# Patient Record
Sex: FEMALE | URBAN - METROPOLITAN AREA
[De-identification: names, ages, dates, MRNs, and addresses within clinical notes are randomized per-mention and may not be internally consistent; named-entity substitution may affect disease eponyms.]

---

## 2017-01-11 ENCOUNTER — INPATIENT (INPATIENT)
Facility: HOSPITAL | Age: 32
LOS: 1 days | Discharge: HOME | DRG: 314 | End: 2017-01-13
Attending: INTERNAL MEDICINE | Admitting: INTERNAL MEDICINE
Payer: MEDICAID

## 2017-01-11 VITALS
TEMPERATURE: 98 F | SYSTOLIC BLOOD PRESSURE: 152 MMHG | RESPIRATION RATE: 21 BRPM | HEART RATE: 95 BPM | OXYGEN SATURATION: 96 % | DIASTOLIC BLOOD PRESSURE: 106 MMHG

## 2017-01-11 DIAGNOSIS — R07.9 CHEST PAIN, UNSPECIFIED: ICD-10-CM

## 2017-01-11 DIAGNOSIS — Q24.9 CONGENITAL MALFORMATION OF HEART, UNSPECIFIED: Chronic | ICD-10-CM

## 2017-01-11 DIAGNOSIS — I63.9 CEREBRAL INFARCTION, UNSPECIFIED: ICD-10-CM

## 2017-01-11 DIAGNOSIS — E78.5 HYPERLIPIDEMIA, UNSPECIFIED: ICD-10-CM

## 2017-01-11 DIAGNOSIS — I10 ESSENTIAL (PRIMARY) HYPERTENSION: ICD-10-CM

## 2017-01-11 DIAGNOSIS — R11.10 VOMITING, UNSPECIFIED: ICD-10-CM

## 2017-01-11 DIAGNOSIS — K92.2 GASTROINTESTINAL HEMORRHAGE, UNSPECIFIED: ICD-10-CM

## 2017-01-11 DIAGNOSIS — I50.22 CHRONIC SYSTOLIC (CONGESTIVE) HEART FAILURE: ICD-10-CM

## 2017-01-11 LAB
ALBUMIN SERPL ELPH-MCNC: 3.3 G/DL — LOW (ref 3.4–5)
ALP SERPL-CCNC: 52 U/L — SIGNIFICANT CHANGE UP (ref 40–120)
ALT FLD-CCNC: 36 U/L — SIGNIFICANT CHANGE UP (ref 12–42)
ANION GAP SERPL CALC-SCNC: 2 MMOL/L — LOW (ref 9–16)
ANION GAP SERPL CALC-SCNC: 4 MMOL/L — LOW (ref 9–16)
ANION GAP SERPL CALC-SCNC: 5 MMOL/L — LOW (ref 9–16)
APPEARANCE UR: CLEAR — SIGNIFICANT CHANGE UP
APTT BLD: 27.9 SEC — SIGNIFICANT CHANGE UP (ref 27.5–37.4)
AST SERPL-CCNC: 78 U/L — HIGH (ref 15–37)
BACTERIA # UR AUTO: PRESENT /HPF
BASOPHILS NFR BLD AUTO: 0.9 % — SIGNIFICANT CHANGE UP (ref 0–2)
BILIRUB SERPL-MCNC: 1.3 MG/DL — HIGH (ref 0.2–1.2)
BILIRUB UR-MCNC: (no result)
BUN SERPL-MCNC: 15 MG/DL — SIGNIFICANT CHANGE UP (ref 7–23)
BUN SERPL-MCNC: 15 MG/DL — SIGNIFICANT CHANGE UP (ref 7–23)
BUN SERPL-MCNC: 16 MG/DL — SIGNIFICANT CHANGE UP (ref 7–23)
CALCIUM SERPL-MCNC: 8.2 MG/DL — LOW (ref 8.5–10.5)
CALCIUM SERPL-MCNC: 8.3 MG/DL — LOW (ref 8.5–10.5)
CALCIUM SERPL-MCNC: 8.3 MG/DL — LOW (ref 8.5–10.5)
CHLORIDE SERPL-SCNC: 96 MMOL/L — SIGNIFICANT CHANGE UP (ref 96–108)
CHLORIDE SERPL-SCNC: 97 MMOL/L — SIGNIFICANT CHANGE UP (ref 96–108)
CHLORIDE SERPL-SCNC: 98 MMOL/L — SIGNIFICANT CHANGE UP (ref 96–108)
CK MB CFR SERPL CALC: <1 NG/ML — SIGNIFICANT CHANGE UP (ref 0.5–3.6)
CK SERPL-CCNC: 118 U/L — SIGNIFICANT CHANGE UP (ref 26–192)
CK SERPL-CCNC: 36 U/L — SIGNIFICANT CHANGE UP (ref 26–192)
CK SERPL-CCNC: 47 U/L — SIGNIFICANT CHANGE UP (ref 26–192)
CO2 SERPL-SCNC: 38 MMOL/L — HIGH (ref 22–31)
CO2 SERPL-SCNC: 39 MMOL/L — HIGH (ref 22–31)
CO2 SERPL-SCNC: 39 MMOL/L — HIGH (ref 22–31)
COLOR SPEC: YELLOW — SIGNIFICANT CHANGE UP
CREAT SERPL-MCNC: 0.83 MG/DL — SIGNIFICANT CHANGE UP (ref 0.5–1.3)
CREAT SERPL-MCNC: 0.9 MG/DL — SIGNIFICANT CHANGE UP (ref 0.5–1.3)
CREAT SERPL-MCNC: 1 MG/DL — SIGNIFICANT CHANGE UP (ref 0.5–1.3)
D DIMER BLD IA.RAPID-MCNC: 491 NG/ML DDU — HIGH
DIFF PNL FLD: (no result)
EOSINOPHIL NFR BLD AUTO: 0.9 % — SIGNIFICANT CHANGE UP (ref 0–6)
EPI CELLS # UR: (no result) /HPF
GLUCOSE SERPL-MCNC: 123 MG/DL — HIGH (ref 70–99)
GLUCOSE SERPL-MCNC: 78 MG/DL — SIGNIFICANT CHANGE UP (ref 70–99)
GLUCOSE SERPL-MCNC: 83 MG/DL — SIGNIFICANT CHANGE UP (ref 70–99)
GLUCOSE UR QL: NEGATIVE — SIGNIFICANT CHANGE UP
HCG SERPL-ACNC: <1 MIU/ML — SIGNIFICANT CHANGE UP
HCT VFR BLD CALC: 52.5 % — HIGH (ref 34.5–45)
HCT VFR BLD CALC: 53 % — HIGH (ref 34.5–45)
HGB BLD-MCNC: 15.4 G/DL — SIGNIFICANT CHANGE UP (ref 11.5–15.5)
HGB BLD-MCNC: 15.5 G/DL — SIGNIFICANT CHANGE UP (ref 11.5–15.5)
HYALINE CASTS # UR AUTO: (no result) /LPF
INR BLD: 1.17 — HIGH (ref 0.88–1.16)
KETONES UR-MCNC: NEGATIVE — SIGNIFICANT CHANGE UP
LEUKOCYTE ESTERASE UR-ACNC: NEGATIVE — SIGNIFICANT CHANGE UP
LIDOCAIN IGE QN: 59 U/L — LOW (ref 73–393)
LYMPHOCYTES # BLD AUTO: 20.8 % — SIGNIFICANT CHANGE UP (ref 13–44)
MAGNESIUM SERPL-MCNC: 1.7 MG/DL — SIGNIFICANT CHANGE UP (ref 1.6–2.4)
MCHC RBC-ENTMCNC: 22.7 PG — LOW (ref 27–34)
MCHC RBC-ENTMCNC: 23.7 PG — LOW (ref 27–34)
MCHC RBC-ENTMCNC: 29.2 G/DL — LOW (ref 32–36)
MCHC RBC-ENTMCNC: 29.3 G/DL — LOW (ref 32–36)
MCV RBC AUTO: 77.4 FL — LOW (ref 80–100)
MCV RBC AUTO: 80.9 FL — SIGNIFICANT CHANGE UP (ref 80–100)
MONOCYTES NFR BLD AUTO: 9.7 % — SIGNIFICANT CHANGE UP (ref 2–14)
NEUTROPHILS NFR BLD AUTO: 67.7 % — SIGNIFICANT CHANGE UP (ref 43–77)
NITRITE UR-MCNC: NEGATIVE — SIGNIFICANT CHANGE UP
PH UR: 6 — SIGNIFICANT CHANGE UP (ref 4–8)
PLATELET # BLD AUTO: 118 K/UL — LOW (ref 150–400)
PLATELET # BLD AUTO: 146 K/UL — LOW (ref 150–400)
POTASSIUM SERPL-MCNC: 3.9 MMOL/L — SIGNIFICANT CHANGE UP (ref 3.5–5.3)
POTASSIUM SERPL-MCNC: 4.1 MMOL/L — SIGNIFICANT CHANGE UP (ref 3.5–5.3)
POTASSIUM SERPL-MCNC: 6.2 MMOL/L — CRITICAL HIGH (ref 3.5–5.3)
POTASSIUM SERPL-SCNC: 3.9 MMOL/L — SIGNIFICANT CHANGE UP (ref 3.5–5.3)
POTASSIUM SERPL-SCNC: 4.1 MMOL/L — SIGNIFICANT CHANGE UP (ref 3.5–5.3)
POTASSIUM SERPL-SCNC: 6.2 MMOL/L — CRITICAL HIGH (ref 3.5–5.3)
PROT SERPL-MCNC: 8.2 G/DL — SIGNIFICANT CHANGE UP (ref 6.4–8.2)
PROT UR-MCNC: 30 MG/DL
PROTHROM AB SERPL-ACNC: 13 SEC — SIGNIFICANT CHANGE UP (ref 10–13.1)
RBC # BLD: 6.55 M/UL — HIGH (ref 3.8–5.2)
RBC # BLD: 6.78 M/UL — HIGH (ref 3.8–5.2)
RBC # FLD: 22.9 % — HIGH (ref 10.3–16.9)
RBC # FLD: 23.9 % — HIGH (ref 10.3–16.9)
RBC CASTS # UR COMP ASSIST: < 5 /HPF — SIGNIFICANT CHANGE UP
SODIUM SERPL-SCNC: 137 MMOL/L — SIGNIFICANT CHANGE UP (ref 135–145)
SODIUM SERPL-SCNC: 140 MMOL/L — SIGNIFICANT CHANGE UP (ref 135–145)
SODIUM SERPL-SCNC: 141 MMOL/L — SIGNIFICANT CHANGE UP (ref 135–145)
SP GR SPEC: 1.02 — SIGNIFICANT CHANGE UP (ref 1–1.03)
TROPONIN I SERPL-MCNC: 0.14 NG/ML — HIGH (ref 0.01–0.04)
TROPONIN I SERPL-MCNC: 0.16 NG/ML — HIGH (ref 0.01–0.04)
TROPONIN I SERPL-MCNC: 0.18 NG/ML — HIGH (ref 0.01–0.04)
UROBILINOGEN FLD QL: 4 E.U./DL
WBC # BLD: 4.4 K/UL — SIGNIFICANT CHANGE UP (ref 3.8–10.5)
WBC # BLD: 6.1 K/UL — SIGNIFICANT CHANGE UP (ref 3.8–10.5)
WBC # FLD AUTO: 4.4 K/UL — SIGNIFICANT CHANGE UP (ref 3.8–10.5)
WBC # FLD AUTO: 6.1 K/UL — SIGNIFICANT CHANGE UP (ref 3.8–10.5)
WBC UR QL: < 5 /HPF — SIGNIFICANT CHANGE UP

## 2017-01-11 PROCEDURE — 71010: CPT | Mod: 26

## 2017-01-11 PROCEDURE — 93010 ELECTROCARDIOGRAM REPORT: CPT

## 2017-01-11 PROCEDURE — 99285 EMERGENCY DEPT VISIT HI MDM: CPT | Mod: 25

## 2017-01-11 RX ORDER — ASPIRIN/CALCIUM CARB/MAGNESIUM 324 MG
325 TABLET ORAL ONCE
Qty: 0 | Refills: 0 | Status: COMPLETED | OUTPATIENT
Start: 2017-01-11 | End: 2017-01-11

## 2017-01-11 RX ORDER — FUROSEMIDE 40 MG
60 TABLET ORAL ONCE
Qty: 0 | Refills: 0 | Status: COMPLETED | OUTPATIENT
Start: 2017-01-11 | End: 2017-01-11

## 2017-01-11 RX ORDER — ASPIRIN/CALCIUM CARB/MAGNESIUM 324 MG
81 TABLET ORAL DAILY
Qty: 0 | Refills: 0 | Status: DISCONTINUED | OUTPATIENT
Start: 2017-01-11 | End: 2017-01-12

## 2017-01-11 RX ORDER — FUROSEMIDE 40 MG
40 TABLET ORAL DAILY
Qty: 0 | Refills: 0 | Status: DISCONTINUED | OUTPATIENT
Start: 2017-01-12 | End: 2017-01-13

## 2017-01-11 RX ORDER — LISINOPRIL 2.5 MG/1
10 TABLET ORAL DAILY
Qty: 0 | Refills: 0 | Status: DISCONTINUED | OUTPATIENT
Start: 2017-01-11 | End: 2017-01-12

## 2017-01-11 RX ORDER — FAMOTIDINE 10 MG/ML
20 INJECTION INTRAVENOUS DAILY
Qty: 0 | Refills: 0 | Status: DISCONTINUED | OUTPATIENT
Start: 2017-01-11 | End: 2017-01-13

## 2017-01-11 RX ORDER — AMLODIPINE BESYLATE 2.5 MG/1
5 TABLET ORAL DAILY
Qty: 0 | Refills: 0 | Status: DISCONTINUED | OUTPATIENT
Start: 2017-01-11 | End: 2017-01-13

## 2017-01-11 RX ORDER — SODIUM CHLORIDE 9 MG/ML
1000 INJECTION INTRAMUSCULAR; INTRAVENOUS; SUBCUTANEOUS ONCE
Qty: 0 | Refills: 0 | Status: COMPLETED | OUTPATIENT
Start: 2017-01-11 | End: 2017-01-11

## 2017-01-11 RX ORDER — PANTOPRAZOLE SODIUM 20 MG/1
40 TABLET, DELAYED RELEASE ORAL
Qty: 0 | Refills: 0 | Status: DISCONTINUED | OUTPATIENT
Start: 2017-01-11 | End: 2017-01-13

## 2017-01-11 RX ORDER — ONDANSETRON 8 MG/1
4 TABLET, FILM COATED ORAL ONCE
Qty: 0 | Refills: 0 | Status: DISCONTINUED | OUTPATIENT
Start: 2017-01-11 | End: 2017-01-13

## 2017-01-11 RX ORDER — FAMOTIDINE 10 MG/ML
20 INJECTION INTRAVENOUS ONCE
Qty: 0 | Refills: 0 | Status: COMPLETED | OUTPATIENT
Start: 2017-01-11 | End: 2017-01-11

## 2017-01-11 RX ORDER — ATORVASTATIN CALCIUM 80 MG/1
10 TABLET, FILM COATED ORAL AT BEDTIME
Qty: 0 | Refills: 0 | Status: DISCONTINUED | OUTPATIENT
Start: 2017-01-11 | End: 2017-01-13

## 2017-01-11 RX ORDER — ONDANSETRON 8 MG/1
4 TABLET, FILM COATED ORAL ONCE
Qty: 0 | Refills: 0 | Status: COMPLETED | OUTPATIENT
Start: 2017-01-11 | End: 2017-01-11

## 2017-01-11 RX ADMIN — Medication 325 MILLIGRAM(S): at 15:10

## 2017-01-11 RX ADMIN — ONDANSETRON 4 MILLIGRAM(S): 8 TABLET, FILM COATED ORAL at 13:00

## 2017-01-11 RX ADMIN — Medication 60 MILLIGRAM(S): at 15:10

## 2017-01-11 RX ADMIN — Medication 30 MILLILITER(S): at 13:04

## 2017-01-11 RX ADMIN — FAMOTIDINE 20 MILLIGRAM(S): 10 INJECTION INTRAVENOUS at 13:04

## 2017-01-11 RX ADMIN — ATORVASTATIN CALCIUM 10 MILLIGRAM(S): 80 TABLET, FILM COATED ORAL at 22:22

## 2017-01-11 RX ADMIN — SODIUM CHLORIDE 1000 MILLILITER(S): 9 INJECTION INTRAMUSCULAR; INTRAVENOUS; SUBCUTANEOUS at 13:00

## 2017-01-11 NOTE — H&P ADULT. - ATTENDING COMMENTS
-pt seen and examined. Agree with PA note  -pt has multiple self-reported medical problems, and provides an unreliable history of details, currently states she is visiting here from [place]. She presented with complaints of a poorly described chest pain syndrome as well as some vague nausea/vomiting.  EKG revealed SR with nonspecific changes, There is a small noted elevation in troponin as well as pro-BNP. Echocardiogram notable for probable hypertrophic cardiomyopaty with LATASHA and no outflow gradient.  She was diuresed gently for mild volume overload (cautiously not to drop preload). CE minimally elevated, with no significant increase. Will r/o DVT given prior DVT. She appears quite comfortable clinically.  Will need improved psychosocial care in the outpt setting when she returns to [place]. Will stop lisinopril given of child-bearing age. Start toprol xl 25mg daily,

## 2017-01-11 NOTE — H&P ADULT. - PROBLEM SELECTOR PLAN 1
-currently CP free, S/P  in ED, continue ASA 81  -EKG nonischemic, serial EKGs  -troponin 0.14 to 0.183 continue to trend CE

## 2017-01-11 NOTE — H&P ADULT. - GASTROINTESTINAL DETAILS
no distention/no masses palpable/no guarding/normal/no rebound tenderness/soft/no rigidity/nontender

## 2017-01-11 NOTE — H&P ADULT. - PMH
Afib    Chronic systolic congestive heart failure    DVT (deep venous thrombosis)    GIB (gastrointestinal bleeding)    HLD (hyperlipidemia)    HTN (hypertension)    Myocardial infarction

## 2017-01-11 NOTE — H&P ADULT. - NEGATIVE NEUROLOGICAL SYMPTOMS
no loss of sensation/no headache/no vertigo/no syncope/no difficulty walking/no loss of consciousness/no weakness

## 2017-01-11 NOTE — H&P ADULT. - PROBLEM SELECTOR PLAN 2
-diuresing well s/p Lasix 60 mg IV, continue Lasix 40 mg PO QD starting tomorrow  -strict I&Os, daily weights  -CXR revealing congestion  -echo pending  -Continue Lisinopril 10 mg PO QD, consider increasing  -Metolazone 2.5 mg PO QD

## 2017-01-11 NOTE — ED PROVIDER NOTE - OBJECTIVE STATEMENT
30 yo female, very poor historian, presents with CP and SOB with GAYTAN X 2 days. Also c/o multiple episodes of nbnb emesis. Pt states that she has had a "heart attack and stroke" in the past and has hx of HTN[place] any [place] her medications. Also admits to having fluid on her lungs. Denies fevers. (+) dry cough. (+) epigastric abd pain.

## 2017-01-11 NOTE — H&P ADULT. - PROBLEM SELECTOR PLAN 7
-Continue Norvasc 5 mg PO QD, Lisinopril 10  -Continue to monitor closely  -F/U echo to indicate chronicity of HTN

## 2017-01-11 NOTE — H&P ADULT. - ASSESSMENT
Pt is a 32 yo F [employer], extremely poor historian, noncompliant, visiting from [place] PMHx HTN, HLD, CHF (UK EF), ?paroxysmal afib (reports during pregnancy), ?h/o CVA (reports "loss of speech" 9/16, unsure treatment), DVT in 2008 (s/p Coumadin last dose 2009), h/o UGIB s/p endoscopy, MI in 2008 (s/p med mgmt), ?h/o congential heart disease ([place] heart surgery as child) presented to [facility] ED 1/11/17 c/o 2-3 day history of vomiting, SOB, and chest pressure.  Pt will be admitted to [facility] for telemetry, R/O ACS, and likely acute on chronic CHF management in the setting of possible viral illness.

## 2017-01-11 NOTE — ED PROVIDER NOTE - MEDICAL DECISION MAKING DETAILS
Pt with hx of ?CHF/ CAD/ stroke (poor historian), presents with CP/SOB and N/V. Cardiac enzymes and BNP elevated. Pt admitted to cardiac tele for further evaluation and management.

## 2017-01-11 NOTE — H&P ADULT. - HISTORY OF PRESENT ILLNESS
Pt is a 30 yo F [employer], extremely poor historian, noncompliant, visiting from [place] PMHx HTN, HLD, CHF (UK EF), ?paroxysmal afib (reports during pregnancy), ?h/o CVA (reports "loss of speech" 9/16, unsure treatment), DVT in 2008 (s/p Coumadin last dose 2009), h/o UGIB s/p endoscopy, MI in 2008 (s/p med mgmt), ?h/o congential heart disease ([place] heart surgery as child) presented to [facility] ED 1/11/17 c/o 2-3 day history of vomiting, SOB, and chest pressure. Patient states that a few days ago she started feeling fatigued, SOB after 1 flight of stairs, and nausea with clear vomitus. She reports substernal chest pressure at rest not relieved by anything. She also notes intermittent LE edema. Pt reports being hospitalized in [place] multiple times for similar complaints where she was told that she had congestive heart failure. She also admits to not taking her medications recently as she is visiting [place] since 12/26 and did not bring them. Pt denies any syncope, LOC, lightheadedness, dizziness, weakness, visual changes, numbness/tingling, diarrhea, constipation, hematemesis, hematochezia, melena, LE claudication, fever/chills, urinary frequency, dysuria, hematuria. Denies flu vaccine or sick contacts. Upon arrival to [facility] ED /106, RR 21, O2 96% on RA, afebrile, no leukocytosis. Troponin 0.14, BNP 3886, CXR revealing congestion, EKG with nonsp ST changes, atrial enlargement. Pt given Maalox, Zofran, , Pepcid, Lasix 60mg IV once. Pt will be admitted to [facility] for telemetry, R/O ACS, and likely acute on chronic CHF management in the setting of possible viral illness.     *Recent Hospitalizations at [place], [phone], Pharmacy [facility] [phone] Pt is a 32 yo F [employer], extremely poor historian, noncompliant, visiting from [place] PMHx HTN, HLD, CHF (UK EF), ?paroxysmal afib (reports during pregnancy), ?h/o CVA (reports "loss of speech" 9/16, unsure treatment), DVT in 2008 (s/p Coumadin last dose 2009), h/o UGIB s/p endoscopy, ? MI in 2008 (s/p med mgmt), ?h/o congential heart disease ([place] heart surgery as child) presented to [facility] ED 1/11/17 c/o 2-3 day history of vomiting, SOB, and chest pressure. Patient states that a few days ago she started feeling fatigued, SOB after 1 flight of stairs, and nausea with clear vomitus. She reports substernal chest pressure at rest not relieved by anything. She also notes intermittent LE edema. Pt reports being hospitalized in [place] multiple times for similar complaints where she was told that she had congestive heart failure. She also admits to not taking her medications recently as she is visiting [place] since 12/26 and did not bring them. Pt denies any syncope, LOC, lightheadedness, dizziness, weakness, visual changes, numbness/tingling, diarrhea, constipation, hematemesis, hematochezia, melena, LE claudication, fever/chills, urinary frequency, dysuria, hematuria. Denies flu vaccine or sick contacts. Upon arrival to [facility] ED /106, RR 21, O2 96% on RA, afebrile, no leukocytosis. Troponin 0.14, BNP 3886, CXR revealing congestion, EKG with nonsp ST changes, atrial enlargement. Pt given Maalox, Zofran, , Pepcid, Lasix 60mg IV once. Pt will be admitted to [facility] for telemetry, R/O ACS, and likely acute on chronic CHF management in the setting of possible viral illness.     *Recent Hospitalizations at [facility], [place], [place], [phone], Pharmacy [facility] [phone]

## 2017-01-11 NOTE — ED PROVIDER NOTE - PROGRESS NOTE DETAILS
Pt with mildly elevated cardiac enzymeGIven IV lasix and admitted to cardiology for further evaluation.

## 2017-01-11 NOTE — H&P ADULT. - PROBLEM SELECTOR PLAN 8
-? history of CVA, reports "loss of speech" in September 2016 and cannot remember tx  -obtaining records from [facility]  -no focal deficits, CN II-XII grossly intact, no weakness/dysarthria   -monitor closely, consider CT head

## 2017-01-11 NOTE — ED ADULT NURSE NOTE - OBJECTIVE STATEMENT
pt is a 30 y/o female c/o intermittent midsternal chest pain that "has been going on for a while" but became worse today. pt also c/o one episode of vomiting today that was clear and watery. PMH CHF and HTN, pt took daily medications this morning as prescribed. no other prior tx. pt denies any SOB, jaw pain, arm pain, headache, palpitations, fever, chills, diarrhea, urinary symptoms. EKG done and MD reviewed. pt reports recent travel from [place] by bus. pt also has an IUD. pt is a 30 y/o female c/o intermittent midsternal chest pain that "has been going on for a while" but became worse today. pt also c/o one episode of vomiting today that was clear and watery. PMH CHF and HTN, pt took daily medications this morning as prescribed. no other prior tx. pt denies any SOB, jaw pain, arm pain, headache, palpitations, fever, chills, diarrhea, urinary symptoms. bilateral pedal edema +1.  EKG done and MD reviewed. pt reports recent travel from [place] by bus. pt also has an IUD.

## 2017-01-11 NOTE — H&P ADULT. - NEUROLOGICAL DETAILS
sensation intact/alert and oriented x 3/responds to verbal commands/cranial nerves intact/deep reflexes hyperactive/normal strength

## 2017-01-12 DIAGNOSIS — I50.32 CHRONIC DIASTOLIC (CONGESTIVE) HEART FAILURE: ICD-10-CM

## 2017-01-12 LAB
ALBUMIN SERPL ELPH-MCNC: 3 G/DL — LOW (ref 3.4–5)
ALBUMIN SERPL ELPH-MCNC: 3.3 G/DL — LOW (ref 3.4–5)
ALP SERPL-CCNC: 53 U/L — SIGNIFICANT CHANGE UP (ref 40–120)
ALP SERPL-CCNC: 61 U/L — SIGNIFICANT CHANGE UP (ref 40–120)
ALT FLD-CCNC: 28 U/L — SIGNIFICANT CHANGE UP (ref 12–42)
ALT FLD-CCNC: 31 U/L — SIGNIFICANT CHANGE UP (ref 12–42)
ANION GAP SERPL CALC-SCNC: 5 MMOL/L — LOW (ref 9–16)
ANION GAP SERPL CALC-SCNC: 7 MMOL/L — LOW (ref 9–16)
APTT BLD: 28.3 SEC — SIGNIFICANT CHANGE UP (ref 27.5–37.4)
AST SERPL-CCNC: 19 U/L — SIGNIFICANT CHANGE UP (ref 15–37)
AST SERPL-CCNC: 28 U/L — SIGNIFICANT CHANGE UP (ref 15–37)
BASE EXCESS BLDV CALC-SCNC: 11.6 MMOL/L — SIGNIFICANT CHANGE UP
BILIRUB SERPL-MCNC: 1.2 MG/DL — SIGNIFICANT CHANGE UP (ref 0.2–1.2)
BILIRUB SERPL-MCNC: 1.3 MG/DL — HIGH (ref 0.2–1.2)
BUN SERPL-MCNC: 16 MG/DL — SIGNIFICANT CHANGE UP (ref 7–23)
BUN SERPL-MCNC: 18 MG/DL — SIGNIFICANT CHANGE UP (ref 7–23)
CALCIUM SERPL-MCNC: 8.6 MG/DL — SIGNIFICANT CHANGE UP (ref 8.5–10.5)
CALCIUM SERPL-MCNC: 8.8 MG/DL — SIGNIFICANT CHANGE UP (ref 8.5–10.5)
CHLORIDE SERPL-SCNC: 95 MMOL/L — LOW (ref 96–108)
CHLORIDE SERPL-SCNC: 97 MMOL/L — SIGNIFICANT CHANGE UP (ref 96–108)
CHOLEST SERPL-MCNC: 122 MG/DL — SIGNIFICANT CHANGE UP
CK MB CFR SERPL CALC: <1 NG/ML — SIGNIFICANT CHANGE UP (ref 0.5–3.6)
CK SERPL-CCNC: 25 U/L — LOW (ref 26–192)
CO2 SERPL-SCNC: 36 MMOL/L — HIGH (ref 22–31)
CO2 SERPL-SCNC: 37 MMOL/L — HIGH (ref 22–31)
CREAT SERPL-MCNC: 0.94 MG/DL — SIGNIFICANT CHANGE UP (ref 0.5–1.3)
CREAT SERPL-MCNC: 0.96 MG/DL — SIGNIFICANT CHANGE UP (ref 0.5–1.3)
GAS PNL BLDV: SIGNIFICANT CHANGE UP
GLUCOSE SERPL-MCNC: 130 MG/DL — HIGH (ref 70–99)
GLUCOSE SERPL-MCNC: 141 MG/DL — HIGH (ref 70–99)
HBA1C BLD-MCNC: 6.4 % — HIGH (ref 4.8–5.6)
HCO3 BLDV-SCNC: 42 MMOL/L — HIGH (ref 20–27)
HCT VFR BLD CALC: 51.8 % — HIGH (ref 34.5–45)
HDLC SERPL-MCNC: 34 MG/DL — LOW
HGB BLD-MCNC: 15.5 G/DL — SIGNIFICANT CHANGE UP (ref 11.5–15.5)
INR BLD: 1.35 — HIGH (ref 0.88–1.16)
LACTATE SERPL-SCNC: 1.8 MMOL/L — SIGNIFICANT CHANGE UP (ref 0.5–2)
LIPID PNL WITH DIRECT LDL SERPL: 66 MG/DL — SIGNIFICANT CHANGE UP
MAGNESIUM SERPL-MCNC: 1.7 MG/DL — SIGNIFICANT CHANGE UP (ref 1.6–2.4)
MCHC RBC-ENTMCNC: 23.8 PG — LOW (ref 27–34)
MCHC RBC-ENTMCNC: 29.9 G/DL — LOW (ref 32–36)
MCV RBC AUTO: 79.6 FL — LOW (ref 80–100)
NT-PROBNP SERPL-SCNC: 4075 PG/ML — HIGH
PCO2 BLDV: 80 MMHG — HIGH (ref 41–51)
PCP SPEC-MCNC: SIGNIFICANT CHANGE UP
PH BLDV: 7.34 — SIGNIFICANT CHANGE UP (ref 7.32–7.43)
PLATELET # BLD AUTO: 120 K/UL — LOW (ref 150–400)
PO2 BLDV: 30 MMHG — SIGNIFICANT CHANGE UP
POTASSIUM SERPL-MCNC: 3.9 MMOL/L — SIGNIFICANT CHANGE UP (ref 3.5–5.3)
POTASSIUM SERPL-MCNC: 4.1 MMOL/L — SIGNIFICANT CHANGE UP (ref 3.5–5.3)
POTASSIUM SERPL-SCNC: 3.9 MMOL/L — SIGNIFICANT CHANGE UP (ref 3.5–5.3)
POTASSIUM SERPL-SCNC: 4.1 MMOL/L — SIGNIFICANT CHANGE UP (ref 3.5–5.3)
PROT SERPL-MCNC: 7.3 G/DL — SIGNIFICANT CHANGE UP (ref 6.4–8.2)
PROT SERPL-MCNC: 7.9 G/DL — SIGNIFICANT CHANGE UP (ref 6.4–8.2)
PROTHROM AB SERPL-ACNC: 15 SEC — HIGH (ref 10–13.1)
RBC # BLD: 6.51 M/UL — HIGH (ref 3.8–5.2)
RBC # FLD: 23 % — HIGH (ref 10.3–16.9)
SAO2 % BLDV: 50 % — SIGNIFICANT CHANGE UP
SODIUM SERPL-SCNC: 137 MMOL/L — SIGNIFICANT CHANGE UP (ref 135–145)
SODIUM SERPL-SCNC: 140 MMOL/L — SIGNIFICANT CHANGE UP (ref 135–145)
TOTAL CHOLESTEROL/HDL RATIO MEASUREMENT: 3.6 RATIO — SIGNIFICANT CHANGE UP
TRIGL SERPL-MCNC: 111 MG/DL — SIGNIFICANT CHANGE UP
TROPONIN I SERPL-MCNC: 0.06 NG/ML — HIGH (ref 0.01–0.04)
TROPONIN I SERPL-MCNC: 0.07 NG/ML — HIGH (ref 0.01–0.04)
TROPONIN I SERPL-MCNC: 0.12 NG/ML — HIGH (ref 0.01–0.04)
TSH SERPL-MCNC: 1.62 UIU/ML — SIGNIFICANT CHANGE UP (ref 0.35–4.94)
WBC # BLD: 4.4 K/UL — SIGNIFICANT CHANGE UP (ref 3.8–10.5)
WBC # FLD AUTO: 4.4 K/UL — SIGNIFICANT CHANGE UP (ref 3.8–10.5)

## 2017-01-12 PROCEDURE — 93970 EXTREMITY STUDY: CPT | Mod: 26

## 2017-01-12 PROCEDURE — 93306 TTE W/DOPPLER COMPLETE: CPT | Mod: 26

## 2017-01-12 PROCEDURE — 70450 CT HEAD/BRAIN W/O DYE: CPT | Mod: 26

## 2017-01-12 RX ORDER — ASPIRIN/CALCIUM CARB/MAGNESIUM 324 MG
81 TABLET ORAL DAILY
Qty: 0 | Refills: 0 | Status: DISCONTINUED | OUTPATIENT
Start: 2017-01-12 | End: 2017-01-13

## 2017-01-12 RX ORDER — METOPROLOL TARTRATE 50 MG
25 TABLET ORAL
Qty: 0 | Refills: 0 | Status: DISCONTINUED | OUTPATIENT
Start: 2017-01-12 | End: 2017-01-13

## 2017-01-12 RX ORDER — SODIUM CHLORIDE 9 MG/ML
1000 INJECTION INTRAMUSCULAR; INTRAVENOUS; SUBCUTANEOUS
Qty: 0 | Refills: 0 | Status: DISCONTINUED | OUTPATIENT
Start: 2017-01-12 | End: 2017-01-13

## 2017-01-12 RX ORDER — FUROSEMIDE 40 MG
40 TABLET ORAL ONCE
Qty: 0 | Refills: 0 | Status: COMPLETED | OUTPATIENT
Start: 2017-01-12 | End: 2017-01-12

## 2017-01-12 RX ORDER — MAGNESIUM SULFATE 500 MG/ML
2 VIAL (ML) INJECTION ONCE
Qty: 0 | Refills: 0 | Status: COMPLETED | OUTPATIENT
Start: 2017-01-12 | End: 2017-01-12

## 2017-01-12 RX ORDER — METOPROLOL TARTRATE 50 MG
25 TABLET ORAL DAILY
Qty: 0 | Refills: 0 | Status: DISCONTINUED | OUTPATIENT
Start: 2017-01-12 | End: 2017-01-12

## 2017-01-12 RX ADMIN — LISINOPRIL 10 MILLIGRAM(S): 2.5 TABLET ORAL at 06:53

## 2017-01-12 RX ADMIN — Medication 25 MILLIGRAM(S): at 17:03

## 2017-01-12 RX ADMIN — Medication 40 MILLIGRAM(S): at 06:52

## 2017-01-12 RX ADMIN — PANTOPRAZOLE SODIUM 40 MILLIGRAM(S): 20 TABLET, DELAYED RELEASE ORAL at 06:53

## 2017-01-12 RX ADMIN — SODIUM CHLORIDE 75 MILLILITER(S): 9 INJECTION INTRAMUSCULAR; INTRAVENOUS; SUBCUTANEOUS at 17:03

## 2017-01-12 RX ADMIN — AMLODIPINE BESYLATE 5 MILLIGRAM(S): 2.5 TABLET ORAL at 06:53

## 2017-01-12 RX ADMIN — Medication 50 GRAM(S): at 17:21

## 2017-01-12 RX ADMIN — Medication 40 MILLIGRAM(S): at 11:24

## 2017-01-12 RX ADMIN — Medication 81 MILLIGRAM(S): at 17:16

## 2017-01-12 RX ADMIN — ATORVASTATIN CALCIUM 10 MILLIGRAM(S): 80 TABLET, FILM COATED ORAL at 21:03

## 2017-01-12 NOTE — PROGRESS NOTE ADULT - PROBLEM SELECTOR PLAN 8
-? history of CVA, reports "loss of speech" in September 2016 and cannot remember tx  -obtaining records from [facility]  -no focal deficits, CN II-XII grossly intact, no weakness/dysarthria   -monitor closely, consider CT head -? history of CVA, reports "loss of speech" in September 2016 and cannot remember tx   - Rapid response was called this AM 2/2 unreponsiveness. Pt back to baseline A+ O x3. CT head STAT showed no acute intracranial abnormality. Specifically, no acute intracranial hemorrhage, mass effect or recent transcortical or territorial infarction.     -no focal deficits, CN II-XII grossly intact, no weakness/dysarthria   - Will continue to monitor.

## 2017-01-12 NOTE — PROGRESS NOTE ADULT - PROBLEM SELECTOR PLAN 6
-?h/o GIB, obtaining records, monitor H&H on ASA 81 -?h/o GIB  - H/H stable. Will con't to trend. No signs of active bleeding.

## 2017-01-12 NOTE — PROGRESS NOTE ADULT - PROBLEM SELECTOR PLAN 3
-h/o DVT, add on D-dimer f/u  -LE dopplers  -Consider CTA PE protocol -h/o DVT  ddimer elevated at 491. LE duplex US - neg for DVT.   No CTA PE protocol per Dr. Marc. Unlikely to have PE.

## 2017-01-12 NOTE — PROGRESS NOTE ADULT - PROBLEM SELECTOR PLAN 1
-currently CP free, S/P  in ED, continue ASA 81  -EKG nonischemic, serial EKGs  -troponin 0.14 to 0.183 continue to trend CE -currently CP free, S/P  in ED, continue ASA 81  -troponin 0.14 to 0.183   - ECHO today revealed EF 60%, severe asymmetric septal hypertrophy. Basal anteroseptum measures 2.0 cm, no valvular disease.  - Started Lopressor 25mg Q12hrs.   Of note, pt says she can't swallow pills. Dysphagia screen - normal.

## 2017-01-12 NOTE — PROGRESS NOTE ADULT - PROBLEM SELECTOR PLAN 2
-diuresing well s/p Lasix 60 mg IV, continue Lasix 40 mg PO QD starting tomorrow  -strict I&Os, daily weights  -CXR revealing congestion  -echo pending  -Continue Lisinopril 10 mg PO QD, consider increasing  -Metolazone 2.5 mg PO QD - Received another lasix 40mg IV x1. Cont' Lasix 40 mg PO QD. Con't Metolazone 2.5 mg PO QDD/c'd lisinopril.   -strict I&Os, daily weights  -CXR revealing congestion

## 2017-01-12 NOTE — PROGRESS NOTE ADULT - ASSESSMENT
32 yo F [employer], extremely poor historian, noncompliant, visiting from [place] PMHx HTN, HLD, ?paroxysmal afib (reports during pregnancy), ?h/o CVA (reports "loss of speech" 9/16, unsure treatment), DVT in 2008 (s/p Coumadin last dose 2009), h/o UGIB s/p endoscopy, MI in 2008 (s/p med mgmt), who is admitted to [facility] for telemetry, R/O ACS, and likely acute on chronic CHF management in the setting of possible viral illness. 32 yo F [employer], extremely poor historian, noncompliant, visiting from [place] PMHx HTN, HLD, ?paroxysmal afib (reports during pregnancy), ?h/o CVA (reports "loss of speech" 9/16, unsure treatment), DVT in 2008 (s/p Coumadin last dose 2009), h/o UGIB s/p endoscopy, MI in 2008 (s/p med mgmt), who is admitted to [facility] for telemetry, R/O ACS, and acute on chronic CHF management.

## 2017-01-12 NOTE — PROGRESS NOTE ADULT - PROBLEM SELECTOR PLAN 4
-Continue PPI and Zofran PRN nausea  -AST/ALT 52/36, lipase 59  -continue to trend, consider abd US -Continue PPI and Zofran PRN nausea  -AST/ALT 52/36, lipase 59  -continue to trend

## 2017-01-12 NOTE — PROGRESS NOTE ADULT - PROBLEM SELECTOR PLAN 7
-Continue Norvasc 5 mg PO QD, Lisinopril 10  -Continue to monitor closely  -F/U echo to indicate chronicity of HTN -Continue Norvasc 5 mg PO QD and lopressor   -Continue to monitor closely

## 2017-01-12 NOTE — PROGRESS NOTE ADULT - PROBLEM SELECTOR PLAN 9
? MICHAEL, pt is obese and seems more tired than usual. Will start a trial of CPAP tonight.   Will monitor O2 at night.

## 2017-01-13 VITALS — TEMPERATURE: 98 F

## 2017-01-13 DIAGNOSIS — F32.9 MAJOR DEPRESSIVE DISORDER, SINGLE EPISODE, UNSPECIFIED: ICD-10-CM

## 2017-01-13 DIAGNOSIS — F43.21 ADJUSTMENT DISORDER WITH DEPRESSED MOOD: ICD-10-CM

## 2017-01-13 LAB
ANION GAP SERPL CALC-SCNC: 4 MMOL/L — LOW (ref 9–16)
BUN SERPL-MCNC: 17 MG/DL — SIGNIFICANT CHANGE UP (ref 7–23)
CALCIUM SERPL-MCNC: 8.9 MG/DL — SIGNIFICANT CHANGE UP (ref 8.5–10.5)
CHLORIDE SERPL-SCNC: 92 MMOL/L — LOW (ref 96–108)
CO2 SERPL-SCNC: 43 MMOL/L — HIGH (ref 22–31)
CREAT SERPL-MCNC: 0.97 MG/DL — SIGNIFICANT CHANGE UP (ref 0.5–1.3)
GLUCOSE SERPL-MCNC: 142 MG/DL — HIGH (ref 70–99)
HBA1C BLD-MCNC: 6.5 % — HIGH (ref 4.8–5.6)
HCT VFR BLD CALC: 55.6 % — HIGH (ref 34.5–45)
HGB BLD-MCNC: 16.5 G/DL — HIGH (ref 11.5–15.5)
MAGNESIUM SERPL-MCNC: 2.1 MG/DL — SIGNIFICANT CHANGE UP (ref 1.6–2.4)
MCHC RBC-ENTMCNC: 24.3 PG — LOW (ref 27–34)
MCHC RBC-ENTMCNC: 29.7 G/DL — LOW (ref 32–36)
MCV RBC AUTO: 82 FL — SIGNIFICANT CHANGE UP (ref 80–100)
PLATELET # BLD AUTO: 138 K/UL — LOW (ref 150–400)
POTASSIUM SERPL-MCNC: 3.6 MMOL/L — SIGNIFICANT CHANGE UP (ref 3.5–5.3)
POTASSIUM SERPL-SCNC: 3.6 MMOL/L — SIGNIFICANT CHANGE UP (ref 3.5–5.3)
RBC # BLD: 6.78 M/UL — HIGH (ref 3.8–5.2)
RBC # FLD: 23.3 % — HIGH (ref 10.3–16.9)
SODIUM SERPL-SCNC: 139 MMOL/L — SIGNIFICANT CHANGE UP (ref 135–145)
T3FREE SERPL-MCNC: 2.54 PG/ML — SIGNIFICANT CHANGE UP (ref 1.71–3.71)
T4 FREE SERPL-MCNC: 1.05 NG/DL — SIGNIFICANT CHANGE UP (ref 0.7–1.48)
TSH SERPL-MCNC: 1.65 UIU/ML — SIGNIFICANT CHANGE UP (ref 0.35–4.94)
WBC # BLD: 7.1 K/UL — SIGNIFICANT CHANGE UP (ref 3.8–10.5)
WBC # FLD AUTO: 7.1 K/UL — SIGNIFICANT CHANGE UP (ref 3.8–10.5)

## 2017-01-13 PROCEDURE — 83880 ASSAY OF NATRIURETIC PEPTIDE: CPT

## 2017-01-13 PROCEDURE — 85027 COMPLETE CBC AUTOMATED: CPT

## 2017-01-13 PROCEDURE — 80048 BASIC METABOLIC PNL TOTAL CA: CPT

## 2017-01-13 PROCEDURE — 84439 ASSAY OF FREE THYROXINE: CPT

## 2017-01-13 PROCEDURE — 99223 1ST HOSP IP/OBS HIGH 75: CPT

## 2017-01-13 PROCEDURE — 81001 URINALYSIS AUTO W/SCOPE: CPT

## 2017-01-13 PROCEDURE — 83735 ASSAY OF MAGNESIUM: CPT

## 2017-01-13 PROCEDURE — 96375 TX/PRO/DX INJ NEW DRUG ADDON: CPT

## 2017-01-13 PROCEDURE — 82550 ASSAY OF CK (CPK): CPT

## 2017-01-13 PROCEDURE — 70450 CT HEAD/BRAIN W/O DYE: CPT

## 2017-01-13 PROCEDURE — 93306 TTE W/DOPPLER COMPLETE: CPT

## 2017-01-13 PROCEDURE — 80061 LIPID PANEL: CPT

## 2017-01-13 PROCEDURE — 82803 BLOOD GASES ANY COMBINATION: CPT

## 2017-01-13 PROCEDURE — 99285 EMERGENCY DEPT VISIT HI MDM: CPT | Mod: 25

## 2017-01-13 PROCEDURE — 84484 ASSAY OF TROPONIN QUANT: CPT

## 2017-01-13 PROCEDURE — 93970 EXTREMITY STUDY: CPT

## 2017-01-13 PROCEDURE — 94660 CPAP INITIATION&MGMT: CPT

## 2017-01-13 PROCEDURE — 82553 CREATINE MB FRACTION: CPT

## 2017-01-13 PROCEDURE — 85730 THROMBOPLASTIN TIME PARTIAL: CPT

## 2017-01-13 PROCEDURE — 85610 PROTHROMBIN TIME: CPT

## 2017-01-13 PROCEDURE — 96374 THER/PROPH/DIAG INJ IV PUSH: CPT

## 2017-01-13 PROCEDURE — 83036 HEMOGLOBIN GLYCOSYLATED A1C: CPT

## 2017-01-13 PROCEDURE — 83690 ASSAY OF LIPASE: CPT

## 2017-01-13 PROCEDURE — 80307 DRUG TEST PRSMV CHEM ANLYZR: CPT

## 2017-01-13 PROCEDURE — 84443 ASSAY THYROID STIM HORMONE: CPT

## 2017-01-13 PROCEDURE — 80053 COMPREHEN METABOLIC PANEL: CPT

## 2017-01-13 PROCEDURE — 81003 URINALYSIS AUTO W/O SCOPE: CPT

## 2017-01-13 PROCEDURE — 84702 CHORIONIC GONADOTROPIN TEST: CPT

## 2017-01-13 PROCEDURE — 85379 FIBRIN DEGRADATION QUANT: CPT

## 2017-01-13 PROCEDURE — 71045 X-RAY EXAM CHEST 1 VIEW: CPT

## 2017-01-13 PROCEDURE — 36415 COLL VENOUS BLD VENIPUNCTURE: CPT

## 2017-01-13 PROCEDURE — 93005 ELECTROCARDIOGRAM TRACING: CPT

## 2017-01-13 PROCEDURE — 85025 COMPLETE CBC W/AUTO DIFF WBC: CPT

## 2017-01-13 PROCEDURE — 84481 FREE ASSAY (FT-3): CPT

## 2017-01-13 PROCEDURE — 83605 ASSAY OF LACTIC ACID: CPT

## 2017-01-13 RX ORDER — LISINOPRIL 2.5 MG/1
1 TABLET ORAL
Qty: 0 | Refills: 0 | COMMUNITY

## 2017-01-13 RX ORDER — FUROSEMIDE 40 MG
1 TABLET ORAL
Qty: 30 | Refills: 0 | OUTPATIENT
Start: 2017-01-13 | End: 2017-02-12

## 2017-01-13 RX ORDER — AMLODIPINE BESYLATE 2.5 MG/1
1 TABLET ORAL
Qty: 0 | Refills: 0 | COMMUNITY

## 2017-01-13 RX ORDER — FUROSEMIDE 40 MG
1 TABLET ORAL
Qty: 0 | Refills: 0 | COMMUNITY

## 2017-01-13 RX ORDER — METOPROLOL TARTRATE 50 MG
1 TABLET ORAL
Qty: 60 | Refills: 3 | OUTPATIENT
Start: 2017-01-13 | End: 2017-05-12

## 2017-01-13 RX ORDER — AMLODIPINE BESYLATE 2.5 MG/1
1 TABLET ORAL
Qty: 30 | Refills: 3 | OUTPATIENT
Start: 2017-01-13 | End: 2017-05-12

## 2017-01-13 RX ORDER — ASPIRIN/CALCIUM CARB/MAGNESIUM 324 MG
1 TABLET ORAL
Qty: 0 | Refills: 0 | COMMUNITY
Start: 2017-01-13

## 2017-01-13 RX ORDER — ATORVASTATIN CALCIUM 80 MG/1
1 TABLET, FILM COATED ORAL
Qty: 30 | Refills: 3 | OUTPATIENT
Start: 2017-01-13 | End: 2017-05-12

## 2017-01-13 RX ORDER — RANITIDINE HYDROCHLORIDE 150 MG/1
1 TABLET, FILM COATED ORAL
Qty: 30 | Refills: 3 | OUTPATIENT
Start: 2017-01-13 | End: 2017-05-12

## 2017-01-13 RX ORDER — RANITIDINE HYDROCHLORIDE 150 MG/1
1 TABLET, FILM COATED ORAL
Qty: 0 | Refills: 0 | COMMUNITY

## 2017-01-13 RX ADMIN — Medication 81 MILLIGRAM(S): at 12:29

## 2017-01-13 RX ADMIN — AMLODIPINE BESYLATE 5 MILLIGRAM(S): 2.5 TABLET ORAL at 07:27

## 2017-01-13 RX ADMIN — Medication 40 MILLIGRAM(S): at 07:27

## 2017-01-13 RX ADMIN — PANTOPRAZOLE SODIUM 40 MILLIGRAM(S): 20 TABLET, DELAYED RELEASE ORAL at 07:27

## 2017-01-13 RX ADMIN — Medication 25 MILLIGRAM(S): at 07:27

## 2017-01-13 RX ADMIN — FAMOTIDINE 20 MILLIGRAM(S): 10 INJECTION INTRAVENOUS at 12:29

## 2017-01-13 NOTE — DISCHARGE NOTE ADULT - CARE PLAN
Principal Discharge DX:	Hypertrophic cardiomyopathy  Goal:	It is disease in which the heart muscle (myocardium) becomes abnormally thick. You were started on metoprolol tartrate 25mg twice a day to help your heart pump better.  Instructions for follow-up, activity and diet:	Please follow up with a medicine doctor at [facility] on [address] [place] [address], between [address] Tel   Secondary Diagnosis:	HTN (hypertension)  Goal:	Monitor your blood pressure. Maintain a blood pressure of less than 130/80. STOP lisinopril. Continue norvasc 5mg once a day and metoprolol.  Secondary Diagnosis:	Chronic diastolic (congestive) heart failure  Goal:	Heart failure occurs when the heart is unable to pump sufficiently to maintain blood flow to meet the body's needs. Continue lasix.  Instructions for follow-up, activity and diet:	Follow a low salt diet.  Secondary Diagnosis:	MICHAEL (obstructive sleep apnea)  Goal:	You need a sleep test to be done in order to get diagnosed sleep apnea.  Instructions for follow-up, activity and diet:	You need to see a lung doctor (pulmonologist). Principal Discharge DX:	Hypertrophic cardiomyopathy  Goal:	It is disease in which the heart muscle (myocardium) becomes abnormally thick. You were started on metoprolol tartrate 25mg twice a day to help your heart pump better.  Instructions for follow-up, activity and diet:	Please follow up with a medicine doctor at [facility] on [address] [place] [address], between [address] Tel   Secondary Diagnosis:	HTN (hypertension)  Goal:	Monitor your blood pressure. Maintain a blood pressure of less than 130/80. STOP lisinopril. Continue norvasc 5mg once a day and metoprolol.  Secondary Diagnosis:	Chronic diastolic (congestive) heart failure  Goal:	Heart failure occurs when the heart is unable to pump sufficiently to maintain blood flow to meet the body's needs. Continue lasix.  Instructions for follow-up, activity and diet:	Follow a low salt diet.  Secondary Diagnosis:	MICHAEL (obstructive sleep apnea)  Goal:	You need a sleep test to be done in order to get diagnosed sleep apnea.  Instructions for follow-up, activity and diet:	You need to see a lung doctor (pulmonologist). Principal Discharge DX:	Hypertrophic cardiomyopathy  Goal:	It is disease in which the heart muscle (myocardium) becomes abnormally thick. You were started on metoprolol tartrate 25mg twice a day to help your heart pump better.  Instructions for follow-up, activity and diet:	Please follow up with a medicine doctor at [facility] on [address] [place] [address], between [address] Tel   Secondary Diagnosis:	HTN (hypertension)  Goal:	Monitor your blood pressure. Maintain a blood pressure of less than 130/80. STOP lisinopril. Continue norvasc 5mg once a day and metoprolol.  Secondary Diagnosis:	Chronic diastolic (congestive) heart failure  Goal:	Heart failure occurs when the heart is unable to pump sufficiently to maintain blood flow to meet the body's needs. Continue lasix.  Instructions for follow-up, activity and diet:	Follow a low salt diet.  Secondary Diagnosis:	MICHAEL (obstructive sleep apnea)  Goal:	You need a sleep test to be done in order to get diagnosed sleep apnea.  Instructions for follow-up, activity and diet:	You need to see a lung doctor (pulmonologist).

## 2017-01-13 NOTE — DISCHARGE NOTE ADULT - PATIENT PORTAL LINK FT
“You can access the Follow[facility] Patient Portal, offered by [facility], by registering with the following website: [email or web address]”

## 2017-01-13 NOTE — CONSULT NOTE ADULT - SUBJECTIVE AND OBJECTIVE BOX
HPI: Per admitting clinician: "Pt is a 30 yo F [employer], extremely poor historian, noncompliant, visiting from [place] PMHx HTN, HLD, CHF (UK EF), ?paroxysmal afib (reports during pregnancy), ?h/o CVA (reports "loss of speech" 9/16, unsure treatment), DVT in 2008 (s/p Coumadin last dose 2009), h/o UGIB s/p endoscopy, ? MI in 2008 (s/p med mgmt), ?h/o congential heart disease ([place] heart surgery as child) presented to [facility] ED 1/11/17 c/o 2-3 day history of vomiting, SOB, and chest pressure. Patient states that a few days ago she started feeling fatigued, SOB after 1 flight of stairs, and nausea with clear vomitus. She reports substernal chest pressure at rest not relieved by anything. She also notes intermittent LE edema. Pt reports being hospitalized in [place] multiple times for similar complaints where she was told that she had congestive heart failure. She also admits to not taking her medications recently as she is visiting [place] since 12/26 and did not bring them. Pt denies any syncope, LOC, lightheadedness, dizziness, weakness, visual changes, numbness/tingling, diarrhea, constipation, hematemesis, hematochezia, melena, LE claudication, fever/chills, urinary frequency, dysuria, hematuria. Denies flu vaccine or sick contacts. Upon arrival to [facility] ED /106, RR 21, O2 96% on RA, afebrile, no leukocytosis. Troponin 0.14, BNP 3886, CXR revealing congestion, EKG with nonsp ST changes, atrial enlargement. Pt given Maalox, Zofran, , Pepcid, Lasix 60mg IV once. Pt will be admitted to [facility] for telemetry, R/O ACS, and likely acute on chronic CHF management in the setting of possible viral illness.   *Recent Hospitalizations at [facility], [place], [phone], Pharmacy [facility] [phone] (11 Jan 2017 17:58)"    CC: "I'm tired."  In speaking with the consultor, it was apparent that the patient was having trouble putting together a consistent timeline. Her level of alertness was fluctuating and it was thought that it could be volitional. In addition, the patient would say she was going to vomit and then dry heave.    Upon evaluation today, patient was in gowns in bed with a CPAP mask at the top of the bed and her father sitting in a chair next to the bed. Patient was vague and easily irritable. She gave writer permission to speak with father. Father stated that the patient has 3 children who are living down in [place], [place] with his ex-wife (patient's mother)  Patient endorses depressed/irritable mood most of the day, nearly every day for greater than 2 weeks. Patient endorses loss of interest, change in weight, trouble sleeping, feeling worthless, poor concentration, indecisiveness, recurrent thoughts of death/suicidal ideation.    HAILY - Patient endorses  excessive anxiety, muscle tension, restlessness, being easily fatigued, being irritable, issues with sleep and difficulty concentrating for the past  months.    Patient denies suicidal and homicidal ideation. Patient denies symptoms of marlo and psychosis.    Collateral: [facility] Manager: [employer] [phone]  Psychiatrist Dr. Ware [phone] - Dwtr message with  requesting call back.    Past Psychiatric History: Patient reports being seen at [facility] at some point. Patient states she was on Effexor. Patient states she has never been given a psychiatric diagnosis.    Past Medical History:    ALL:    MEDS:    Social History:    Substance Abuse History:    Family History:    ROS: Psych: See HPI.   All other systems negative except:  Constitutional -Fever, night sweats, weight loss, lymphadenopathy, ecchymoses, fatigue    Dermatologic - Rash, New/growing/changing skin lesions    HEENT - Vision change, eye pain rhinorrhea, sinus pain, epistaxis  dysphagia, odynophagia, globus sensation  Change in hearing, tinnitus, vertigo, otalgia  Dental problems, oral ulcers or lesions    Endocrine - Weight change, heat or cold intolerance, tremor, insomnia, neck pain or swelling  Polyuria, polydypsia, polyphagia  Abnormal hair growth, change in nails  Cardiovascular - Chest pain, palpitations, syncope  Edema, cyanosis, claudication  Orthopnea, paroxysmal nocturnal dyspnea  Pulmonary - Shortness of breath, dyspnea with exertion  Cough, hemoptysis, wheezing, chest pain  GI - Nausea, vomiting, diarrhea, melena, hematochezia  Change in appetite, abdominal pain, change in bowel habits or stools   - Dysuria, frequency, urgency  Urinary incontinence, hematuria, foamy urine, nocturia  Change in libido, erectile dysfunction  Change in menses, dysmenorrhea, dyspaerunia, pelvic pain  Musculoskeletal Joint swelling or pain, muscle pain, back pain  Neurologic Headache, scotoma  Change in smell or taste, change in facial muscles  Muscle weakness, paresthesias, anesthesia  Ataxia, change in speech    LABS:    EXAM:  VS:     Gen Appearance: Well/poorly groomed, dress, posture, perceived age, poor/good eye contact  Gait/Station/Muscle Tone:  Abnormal Movements: Psychomotor increased/normal/decreased. Note presence of: tics, tremors, or akathisia  Speech: Rate: rapid/pressured/normal/reduced/. Volume:  loud/normal/soft. Quantity: talkative/minimal  Thought Process: Logical, flight of ideas, racing thoughts, circumstantial, tangential, thought blocking  Associations:  Thought Content: Poverty of thought, delusions, phobias, Perseverative  Mood:  Affect:  Consciousness/orientation:  Memory: Recent:		Remote:  Attention/Concentration:  Fund of Knowledge: Age appropriate, above/at/below average intelligence  Insight:   Judgment:     Suicide Risk Assessment  Risk Factors:  Protective Factors:    Impression    Recommendations:    IN HOSPITAL  Observation Status:  Additional Work-up:  Medications:    AFTER DISCHARGE HPI: Per admitting clinician: "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 MI in  (s/p med mgmt), ?h/o congential heart disease ([place] heart surgery as child) presented to [facility] ED 17 c/o 2-3 day history of vomiting, SOB, and chest pressure. Patient states that a few days ago she started feeling fatigued, SOB after 1 flight of stairs, and nausea with clear vomitus. She reports substernal chest pressure at rest not relieved by anything. She also notes intermittent LE edema. Pt reports being hospitalized in [place] multiple times for similar complaints where she was told that she had congestive heart failure. She also admits to not taking her medications recently as she is visiting [place] since  and did not bring them. Pt denies any syncope, LOC, lightheadedness, dizziness, weakness, visual changes, numbness/tingling, diarrhea, constipation, hematemesis, hematochezia, melena, LE claudication, fever/chills, urinary frequency, dysuria, hematuria. Denies flu vaccine or sick contacts. Upon arrival to [facility] ED /106, RR 21, O2 96% on RA, afebrile, no leukocytosis. Troponin 0.14, BNP 3886, CXR revealing congestion, EKG with nonsp ST changes, atrial enlargement. Pt given Maalox, Zofran, , Pepcid, Lasix 60mg IV once. Pt will be admitted to [facility] for telemetry, R/O ACS, and likely acute on chronic CHF management in the setting of possible viral illness.   *Recent Hospitalizations at [facility], [place], [phone], Pharmacy [facility] [phone] (2017 17:58)"    CC: "I'm tired."  Please note: In speaking with the consultor, it was apparent that the patient was having trouble putting together a consistent timeline. Her level of alertness was fluctuating and it was thought that it could be volitional. In addition, the patient would say she was going to vomit and then dry heave.    Upon evaluation today, patient was in gowns in bed with a CPAP mask at the top of the bed and her father sitting in a chair next to the bed. Patient was vague and easily irritable. She gave writer permission to speak with father. Father stated that the patient has 3 children who are living down in [place], [place] with his ex-wife (patient's mother). Patient had been living with her partner in [place], [place] until september when she and her partner went through a difficult time after which the patient moved down to [place] to live with her mother and children. Patient's partner passed away a few days before [place] so patient traveled up to [facility] a few days after [place] to attend the . Patient was staying with her grandmother sometimes and her father sometimes. Patient was supposed to leave Wednesday, 2 days ago, to go back to [place]. Patient started feeling "bad" and came to the hospital. The patient reported that she hadn't taken her medication for the past 2-3 weeks because she forgot to bring it with her to [place]. Patient has a Psychiatrist who she saw and he prescriber her Effexor. Patient endorses depressed/irritable mood most of the day for the past 2-3 weeks. Patient endorses loss of interest, change in weight, trouble sleeping, feeling worthless, poor concentration, indecisiveness, recurrent thoughts of death/suicidal ideation.  HAILY - Patient endorses  excessive anxiety, muscle tension, restlessness, being easily fatigued, being irritable, issues with sleep and difficulty concentrating for the past  months.    Patient denies suicidal and homicidal ideation. Patient denies symptoms of marlo and psychosis.    Collateral: [facility] Manager: [employer] [phone]  Psychiatrist Dr. Ware [phone] - Left message with  requesting call back.    Past Psychiatric History: Patient reports being seen at [facility] at some point. Patient states she was on Effexor. Patient states she has never been given a psychiatric diagnosis.    Past Medical History:    ALL:    MEDS:    Social History:    Substance Abuse History:    Family History:    ROS: Psych: See HPI.   All other systems negative except:  Constitutional -Fever, night sweats, weight loss, lymphadenopathy, ecchymoses, fatigue    Dermatologic - Rash, New/growing/changing skin lesions    HEENT - Vision change, eye pain rhinorrhea, sinus pain, epistaxis  dysphagia, odynophagia, globus sensation  Change in hearing, tinnitus, vertigo, otalgia  Dental problems, oral ulcers or lesions    Endocrine - Weight change, heat or cold intolerance, tremor, insomnia, neck pain or swelling  Polyuria, polydypsia, polyphagia  Abnormal hair growth, change in nails  Cardiovascular - Chest pain, palpitations, syncope  Edema, cyanosis, claudication  Orthopnea, paroxysmal nocturnal dyspnea  Pulmonary - Shortness of breath, dyspnea with exertion  Cough, hemoptysis, wheezing, chest pain  GI - Nausea, vomiting, diarrhea, melena, hematochezia  Change in appetite, abdominal pain, change in bowel habits or stools   - Dysuria, frequency, urgency  Urinary incontinence, hematuria, foamy urine, nocturia  Change in libido, erectile dysfunction  Change in menses, dysmenorrhea, dyspaerunia, pelvic pain  Musculoskeletal Joint swelling or pain, muscle pain, back pain  Neurologic Headache, scotoma  Change in smell or taste, change in facial muscles  Muscle weakness, paresthesias, anesthesia  Ataxia, change in speech    LABS:    EXAM:  VS:     Gen Appearance: Well/poorly groomed, dress, posture, perceived age, poor/good eye contact  Gait/Station/Muscle Tone:  Abnormal Movements: Psychomotor increased/normal/decreased. Note presence of: tics, tremors, or akathisia  Speech: Rate: rapid/pressured/normal/reduced/. Volume:  loud/normal/soft. Quantity: talkative/minimal  Thought Process: Logical, flight of ideas, racing thoughts, circumstantial, tangential, thought blocking  Associations:  Thought Content: Poverty of thought, delusions, phobias, Perseverative  Mood:  Affect:  Consciousness/orientation:  Memory: Recent:		Remote:  Attention/Concentration:  Fund of Knowledge: Age appropriate, above/at/below average intelligence  Insight:   Judgment:     Suicide Risk Assessment  Risk Factors:  Protective Factors:    Impression    Recommendations:    IN HOSPITAL  Observation Status:  Additional Work-up:  Medications:    AFTER DISCHARGE HPI: Per admitting clinician: "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 MI in  (s/p med mgmt), ?h/o congential heart disease ([place] heart surgery as child) presented to [facility] ED 17 c/o 2-3 day history of vomiting, SOB, and chest pressure. Patient states that a few days ago she started feeling fatigued, SOB after 1 flight of stairs, and nausea with clear vomitus. She reports substernal chest pressure at rest not relieved by anything. She also notes intermittent LE edema. Pt reports being hospitalized in [place] multiple times for similar complaints where she was told that she had congestive heart failure. She also admits to not taking her medications recently as she is visiting [place] since  and did not bring them. Pt denies any syncope, LOC, lightheadedness, dizziness, weakness, visual changes, numbness/tingling, diarrhea, constipation, hematemesis, hematochezia, melena, LE claudication, fever/chills, urinary frequency, dysuria, hematuria. Denies flu vaccine or sick contacts. Upon arrival to [facility] ED /106, RR 21, O2 96% on RA, afebrile, no leukocytosis. Troponin 0.14, BNP 3886, CXR revealing congestion, EKG with nonsp ST changes, atrial enlargement. Pt given Maalox, Zofran, , Pepcid, Lasix 60mg IV once. Pt will be admitted to [facility] for telemetry, R/O ACS, and likely acute on chronic CHF management in the setting of possible viral illness.   *Recent Hospitalizations at [facility], [place], -[phone], Pharmacy [facility] [phone] (2017 17:58)"    CC: "I'm tired."  Please note: In speaking with the consultor, it was apparent that the patient was having trouble putting together a consistent timeline. Her level of alertness was fluctuating and it was thought that it could be volitional. In addition, the patient would say she was going to vomit and then dry heave.    Upon evaluation today, patient was in gowns in bed with a CPAP mask at the top of the bed and her father sitting in a chair next to the bed. Patient was vague and easily irritable. She gave writer permission to speak with father. Father stated that the patient has 3 children who are living down in [place], [place] with his ex-wife (patient's mother). Patient had been living with her partner in [place], [place] until september when she and her partner went through a difficult time after which the patient moved down to [place] to live with her mother and children. Patient's partner passed away a few days before [place] so [facility] [place] [facility] the . Patient was staying with her grandmother sometimes and her father sometimes. Patient was supposed to leave Wednesday, 2 days ago, to go back to [place]. Patient started feeling "bad" and came to the hospital. The patient reported that she hadn't taken her medication for the past 2-3 weeks because she forgot to bring it with her to [place]. Patient has a Psychiatrist who she saw and he prescriber her Effexor. Patient endorses depressed/irritable mood most of the day for the past 2-3 weeks. Patient endorses loss of appetite, trouble sleeping, and  poor concentration. Patient denies suicidal and homicidal ideation. Patient denies symptoms of marlo and psychosis.    Collateral:   [facility] Manager: Destinee CANTRELL [phone]  Psychiatrist - Dr. Ware [phone] - Lzbi message with  requesting call back.    Past Psychiatric History: Patient reports being seen at [facility] at some point. Patient states she was on Effexor. Patient states she has never been given a psychiatric diagnosis.    PAST MEDICAL & SURGICAL HISTORY:  DVT (deep venous thrombosis)  GIB (gastrointestinal bleeding)  Afib  HTN (hypertension)  Chronic systolic congestive heart failure  HLD (hyperlipidemia)  Myocardial infarction  Congenital heart disease  r/o Diabetes (HbA1C - 6.5)  r/o Obstructive Sleep Apnea    Allergies: Seafood (Rash)    MEDICATIONS  (STANDING):  amLODIPine   Tablet 5milliGRAM(s) Oral daily  furosemide    Tablet 40milliGRAM(s) Oral daily  pantoprazole    Tablet 40milliGRAM(s) Oral before breakfast  famotidine    Tablet 20milliGRAM(s) Oral daily  atorvastatin 10milliGRAM(s) Oral at bedtime  sodium chloride 0.9%. 1000milliLiter(s) IV Continuous <Continuous>  aspirin  chewable 81milliGRAM(s) Oral daily  metoprolol 25milliGRAM(s) Oral two times a day    MEDICATIONS  (PRN):  ondansetron Injectable 4milliGRAM(s) IV Push once PRN Nausea and/or Vomiting    Social History: See HPI. Father reports that patient is unemployed and that she did not finish 10th grade.     Substance Abuse History: Patient denies. Father deferred to patient.    Family History: Some depression?    ROS: Psych: See HPI.   All other systems negative over the past 2 weeks except:  Constitutional - fatigue  Cardiovascular - Chest pain, Edema, nocturnal dyspnea  Pulmonary - Shortness of breath, dyspnea with exertion, chest pain  GI - Nausea, vomiting,Change in appetite    CBC Full  -  ( 2017 07:14 )  WBC Count : 7.1 K/uL  Hemoglobin : 16.5 g/dL  Hematocrit : 55.6 %  Platelet Count - Automated : 138 K/uL  Mean Cell Volume : 82.0 fL  Mean Cell Hemoglobin : 24.3 pg  Mean Cell Hemoglobin Concentration : 29.7 g/dL    139    |  92     |  17     ----------------------------<  142    3.6     |  43     |  0.97     Ca    8.9        2017 07:14  Mg     2.1       2017 07:14    TPro  7.3    /  Alb  3.0    /  TBili  1.3    /  DBili  x      /  AST  19     /  ALT  28     /  AlkPhos  53       EKG:  Q-T Interval 386 ms  QTC Calculation(Bezet) 453 ms  P Axis 36 degrees  R Axis 195 degrees  T Axis 56 degrees  Diagnosis Line Normal sinus rhythm, Biatrial enlargement, Right superior axis deviation, Pulmonary disease pattern    EXAM:  ECHOCARDIOGRAM (CARDIOL)    PROCEDURE DATE:  2017    Abnormal findings: Severe asymmetric septal hypertrophy. The right atrium is borderline dilated.   All other findings were WNL  Study Quality: Good.    EXAM:  Vital Signs Last 24 Hrs  T(C): 36.4, Max: 37.1 ( @ 01:25)  T(F): 97.5, Max: 98.8 ( @ 01:25)  HR: 90 (84 - 105)  BP: 141/94 (122/76 - 151/70)  RR: 16 (16 - 18)  SpO2: 99% (9% - 100%)     Gen Appearance: Obese, poorly groomed, looks older than stated age, poor eye contact, scleral injection of medial right eye, chipped front tooth  Gait/Station/Muscle Tone: Not tested/Not tested/Normal  Abnormal Movements: Psychomotor normal.   Speech: Rate: normal. Volume:  normal. Quantity: minimal  Thought Process: Vague, superficial  Thought Content: Perseverative regarding father's perceived judgements about her  Mood: "Tired"  Affect: Irritable, regressed  Consciousness/orientation: AOx4  Memory: Grossly intact  Attention/Concentration: Adequate  Fund of Knowledge: Unclear if at (due to lack of education) or below average intelligence  Insight: Fair  Judgment: Fair    Suicide Risk Assessment  Risk Factors: Recent loss, many chronic medical conditions  Protective Factors: Female, age, AA, children, supportive family

## 2017-01-13 NOTE — CONSULT NOTE ADULT - PROBLEM SELECTOR RECOMMENDATION 9
IN HOSPITAL  Observation Status: Per unit protocol.  Additional Work-up: None at this time  Medications: Consider adding Effexor as patient reports she is prescribed it.    AFTER DISCHARGE  Patient should follow up with Dr. Ware, her outpatient psychiatrist.    Case discussed with attending. IN HOSPITAL  Observation Status: Per unit protocol.  Additional Work-up: None at this time  Medications: None at this time as medications need to be confirmed by outpatient psychiatrist.    AFTER DISCHARGE  Patient should follow up with Dr. Ware, her outpatient psychiatrist.    Case discussed with attending. IN HOSPITAL  Observation Status: Per unit protocol.  Additional Work-up: None at this time  Medications: None at this time as medications need to be confirmed by outpatient psychiatrist.    AFTER DISCHARGE  Patient should follow up with Dr. Ware, her outpatient psychiatrist. Medication changes and therapy referral can be made by Dr. Ware.    Case discussed with attending, Dr. Mcdaniels. IN HOSPITAL  Observation Status: Per unit protocol.  Additional Work-up: None at this time  Medications: None at this time as medications need to be confirmed by outpatient psychiatrist.  Patient does not meet criteria for inpatient psychiatric admission as she is not a danger to self/others, she can take care of her ADLs, has adequate social support and is established with an outpatient provider.    AFTER DISCHARGE  Follow up with Dr. Ware, her outpatient psychiatrist. Medication changes and therapy referral can be made by Dr. Ware.    Case discussed with attending, Dr. Mcdaniels. IN HOSPITAL  Observation Status: Per unit protocol. No need for C.O.  Additional Work-up: Consider checking TSH, B12/Folate, Vit D level for depression workup.  Medications: None at this time as medications need to be confirmed by outpatient psychiatrist.  Patient does not meet criteria for inpatient psychiatric admission as she is not a danger to self/others, she can take care of her ADLs, has adequate social support and is established with an outpatient provider.    AFTER DISCHARGE  Follow up with Dr. Ware, her outpatient psychiatrist. Medication changes and therapy referral can be made by Dr. Ware.    Case discussed with attending, Dr. Mcdaniels.

## 2017-01-13 NOTE — DISCHARGE NOTE ADULT - PLAN OF CARE
It is disease in which the heart muscle (myocardium) becomes abnormally thick. You were started on metoprolol tartrate 25mg twice a day to help your heart pump better. Please follow up with a medicine doctor at [facility] on [address], [address] Tel  Monitor your blood pressure. Maintain a blood pressure of less than 130/80. STOP lisinopril. Continue norvasc 5mg once a day and metoprolol. Heart failure occurs when the heart is unable to pump sufficiently to maintain blood flow to meet the body's needs. Continue lasix. Follow a low salt diet. You need a sleep test to be done in order to get diagnosed sleep apnea. You need to see a lung doctor (pulmonologist).

## 2017-01-13 NOTE — DISCHARGE NOTE ADULT - HOSPITAL COURSE
30 yo F SEAFOOD ALLERGY, extremely poor historian, noncompliant, visiting from [place] PMHx HTN, HLD, CHF ([place] EF), ?paroxysmal afib (reports during pregnancy), ?h/o CVA (reports "loss of speech" 9/16, unsure treatment), DVT in 2008 (s/p Coumadin last dose 2009), h/o UGIB s/p endoscopy, ? MI in 2008 (s/p med mgmt), ?h/o congential heart disease ([place] heart surgery as child) presented to [facility] ED 1/11/17 c/o 2-3 day history of vomiting, SOB, and chest pressure. Upon arrival to [facility] ED /106, RR 21, O2 96% on RA, afebrile, no leukocytosis. Troponin 0.14, BNP 3886, CXR revealing congestion, EKG with nonsp ST changes, atrial enlargement. Pt given Maalox, Zofran, , Pepcid, Lasix 60mg IV once. Pt will be admitted to [facility] for telemetry, R/O ACS, and likely acute on chronic CHF management. Pt s/p IV lasix. Rapid response was called around 8:45ish on 1/12/17. Pt was found to be unresponsive. Upon arrival to the ER, pt was more responsive. Neuro exam intact, /100, HR 90s, Temp 97.9F. Lab work sent STAT. UTox STAT. Dr. Marc arrived at bedside and agreed w/ plan. CT head ordered STAT- neg. . Pt become fully responsive, A+Ox3 after 10 mins. Pt stated she was really tired and didn't get enough sleep. CT head w/o contrast revealed No acute intracranial abnormality. Specifically, no acute intracranial hemorrhage, mass effect or recent transcortical or territorial infarction. Utox neg. All lab work within normal. ECHO 1/12 revealed EF 60%, severe asymmetric septal hypertrophy. Basal anteroseptum measures 2.0 cm, no valvular disease. Pt was started on lopressor 25mg BID. Of note, pt states she can't swallow pills. Dysphagia screening done by nurse was normal and pt eating regular food with no events. No focal deficits, CN II-XII grossly intact, no weakness/dysarthria. Started CPAP for possible MICHAEL. Pt slept well with CPAP machine. Psych was consulted to evaluate pt further as pt behaves in a weird manner. Upon further questioning with psych, pt's father states she lives in [place] with mom and her three children but her mother has full custody. It seems like pt doesn't want to go back to [place]. Pt states her insurance is not covered there. Per father, pt used to live with him in the [place] but now patient is living at her grandmother's house. Father agrees to accompany patient to grandmother's home. Daughter agrees to go to her grandmother's prior to going back to [place] if circumstances permit it. SW, psych and Dr. Marc made aware of safe discharge plan. Labs reviewed and case discussed w/ Dr. Marc who deemed pt stable for discharge. Pt and father were given all discharge instructions and signed all paperwork. Pt will  her prescriptions at [facility].

## 2017-01-13 NOTE — CONSULT NOTE ADULT - ASSESSMENT
30 yo F noncompliant with medications, visiting from [place] with significant past medical history including CHF presented to [facility] ED reporting vomiting, SOB, and chest pressure. In speaking with the consultor, it was apparent that the patient was having trouble putting together a consistent timeline. Patient had moved down from [place] to [place] about 4 months ago. Then patient's partner, in [place], passed away 3 - 4 weeks ago and patient came to [place] without her medications to attend the . Patient endorses some symptoms of depression and appears regressed likely due to the loss of her partner. Patient has an outpatient psychiatrist. Patient denies SI, HI, and symptoms of marlo/psychosis. 32 yo F noncompliant with medications, visiting from [place] with significant past medical history including CHF presented to [facility] ED reporting vomiting, SOB, and chest pressure. In speaking with the consultor, it was apparent that the patient was having trouble putting together a consistent timeline. Patient had moved down from [place] to [place] about 4 months ago. Then patient's partner, in [place], passed away 3 - 4 weeks ago and patient came to [place] without her medications to attend the . Patient endorses some symptoms of depression and appears regressed. Patient likely has chronic depression as evidence by her report of being on Effexor and acute distress due to the loss of her partner. Patient has an outpatient psychiatrist. Patient denies SI, HI, and symptoms of marlo/psychosis.

## 2017-01-13 NOTE — DISCHARGE NOTE ADULT - MEDICATION SUMMARY - MEDICATIONS TO STOP TAKING
I will STOP taking the medications listed below when I get home from the hospital:    lisinopril 10 mg oral tablet  -- 1 tab(s) by mouth once a day

## 2017-01-13 NOTE — CONSULT NOTE ADULT - ATTENDING COMMENTS
Pt seen; chart reviewed and discussed with team.    Agree with above.    Pt with likely acute-on-chronic depression (now also dealing with bereavement after loss of partner), also noncompliant with medication (unclear for how long and exact rx regimen).    Pt denies any thoughts of self-harm or suicidality, no HI, no psychosis.    Given above, no need for C.O., defer starting/re-starting medications pending collateral info.    Pt to follow up with her outpt psychiatrist upon medical d/c.

## 2017-01-13 NOTE — DISCHARGE NOTE ADULT - MEDICATION SUMMARY - MEDICATIONS TO TAKE
I will START or STAY ON the medications listed below when I get home from the hospital:    aspirin 81 mg oral tablet, chewable  -- 1 tab(s) by mouth once a day  -- Indication: For Heart    atorvastatin 10 mg oral tablet  -- 1 tab(s) by mouth once a day (at bedtime)  -- Indication: For High cholesterol    metoprolol tartrate 25 mg oral tablet  -- 1 tab(s) by mouth 2 times a day  -- Indication: For Hypertrophic cardiomyopathy     Norvasc 5 mg oral tablet  -- 1 tab(s) by mouth once a day  -- Indication: For High blood pressure    Lasix 20 mg oral tablet  -- 1 tab(s) by mouth once a day  -- Indication: For Heart    metOLazone 2.5 mg oral tablet  -- 1 tab(s) by mouth once a day  -- Indication: For Heart     Zantac 300 oral tablet  -- 1 tab(s) by mouth once a day (at bedtime)  -- Indication: For Acid reflux    Klor-Con 10 mEq oral tablet, extended release  -- 1 tab(s) by mouth once a day  -- Indication: For supplements

## 2017-01-18 DIAGNOSIS — R11.2 NAUSEA WITH VOMITING, UNSPECIFIED: ICD-10-CM

## 2017-01-18 DIAGNOSIS — I11.0 HYPERTENSIVE HEART DISEASE WITH HEART FAILURE: ICD-10-CM

## 2017-01-18 DIAGNOSIS — G47.33 OBSTRUCTIVE SLEEP APNEA (ADULT) (PEDIATRIC): ICD-10-CM

## 2017-01-18 DIAGNOSIS — R07.9 CHEST PAIN, UNSPECIFIED: ICD-10-CM

## 2017-01-18 DIAGNOSIS — I48.0 PAROXYSMAL ATRIAL FIBRILLATION: ICD-10-CM

## 2017-01-18 DIAGNOSIS — Z91.013 ALLERGY TO SEAFOOD: ICD-10-CM

## 2017-01-18 DIAGNOSIS — Z86.73 PERSONAL HISTORY OF TRANSIENT ISCHEMIC ATTACK (TIA), AND CEREBRAL INFARCTION WITHOUT RESIDUAL DEFICITS: ICD-10-CM

## 2017-01-18 DIAGNOSIS — Z91.14 PATIENT'S OTHER NONCOMPLIANCE WITH MEDICATION REGIMEN: ICD-10-CM

## 2017-01-18 DIAGNOSIS — E78.5 HYPERLIPIDEMIA, UNSPECIFIED: ICD-10-CM

## 2017-01-18 DIAGNOSIS — I50.33 ACUTE ON CHRONIC DIASTOLIC (CONGESTIVE) HEART FAILURE: ICD-10-CM

## 2017-01-18 DIAGNOSIS — I25.2 OLD MYOCARDIAL INFARCTION: ICD-10-CM

## 2017-01-18 DIAGNOSIS — Z79.01 LONG TERM (CURRENT) USE OF ANTICOAGULANTS: ICD-10-CM

## 2017-01-18 DIAGNOSIS — K21.9 GASTRO-ESOPHAGEAL REFLUX DISEASE WITHOUT ESOPHAGITIS: ICD-10-CM

## 2017-01-18 DIAGNOSIS — I42.2 OTHER HYPERTROPHIC CARDIOMYOPATHY: ICD-10-CM

## 2017-01-18 DIAGNOSIS — Q24.9 CONGENITAL MALFORMATION OF HEART, UNSPECIFIED: ICD-10-CM

## 2017-01-18 DIAGNOSIS — Z86.718 PERSONAL HISTORY OF OTHER VENOUS THROMBOSIS AND EMBOLISM: ICD-10-CM

## 2017-01-27 ENCOUNTER — LABORATORY RESULT (OUTPATIENT)
Age: 32
End: 2017-01-27

## 2017-01-27 ENCOUNTER — APPOINTMENT (OUTPATIENT)
Dept: INTERNAL MEDICINE | Facility: CLINIC | Age: 32
End: 2017-01-27

## 2017-01-27 ENCOUNTER — INPATIENT (INPATIENT)
Facility: HOSPITAL | Age: 32
LOS: 26 days | Discharge: NURSING, REHAB OR LONG-TERM CARE | DRG: 4 | End: 2017-02-23
Attending: INTERNAL MEDICINE | Admitting: INTERNAL MEDICINE
Payer: MEDICAID

## 2017-01-27 VITALS
SYSTOLIC BLOOD PRESSURE: 139 MMHG | DIASTOLIC BLOOD PRESSURE: 98 MMHG | HEART RATE: 116 BPM | OXYGEN SATURATION: 92 % | HEIGHT: 69 IN | RESPIRATION RATE: 22 BRPM | WEIGHT: 240.08 LBS | TEMPERATURE: 98 F

## 2017-01-27 VITALS
BODY MASS INDEX: 36.37 KG/M2 | TEMPERATURE: 98.3 F | SYSTOLIC BLOOD PRESSURE: 120 MMHG | OXYGEN SATURATION: 83 % | HEART RATE: 100 BPM | WEIGHT: 240 LBS | DIASTOLIC BLOOD PRESSURE: 90 MMHG | HEIGHT: 68 IN

## 2017-01-27 DIAGNOSIS — F32.9 MAJOR DEPRESSIVE DISORDER, SINGLE EPISODE, UNSPECIFIED: ICD-10-CM

## 2017-01-27 DIAGNOSIS — Q24.9 CONGENITAL MALFORMATION OF HEART, UNSPECIFIED: Chronic | ICD-10-CM

## 2017-01-27 DIAGNOSIS — I48.91 UNSPECIFIED ATRIAL FIBRILLATION: ICD-10-CM

## 2017-01-27 LAB
ALBUMIN SERPL ELPH-MCNC: 3.1 G/DL — LOW (ref 3.4–5)
ALP SERPL-CCNC: 51 U/L — SIGNIFICANT CHANGE UP (ref 40–120)
ALT FLD-CCNC: 21 U/L — SIGNIFICANT CHANGE UP (ref 12–42)
AMYLASE P1 CFR SERPL: 50 U/L — SIGNIFICANT CHANGE UP (ref 25–115)
ANION GAP SERPL CALC-SCNC: 4 MMOL/L — LOW (ref 9–16)
APPEARANCE UR: CLEAR — SIGNIFICANT CHANGE UP
AST SERPL-CCNC: 40 U/L — HIGH (ref 15–37)
BASOPHILS NFR BLD AUTO: 0.6 % — SIGNIFICANT CHANGE UP (ref 0–2)
BILIRUB SERPL-MCNC: 2.1 MG/DL — HIGH (ref 0.2–1.2)
BILIRUB UR-MCNC: NEGATIVE — SIGNIFICANT CHANGE UP
BUN SERPL-MCNC: 15 MG/DL — SIGNIFICANT CHANGE UP (ref 7–23)
CALCIUM SERPL-MCNC: 8.9 MG/DL — SIGNIFICANT CHANGE UP (ref 8.5–10.5)
CHLORIDE SERPL-SCNC: 97 MMOL/L — SIGNIFICANT CHANGE UP (ref 96–108)
CO2 SERPL-SCNC: 40 MMOL/L — HIGH (ref 22–31)
COLOR SPEC: YELLOW — SIGNIFICANT CHANGE UP
CREAT SERPL-MCNC: 1.09 MG/DL — SIGNIFICANT CHANGE UP (ref 0.5–1.3)
DIFF PNL FLD: (no result)
EOSINOPHIL NFR BLD AUTO: 0.8 % — SIGNIFICANT CHANGE UP (ref 0–6)
GLUCOSE SERPL-MCNC: 99 MG/DL — SIGNIFICANT CHANGE UP (ref 70–99)
GLUCOSE UR QL: NEGATIVE — SIGNIFICANT CHANGE UP
HCT VFR BLD CALC: 53.8 % — HIGH (ref 34.5–45)
HGB BLD-MCNC: 15.5 G/DL — SIGNIFICANT CHANGE UP (ref 11.5–15.5)
KETONES UR-MCNC: NEGATIVE — SIGNIFICANT CHANGE UP
LEUKOCYTE ESTERASE UR-ACNC: NEGATIVE — SIGNIFICANT CHANGE UP
LIDOCAIN IGE QN: 69 U/L — LOW (ref 73–393)
LYMPHOCYTES # BLD AUTO: 20.5 % — SIGNIFICANT CHANGE UP (ref 13–44)
MCHC RBC-ENTMCNC: 22 PG — LOW (ref 27–34)
MCHC RBC-ENTMCNC: 28.8 G/DL — LOW (ref 32–36)
MCV RBC AUTO: 76.4 FL — LOW (ref 80–100)
MONOCYTES NFR BLD AUTO: 10.9 % — SIGNIFICANT CHANGE UP (ref 2–14)
NEUTROPHILS NFR BLD AUTO: 67.2 % — SIGNIFICANT CHANGE UP (ref 43–77)
NITRITE UR-MCNC: NEGATIVE — SIGNIFICANT CHANGE UP
NT-PROBNP SERPL-SCNC: 1867 PG/ML — HIGH
PH UR: 6.5 — SIGNIFICANT CHANGE UP (ref 4–8)
PLATELET # BLD AUTO: 118 K/UL — LOW (ref 150–400)
POTASSIUM SERPL-MCNC: 4.7 MMOL/L — SIGNIFICANT CHANGE UP (ref 3.5–5.3)
POTASSIUM SERPL-SCNC: 4.7 MMOL/L — SIGNIFICANT CHANGE UP (ref 3.5–5.3)
PROT SERPL-MCNC: 8.1 G/DL — SIGNIFICANT CHANGE UP (ref 6.4–8.2)
PROT UR-MCNC: (no result) MG/DL
RAPID RVP RESULT: SIGNIFICANT CHANGE UP
RBC # BLD: 7.04 M/UL — HIGH (ref 3.8–5.2)
RBC # FLD: 25.1 % — HIGH (ref 10.3–16.9)
SODIUM SERPL-SCNC: 141 MMOL/L — SIGNIFICANT CHANGE UP (ref 135–145)
SP GR SPEC: 1.01 — SIGNIFICANT CHANGE UP (ref 1–1.03)
UROBILINOGEN FLD QL: >=8 E.U./DL
WBC # BLD: 5.2 K/UL — SIGNIFICANT CHANGE UP (ref 3.8–10.5)
WBC # FLD AUTO: 5.2 K/UL — SIGNIFICANT CHANGE UP (ref 3.8–10.5)

## 2017-01-27 PROCEDURE — 93010 ELECTROCARDIOGRAM REPORT: CPT | Mod: 59

## 2017-01-27 PROCEDURE — 31500 INSERT EMERGENCY AIRWAY: CPT

## 2017-01-27 PROCEDURE — 99292 CRITICAL CARE ADDL 30 MIN: CPT | Mod: 25

## 2017-01-27 PROCEDURE — 74176 CT ABD & PELVIS W/O CONTRAST: CPT | Mod: 26

## 2017-01-27 PROCEDURE — 99291 CRITICAL CARE FIRST HOUR: CPT | Mod: 25

## 2017-01-27 RX ORDER — SODIUM CHLORIDE 9 MG/ML
1000 INJECTION INTRAMUSCULAR; INTRAVENOUS; SUBCUTANEOUS
Qty: 0 | Refills: 0 | Status: DISCONTINUED | OUTPATIENT
Start: 2017-01-27 | End: 2017-01-28

## 2017-01-27 RX ORDER — IOHEXOL 300 MG/ML
50 INJECTION, SOLUTION INTRAVENOUS ONCE
Qty: 0 | Refills: 0 | Status: COMPLETED | OUTPATIENT
Start: 2017-01-27 | End: 2017-01-27

## 2017-01-27 RX ORDER — FAMOTIDINE 10 MG/ML
20 INJECTION INTRAVENOUS ONCE
Qty: 0 | Refills: 0 | Status: COMPLETED | OUTPATIENT
Start: 2017-01-27 | End: 2017-01-27

## 2017-01-27 RX ORDER — ONDANSETRON 8 MG/1
4 TABLET, FILM COATED ORAL ONCE
Qty: 0 | Refills: 0 | Status: COMPLETED | OUTPATIENT
Start: 2017-01-27 | End: 2017-01-27

## 2017-01-27 RX ADMIN — SODIUM CHLORIDE 100 MILLILITER(S): 9 INJECTION INTRAMUSCULAR; INTRAVENOUS; SUBCUTANEOUS at 21:55

## 2017-01-27 RX ADMIN — ONDANSETRON 4 MILLIGRAM(S): 8 TABLET, FILM COATED ORAL at 20:00

## 2017-01-27 RX ADMIN — IOHEXOL 50 MILLILITER(S): 300 INJECTION, SOLUTION INTRAVENOUS at 21:55

## 2017-01-27 RX ADMIN — FAMOTIDINE 20 MILLIGRAM(S): 10 INJECTION INTRAVENOUS at 20:00

## 2017-01-27 NOTE — ED ADULT NURSE NOTE - CHIEF COMPLAINT QUOTE
Patient c/o chest pain , sob  and cough for 2 weeks . Denies any fever , chills nor palpitations . Was sent by pmd for evaluation . History of afib and CHF.

## 2017-01-27 NOTE — ED PROVIDER NOTE - CRITICAL CARE INDICATION, MLM
patient was critically ill... Patient was critically ill with a high probability of imminent or life threatening deterioration. requires advanced airway and aggressive MGMT

## 2017-01-27 NOTE — ED PROVIDER NOTE - MUSCULOSKELETAL, MLM
Spine appears normal, range of motion is not limited, no muscle or joint tenderness Spine appears normal, range of motion is not limited, no muscle or joint tenderness, b/l LE edema 1+, symmetrical, good distal pulses, no discolorations,

## 2017-01-27 NOTE — ED PROVIDER NOTE - CARE PLAN
Principal Discharge DX:	Nausea and vomiting  Secondary Diagnosis:	Cardiomyopathy, hypertrophic Principal Discharge DX:	CO2 narcosis  Secondary Diagnosis:	Cardiomyopathy, hypertrophic

## 2017-01-27 NOTE — ED PROVIDER NOTE - RESPIRATORY, MLM
scattered rales, no wheezing, good air movement scattered rales, no wheezing, good air movement, no respiratory distress or labored breathing

## 2017-01-27 NOTE — ED PROVIDER NOTE - CRITICAL CARE PROVIDED
direct patient care (not related to procedure)/interpretation of diagnostic studies/additional history taking/consultation with other physicians/documentation

## 2017-01-27 NOTE — ED PROVIDER NOTE - MEDICAL DECISION MAKING DETAILS
32 yo female with h/o hypertrophic cardiomyopathy, in the Er with nausea, vomiting, mid epigastric pain. PMH gallstones and cholecystectomy, GERD. pt appears well, non-toxic, afebrile, in NAD. on exam- soft abdomen, mid epigastric tenderness, labs- better compare to her previous admission. ECG and CXR- no changes from previous one. abd/pelvis CT scan done- no hernia or obstruction, suspicious for gastritis and pelvic congestion syndrome. Pt improved after IV antiemetics and PPI, stable for discharge . F/u with GI strongly recommended, importance to take all her medications as prescribed explained. 32 yo female with h/o hypertrophic cardiomyopathy, in the Er with nausea, vomiting, mid epigastric pain. PMH gallstones and cholecystectomy, GERD. pt appears well, non-toxic, afebrile, in NAD. on exam- soft abdomen, mid epigastric tenderness++, labs- better compare to her previous admission. ECG and CXR- no changes from previous one. abd/pelvis CT scan done- no hernia or obstruction, suspicious for gastritis and pelvic congestion syndrome. Pt improved after IV antiemetics and PPI, will re-asses.

## 2017-01-27 NOTE — ED PROVIDER NOTE - OBJECTIVE STATEMENT
32 yo female, with h/o Afib, CHF, MI?, CVA?( pt is poor historian) c/o persistent nausea and vomiting, mid epigastric pain, c/o cough at night mostly, and GAYTAN. Pt present with the same symptoms to ER on 01/11/17, was admitted to [facility] then d/c home in 2 days. Pt reports that all her symptoms area the same despite her taking all medications as prescribed. Pt went to see her PMD today, vomit in the doctor's office and was sent to ER again. Denies fever, chills, denies CP, denies diarrhea or constipations. Pt reports h/o cholecystectomy few years ago, states that she had same vomiting at that time. 32 yo female, with h/o Afib, CHF, MI?, CVA? DVT? ( pt is poor historian) c/o persistent nausea and vomiting, mid epigastric pain, c/o cough at night mostly, and GAYTAN. Symptoms started about 1 month ago.  Pt states that she was with the same symptoms in the ER on 01/11/17, was admitted to [facility] then d/c home in 2 days. Pt reports that all her symptoms persist since she was discharge home, despite her taking all medications as prescribed. Pt went to see her PMD today, vomit in the doctor's office and was sent to ER again. Denies fever, chills, denies CP, denies diarrhea or constipations. Pt reports h/o cholecystectomy few years ago, states that she had same vomiting at that time.

## 2017-01-27 NOTE — ED PROVIDER NOTE - ATTENDING CONTRIBUTION TO CARE
31 yof pw nv and abd pain.  hx of CHF, CAD.  pt states sx ongoing for few weeks.  no f/c, no chest pain.  seen recently for similar complaints, admitted to the hospital, and discharged subsequently w/o significant improvement of sx.  prior abd surg of cholecystectomy.    agree w/ PA, pt seen vomiting in ED, abd soft, nontender, clear lungs, rrr, no rash noted, patent airway.  agree w/ CT, labs, screening EKG, reassess    tolerated PO contrast, CT reviewed w/o acute surgical pathology, no sbo.      pt reevaluated and seen w/ emesis bucket in hand.  denies cp, denies abd pain, no fever.    will trial of d5 NS for persistent vomiting, will need to reassess.    s/o to next team for repeat evaluation 31 yof pw nv and abd pain.  hx of CHF, CAD.  pt states sx ongoing for few weeks.  no f/c, no chest pain.  seen recently for similar complaints, admitted to the hospital, and discharged subsequently w/o significant improvement of sx.  prior abd surg of cholecystectomy.    agree w/ PA, pt seen vomiting in ED, abd soft, nontender, clear lungs, rrr, no rash noted, patent airway.  agree w/ CT, labs, screening EKG, reassess    tolerated PO contrast, CT reviewed w/o acute surgical pathology, no sbo.      pt reevaluated and seen w/ emesis bucket in hand.  denies cp, denies abd pain, no fever.    will trial of d5 NS for persistent vomiting, will need to reassess.    s/o to next team for repeat evaluation and PO toleration

## 2017-01-27 NOTE — ED ADULT NURSE NOTE - OBJECTIVE STATEMENT
Patient comes into from PCD office. Patient reports since being discharged on January 14th patient has had constant dull 4/10 epigastric pain, nausea, intermittent vomiting, no productive cough and exertional shortness of breath. No prior tx. Hx of CHF and HTN.

## 2017-01-27 NOTE — ED PROVIDER NOTE - PROGRESS NOTE DETAILS
pt vomit in the ER ,  became more and more tachycardiac, and at the same time her O2 sat dropped  so she was placed on non-rebreather face mask. O2 improved shortly. Pt appears to be sleepy/lethargic, blood gas has been ordered. Pt hypercapnic, retaining CO2. called for Bi-pap and ICU evaluation. PLan : Bi-pap, repeat VBG, ICU will re-evaluate. Received s/o from Dr Marc, pt with MICHAEL, had been seen for vomiting and abdominal pain w/reassuring exam but then became SOB, hypercapneic, did not improve after bipap, intubated in consultation with (and by) ICU team, plan for admission for hypercapneic respiratory failure pt found unresponsive and not triggering bipap descision to intubate by ICU team pt moved rapidly to resus and tube suction and back up measures prepped choice to attempt tube without paralytics by ICU team and first attempt at intubation by icu fellow unsuccessful with pt clenching and biting tube and possible damage to dentition pt then bagged up and reattempt by er team with glide and intubated without complication ng tube placed and suctions and pt sedated for comfort will admit to ICU

## 2017-01-27 NOTE — ED ADULT TRIAGE NOTE - CHIEF COMPLAINT QUOTE
Patient c/o chest pain , sob  and cough for 2 weeks . Denies any fever , chills nor palpitations . Was sent by pmd for evaluation . Patient c/o chest pain , sob  and cough for 2 weeks . Denies any fever , chills nor palpitations . Was sent by pmd for evaluation . History of afib and CHF.

## 2017-01-28 PROBLEM — E78.5 HYPERLIPIDEMIA, UNSPECIFIED: Chronic | Status: ACTIVE | Noted: 2017-01-11

## 2017-01-28 PROBLEM — I50.22 CHRONIC SYSTOLIC (CONGESTIVE) HEART FAILURE: Chronic | Status: ACTIVE | Noted: 2017-01-11

## 2017-01-28 PROBLEM — I48.91 UNSPECIFIED ATRIAL FIBRILLATION: Chronic | Status: ACTIVE | Noted: 2017-01-11

## 2017-01-28 PROBLEM — I82.409 ACUTE EMBOLISM AND THROMBOSIS OF UNSPECIFIED DEEP VEINS OF UNSPECIFIED LOWER EXTREMITY: Chronic | Status: ACTIVE | Noted: 2017-01-11

## 2017-01-28 PROBLEM — K92.2 GASTROINTESTINAL HEMORRHAGE, UNSPECIFIED: Chronic | Status: ACTIVE | Noted: 2017-01-11

## 2017-01-28 PROBLEM — I21.3 ST ELEVATION (STEMI) MYOCARDIAL INFARCTION OF UNSPECIFIED SITE: Chronic | Status: ACTIVE | Noted: 2017-01-11

## 2017-01-28 PROBLEM — I10 ESSENTIAL (PRIMARY) HYPERTENSION: Chronic | Status: ACTIVE | Noted: 2017-01-11

## 2017-01-28 LAB
BASE EXCESS BLDA CALC-SCNC: 10.6 MMOL/L — HIGH (ref -2–3)
BASE EXCESS BLDA CALC-SCNC: 8.5 MMOL/L — HIGH (ref -2–3)
BASE EXCESS BLDA CALC-SCNC: SIGNIFICANT CHANGE UP MMOL/L (ref -2–3)
GAS PNL BLDA: SIGNIFICANT CHANGE UP
GAS PNL BLDV: SIGNIFICANT CHANGE UP
HCO3 BLDA-SCNC: 31 MMOL/L — HIGH (ref 21–28)
HCO3 BLDA-SCNC: 36 MMOL/L — HIGH (ref 21–28)
HCO3 BLDA-SCNC: SIGNIFICANT CHANGE UP MMOL/L (ref 21–28)
HCT VFR BLD CALC: 50.2 % — HIGH (ref 34.5–45)
HGB BLD-MCNC: 15 G/DL — SIGNIFICANT CHANGE UP (ref 11.5–15.5)
MCHC RBC-ENTMCNC: 22.9 PG — LOW (ref 27–34)
MCHC RBC-ENTMCNC: 29.9 G/DL — LOW (ref 32–36)
MCV RBC AUTO: 76.6 FL — LOW (ref 80–100)
PCO2 BLDA: 37 MMHG — SIGNIFICANT CHANGE UP (ref 32–45)
PCO2 BLDA: 49 MMHG — HIGH (ref 32–45)
PCO2 BLDA: SIGNIFICANT CHANGE UP MMHG (ref 32–45)
PH BLDA: 7.48 — HIGH (ref 7.35–7.45)
PH BLDA: 7.55 — HIGH (ref 7.35–7.45)
PH BLDA: SIGNIFICANT CHANGE UP (ref 7.35–7.45)
PLATELET # BLD AUTO: 99 K/UL — LOW (ref 150–400)
PO2 BLDA: 48 MMHG — CRITICAL LOW (ref 83–108)
PO2 BLDA: 62 MMHG — LOW (ref 83–108)
PO2 BLDA: SIGNIFICANT CHANGE UP MMHG (ref 83–108)
RBC # BLD: 6.55 M/UL — HIGH (ref 3.8–5.2)
RBC # FLD: 24.3 % — HIGH (ref 10.3–16.9)
SAO2 % BLDA: 89 % — LOW (ref 95–100)
SAO2 % BLDA: 93 % — LOW (ref 95–100)
SAO2 % BLDA: SIGNIFICANT CHANGE UP % (ref 95–100)
TROPONIN I SERPL-MCNC: 0.03 NG/ML — SIGNIFICANT CHANGE UP (ref 0.01–0.04)
WBC # BLD: 5.9 K/UL — SIGNIFICANT CHANGE UP (ref 3.8–10.5)
WBC # FLD AUTO: 5.9 K/UL — SIGNIFICANT CHANGE UP (ref 3.8–10.5)

## 2017-01-28 PROCEDURE — 99223 1ST HOSP IP/OBS HIGH 75: CPT | Mod: 25,GC

## 2017-01-28 PROCEDURE — 71010: CPT | Mod: 26

## 2017-01-28 PROCEDURE — 31622 DX BRONCHOSCOPE/WASH: CPT | Mod: GC

## 2017-01-28 RX ORDER — AMPICILLIN SODIUM AND SULBACTAM SODIUM 250; 125 MG/ML; MG/ML
3 INJECTION, POWDER, FOR SUSPENSION INTRAMUSCULAR; INTRAVENOUS ONCE
Qty: 0 | Refills: 0 | Status: COMPLETED | OUTPATIENT
Start: 2017-01-28 | End: 2017-01-28

## 2017-01-28 RX ORDER — MIDAZOLAM HYDROCHLORIDE 1 MG/ML
5 INJECTION, SOLUTION INTRAMUSCULAR; INTRAVENOUS
Qty: 100 | Refills: 0 | Status: DISCONTINUED | OUTPATIENT
Start: 2017-01-28 | End: 2017-01-30

## 2017-01-28 RX ORDER — ETOMIDATE 2 MG/ML
20 INJECTION INTRAVENOUS ONCE
Qty: 0 | Refills: 0 | Status: COMPLETED | OUTPATIENT
Start: 2017-01-28 | End: 2017-01-28

## 2017-01-28 RX ORDER — FENTANYL CITRATE 50 UG/ML
1 INJECTION INTRAVENOUS
Qty: 2500 | Refills: 0 | Status: DISCONTINUED | OUTPATIENT
Start: 2017-01-28 | End: 2017-01-28

## 2017-01-28 RX ORDER — SODIUM CHLORIDE 9 MG/ML
500 INJECTION, SOLUTION INTRAVENOUS
Qty: 0 | Refills: 0 | Status: DISCONTINUED | OUTPATIENT
Start: 2017-01-28 | End: 2017-01-28

## 2017-01-28 RX ORDER — CHLORHEXIDINE GLUCONATE 213 G/1000ML
15 SOLUTION TOPICAL
Qty: 0 | Refills: 0 | Status: DISCONTINUED | OUTPATIENT
Start: 2017-01-28 | End: 2017-02-01

## 2017-01-28 RX ORDER — FENTANYL CITRATE 50 UG/ML
1 INJECTION INTRAVENOUS
Qty: 2500 | Refills: 0 | Status: DISCONTINUED | OUTPATIENT
Start: 2017-01-28 | End: 2017-01-30

## 2017-01-28 RX ORDER — PROPOFOL 10 MG/ML
34 INJECTION, EMULSION INTRAVENOUS
Qty: 1000 | Refills: 0 | Status: DISCONTINUED | OUTPATIENT
Start: 2017-01-28 | End: 2017-01-28

## 2017-01-28 RX ORDER — ACETAMINOPHEN 500 MG
975 TABLET ORAL ONCE
Qty: 0 | Refills: 0 | Status: COMPLETED | OUTPATIENT
Start: 2017-01-28 | End: 2017-01-28

## 2017-01-28 RX ORDER — KETOROLAC TROMETHAMINE 30 MG/ML
30 SYRINGE (ML) INJECTION ONCE
Qty: 0 | Refills: 0 | Status: DISCONTINUED | OUTPATIENT
Start: 2017-01-28 | End: 2017-01-28

## 2017-01-28 RX ORDER — HEPARIN SODIUM 5000 [USP'U]/ML
7500 INJECTION INTRAVENOUS; SUBCUTANEOUS EVERY 8 HOURS
Qty: 0 | Refills: 0 | Status: DISCONTINUED | OUTPATIENT
Start: 2017-01-28 | End: 2017-02-05

## 2017-01-28 RX ORDER — SODIUM CHLORIDE 9 MG/ML
1000 INJECTION INTRAMUSCULAR; INTRAVENOUS; SUBCUTANEOUS ONCE
Qty: 0 | Refills: 0 | Status: COMPLETED | OUTPATIENT
Start: 2017-01-28 | End: 2017-01-28

## 2017-01-28 RX ORDER — SODIUM CHLORIDE 9 MG/ML
500 INJECTION INTRAMUSCULAR; INTRAVENOUS; SUBCUTANEOUS ONCE
Qty: 0 | Refills: 0 | Status: COMPLETED | OUTPATIENT
Start: 2017-01-28 | End: 2017-01-28

## 2017-01-28 RX ORDER — METOCLOPRAMIDE HCL 10 MG
10 TABLET ORAL ONCE
Qty: 0 | Refills: 0 | Status: COMPLETED | OUTPATIENT
Start: 2017-01-28 | End: 2017-01-28

## 2017-01-28 RX ORDER — PANTOPRAZOLE SODIUM 20 MG/1
40 TABLET, DELAYED RELEASE ORAL DAILY
Qty: 0 | Refills: 0 | Status: DISCONTINUED | OUTPATIENT
Start: 2017-01-28 | End: 2017-02-09

## 2017-01-28 RX ORDER — AMPICILLIN SODIUM AND SULBACTAM SODIUM 250; 125 MG/ML; MG/ML
3 INJECTION, POWDER, FOR SUSPENSION INTRAMUSCULAR; INTRAVENOUS EVERY 6 HOURS
Qty: 0 | Refills: 0 | Status: DISCONTINUED | OUTPATIENT
Start: 2017-01-29 | End: 2017-01-30

## 2017-01-28 RX ORDER — AMPICILLIN SODIUM AND SULBACTAM SODIUM 250; 125 MG/ML; MG/ML
INJECTION, POWDER, FOR SUSPENSION INTRAMUSCULAR; INTRAVENOUS
Qty: 0 | Refills: 0 | Status: DISCONTINUED | OUTPATIENT
Start: 2017-01-28 | End: 2017-01-30

## 2017-01-28 RX ORDER — FENTANYL CITRATE 50 UG/ML
100 INJECTION INTRAVENOUS ONCE
Qty: 0 | Refills: 0 | Status: DISCONTINUED | OUTPATIENT
Start: 2017-01-28 | End: 2017-01-28

## 2017-01-28 RX ORDER — PROPOFOL 10 MG/ML
5 INJECTION, EMULSION INTRAVENOUS
Qty: 1000 | Refills: 0 | Status: DISCONTINUED | OUTPATIENT
Start: 2017-01-28 | End: 2017-01-30

## 2017-01-28 RX ORDER — SUCCINYLCHOLINE CHLORIDE 100 MG/5ML
100 SYRINGE (ML) INTRAVENOUS ONCE
Qty: 0 | Refills: 0 | Status: COMPLETED | OUTPATIENT
Start: 2017-01-28 | End: 2017-01-28

## 2017-01-28 RX ADMIN — MIDAZOLAM HYDROCHLORIDE 5 MG/HR: 1 INJECTION, SOLUTION INTRAMUSCULAR; INTRAVENOUS at 17:02

## 2017-01-28 RX ADMIN — Medication 975 MILLIGRAM(S): at 02:37

## 2017-01-28 RX ADMIN — Medication 100 MILLIGRAM(S): at 08:18

## 2017-01-28 RX ADMIN — AMPICILLIN SODIUM AND SULBACTAM SODIUM 200 GRAM(S): 250; 125 INJECTION, POWDER, FOR SUSPENSION INTRAMUSCULAR; INTRAVENOUS at 20:40

## 2017-01-28 RX ADMIN — Medication 30 MILLIGRAM(S): at 02:50

## 2017-01-28 RX ADMIN — FENTANYL CITRATE 10.89 MICROGRAM(S)/KG/HR: 50 INJECTION INTRAVENOUS at 20:37

## 2017-01-28 RX ADMIN — MIDAZOLAM HYDROCHLORIDE 5 MG/HR: 1 INJECTION, SOLUTION INTRAMUSCULAR; INTRAVENOUS at 08:48

## 2017-01-28 RX ADMIN — SODIUM CHLORIDE 1000 MILLILITER(S): 9 INJECTION INTRAMUSCULAR; INTRAVENOUS; SUBCUTANEOUS at 02:37

## 2017-01-28 RX ADMIN — SODIUM CHLORIDE 1000 MILLILITER(S): 9 INJECTION INTRAMUSCULAR; INTRAVENOUS; SUBCUTANEOUS at 23:33

## 2017-01-28 RX ADMIN — HEPARIN SODIUM 7500 UNIT(S): 5000 INJECTION INTRAVENOUS; SUBCUTANEOUS at 23:33

## 2017-01-28 RX ADMIN — PROPOFOL 22.22 MICROGRAM(S)/KG/MIN: 10 INJECTION, EMULSION INTRAVENOUS at 08:31

## 2017-01-28 RX ADMIN — Medication 975 MILLIGRAM(S): at 03:07

## 2017-01-28 RX ADMIN — Medication 104 MILLIGRAM(S): at 02:50

## 2017-01-28 RX ADMIN — FENTANYL CITRATE 100 MICROGRAM(S): 50 INJECTION INTRAVENOUS at 08:18

## 2017-01-28 RX ADMIN — SODIUM CHLORIDE 250 MILLILITER(S): 9 INJECTION, SOLUTION INTRAVENOUS at 03:05

## 2017-01-28 RX ADMIN — ETOMIDATE 20 MILLIGRAM(S): 2 INJECTION INTRAVENOUS at 08:18

## 2017-01-28 RX ADMIN — FENTANYL CITRATE 10.89 MICROGRAM(S)/KG/HR: 50 INJECTION INTRAVENOUS at 10:44

## 2017-01-28 RX ADMIN — Medication 30 MILLIGRAM(S): at 03:20

## 2017-01-28 NOTE — H&P ADULT. - HISTORY OF PRESENT ILLNESS
32 yo F extremely poor historian, PMHx HTN, HLD, ?paroxysmal afib (reports during pregnancy), congenital cardiac malformation s/p repair, ?MICHAEL, DVT in 2008 (s/p Coumadin last dose 2009), h/o UGIB s/p endoscopy, recent 2 day admission (Jan 2017) to [facility] for SOB/CP/n/v r/o ACS, ECHO (EF 60%, normal sized LA, severely asymmetric septal hypertrophy) admission c/b 10 minute unresponsiveness, resolving on its own with non contributory full w/u, presented to  ED (1/27 at night) again per the patient with similar symptoms that brought her to the hospital earlier this month, SOB/CP/n/v.     In the ED VSS T 98.5, , /98, RR 22, POx 92 RA. Labs and CT were not impressive other than cardiomegaly with ASD repair.  The patient was in the ED for a number of hours. In the early morning the pt vomited and became more tachypneic. She was placed on a non-rebreather. ABG showed CO2 retention. The patient was put on Bipap and MICU was consulted. By the time MICU arrived the patient had further decompensated and was urgently intubated.

## 2017-01-28 NOTE — ED ADULT NURSE REASSESSMENT NOTE - NS ED NURSE REASSESS COMMENT FT1
Patient is A&O x3 and +PERRLA. Patient is NAD, speaking full sentences, chest rise is equal and symmetrical bilaterally, trachea midline and lung sounds are clear in +1 non pitting edema noted in lower extremities bilaterally, cap refill less than 3 seconds bilaterally, dorsal pedal pulses are good bilaterally. Patient resting comfortably and aware with plan of care, will continue to monitor.
Arrived to patient in the resus room and intubated. Pt on diprovan and requiring to much to be sedated. pt starting of versed drip at 5ml/hr. Pt condition unchanged, pending transport to ICU.
Pt became increasingly lethargic, responsive to verbal and tactile stimuli. LAMONT Mohamud informed. Vbg ordered and sent to lab. Elevated CO2 levels noted, ph 7.12 reported from lab. Respiratory notified. Bipap applied. ICU arrived to bedside for evaluation. Pt not vented probably on bi-pap. Pt moved to resus room for intubation. EKG monitoring in progress. oxygen therapy provided. Pt intubated to lip level 23. Care ongoing. secondary IV line inserted.
Pt vomited Tylenol shortly after being administered. PA notified. Care in progress. Awaiting disposition. Care ongoing
remains lethargic, pt unable to hold up her cup of juice for PO trial. remains tachycardic to 110's. LAMONT Jain aware

## 2017-01-28 NOTE — H&P ADULT. - ASSESSMENT
30 yo F extremely poor historian, PMHx HTN, HLD, ?paroxysmal afib (reports during pregnancy), congenital cardiac malformation s/p repair, ?MICHAEL, DVT in 2008 (s/p Coumadin last dose 2009), recent 2 day tele admission (Jan 2017) to [facility] for SOB/CP/n/v r/o ACS, ECHO (EF 60%, normal sized LA, severely asymmetric septal hypertrophy) admission c/b 10 minute unresponsiveness, resolving on its own with non contributory full w/u, presented to  ED (1/27 at night) again per the patient with similar symptoms that brought her to the hospital earlier this month, SOB/CP/n/v. 32 yo F extremely poor historian, PMHx HTN, HLD, congenital cardiac malformation s/p repair, ?MICHAEL, DVT in 2008 (s/p Coumadin last dose 2009), recent 2 day tele admission (Jan 2017) to [facility] for SOB/CP/n/v r/o ACS, ECHO (EF 60%, normal sized LA, severely asymmetric septal hypertrophy)  presented to  ED (1/27 at night) again per the patient with similar symptoms that brought her to the hospital earlier this month, SOB/CP/n/v and now admitted to MICU for resp failure    Pulm  #Resp failure 2/2 CO2 retention. Unclear what triggered this. May have been aspiration pneumonitis 2/2 n/v. No h/o lung disease other than MICHAEL. Will repeat CXR. Will f/u RVP.     CV  #cardiac hx (congential anomaly s/p repair, severe asymmetric septal hypertrophy). presently HD stable. will consider ordering repeat ECHO.     ID.   #Low suspicion for infection now. But if CXR shows consolidation or white count elevates or fever then will start abx for pna.     FEN: NPO. Replete lytes.     PPX: SQH, PPI    Full CODE

## 2017-01-28 NOTE — H&P ADULT. - ATTENDING COMMENTS
the patient was seen and examined.  I discussed the case in details with the resident and fellow.  I reviewed labs, meds, CXR, and clinical status.  I rounded on the patient.  Plan is outlined.  the patient had a bronchoscopy. Continue antibiotics for her pneumonia. The ventilator soon adjusted. Start to feed

## 2017-01-28 NOTE — ED PROCEDURE NOTE - CPROC ED TRACHE INTUB DETAIL1
Patient connected to ventilator with settings as ordered./Patient was pre-oxygenated. An endotracheal tube (ETT) was placed through the vocal cords into the trachea. During intubation, staff applied gentle pressure to the cricoid cartilage. ETT position was confirmed by auscultation of bilateral breath sounds to all lung fields. ETCO2 level was appropriate./Difficult/crash intubation (see additional details section).

## 2017-01-28 NOTE — ED PROCEDURE NOTE - PROCEDURE ADDITIONAL DETAILS
first attempt by ICU team (unsuccessful) second attempt by ED team no complications dental trauma present before ED team attempted

## 2017-01-29 LAB
ANION GAP SERPL CALC-SCNC: 4 MMOL/L — LOW (ref 9–16)
ANISOCYTOSIS BLD QL: SLIGHT — SIGNIFICANT CHANGE UP
BASE EXCESS BLDA CALC-SCNC: 6.7 MMOL/L — HIGH (ref -2–3)
BUN SERPL-MCNC: 14 MG/DL — SIGNIFICANT CHANGE UP (ref 7–23)
CALCIUM SERPL-MCNC: 7.5 MG/DL — LOW (ref 8.5–10.5)
CHLORIDE SERPL-SCNC: 106 MMOL/L — SIGNIFICANT CHANGE UP (ref 96–108)
CO2 SERPL-SCNC: 34 MMOL/L — HIGH (ref 22–31)
CREAT SERPL-MCNC: 0.83 MG/DL — SIGNIFICANT CHANGE UP (ref 0.5–1.3)
GAS PNL BLDA: SIGNIFICANT CHANGE UP
GIANT PLATELETS BLD QL SMEAR: PRESENT — SIGNIFICANT CHANGE UP
GLUCOSE SERPL-MCNC: 65 MG/DL — LOW (ref 70–99)
GRAM STN FLD: SIGNIFICANT CHANGE UP
HCG SERPL-ACNC: <1 MIU/ML — SIGNIFICANT CHANGE UP
HCO3 BLDA-SCNC: 34 MMOL/L — HIGH (ref 21–28)
HCT VFR BLD CALC: 46.7 % — HIGH (ref 34.5–45)
HGB BLD-MCNC: 14 G/DL — SIGNIFICANT CHANGE UP (ref 11.5–15.5)
HYPOCHROMIA BLD QL: SLIGHT — SIGNIFICANT CHANGE UP
LACTATE SERPL-SCNC: 1 MMOL/L — SIGNIFICANT CHANGE UP (ref 0.5–2)
LACTATE SERPL-SCNC: 1.2 MMOL/L — SIGNIFICANT CHANGE UP (ref 0.5–2)
LG PLATELETS BLD QL AUTO: PRESENT — SIGNIFICANT CHANGE UP
LYMPHOCYTES # BLD AUTO: 2 % — LOW (ref 13–44)
MACROCYTES BLD QL: SLIGHT — SIGNIFICANT CHANGE UP
MAGNESIUM SERPL-MCNC: 1.5 MG/DL — LOW (ref 1.6–2.4)
MANUAL SMEAR VERIFICATION: SIGNIFICANT CHANGE UP
MCHC RBC-ENTMCNC: 23.4 PG — LOW (ref 27–34)
MCHC RBC-ENTMCNC: 30 G/DL — LOW (ref 32–36)
MCV RBC AUTO: 78.1 FL — LOW (ref 80–100)
MONOCYTES NFR BLD AUTO: 4 % — SIGNIFICANT CHANGE UP (ref 2–14)
NEUTROPHILS NFR BLD AUTO: 52 % — SIGNIFICANT CHANGE UP (ref 43–77)
NEUTS BAND # BLD: 40 % — HIGH
PCO2 BLDA: 57 MMHG — HIGH (ref 32–45)
PH BLDA: 7.39 — SIGNIFICANT CHANGE UP (ref 7.35–7.45)
PLAT MORPH BLD: (no result)
PLATELET # BLD AUTO: 84 K/UL — LOW (ref 150–400)
PO2 BLDA: 94 MMHG — SIGNIFICANT CHANGE UP (ref 83–108)
POLYCHROMASIA BLD QL SMEAR: SLIGHT — SIGNIFICANT CHANGE UP
POTASSIUM SERPL-MCNC: 4.3 MMOL/L — SIGNIFICANT CHANGE UP (ref 3.5–5.3)
POTASSIUM SERPL-SCNC: 4.3 MMOL/L — SIGNIFICANT CHANGE UP (ref 3.5–5.3)
RBC # BLD: 5.98 M/UL — HIGH (ref 3.8–5.2)
RBC # FLD: 24.3 % — HIGH (ref 10.3–16.9)
RBC BLD AUTO: (no result)
SAO2 % BLDA: 97 % — SIGNIFICANT CHANGE UP (ref 95–100)
SODIUM SERPL-SCNC: 144 MMOL/L — SIGNIFICANT CHANGE UP (ref 135–145)
SPECIMEN SOURCE: SIGNIFICANT CHANGE UP
VARIANT LYMPHS # BLD: 2 % — SIGNIFICANT CHANGE UP
WBC # BLD: 9.1 K/UL — SIGNIFICANT CHANGE UP (ref 3.8–10.5)
WBC # FLD AUTO: 9.1 K/UL — SIGNIFICANT CHANGE UP (ref 3.8–10.5)

## 2017-01-29 PROCEDURE — 71010: CPT | Mod: 26

## 2017-01-29 PROCEDURE — 99233 SBSQ HOSP IP/OBS HIGH 50: CPT | Mod: GC

## 2017-01-29 RX ORDER — SODIUM CHLORIDE 9 MG/ML
1000 INJECTION INTRAMUSCULAR; INTRAVENOUS; SUBCUTANEOUS ONCE
Qty: 0 | Refills: 0 | Status: COMPLETED | OUTPATIENT
Start: 2017-01-29 | End: 2017-01-29

## 2017-01-29 RX ORDER — MAGNESIUM SULFATE 500 MG/ML
2 VIAL (ML) INJECTION
Qty: 0 | Refills: 0 | Status: COMPLETED | OUTPATIENT
Start: 2017-01-29 | End: 2017-01-29

## 2017-01-29 RX ORDER — VANCOMYCIN HCL 1 G
1750 VIAL (EA) INTRAVENOUS EVERY 12 HOURS
Qty: 0 | Refills: 0 | Status: DISCONTINUED | OUTPATIENT
Start: 2017-01-29 | End: 2017-01-29

## 2017-01-29 RX ORDER — VANCOMYCIN HCL 1 G
1750 VIAL (EA) INTRAVENOUS ONCE
Qty: 0 | Refills: 0 | Status: DISCONTINUED | OUTPATIENT
Start: 2017-01-29 | End: 2017-01-29

## 2017-01-29 RX ORDER — VANCOMYCIN HCL 1 G
VIAL (EA) INTRAVENOUS
Qty: 0 | Refills: 0 | Status: DISCONTINUED | OUTPATIENT
Start: 2017-01-29 | End: 2017-01-29

## 2017-01-29 RX ORDER — VANCOMYCIN HCL 1 G
1750 VIAL (EA) INTRAVENOUS EVERY 12 HOURS
Qty: 0 | Refills: 0 | Status: DISCONTINUED | OUTPATIENT
Start: 2017-01-29 | End: 2017-01-30

## 2017-01-29 RX ADMIN — HEPARIN SODIUM 7500 UNIT(S): 5000 INJECTION INTRAVENOUS; SUBCUTANEOUS at 22:51

## 2017-01-29 RX ADMIN — AMPICILLIN SODIUM AND SULBACTAM SODIUM 200 GRAM(S): 250; 125 INJECTION, POWDER, FOR SUSPENSION INTRAMUSCULAR; INTRAVENOUS at 03:08

## 2017-01-29 RX ADMIN — PANTOPRAZOLE SODIUM 40 MILLIGRAM(S): 20 TABLET, DELAYED RELEASE ORAL at 12:56

## 2017-01-29 RX ADMIN — HEPARIN SODIUM 5000 UNIT(S): 5000 INJECTION INTRAVENOUS; SUBCUTANEOUS at 06:51

## 2017-01-29 RX ADMIN — MIDAZOLAM HYDROCHLORIDE 5 MG/HR: 1 INJECTION, SOLUTION INTRAMUSCULAR; INTRAVENOUS at 10:35

## 2017-01-29 RX ADMIN — AMPICILLIN SODIUM AND SULBACTAM SODIUM 200 GRAM(S): 250; 125 INJECTION, POWDER, FOR SUSPENSION INTRAMUSCULAR; INTRAVENOUS at 20:16

## 2017-01-29 RX ADMIN — AMPICILLIN SODIUM AND SULBACTAM SODIUM 200 GRAM(S): 250; 125 INJECTION, POWDER, FOR SUSPENSION INTRAMUSCULAR; INTRAVENOUS at 08:18

## 2017-01-29 RX ADMIN — Medication 250 MILLIGRAM(S): at 23:13

## 2017-01-29 RX ADMIN — SODIUM CHLORIDE 6000 MILLILITER(S): 9 INJECTION INTRAMUSCULAR; INTRAVENOUS; SUBCUTANEOUS at 02:09

## 2017-01-29 RX ADMIN — SODIUM CHLORIDE 2000 MILLILITER(S): 9 INJECTION INTRAMUSCULAR; INTRAVENOUS; SUBCUTANEOUS at 03:07

## 2017-01-29 RX ADMIN — PROPOFOL 3.27 MICROGRAM(S)/KG/MIN: 10 INJECTION, EMULSION INTRAVENOUS at 12:56

## 2017-01-29 RX ADMIN — Medication 100 GRAM(S): at 16:53

## 2017-01-29 RX ADMIN — CHLORHEXIDINE GLUCONATE 15 MILLILITER(S): 213 SOLUTION TOPICAL at 06:51

## 2017-01-29 RX ADMIN — HEPARIN SODIUM 7500 UNIT(S): 5000 INJECTION INTRAVENOUS; SUBCUTANEOUS at 14:22

## 2017-01-29 RX ADMIN — Medication 100 GRAM(S): at 14:22

## 2017-01-29 RX ADMIN — AMPICILLIN SODIUM AND SULBACTAM SODIUM 200 GRAM(S): 250; 125 INJECTION, POWDER, FOR SUSPENSION INTRAMUSCULAR; INTRAVENOUS at 14:22

## 2017-01-29 RX ADMIN — CHLORHEXIDINE GLUCONATE 15 MILLILITER(S): 213 SOLUTION TOPICAL at 18:50

## 2017-01-29 RX ADMIN — Medication 250 MILLIGRAM(S): at 12:15

## 2017-01-29 NOTE — CHART NOTE - NSCHARTNOTEFT_GEN_A_CORE
PGY-3 Event Note    patient SBP 87 with MAP of 59. IVC on ultrasound approximately 1.5 to 2.0 cm signifying >50% collapse a PGY-3 Event Note    patient SBP 87 with MAP of 59. IVC on ultrasound approximately 1.5 to 2.0 cm signifying >50% collapse. Gave 2 liters IVF bolus. Will monitor UOP

## 2017-01-29 NOTE — PROGRESS NOTE ADULT - SUBJECTIVE AND OBJECTIVE BOX
INTERVAL HPI/OVERNIGHT EVENTS: Hypotensive to 80s o/n, given 3L NS with good response - SBP up to 110s. Patient seen and examined at bedside, intubated and sedated, unable to assess ROS     OBJECTIVE:    VITAL SIGNS:  ICU Vital Signs Last 24 Hrs  T(C): 37.2, Max: 37.2 ( @ 18:00)  T(F): 99, Max: 99 ( @ 22:19)  HR: 72 (70 - 101)  BP: 156/104 (79/43 - 171/126)  BP(mean): 127 (57 - 143)  ABP: --  ABP(mean): --  RR: 17 (9 - 18)  SpO2: 95% (90% - 100%)    Mode: AC/ CMV (Assist Control/ Continuous Mandatory Ventilation), RR (machine): 14, TV (machine): 390, FiO2: 40, PEEP: 10, ITime: 0.85, MAP: 14, PIP: 29  I & Os for 24h ending  @ 07:00  =============================================  IN: 2928.5 ml / OUT: 1354 ml / NET: 1574.5 ml    I & Os for current day (as of  @ 16:46)  =============================================  IN: 644.6 ml / OUT: 445 ml / NET: 199.6 ml    CAPILLARY BLOOD GLUCOSE      PHYSICAL EXAM:    General: NAD, comfortable, unresponsive   HEENT: NCAT, PERRL, clear conjunctiva, no scleral icterus  Neck: supple, no JVD  Respiratory: CTA b/l, no wheezing, rhonchi, rales  Cardiovascular: RRR, normal S1S2, no M/R/G  Abdomen: soft, NT/ND, bowel sounds in all four quadrants, no palpable masses  Extremities: WWP, no clubbing, cyanosis, +1 edema  Neuro: sedated     MEDICATIONS:  MEDICATIONS  (STANDING):  midazolam Infusion 5mG/Hr IV Continuous <Continuous>  fentaNYL   Infusion 1MICROgram(s)/kG/Hr IV Continuous <Continuous>  propofol Infusion 5MICROgram(s)/kG/Min IV Continuous <Continuous>  ampicillin/sulbactam  IVPB  IV Intermittent   ampicillin/sulbactam  IVPB 3Gram(s) IV Intermittent every 6 hours  chlorhexidine 0.12% Liquid 15milliLiter(s) Swish and Spit two times a day  heparin  Injectable 7500Unit(s) SubCutaneous every 8 hours  pantoprazole  Injectable 40milliGRAM(s) IV Push daily  vancomycin  IVPB 1750milliGRAM(s) IV Intermittent every 12 hours  magnesium sulfate  IVPB 2Gram(s) IV Intermittent every 2 hours    MEDICATIONS  (PRN):      ALLERGIES:  Allergies    No Known Drug Allergies  Seafood (Rash)    Intolerances        LABS:                        14.0   9.1   )-----------( 84       ( 2017 04:38 )             46.7     2017 04:38    144    |  106    |  14     ----------------------------<  65     4.3     |  34     |  0.83     Ca    7.5        2017 04:38  Mg     1.5       2017 04:38    TPro  8.1    /  Alb  3.1    /  TBili  2.1    /  DBili  x      /  AST  40     /  ALT  21     /  AlkPhos  51     2017 19:42      Urinalysis Basic - ( 2017 22:00 )    Color: Yellow / Appearance: Clear / S.010 / pH: x  Gluc: x / Ketone: NEGATIVE  / Bili: NEGATIVE / Urobili: >=8.0 E.U./dL   Blood: x / Protein: Trace mg/dL / Nitrite: NEGATIVE   Leuk Esterase: NEGATIVE / RBC: < 5 /HPF / WBC < 5 /HPF   Sq Epi: x / Non Sq Epi: Moderate /HPF / Bacteria: Present /HPF        RADIOLOGY & ADDITIONAL TESTS: Reviewed.    A/P    32 yo F extremely poor historian, PMHx HTN, HLD, congenital cardiac malformation s/p repair, ?MICHAEL,  DVT in  (s/p Coumadin last dose ), recent 2 day tele admission (2017) to [facility] for SOB/CP/n/v r/o ACS, ECHO (EF 60%, normal sized LA, severely asymmetric septal hypertrophy)  presented with SOB/CP/n/v and now admitted to MICU intubated for resp failure    Pulm  #Resp failure 2/2 CO2 retention: CO2 retention likely 2/2 undiagnosed obesity-hypoventilation syndrome. Neg RVP  -intubated, SBT and possible extubation tomorrow     #Aspiration PNA: gram + and negative growth on bronch cultures.   -c/w Vanc and unasyn (day 2)      FEN: NPO with tube feeds     PPX: SQH, PPI    Full CODE    Dispo: MICU

## 2017-01-30 LAB
-  CEFAZOLIN: SIGNIFICANT CHANGE UP
-  CLINDAMYCIN: SIGNIFICANT CHANGE UP
-  ERYTHROMYCIN: SIGNIFICANT CHANGE UP
-  LINEZOLID: SIGNIFICANT CHANGE UP
-  OXACILLIN: SIGNIFICANT CHANGE UP
-  PENICILLIN: SIGNIFICANT CHANGE UP
-  RIFAMPIN: SIGNIFICANT CHANGE UP
-  TRIMETHOPRIM/SULFAMETHOXAZOLE: SIGNIFICANT CHANGE UP
-  VANCOMYCIN: SIGNIFICANT CHANGE UP
ANION GAP SERPL CALC-SCNC: 8 MMOL/L — LOW (ref 9–16)
BASOPHILS NFR BLD AUTO: 0.3 % — SIGNIFICANT CHANGE UP (ref 0–2)
BUN SERPL-MCNC: 12 MG/DL — SIGNIFICANT CHANGE UP (ref 7–23)
CALCIUM SERPL-MCNC: 8.2 MG/DL — LOW (ref 8.5–10.5)
CHLORIDE SERPL-SCNC: 103 MMOL/L — SIGNIFICANT CHANGE UP (ref 96–108)
CO2 SERPL-SCNC: 30 MMOL/L — SIGNIFICANT CHANGE UP (ref 22–31)
CREAT SERPL-MCNC: 0.79 MG/DL — SIGNIFICANT CHANGE UP (ref 0.5–1.3)
CULTURE RESULTS: SIGNIFICANT CHANGE UP
EOSINOPHIL NFR BLD AUTO: 2.3 % — SIGNIFICANT CHANGE UP (ref 0–6)
GLUCOSE SERPL-MCNC: 68 MG/DL — LOW (ref 70–99)
HCT VFR BLD CALC: 48.7 % — HIGH (ref 34.5–45)
HGB BLD-MCNC: 15.1 G/DL — SIGNIFICANT CHANGE UP (ref 11.5–15.5)
LYMPHOCYTES # BLD AUTO: 8.6 % — LOW (ref 13–44)
MAGNESIUM SERPL-MCNC: 2.4 MG/DL — SIGNIFICANT CHANGE UP (ref 1.6–2.4)
MCHC RBC-ENTMCNC: 23.3 PG — LOW (ref 27–34)
MCHC RBC-ENTMCNC: 31 G/DL — LOW (ref 32–36)
MCV RBC AUTO: 75 FL — LOW (ref 80–100)
METHOD TYPE: SIGNIFICANT CHANGE UP
MONOCYTES NFR BLD AUTO: 7.7 % — SIGNIFICANT CHANGE UP (ref 2–14)
NEUTROPHILS NFR BLD AUTO: 81.1 % — HIGH (ref 43–77)
NIGHT BLUE STAIN TISS: SIGNIFICANT CHANGE UP
ORGANISM # SPEC MICROSCOPIC CNT: SIGNIFICANT CHANGE UP
ORGANISM # SPEC MICROSCOPIC CNT: SIGNIFICANT CHANGE UP
PHOSPHATE SERPL-MCNC: 3.3 MG/DL — SIGNIFICANT CHANGE UP (ref 2.5–4.5)
PLATELET # BLD AUTO: 109 K/UL — LOW (ref 150–400)
POTASSIUM SERPL-MCNC: 4.3 MMOL/L — SIGNIFICANT CHANGE UP (ref 3.5–5.3)
POTASSIUM SERPL-SCNC: 4.3 MMOL/L — SIGNIFICANT CHANGE UP (ref 3.5–5.3)
RBC # BLD: 6.49 M/UL — HIGH (ref 3.8–5.2)
RBC # FLD: 24.5 % — HIGH (ref 10.3–16.9)
SODIUM SERPL-SCNC: 141 MMOL/L — SIGNIFICANT CHANGE UP (ref 135–145)
SPECIMEN SOURCE: SIGNIFICANT CHANGE UP
SPECIMEN SOURCE: SIGNIFICANT CHANGE UP
WBC # BLD: 7.9 K/UL — SIGNIFICANT CHANGE UP (ref 3.8–10.5)
WBC # FLD AUTO: 7.9 K/UL — SIGNIFICANT CHANGE UP (ref 3.8–10.5)

## 2017-01-30 PROCEDURE — 71010: CPT | Mod: 26,77

## 2017-01-30 PROCEDURE — 71010: CPT | Mod: 26

## 2017-01-30 PROCEDURE — 99233 SBSQ HOSP IP/OBS HIGH 50: CPT | Mod: GC

## 2017-01-30 RX ORDER — FENTANYL CITRATE 50 UG/ML
1 INJECTION INTRAVENOUS
Qty: 2500 | Refills: 0 | Status: DISCONTINUED | OUTPATIENT
Start: 2017-01-30 | End: 2017-01-30

## 2017-01-30 RX ORDER — MIDAZOLAM HYDROCHLORIDE 1 MG/ML
5 INJECTION, SOLUTION INTRAMUSCULAR; INTRAVENOUS
Qty: 100 | Refills: 0 | Status: DISCONTINUED | OUTPATIENT
Start: 2017-01-30 | End: 2017-01-30

## 2017-01-30 RX ORDER — OXACILLIN SODIUM 2 G
INTRAVENOUS SOLUTION, PIGGYBACK (EA) INTRAVENOUS
Qty: 0 | Refills: 0 | Status: DISCONTINUED | OUTPATIENT
Start: 2017-01-30 | End: 2017-02-06

## 2017-01-30 RX ORDER — OXACILLIN SODIUM 2 G
2 INTRAVENOUS SOLUTION, PIGGYBACK (EA) INTRAVENOUS ONCE
Qty: 0 | Refills: 0 | Status: COMPLETED | OUTPATIENT
Start: 2017-01-30 | End: 2017-01-30

## 2017-01-30 RX ORDER — FENTANYL CITRATE 50 UG/ML
1 INJECTION INTRAVENOUS
Qty: 2500 | Refills: 0 | Status: DISCONTINUED | OUTPATIENT
Start: 2017-01-30 | End: 2017-01-31

## 2017-01-30 RX ORDER — OXACILLIN SODIUM 2 G
2 INTRAVENOUS SOLUTION, PIGGYBACK (EA) INTRAVENOUS EVERY 4 HOURS
Qty: 0 | Refills: 0 | Status: DISCONTINUED | OUTPATIENT
Start: 2017-01-30 | End: 2017-02-06

## 2017-01-30 RX ORDER — PROPOFOL 10 MG/ML
5 INJECTION, EMULSION INTRAVENOUS
Qty: 1000 | Refills: 0 | Status: DISCONTINUED | OUTPATIENT
Start: 2017-01-30 | End: 2017-01-31

## 2017-01-30 RX ORDER — PROPOFOL 10 MG/ML
5 INJECTION, EMULSION INTRAVENOUS
Qty: 1000 | Refills: 0 | Status: DISCONTINUED | OUTPATIENT
Start: 2017-01-30 | End: 2017-01-30

## 2017-01-30 RX ORDER — MIDAZOLAM HYDROCHLORIDE 1 MG/ML
5 INJECTION, SOLUTION INTRAMUSCULAR; INTRAVENOUS
Qty: 100 | Refills: 0 | Status: DISCONTINUED | OUTPATIENT
Start: 2017-01-30 | End: 2017-01-31

## 2017-01-30 RX ADMIN — MIDAZOLAM HYDROCHLORIDE 5 MG/HR: 1 INJECTION, SOLUTION INTRAMUSCULAR; INTRAVENOUS at 07:41

## 2017-01-30 RX ADMIN — Medication 100 GRAM(S): at 14:50

## 2017-01-30 RX ADMIN — Medication 100 GRAM(S): at 22:40

## 2017-01-30 RX ADMIN — HEPARIN SODIUM 7500 UNIT(S): 5000 INJECTION INTRAVENOUS; SUBCUTANEOUS at 22:23

## 2017-01-30 RX ADMIN — CHLORHEXIDINE GLUCONATE 15 MILLILITER(S): 213 SOLUTION TOPICAL at 18:39

## 2017-01-30 RX ADMIN — HEPARIN SODIUM 7500 UNIT(S): 5000 INJECTION INTRAVENOUS; SUBCUTANEOUS at 07:05

## 2017-01-30 RX ADMIN — CHLORHEXIDINE GLUCONATE 15 MILLILITER(S): 213 SOLUTION TOPICAL at 07:05

## 2017-01-30 RX ADMIN — AMPICILLIN SODIUM AND SULBACTAM SODIUM 200 GRAM(S): 250; 125 INJECTION, POWDER, FOR SUSPENSION INTRAMUSCULAR; INTRAVENOUS at 03:34

## 2017-01-30 RX ADMIN — Medication 100 GRAM(S): at 10:59

## 2017-01-30 RX ADMIN — PANTOPRAZOLE SODIUM 40 MILLIGRAM(S): 20 TABLET, DELAYED RELEASE ORAL at 12:13

## 2017-01-30 RX ADMIN — PROPOFOL 3.27 MICROGRAM(S)/KG/MIN: 10 INJECTION, EMULSION INTRAVENOUS at 08:40

## 2017-01-30 RX ADMIN — Medication 100 GRAM(S): at 18:39

## 2017-01-30 RX ADMIN — AMPICILLIN SODIUM AND SULBACTAM SODIUM 200 GRAM(S): 250; 125 INJECTION, POWDER, FOR SUSPENSION INTRAMUSCULAR; INTRAVENOUS at 07:06

## 2017-01-30 RX ADMIN — HEPARIN SODIUM 7500 UNIT(S): 5000 INJECTION INTRAVENOUS; SUBCUTANEOUS at 14:50

## 2017-01-30 RX ADMIN — FENTANYL CITRATE 10.89 MICROGRAM(S)/KG/HR: 50 INJECTION INTRAVENOUS at 07:05

## 2017-01-30 NOTE — PROGRESS NOTE ADULT - SUBJECTIVE AND OBJECTIVE BOX
INTERVAL HPI/OVERNIGHT EVENTS:    VITAL SIGNS:  Vital Signs Last 24 Hrs  T(C): 37.4, Max: 37.4 (01-30 @ 01:00)  T(F): 99.3, Max: 99.3 (01-30 @ 01:00)  HR: 74 (70 - 106)  BP: 114/72 (113/70 - 171/126)  BP(mean): 87 (85 - 143)  RR: 14 (13 - 18)  SpO2: 97% (90% - 100%)    PHYSICAL EXAM:    Constitutional:  Eyes:  ENMT:  Neck:  Respiratory:  Cardiovascular:  Gastrointestinal:  Extremities:  Vascular:  Neurological:  Musculoskeletal:    MEDICATIONS  (STANDING):  ampicillin/sulbactam  IVPB  IV Intermittent   ampicillin/sulbactam  IVPB 3Gram(s) IV Intermittent every 6 hours  chlorhexidine 0.12% Liquid 15milliLiter(s) Swish and Spit two times a day  heparin  Injectable 7500Unit(s) SubCutaneous every 8 hours  pantoprazole  Injectable 40milliGRAM(s) IV Push daily  vancomycin  IVPB 1750milliGRAM(s) IV Intermittent every 12 hours  fentaNYL   Infusion 1MICROgram(s)/kG/Hr IV Continuous <Continuous>  midazolam Infusion 5mG/Hr IV Continuous <Continuous>  propofol Infusion 5MICROgram(s)/kG/Min IV Continuous <Continuous>    MEDICATIONS  (PRN):      Allergies    No Known Drug Allergies  Seafood (Rash)    Intolerances        LABS:                        14.0   9.1   )-----------( 84       ( 29 Jan 2017 04:38 )             46.7     29 Jan 2017 04:38    144    |  106    |  14     ----------------------------<  65     4.3     |  34     |  0.83     Ca    7.5        29 Jan 2017 04:38  Mg     1.5       29 Jan 2017 04:38            RADIOLOGY & ADDITIONAL TESTS: INTERVAL HPI/OVERNIGHT EVENTS: added vanc because of staph aureus on bronch wash   o/n: MARY      VITAL SIGNS:  Vital Signs Last 24 Hrs  T(C): 37.4, Max: 37.4 (01-30 @ 01:00)  T(F): 99.3, Max: 99.3 (01-30 @ 01:00)  HR: 74 (70 - 106)  BP: 114/72 (113/70 - 171/126) no pressor  BP(mean): 87 (85 - 143)  RR: 14 (13 - 18) MV  SpO2: 97% (90% - 100%)  I&O: net 24 hr net 293cc    PHYSICAL EXAM:    Constitutional:  Eyes:  ENMT:  Neck:  Respiratory:  Cardiovascular:  Gastrointestinal:  Extremities:  Vascular:  Neurological:  Musculoskeletal:  Tubes/Lines:    MEDICATIONS  (STANDING):  ampicillin/sulbactam  IVPB  IV Intermittent   ampicillin/sulbactam  IVPB 3Gram(s) IV Intermittent every 6 hours  chlorhexidine 0.12% Liquid 15milliLiter(s) Swish and Spit two times a day  heparin  Injectable 7500Unit(s) SubCutaneous every 8 hours  pantoprazole  Injectable 40milliGRAM(s) IV Push daily  vancomycin  IVPB 1750milliGRAM(s) IV Intermittent every 12 hours  fentaNYL   Infusion 1MICROgram(s)/kG/Hr IV Continuous <Continuous>  midazolam Infusion 5mG/Hr IV Continuous <Continuous>  propofol Infusion 5MICROgram(s)/kG/Min IV Continuous <Continuous>    MEDICATIONS  (PRN):      Allergies    No Known Drug Allergies  Seafood (Rash)    Intolerances        LABS:                        14.0   9.1   )-----------( 84       ( 29 Jan 2017 04:38 )             46.7     29 Jan 2017 04:38    144    |  106    |  14     ----------------------------<  65     4.3     |  34     |  0.83     Ca    7.5        29 Jan 2017 04:38  Mg     1.5       29 Jan 2017 04:38            RADIOLOGY & ADDITIONAL TESTS: Reviewed   A/P    30 yo F extremely poor historian, PMHx HTN, HLD, congenital cardiac malformation s/p repair, ?MICHAEL,  DVT in 2008 (s/p Coumadin last dose 2009), recent 2 day tele admission (Jan 2017) to [facility] for SOB/CP/n/v r/o ACS, ECHO (EF 60%, normal sized LA, severely asymmetric septal hypertrophy)  presented with SOB/CP/n/v and now admitted to MICU intubated for resp failure    Pulm  #Resp failure 2/2 CO2 retention: CO2 retention likely 2/2 undiagnosed obesity-hypoventilation syndrome. Neg RVP  -intubated, SBT and possible extubation tomorrow     #Aspiration PNA: gram + and negative growth on bronch cultures.   -c/w Vanc and unasyn (day 2)      FEN: NPO with tube feeds     PPX: SQH, PPI    Full CODE    Dispo: MICU INTERVAL HPI/OVERNIGHT EVENTS: added vanc because of staph aureus on bronch wash   o/n: MARY      VITAL SIGNS:  Vital Signs Last 24 Hrs  T(C): 37.4, Max: 37.4 (01-30 @ 01:00)  T(F): 99.3, Max: 99.3 (01-30 @ 01:00)  HR: 74 (70 - 106)  BP: 114/72 (113/70 - 171/126) no pressor  BP(mean): 87 (85 - 143)  RR: 14 (13 - 18) MV  SpO2: 97% (90% - 100%)  I&O: net 24 hr net 293cc    PHYSICAL EXAM:    Constitutional:   Eyes:  ENMT:  Neck:  Respiratory:  Cardiovascular:  Gastrointestinal:  Extremities:  Vascular:  Neurological:  Musculoskeletal:  Tubes/Lines:    MEDICATIONS  (STANDING):  ampicillin/sulbactam  IVPB  IV Intermittent   ampicillin/sulbactam  IVPB 3Gram(s) IV Intermittent every 6 hours  chlorhexidine 0.12% Liquid 15milliLiter(s) Swish and Spit two times a day  heparin  Injectable 7500Unit(s) SubCutaneous every 8 hours  pantoprazole  Injectable 40milliGRAM(s) IV Push daily  vancomycin  IVPB 1750milliGRAM(s) IV Intermittent every 12 hours  fentaNYL   Infusion 1MICROgram(s)/kG/Hr IV Continuous <Continuous>  midazolam Infusion 5mG/Hr IV Continuous <Continuous>  propofol Infusion 5MICROgram(s)/kG/Min IV Continuous <Continuous>    MEDICATIONS  (PRN):      Allergies    No Known Drug Allergies  Seafood (Rash)    Intolerances        LABS:                        14.0   9.1   )-----------( 84       ( 29 Jan 2017 04:38 )             46.7     29 Jan 2017 04:38    144    |  106    |  14     ----------------------------<  65     4.3     |  34     |  0.83     Ca    7.5        29 Jan 2017 04:38  Mg     1.5       29 Jan 2017 04:38            RADIOLOGY & ADDITIONAL TESTS: Nasogastric tube in region of stomach and appearing since prior exam   earlier same day. Left lung base infiltrate again noted. Stylette present   within NG tube.    A/P    32 yo F extremely poor historian, PMHx HTN, HLD, congenital cardiac malformation s/p repair, ?MICHAEL,  DVT in 2008 (s/p Coumadin last dose 2009), recent 2 day tele admission (Jan 2017) to [facility] for SOB/CP/n/v r/o ACS, ECHO (EF 60%, normal sized LA, severely asymmetric septal hypertrophy)  presented with SOB/CP/n/v and now admitted to MICU intubated for resp failure    Pulm  #Resp failure 2/2 CO2 retention: CO2 retention likely 2/2 undiagnosed obesity-hypoventilation syndrome. Neg RVP  -intubated, SBT and possible extubation tomorrow     #Aspiration PNA: gram + and negative growth on bronch cultures.   -c/w Vanc and unasyn (day 2)      FEN: NPO with tube feeds     PPX: SQH, PPI    Full CODE    Dispo: MICU INTERVAL HPI/OVERNIGHT EVENTS: added vanc because of staph aureus on bronch wash   o/n: MARY      VITAL SIGNS:  Vital Signs Last 24 Hrs  T(C): 37.4, Max: 37.4 (01-30 @ 01:00)  T(F): 99.3, Max: 99.3 (01-30 @ 01:00)  HR: 74 (70 - 106)  BP: 114/72 (113/70 - 171/126) no pressor  BP(mean): 87 (85 - 143)  RR: 14 (13 - 18) MV  SpO2: 97% (90% - 100%)  I&O: net 24 hr net 293cc    PHYSICAL EXAM:    Constitutional: obese young woman, intubated, NAD, alert to voice, not following commands  Eyes: PERRL  ENMT: MMM  Neck: No visible distended neck veins  Respiratory: good air movement, scattered crackles  Cardiovascular: RRR. +S1, S2. No murmurs.   Gastrointestinal: obese, NTND  Extremities: trace pedal edema. WWP  Tubes/Lines: miner. no central line.     MEDICATIONS  (STANDING):  ampicillin/sulbactam  IVPB  IV Intermittent   ampicillin/sulbactam  IVPB 3Gram(s) IV Intermittent every 6 hours  chlorhexidine 0.12% Liquid 15milliLiter(s) Swish and Spit two times a day  heparin  Injectable 7500Unit(s) SubCutaneous every 8 hours  pantoprazole  Injectable 40milliGRAM(s) IV Push daily  vancomycin  IVPB 1750milliGRAM(s) IV Intermittent every 12 hours  fentaNYL   Infusion 1MICROgram(s)/kG/Hr IV Continuous <Continuous>  midazolam Infusion 5mG/Hr IV Continuous <Continuous>  propofol Infusion 5MICROgram(s)/kG/Min IV Continuous <Continuous>    MEDICATIONS  (PRN):      Allergies    No Known Drug Allergies  Seafood (Rash)    Intolerances        LABS:                        14.0   9.1   )-----------( 84       ( 29 Jan 2017 04:38 )             46.7     29 Jan 2017 04:38    144    |  106    |  14     ----------------------------<  65     4.3     |  34     |  0.83     Ca    7.5        29 Jan 2017 04:38  Mg     1.5       29 Jan 2017 04:38            RADIOLOGY & ADDITIONAL TESTS: Nasogastric tube in region of stomach and appearing since prior exam   earlier same day. Left lung base infiltrate again noted. Stylette present   within NG tube.    A/P    30 yo F extremely poor historian, PMHx HTN, HLD, congenital cardiac malformation s/p repair, ?MICHAEL, ?HCOM,  DVT in 2008 (s/p Coumadin last dose 2009), recent 2 day tele admission (Jan 2017) to [facility] for SOB/CP/n/v r/o ACS, ECHO (EF 60%, normal sized LA, severely asymmetric septal hypertrophy)  presented with SOB/CP/n/v and now admitted to MICU intubated for resp failure.    Pulm  #Resp failure 2/2 CO2 retention: CO2 retention likely 2/2 obesity hypoventilation syndrome or from worsening aspiration pna from vomiting.   -intubated, will try to extubate today or tomorrow.     Cardiology    ID  #Aspiration PNA from vomiting: gram + and negative growth on bronch cultures. Cx sensitivies back. CXR improved today.   -switch to oxacillin 2g q4. Consider 7-10 day treatment.                 FEN: NPO with tube feeds     PPX: SQH, PPI    Full CODE    Dispo: MICU INTERVAL HPI/OVERNIGHT EVENTS: added vanc because of staph aureus on bronch wash   o/n: MARY      VITAL SIGNS:  Vital Signs Last 24 Hrs  T(C): 37.4, Max: 37.4 (01-30 @ 01:00)  T(F): 99.3, Max: 99.3 (01-30 @ 01:00)  HR: 74 (70 - 106)  BP: 114/72 (113/70 - 171/126) no pressor  BP(mean): 87 (85 - 143)  RR: 14 (13 - 18) MV  SpO2: 97% (90% - 100%)  I&O: net 24 hr net 293cc    PHYSICAL EXAM:    Constitutional: obese young woman, intubated, NAD, alert to voice, not following commands  Eyes: PERRL  ENMT: MMM  Neck: No visible distended neck veins  Respiratory: good air movement, scattered crackles  Cardiovascular: RRR. +S1, S2. No murmurs.   Gastrointestinal: obese, NTND  Extremities: trace pedal edema. WWP  Tubes/Lines: miner. no central line.     MEDICATIONS  (STANDING):  ampicillin/sulbactam  IVPB  IV Intermittent   ampicillin/sulbactam  IVPB 3Gram(s) IV Intermittent every 6 hours  chlorhexidine 0.12% Liquid 15milliLiter(s) Swish and Spit two times a day  heparin  Injectable 7500Unit(s) SubCutaneous every 8 hours  pantoprazole  Injectable 40milliGRAM(s) IV Push daily  vancomycin  IVPB 1750milliGRAM(s) IV Intermittent every 12 hours  fentaNYL   Infusion 1MICROgram(s)/kG/Hr IV Continuous <Continuous>  midazolam Infusion 5mG/Hr IV Continuous <Continuous>  propofol Infusion 5MICROgram(s)/kG/Min IV Continuous <Continuous>    MEDICATIONS  (PRN):      Allergies    No Known Drug Allergies  Seafood (Rash)    Intolerances        LABS:                        14.0   9.1   )-----------( 84       ( 29 Jan 2017 04:38 )             46.7     29 Jan 2017 04:38    144    |  106    |  14     ----------------------------<  65     4.3     |  34     |  0.83     Ca    7.5        29 Jan 2017 04:38  Mg     1.5       29 Jan 2017 04:38            RADIOLOGY & ADDITIONAL TESTS: Nasogastric tube in region of stomach and appearing since prior exam   earlier same day. Left lung base infiltrate again noted. Stylette present   within NG tube.    A/P    30 yo F extremely poor historian, PMHx HTN, HLD, congenital cardiac malformation s/p repair, ?MICHAEL, ?HCOM,  DVT in 2008 (s/p Coumadin last dose 2009), recent 2 day tele admission (Jan 2017) to [facility] for SOB/CP/n/v r/o ACS, ECHO (EF 60%, normal sized LA, severely asymmetric septal hypertrophy)  presented with SOB/CP/n/v and now admitted to MICU intubated for resp failure.    Pulm  #Resp failure 2/2 CO2 retention: CO2 retention likely 2/2 obesity hypoventilation syndrome or from worsening aspiration pna from vomiting. Initial BG while intubated showed elevated A-a likely 2/2 pna.   -intubated, will try to extubate today or tomorrow.     Cardiology  #HD. H/o HTN on home BP meds. Euvolemic. BP stable off pressors.     #Pump. EF 60%. No diastolic HF. Echo finding of asymmetric septal wall hypertrophy. HCOM?  Will consider cardiology consult.      #Electric. NSR    ID  #Aspiration PNA from vomiting: gram + and negative growth on bronch cultures. Cx sensitivies back. CXR improved today.   -switch to oxacillin 2g q4. Consider 7-10 day treatment.       FEN: NPO with tube feeds     PPX: SQH, PPI    Full CODE    Dispo: MICU

## 2017-01-31 DIAGNOSIS — J15.211 PNEUMONIA DUE TO METHICILLIN SUSCEPTIBLE STAPHYLOCOCCUS AUREUS: ICD-10-CM

## 2017-01-31 DIAGNOSIS — G47.33 OBSTRUCTIVE SLEEP APNEA (ADULT) (PEDIATRIC): ICD-10-CM

## 2017-01-31 DIAGNOSIS — I82.409 ACUTE EMBOLISM AND THROMBOSIS OF UNSPECIFIED DEEP VEINS OF UNSPECIFIED LOWER EXTREMITY: ICD-10-CM

## 2017-01-31 DIAGNOSIS — D69.6 THROMBOCYTOPENIA, UNSPECIFIED: ICD-10-CM

## 2017-01-31 LAB
ANION GAP SERPL CALC-SCNC: 8 MMOL/L — LOW (ref 9–16)
BASE EXCESS BLDA CALC-SCNC: 5.1 MMOL/L — HIGH (ref -2–3)
BASE EXCESS BLDA CALC-SCNC: SIGNIFICANT CHANGE UP MMOL/L (ref -2–3)
BUN SERPL-MCNC: 12 MG/DL — SIGNIFICANT CHANGE UP (ref 7–23)
CALCIUM SERPL-MCNC: 8.3 MG/DL — LOW (ref 8.5–10.5)
CHLORIDE SERPL-SCNC: 106 MMOL/L — SIGNIFICANT CHANGE UP (ref 96–108)
CO2 SERPL-SCNC: 28 MMOL/L — SIGNIFICANT CHANGE UP (ref 22–31)
CREAT SERPL-MCNC: 0.77 MG/DL — SIGNIFICANT CHANGE UP (ref 0.5–1.3)
GAS PNL BLDA: SIGNIFICANT CHANGE UP
GAS PNL BLDA: SIGNIFICANT CHANGE UP
GLUCOSE SERPL-MCNC: 83 MG/DL — SIGNIFICANT CHANGE UP (ref 70–99)
HCO3 BLDA-SCNC: 30 MMOL/L — HIGH (ref 21–28)
HCO3 BLDA-SCNC: SIGNIFICANT CHANGE UP MMOL/L (ref 21–28)
HCT VFR BLD CALC: 44.8 % — SIGNIFICANT CHANGE UP (ref 34.5–45)
HGB BLD-MCNC: 13.5 G/DL — SIGNIFICANT CHANGE UP (ref 11.5–15.5)
MAGNESIUM SERPL-MCNC: 2.2 MG/DL — SIGNIFICANT CHANGE UP (ref 1.6–2.4)
MCHC RBC-ENTMCNC: 22.5 PG — LOW (ref 27–34)
MCHC RBC-ENTMCNC: 30.1 G/DL — LOW (ref 32–36)
MCV RBC AUTO: 74.5 FL — LOW (ref 80–100)
PCO2 BLDA: 46 MMHG — HIGH (ref 32–45)
PCO2 BLDA: SIGNIFICANT CHANGE UP MMHG (ref 32–45)
PH BLDA: 7.43 — SIGNIFICANT CHANGE UP (ref 7.35–7.45)
PH BLDA: SIGNIFICANT CHANGE UP (ref 7.35–7.45)
PLATELET # BLD AUTO: 106 K/UL — LOW (ref 150–400)
PO2 BLDA: 65 MMHG — LOW (ref 83–108)
PO2 BLDA: SIGNIFICANT CHANGE UP MMHG (ref 83–108)
POTASSIUM SERPL-MCNC: 3.7 MMOL/L — SIGNIFICANT CHANGE UP (ref 3.5–5.3)
POTASSIUM SERPL-SCNC: 3.7 MMOL/L — SIGNIFICANT CHANGE UP (ref 3.5–5.3)
RBC # BLD: 6.01 M/UL — HIGH (ref 3.8–5.2)
RBC # FLD: 24.2 % — HIGH (ref 10.3–16.9)
SAO2 % BLDA: 92 % — LOW (ref 95–100)
SAO2 % BLDA: SIGNIFICANT CHANGE UP % (ref 95–100)
SODIUM SERPL-SCNC: 142 MMOL/L — SIGNIFICANT CHANGE UP (ref 135–145)
WBC # BLD: 6.9 K/UL — SIGNIFICANT CHANGE UP (ref 3.8–10.5)
WBC # FLD AUTO: 6.9 K/UL — SIGNIFICANT CHANGE UP (ref 3.8–10.5)

## 2017-01-31 PROCEDURE — 99233 SBSQ HOSP IP/OBS HIGH 50: CPT | Mod: GC

## 2017-01-31 PROCEDURE — 71010: CPT | Mod: 26

## 2017-01-31 PROCEDURE — 93970 EXTREMITY STUDY: CPT | Mod: 26

## 2017-01-31 RX ORDER — ACETAMINOPHEN 500 MG
650 TABLET ORAL EVERY 6 HOURS
Qty: 0 | Refills: 0 | Status: DISCONTINUED | OUTPATIENT
Start: 2017-01-31 | End: 2017-02-07

## 2017-01-31 RX ORDER — FENTANYL CITRATE 50 UG/ML
1 INJECTION INTRAVENOUS
Qty: 2500 | Refills: 0 | Status: DISCONTINUED | OUTPATIENT
Start: 2017-01-31 | End: 2017-02-01

## 2017-01-31 RX ORDER — POTASSIUM CHLORIDE 20 MEQ
40 PACKET (EA) ORAL
Qty: 0 | Refills: 0 | Status: COMPLETED | OUTPATIENT
Start: 2017-01-31 | End: 2017-01-31

## 2017-01-31 RX ORDER — PROPOFOL 10 MG/ML
5 INJECTION, EMULSION INTRAVENOUS
Qty: 1000 | Refills: 0 | Status: DISCONTINUED | OUTPATIENT
Start: 2017-01-31 | End: 2017-02-01

## 2017-01-31 RX ORDER — MIDAZOLAM HYDROCHLORIDE 1 MG/ML
5 INJECTION, SOLUTION INTRAMUSCULAR; INTRAVENOUS
Qty: 100 | Refills: 0 | Status: DISCONTINUED | OUTPATIENT
Start: 2017-01-31 | End: 2017-02-01

## 2017-01-31 RX ORDER — FUROSEMIDE 40 MG
40 TABLET ORAL ONCE
Qty: 0 | Refills: 0 | Status: COMPLETED | OUTPATIENT
Start: 2017-01-31 | End: 2017-01-31

## 2017-01-31 RX ADMIN — Medication 100 GRAM(S): at 16:57

## 2017-01-31 RX ADMIN — HEPARIN SODIUM 7500 UNIT(S): 5000 INJECTION INTRAVENOUS; SUBCUTANEOUS at 14:23

## 2017-01-31 RX ADMIN — Medication 40 MILLIGRAM(S): at 08:23

## 2017-01-31 RX ADMIN — Medication 100 GRAM(S): at 06:22

## 2017-01-31 RX ADMIN — Medication 40 MILLIEQUIVALENT(S): at 11:38

## 2017-01-31 RX ADMIN — PROPOFOL 3.27 MICROGRAM(S)/KG/MIN: 10 INJECTION, EMULSION INTRAVENOUS at 06:21

## 2017-01-31 RX ADMIN — Medication 100 GRAM(S): at 11:39

## 2017-01-31 RX ADMIN — Medication 100 GRAM(S): at 19:50

## 2017-01-31 RX ADMIN — PROPOFOL 3.27 MICROGRAM(S)/KG/MIN: 10 INJECTION, EMULSION INTRAVENOUS at 19:53

## 2017-01-31 RX ADMIN — FENTANYL CITRATE 100 MICROGRAM(S): 50 INJECTION INTRAVENOUS at 20:42

## 2017-01-31 RX ADMIN — PANTOPRAZOLE SODIUM 40 MILLIGRAM(S): 20 TABLET, DELAYED RELEASE ORAL at 11:38

## 2017-01-31 RX ADMIN — Medication 100 GRAM(S): at 23:22

## 2017-01-31 RX ADMIN — Medication 100 GRAM(S): at 01:54

## 2017-01-31 RX ADMIN — Medication 40 MILLIEQUIVALENT(S): at 10:11

## 2017-01-31 RX ADMIN — CHLORHEXIDINE GLUCONATE 15 MILLILITER(S): 213 SOLUTION TOPICAL at 17:00

## 2017-01-31 RX ADMIN — HEPARIN SODIUM 7500 UNIT(S): 5000 INJECTION INTRAVENOUS; SUBCUTANEOUS at 22:13

## 2017-01-31 RX ADMIN — HEPARIN SODIUM 7500 UNIT(S): 5000 INJECTION INTRAVENOUS; SUBCUTANEOUS at 06:22

## 2017-01-31 RX ADMIN — CHLORHEXIDINE GLUCONATE 15 MILLILITER(S): 213 SOLUTION TOPICAL at 06:21

## 2017-01-31 NOTE — CONSULT NOTE ADULT - SUBJECTIVE AND OBJECTIVE BOX
HPI:  30 yo F extremely poor historian, PMHx HTN, HLD, ?paroxysmal afib (reports during pregnancy), congenital cardiac malformation s/p repair, ?MICHAEL, DVT in 2008 (s/p Coumadin last dose 2009), h/o UGIB s/p endoscopy, recent 2 day admission (Jan 2017) to [facility] for SOB/CP/n/v r/o ACS, ECHO (EF 60%, normal sized LA, severely asymmetric septal hypertrophy) admission c/b 10 minute unresponsiveness, resolving on its own with non contributory full w/u, presented to  ED (1/27 at night) again per the patient with similar symptoms that brought her to the hospital earlier this month, SOB/CP/n/v.     In the ED VSS T 98.5, , /98, RR 22, POx 92 RA. Labs and CT were not impressive other than cardiomegaly with ASD repair.  The patient was in the ED for a number of hours. In the early morning the pt vomited and became more tachypneic. She was placed on a non-rebreather. ABG showed CO2 retention. The patient was put on Bipap and MICU was consulted. By the time MICU arrived the patient had further decompensated and was urgently intubated. (28 Jan 2017 19:13)      PAST MEDICAL & SURGICAL HISTORY:  DVT (deep venous thrombosis)  GIB (gastrointestinal bleeding)  Afib  HTN (hypertension)  Chronic systolic congestive heart failure  HLD (hyperlipidemia)  Myocardial infarction  Congenital heart disease    MEDICATIONS  (STANDING):  chlorhexidine 0.12% Liquid 15milliLiter(s) Swish and Spit two times a day  heparin  Injectable 7500Unit(s) SubCutaneous every 8 hours  pantoprazole  Injectable 40milliGRAM(s) IV Push daily  oxacillin IVPB 2Gram(s) IV Intermittent every 4 hours  oxacillin IVPB  IV Intermittent   fentaNYL   Infusion 1MICROgram(s)/kG/Hr IV Continuous <Continuous>  midazolam Infusion 5mG/Hr IV Continuous <Continuous>  propofol Infusion 5MICROgram(s)/kG/Min IV Continuous <Continuous>    MEDICATIONS  (PRN):  acetaminophen   Tablet 650milliGRAM(s) Oral every 6 hours PRN For Temp greater than 38 C (100.4 F)      Allergies: No Known Drug Allergies, Seafood (Rash)    SOCIAL HISTORY:    FAMILY HISTORY:  No pertinent family history in first degree relatives      Vital Signs Last 24 Hrs  T(C): 38.4, Max: 38.4 (01-31 @ 10:00)  T(F): 101.1, Max: 101.1 (01-31 @ 10:00)  HR: 88 (68 - 112)  BP: 104/66 (93/57 - 172/100)  BP(mean): 89 (73 - 129)  RR: 14 (0 - 18)  SpO2: 100% (88% - 100%)    PHYSICAL EXAM:    T(F): 101.1, Max: 101.1 (01-31 @ 10:00)  HR: 88 (68 - 112)  BP: 104/66 (93/57 - 172/100)  RR: 14 (0 - 18)  SpO2: 100% (88% - 100%)      Gen: well developed, well nourished, comfortable  HEENT: normocephalic/atraumatic, no conjunctival pallor, no scleral icterus, no oral thrush/mucosal bleeding/mucositis  Neck: supple, no masses, no JVD  Cardiovascular: RR, nl S1S2, no murmurs/rubs/gallops  Respiratory: clear air entry b/l  Gastrointestinal: BS+, soft, NT/ND, no masses, no splenomegaly, no hepatomegaly, no evidence for ascites  Extremities: no clubbing/cyanosis, no edema, no calf tenderness  Neurological: CN 2-12 grossly intact, no focal deficits  Lymph Nodes:  no cervical/supraclavicular LAD, no axillary/groin LAD      LABS:                        13.5   6.9   )-----------( 106      ( 31 Jan 2017 07:07 )             44.8     31 Jan 2017 07:07    142    |  106    |  12     ----------------------------<  83     3.7     |  28     |  0.77     Ca    8.3        31 Jan 2017 07:07  Phos  3.3       30 Jan 2017 07:27  Mg     2.2       31 Jan 2017 07:07            RADIOLOGY & ADDITIONAL STUDIES:

## 2017-01-31 NOTE — CONSULT NOTE ADULT - SUBJECTIVE AND OBJECTIVE BOX
HPI:  32 yo F extremely poor historian, PMHx HTN, HLD, ?paroxysmal afib (reports during pregnancy), congenital cardiac malformation s/p repair, ?MICHAEL, DVT in 2008 (s/p Coumadin last dose 2009), h/o UGIB s/p endoscopy, recent 2 day admission (Jan 2017) to [facility] for SOB/CP/n/v r/o ACS, ECHO (EF 60%, normal sized LA, severely asymmetric septal hypertrophy) admission c/b 10 minute unresponsiveness, resolving on its own with non contributory full w/u, presented to  ED (1/27 at night) again per the patient with similar symptoms that brought her to the hospital earlier this month, SOB/CP/n/v.     In the ED VSS T 98.5, , /98, RR 22, POx 92 RA. Labs and CT were not impressive other than cardiomegaly with ASD repair.  The patient was in the ED for a number of hours. In the early morning the pt vomited and became more tachypneic. She was placed on a non-rebreather. ABG showed CO2 retention. The patient was put on Bipap and MICU was consulted. By the time MICU arrived the patient had further decompensated and was urgently intubated.    Hospital Course:  -still intubated, bronched x2  -BP/HR stable  -team wanted cardiac evaluation given hx of HCM    PAST MEDICAL & SURGICAL HISTORY:  DVT (deep venous thrombosis)  GIB (gastrointestinal bleeding)  ?Afib  HTN (hypertension)  Chronic systolic congestive heart failure  HLD (hyperlipidemia)  Myocardial infarction  Congenital heart disease  HOCM      REVIEW OF SYSTEMS  intubated	    Allergic/Immunologic:	    No Known Drug Allergies  Seafood (Rash)      FAMILY HISTORY:  No pertinent family history in first degree relatives        Vital Signs Last 24 Hrs  T(C): 37.5, Max: 38.4 (01-31 @ 10:00)  T(F): 99.5, Max: 101.1 (01-31 @ 10:00)  HR: 90 (68 - 110)  BP: 129/70 (93/57 - 172/100)  BP(mean): 98 (73 - 129)  RR: 13 (0 - 18)  SpO2: 100% (88% - 100%)   I&O's Detail  I & Os for 24h ending 31 Jan 2017 07:00  =============================================  IN:    Jevity: 420 ml    fentaNYL  Infusion: 157.5 ml    propofol Infusion: 131.9 ml    fentaNYL  Infusion: 90 ml    propofol Infusion: 74.4 ml    IV PiggyBack: 50 ml    fentaNYL  Infusion: 44.9 ml    midazolam Infusion: 14 ml    propofol Infusion: 8.8 ml    midazolam Infusion: 8 ml    midazolam Infusion: 4 ml    Total IN: 1003.5 ml  ---------------------------------------------  OUT:    Indwelling Catheter - Urethral: 1245 ml    Total OUT: 1245 ml  ---------------------------------------------  Total NET: -241.5 ml    I & Os for current day (as of 31 Jan 2017 15:26)  =============================================  IN:    Jevity: 495 ml    IV PiggyBack: 100 ml    fentaNYL  Infusion: 67.6 ml    propofol Infusion: 53.9 ml    midazolam Infusion: 5 ml    Total IN: 721.5 ml  ---------------------------------------------  OUT:    Indwelling Catheter - Urethral: 2180 ml    Total OUT: 2180 ml  ---------------------------------------------  Total NET: -1458.5 ml    Daily     Daily     Physical Exam:   GEN: NAD, AAOx3, morbidly obese  HEENT: MMM, no icterus  CV: S1 S2 RRR, soft systolic murmur  Lung: CTA anteriorly  Abd: soft NT ND +BS  Ext: no c/c/e  Neuro: no focal neuro deficit    MEDICATIONS  (STANDING):  chlorhexidine 0.12% Liquid 15milliLiter(s) Swish and Spit two times a day  heparin  Injectable 7500Unit(s) SubCutaneous every 8 hours  pantoprazole  Injectable 40milliGRAM(s) IV Push daily  oxacillin IVPB 2Gram(s) IV Intermittent every 4 hours  oxacillin IVPB  IV Intermittent   fentaNYL   Infusion 1MICROgram(s)/kG/Hr IV Continuous <Continuous>  midazolam Infusion 5mG/Hr IV Continuous <Continuous>  propofol Infusion 5MICROgram(s)/kG/Min IV Continuous <Continuous>      TELEMETRY:    ECG: reviewed    Echo: EF 60%, normal sized LA, severely asymmetric septal hypertrophy)    LABS:                        13.5   6.9   )-----------( 106      ( 31 Jan 2017 07:07 )             44.8     31 Jan 2017 07:07    142    |  106    |  12     ----------------------------<  83     3.7     |  28     |  0.77     Ca    8.3        31 Jan 2017 07:07  Phos  3.3       30 Jan 2017 07:27  Mg     2.2       31 Jan 2017 07:07                RADIOLOGY & ADDITIONAL STUDIES:

## 2017-01-31 NOTE — PROGRESS NOTE ADULT - PROBLEM SELECTOR PLAN 2
the recent venous doppler is negative but she is a high risk for thromboembolic disease.  She is on heparin

## 2017-01-31 NOTE — CONSULT NOTE ADULT - ASSESSMENT
HOCM  -unlikely contributing to respiratory failure. She likely has some diastolic dysfunction, but she does not appear fluid overloaded on exam (no pitting edema, JVD difficult to distinguish)  -please restart metoprolol (home dose is fine) if BP/HR can handle. This will decrease LVOT obstruction.  -HCM patients highly dependent on preload- would not diurese (unless absolutely needed) as this can make LVOT obstruction worse  -Low risk from arrythmia standpoint (Septal hypertrophy <30mm, no NSVT on monitor, no known hx of SCD in family or unexplained syncope). Patient may benefit from exercise stress test in the future to look for BP drop (this should be done as an outpatient).  Case discussed with Dr. Marc

## 2017-01-31 NOTE — CONSULT NOTE ADULT - PROBLEM SELECTOR RECOMMENDATION 9
Uncertain etiology at this time. Isolated thrombocytopenia with no other cytopenias points away from a bone marrow process. I suspect an infectious or inflammatory process at this time. Will evaluate peripheral smear. Please check CBC with manual diff, retic count, PT, PTT, fibrinogen,

## 2017-01-31 NOTE — PROGRESS NOTE ADULT - SUBJECTIVE AND OBJECTIVE BOX
INTERVAL HPI/OVERNIGHT EVENTS: AM CXR improved. Cx sensitivies returned and changed abx. Morning ABG showed hypoxemia.     VITAL SIGNS:  Vital Signs Last 24 Hrs  T(C): 36.8, Max: 37.2 (01-30 @ 07:07)  T(F): 98.3, Max: 98.9 (01-30 @ 07:07)  HR: 86 (68 - 112)  BP: 127/62 (104/59 - 180/101)  BP(mean): 89 (75 - 139)  RR: 14 (0 - 26) MV  SpO2: 92% (88% - 100%)  I&O: 24 hr net neg 320, urine output last hr 35  Drips: versed, fentanyl, propofol    PHYSICAL EXAM:    Constitutional:  Eyes:  ENMT:  Neck:  Respiratory:  Cardiovascular:  Gastrointestinal:  Extremities:  Vascular:  Neurological:  Musculoskeletal:  Tubes/Lines:    MEDICATIONS  (STANDING):  chlorhexidine 0.12% Liquid 15milliLiter(s) Swish and Spit two times a day  heparin  Injectable 7500Unit(s) SubCutaneous every 8 hours  pantoprazole  Injectable 40milliGRAM(s) IV Push daily  oxacillin IVPB 2Gram(s) IV Intermittent every 4 hours  oxacillin IVPB  IV Intermittent   fentaNYL   Infusion 1MICROgram(s)/kG/Hr IV Continuous <Continuous>  midazolam Infusion 5mG/Hr IV Continuous <Continuous>  propofol Infusion 5MICROgram(s)/kG/Min IV Continuous <Continuous>    MEDICATIONS  (PRN):      Allergies    No Known Drug Allergies  Seafood (Rash)    Intolerances        LABS:                        15.1   7.9   )-----------( 109      ( 30 Jan 2017 07:27 )             48.7     30 Jan 2017 07:27    141    |  103    |  12     ----------------------------<  68     4.3     |  30     |  0.79     Ca    8.2        30 Jan 2017 07:27  Phos  3.3       30 Jan 2017 07:27  Mg     2.4       30 Jan 2017 07:27            RADIOLOGY & ADDITIONAL TESTS:    A/P    32 yo F  PMHx HTN, HLD, congenital cardiac malformation s/p repair, ?MICHAEL, ?HCOM,  DVT in 2008 (s/p Coumadin last dose 2009), recent 2 day tele admission (Jan 2017) to [facility] for SOB/CP/n/v r/o ACS, ECHO (EF 60%, normal sized LA, severely asymmetric septal hypertrophy)  presented with SOB/CP/n/v and now admitted to MICU intubated for resp failure.    Pulm  #Resp failure 2/2 CO2 retention: CO2 retention likely 2/2 obesity hypoventilation syndrome or from worsening aspiration pna from vomiting. Initial BG while intubated showed elevated A-a likely 2/2 pna.   -intubated, will try to extubate today or tomorrow.     Cardiology  #HD. H/o HTN on home BP meds. Euvolemic. BP stable off pressors.     #Pump. EF 60%. No diastolic HF. Echo finding of asymmetric septal wall hypertrophy. HCOM?  Will consider cardiology consult.      #Electric. NSR    ID  #Aspiration PNA from vomiting: gram + and negative growth on bronch cultures. Cx sensitivies back. CXR improved today.   -switch to oxacillin 2g q4. Consider 7-10 day treatment.       FEN: NPO with tube feeds     PPX: SQH, PPI    Full CODE    Dispo: MICU INTERVAL HPI/OVERNIGHT EVENTS: AM CXR yesterday was slightly improved. Cx sensitivies returned and changed abx.     VITAL SIGNS:  Vital Signs Last 24 Hrs  T(C): 36.8, Max: 37.2 (01-30 @ 07:07)  T(F): 98.3, Max: 98.9 (01-30 @ 07:07)  HR: 86 (68 - 112)  BP: 127/62 (104/59 - 180/101)  BP(mean): 89 (75 - 139)  RR: 14 (0 - 26) MV  SpO2: 92% (88% - 100%)  I&O: 24 hr net neg 320, urine output last hr 35  Drips: versed, fentanyl, propofol    PHYSICAL EXAM:    Constitutional: sedated, not responsive to sternal rub  Eyes: pin point pupils, reactive to light  ENMT: MMM  Neck: No JVP  Respiratory: good air movement, diffuse rales, R sided bronchial breath sounds  Cardiovascular: RRR. +S1, S2. No murmurs or gallops  Gastrointestinal: obese, soft, NTND  Extremities: WWP. No pitting edema of hands or legs.   Tubes/Lines: NGT, miner, no central line    MEDICATIONS  (STANDING):  chlorhexidine 0.12% Liquid 15milliLiter(s) Swish and Spit two times a day  heparin  Injectable 7500Unit(s) SubCutaneous every 8 hours  pantoprazole  Injectable 40milliGRAM(s) IV Push daily  oxacillin IVPB 2Gram(s) IV Intermittent every 4 hours  oxacillin IVPB  IV Intermittent   fentaNYL   Infusion 1MICROgram(s)/kG/Hr IV Continuous <Continuous>  midazolam Infusion 5mG/Hr IV Continuous <Continuous>  propofol Infusion 5MICROgram(s)/kG/Min IV Continuous <Continuous>    MEDICATIONS  (PRN):      Allergies    No Known Drug Allergies  Seafood (Rash)    Intolerances      LABS:                        13.5   6.9   )-----------( 106      ( 31 Jan 2017 07:07 )             44.8     31 Jan 2017 07:07    142    |  106    |  12     ----------------------------<  83     3.7     |  28     |  0.77     Ca    8.3        31 Jan 2017 07:07  Phos  3.3       30 Jan 2017 07:27  Mg     2.2       31 Jan 2017 07:07        RADIOLOGY & ADDITIONAL TESTS: CXR (wet read). AP. Poor quality film, pt turned to right side. ET 2.5 cm from elysia. NGT in stomach. Bilateral pulmonary infiltrates R > L. Bilateral pleural effusions.     A/P    30 yo F  PMHx HTN, HLD, congenital ASD s/p repair, MICHAEL, HOCM, family h/o of neuromuscular hypoventilation syndrome,  DVT in 2008 (s/p Coumadin last dose 2009) presented with SOB/CP/n/v and was admitted to MICU after hypercapnic resp failure.     #Pulm  #Hypercapnic resp failure with unclear etiology.    -intubated, will try to extubate today or tomorrow.     Cardiology  #HD. H/o HTN on home BP meds. Euvolemic. BP stable off pressors.     #Pump. EF 60%. No diastolic HF. Echo finding of asymmetric septal wall hypertrophy. HCOM?  Will consider cardiology consult.      #Electric. NSR    ID  #Aspiration PNA from vomiting: gram + and negative growth on bronch cultures. Cx sensitivies back. CXR improved today.   -switch to oxacillin 2g q4. Consider 7-10 day treatment.       FEN: NPO with tube feeds     PPX: SQH, PPI    Full CODE    Dispo: MICU INTERVAL HPI/OVERNIGHT EVENTS: AM CXR yesterday was slightly improved. Cx sensitivies returned and changed abx.     VITAL SIGNS:  Vital Signs Last 24 Hrs  T(C): 36.8, Max: 37.2 (01-30 @ 07:07)  T(F): 98.3, Max: 98.9 (01-30 @ 07:07)  HR: 86 (68 - 112)  BP: 127/62 (104/59 - 180/101)  BP(mean): 89 (75 - 139)  RR: 14 (0 - 26) MV  SpO2: 92% (88% - 100%)  I&O: 24 hr net neg 320, urine output last hr 35  Drips: versed, fentanyl, propofol    PHYSICAL EXAM:    Constitutional: sedated, not responsive to sternal rub  Eyes: pin point pupils, reactive to light  ENMT: MMM  Neck: No JVP  Respiratory: good air movement, diffuse rales, R sided bronchial breath sounds  Cardiovascular: RRR. +S1, S2. No murmurs or gallops  Gastrointestinal: obese, soft, NTND  Extremities: WWP. No pitting edema of hands or legs.   Tubes/Lines: NGT, miner, no central line    MEDICATIONS  (STANDING):  chlorhexidine 0.12% Liquid 15milliLiter(s) Swish and Spit two times a day  heparin  Injectable 7500Unit(s) SubCutaneous every 8 hours  pantoprazole  Injectable 40milliGRAM(s) IV Push daily  oxacillin IVPB 2Gram(s) IV Intermittent every 4 hours  oxacillin IVPB  IV Intermittent   fentaNYL   Infusion 1MICROgram(s)/kG/Hr IV Continuous <Continuous>  midazolam Infusion 5mG/Hr IV Continuous <Continuous>  propofol Infusion 5MICROgram(s)/kG/Min IV Continuous <Continuous>    MEDICATIONS  (PRN):      Allergies    No Known Drug Allergies  Seafood (Rash)    Intolerances      LABS:                        13.5   6.9   )-----------( 106      ( 31 Jan 2017 07:07 )             44.8     31 Jan 2017 07:07    142    |  106    |  12     ----------------------------<  83     3.7     |  28     |  0.77     Ca    8.3        31 Jan 2017 07:07  Phos  3.3       30 Jan 2017 07:27  Mg     2.2       31 Jan 2017 07:07        RADIOLOGY & ADDITIONAL TESTS: CXR (wet read). AP. Poor quality film, pt turned to right side. ET 2.5 cm from elysia. NGT in stomach. Bilateral pulmonary infiltrates R > L. Bilateral pleural effusions.     A/P    30 yo F  PMHx HTN, HLD, congenital ASD s/p repair, MICHAEL, HOCM, family h/o of neuromuscular hypoventilation syndrome,  DVT in 2008 (s/p Coumadin last dose 2009) presented with SOB/CP/n/v and was admitted to MICU after hypercapnic resp failure.     #Pulm  #Hypercapnic resp failure with unclear etiology.  May be from severe pna. But pt with MICHAEL and witnessed Cheyne Mcmillan breathing on exam today. Will check TSH for myxedema role in hypoventilation. May have similar neuromuscular disorder as her father who required diaphragmatic pacemaker. Additionally HOCM may be playing a role. Will consult cardiology. Ordering duplex to r/o DVT for possible PE role in resp failure. Continue with MV and daily CPAP trials.     #MSSA pna. C/w oxacillin. Will diurese to dry lungs out but will f/u with cardiology recs regarding HCOM.       Cardiology  #HD. H/o HTN on home BP meds. Hold BP while septic. May consider restarting when clinically improved. Cheryle for strict I&Os.     #Pump. EF 60%. No diastolic HF. Echo finding of asymmetric septal wall hypertrophy. HCOM? Ordered cardiology consult    #Electric. NSR    ID  #MSSA PNA. oxacillin 2g q4.     #Hem  #Thrombocytopenia with h/o DVT. Hem consult for possible APLAS. Ordered duplex to r/o DVT.     FEN: NPO with tube feeds Jevity 1.2 1560 ml a day.     PPX: SQH, PPI    Access: peripheral.     Full CODE    Dispo: MICU     FULL CODE INTERVAL HPI/OVERNIGHT EVENTS: AM CXR yesterday was slightly improved. Cx sensitivies returned and changed abx.   ROS not obtainable because of intubation with sedation    VITAL SIGNS:  Vital Signs Last 24 Hrs  T(C): 36.8, Max: 37.2 (01-30 @ 07:07)  T(F): 98.3, Max: 98.9 (01-30 @ 07:07)  HR: 86 (68 - 112)  BP: 127/62 (104/59 - 180/101)  BP(mean): 89 (75 - 139)  RR: 14 (0 - 26) MV  SpO2: 92% (88% - 100%)  I&O: 24 hr net neg 320, urine output last hr 35  Drips: versed, fentanyl, propofol    PHYSICAL EXAM:    Constitutional: sedated, not responsive to sternal rub  Eyes: pin point pupils, reactive to light  ENMT: MMM  Neck: No JVP  Respiratory: good air movement, diffuse rales, R sided bronchial breath sounds  Cardiovascular: RRR. +S1, S2. No murmurs or gallops  Gastrointestinal: obese, soft, NTND  Extremities: WWP. No pitting edema of hands or legs.   Vasc 2+ pulses throughout  MSK no joint swelling   miner in place  Tubes/Lines: NGT, miner, no central line    MEDICATIONS  (STANDING):  chlorhexidine 0.12% Liquid 15milliLiter(s) Swish and Spit two times a day  heparin  Injectable 7500Unit(s) SubCutaneous every 8 hours  pantoprazole  Injectable 40milliGRAM(s) IV Push daily  oxacillin IVPB 2Gram(s) IV Intermittent every 4 hours  oxacillin IVPB  IV Intermittent   fentaNYL   Infusion 1MICROgram(s)/kG/Hr IV Continuous <Continuous>  midazolam Infusion 5mG/Hr IV Continuous <Continuous>  propofol Infusion 5MICROgram(s)/kG/Min IV Continuous <Continuous>    MEDICATIONS  (PRN):      Allergies    No Known Drug Allergies  Seafood (Rash)    Intolerances      LABS:                        13.5   6.9   )-----------( 106      ( 31 Jan 2017 07:07 )             44.8     31 Jan 2017 07:07    142    |  106    |  12     ----------------------------<  83     3.7     |  28     |  0.77     Ca    8.3        31 Jan 2017 07:07  Phos  3.3       30 Jan 2017 07:27  Mg     2.2       31 Jan 2017 07:07        RADIOLOGY & ADDITIONAL TESTS: CXR (wet read). AP. Poor quality film, pt turned to right side. ET 2.5 cm from elysia. NGT in stomach. Bilateral pulmonary infiltrates R > L. Bilateral pleural effusions.     A/P    32 yo F  PMHx HTN, HLD, congenital ASD s/p repair, MICHAEL, HOCM, family h/o of neuromuscular hypoventilation syndrome,  DVT in 2008 (s/p Coumadin last dose 2009) presented with SOB/CP/n/v and was admitted to MICU after hypercapnic resp failure and MSSA pna.     #Pulm  #Hypercapnic resp failure with unclear etiology.  May be from severe pna. But pt with MICHAEL and witnessed Cheyne Mcmillan breathing on exam today. Will check TSH for myxedema role in hypoventilation. May have similar neuromuscular disorder as her father who required diaphragmatic pacemaker. Additionally HOCM may be playing a role. Will consult cardiology. Ordering duplex to r/o DVT for possible PE role in resp failure. Continue with MV and daily CPAP trials.     #MSSA pna. C/w oxacillin. Will diurese to dry lungs out but will f/u with cardiology recs regarding HCOM.       Cardiology  #HD. H/o HTN on home BP meds. Hold BP while septic. May consider restarting when clinically improved. Miner for strict I&Os.     #Pump. EF 60%. No diastolic HF. Echo finding of asymmetric septal wall hypertrophy. HCOM? Ordered cardiology consult    #Electric. NSR    ID  #MSSA PNA. oxacillin 2g q4.     #Hem  #Thrombocytopenia with h/o DVT. Hem consult for possible APLAS. Ordered duplex to r/o DVT.     FEN: NPO with tube feeds Jevity 1.2 1560 ml a day.     PPX: SQH, PPI    Access: peripheral.     Full CODE    Dispo: MICU     FULL CODE

## 2017-01-31 NOTE — PROGRESS NOTE ADULT - SUBJECTIVE AND OBJECTIVE BOX
Interval Events: reviewed  Patient seen and examined at bedside.    Patient is a 31y old  Female who presents with a chief complaint of   she is sedated and not in distress  PAST MEDICAL & SURGICAL HISTORY:  DVT (deep venous thrombosis)  GIB (gastrointestinal bleeding)  Afib  HTN (hypertension)  Chronic systolic congestive heart failure  HLD (hyperlipidemia)  Myocardial infarction  Congenital heart disease      MEDICATIONS:  Pulmonary:    Antimicrobials:  oxacillin IVPB 2Gram(s) IV Intermittent every 4 hours  oxacillin IVPB  IV Intermittent     Anticoagulants:  heparin  Injectable 7500Unit(s) SubCutaneous every 8 hours    Cardiac:      Allergies    No Known Drug Allergies  Seafood (Rash)    Intolerances        Vital Signs Last 24 Hrs  T(C): 37.8, Max: 37.8 (01-31 @ 05:00)  T(F): 100, Max: 100 (01-31 @ 05:00)  HR: 70 (68 - 112)  BP: 123/72 (104/59 - 180/101)  BP(mean): 86 (75 - 139)  RR: 16 (0 - 23)  SpO2: 100% (88% - 100%)  I & Os for 24h ending 01-31 @ 07:00  =============================================  IN: 1003.5 ml / OUT: 1245 ml / NET: -241.5 ml    I & Os for current day (as of 01-31 @ 09:16)  =============================================  IN: 94.8 ml / OUT: 30 ml / NET: 64.8 ml    Mode: AC/ CMV (Assist Control/ Continuous Mandatory Ventilation)  RR (machine): 14  TV (machine): 390  FiO2: 60  PEEP: 5  ITime: 0.85  MAP: 10  PIP: 23      LABS:  ABG - ( 31 Jan 2017 04:02 )  pH: 7.43  /  pCO2: 46    /  pO2: 65    / HCO3: 30    / Base Excess: 5.1   /  SaO2: 92                  CBC Full  -  ( 31 Jan 2017 07:07 )  WBC Count : 6.9 K/uL  Hemoglobin : 13.5 g/dL  Hematocrit : 44.8 %  Platelet Count - Automated : 106 K/uL  Mean Cell Volume : 74.5 fL  Mean Cell Hemoglobin : 22.5 pg  Mean Cell Hemoglobin Concentration : 30.1 g/dL  Auto Neutrophil # : x  Auto Lymphocyte # : x  Auto Monocyte # : x  Auto Eosinophil # : x  Auto Basophil # : x  Auto Neutrophil % : x  Auto Lymphocyte % : x  Auto Monocyte % : x  Auto Eosinophil % : x  Auto Basophil % : x    31 Jan 2017 07:07    142    |  106    |  12     ----------------------------<  83     3.7     |  28     |  0.77     Ca    8.3        31 Jan 2017 07:07  Phos  3.3       30 Jan 2017 07:27  Mg     2.2       31 Jan 2017 07:07        EXAM:  XR CHEST-FRONTAL                           PROCEDURE DATE:  01/30/2017                 INTERPRETATION:  Resident preliminary report  AP Portable CXR dated 1/30/2017 5:58 PM    CLINICAL INFORMATION: 31 years, Female, ngt placement    PRIOR STUDIES: Portable CXR on 1/30/2017    FINDINGS:   Lines, Catheters & Support Devices: Unchanged and in good position, as   following. There is an enteric tube with the tip terminating out of the   field-of-view, likely in appropriate position. Again there is a   nasogastric tube in appropriate position.    Heart Size, Mediastinum & Hilar Contours: Heart size is suboptimally   evaluated in this projection. Normal mediastinal and hilar contours.      Lungs: Again there is increased opacity in the right upper lobe likely   representing pneumonia. There is a retrocardiac opacity. Likely small   right pleural effusion. Left effusion is present.  No pleural effusions.    No pneumothorax.     Bones/Soft Tissues: No acute abnormalities of the soft tissues and   osseous structures.    IMPRESSION:  Nasogastric tube is likely in appropriate position, although terminating   out of the field-of-view. Right upper lobe pneumonia. Retrocardiac   opacity may be secondary to effusion, atelectasis or pneumonia.    CXR from 1/31 ET high in trachea.                  RADIOLOGY & ADDITIONAL STUDIES (The following images were personally reviewed):  Lobato:                            Yes          Urine output:               Yes         DVT prophylaxis:         Yes         Flattus:                          Yes         Bowel movement:          No

## 2017-01-31 NOTE — PROGRESS NOTE ADULT - PROBLEM SELECTOR PLAN 3
please send for gene analysis for congential hypoventilation syndrome.  She has a family history congenital hypoventilation syndrome.

## 2017-01-31 NOTE — PROGRESS NOTE ADULT - PROBLEM SELECTOR PLAN 1
the patient is stable and on Oxacillin.  The CXR revealed slight improvement in the infiltrate,.  Pulmonary toilet.  Need to advance the ET to an adequate level.   Continue on inhalers.

## 2017-02-01 DIAGNOSIS — J96.22 ACUTE AND CHRONIC RESPIRATORY FAILURE WITH HYPERCAPNIA: ICD-10-CM

## 2017-02-01 LAB
ANION GAP SERPL CALC-SCNC: 7 MMOL/L — LOW (ref 9–16)
ANISOCYTOSIS BLD QL: SLIGHT — SIGNIFICANT CHANGE UP
APPEARANCE UR: CLEAR — SIGNIFICANT CHANGE UP
APTT BLD: 36.7 SEC — SIGNIFICANT CHANGE UP (ref 27.5–37.4)
BASE EXCESS BLDA CALC-SCNC: -2.2 MMOL/L — LOW (ref -2–3)
BASE EXCESS BLDA CALC-SCNC: 2.7 MMOL/L — SIGNIFICANT CHANGE UP (ref -2–3)
BILIRUB UR-MCNC: NEGATIVE — SIGNIFICANT CHANGE UP
BUN SERPL-MCNC: 12 MG/DL — SIGNIFICANT CHANGE UP (ref 7–23)
CALCIUM SERPL-MCNC: 8.3 MG/DL — LOW (ref 8.5–10.5)
CHLORIDE SERPL-SCNC: 105 MMOL/L — SIGNIFICANT CHANGE UP (ref 96–108)
CO2 SERPL-SCNC: 29 MMOL/L — SIGNIFICANT CHANGE UP (ref 22–31)
COLOR SPEC: YELLOW — SIGNIFICANT CHANGE UP
CREAT SERPL-MCNC: 0.78 MG/DL — SIGNIFICANT CHANGE UP (ref 0.5–1.3)
DIFF PNL FLD: (no result)
EOSINOPHIL NFR BLD AUTO: 4 % — SIGNIFICANT CHANGE UP (ref 0–6)
FIBRINOGEN PPP-MCNC: 691 MG/DL — HIGH (ref 258–438)
GAS PNL BLDA: SIGNIFICANT CHANGE UP
GAS PNL BLDA: SIGNIFICANT CHANGE UP
GLUCOSE SERPL-MCNC: 76 MG/DL — SIGNIFICANT CHANGE UP (ref 70–99)
GLUCOSE UR QL: NEGATIVE — SIGNIFICANT CHANGE UP
HCO3 BLDA-SCNC: 29 MMOL/L — HIGH (ref 21–28)
HCO3 BLDA-SCNC: 31 MMOL/L — HIGH (ref 21–28)
HCT VFR BLD CALC: 47.3 % — HIGH (ref 34.5–45)
HGB BLD-MCNC: 14.6 G/DL — SIGNIFICANT CHANGE UP (ref 11.5–15.5)
HYPOCHROMIA BLD QL: SLIGHT — SIGNIFICANT CHANGE UP
INR BLD: 1.19 — HIGH (ref 0.88–1.16)
KETONES UR-MCNC: NEGATIVE — SIGNIFICANT CHANGE UP
LEUKOCYTE ESTERASE UR-ACNC: NEGATIVE — SIGNIFICANT CHANGE UP
LYMPHOCYTES # BLD AUTO: 19 % — SIGNIFICANT CHANGE UP (ref 13–44)
MAGNESIUM SERPL-MCNC: 2.2 MG/DL — SIGNIFICANT CHANGE UP (ref 1.6–2.4)
MANUAL DIF COMMENT BLD-IMP: SIGNIFICANT CHANGE UP
MANUAL SMEAR VERIFICATION: SIGNIFICANT CHANGE UP
MCHC RBC-ENTMCNC: 22.8 PG — LOW (ref 27–34)
MCHC RBC-ENTMCNC: 30.9 G/DL — LOW (ref 32–36)
MCV RBC AUTO: 73.9 FL — LOW (ref 80–100)
MICROCYTES BLD QL: SLIGHT — SIGNIFICANT CHANGE UP
MONOCYTES NFR BLD AUTO: 9 % — SIGNIFICANT CHANGE UP (ref 2–14)
NEUTROPHILS NFR BLD AUTO: 63 % — SIGNIFICANT CHANGE UP (ref 43–77)
NEUTS BAND # BLD: 5 % — SIGNIFICANT CHANGE UP
NITRITE UR-MCNC: NEGATIVE — SIGNIFICANT CHANGE UP
PCO2 BLDA: 100 MMHG — CRITICAL HIGH (ref 32–45)
PCO2 BLDA: 52 MMHG — HIGH (ref 32–45)
PH BLDA: 7.11 — CRITICAL LOW (ref 7.35–7.45)
PH BLDA: 7.37 — SIGNIFICANT CHANGE UP (ref 7.35–7.45)
PH UR: 8.5 — HIGH (ref 4–8)
PHOSPHATE SERPL-MCNC: 4.4 MG/DL — SIGNIFICANT CHANGE UP (ref 2.5–4.5)
PLAT MORPH BLD: (no result)
PLATELET # BLD AUTO: 124 K/UL — LOW (ref 150–400)
PO2 BLDA: 169 MMHG — HIGH (ref 83–108)
PO2 BLDA: 72 MMHG — LOW (ref 83–108)
POLYCHROMASIA BLD QL SMEAR: SLIGHT — SIGNIFICANT CHANGE UP
POTASSIUM SERPL-MCNC: 4 MMOL/L — SIGNIFICANT CHANGE UP (ref 3.5–5.3)
POTASSIUM SERPL-SCNC: 4 MMOL/L — SIGNIFICANT CHANGE UP (ref 3.5–5.3)
PROT UR-MCNC: 30 MG/DL
PROTHROM AB SERPL-ACNC: 13.2 SEC — HIGH (ref 10–13.1)
RBC # BLD: 6.4 M/UL — HIGH (ref 3.8–5.2)
RBC # FLD: 24.4 % — HIGH (ref 10.3–16.9)
RBC BLD AUTO: (no result)
SAO2 % BLDA: 87 % — LOW (ref 95–100)
SAO2 % BLDA: 99 % — SIGNIFICANT CHANGE UP (ref 95–100)
SODIUM SERPL-SCNC: 141 MMOL/L — SIGNIFICANT CHANGE UP (ref 135–145)
SP GR SPEC: 1.01 — SIGNIFICANT CHANGE UP (ref 1–1.03)
UROBILINOGEN FLD QL: >=8 E.U./DL
WBC # BLD: 6.2 K/UL — SIGNIFICANT CHANGE UP (ref 3.8–10.5)
WBC # FLD AUTO: 6.2 K/UL — SIGNIFICANT CHANGE UP (ref 3.8–10.5)

## 2017-02-01 PROCEDURE — 71010: CPT | Mod: 26,77

## 2017-02-01 PROCEDURE — 99232 SBSQ HOSP IP/OBS MODERATE 35: CPT | Mod: GC

## 2017-02-01 PROCEDURE — 71010: CPT | Mod: 26

## 2017-02-01 PROCEDURE — 99233 SBSQ HOSP IP/OBS HIGH 50: CPT | Mod: GC

## 2017-02-01 RX ORDER — MIDAZOLAM HYDROCHLORIDE 1 MG/ML
5 INJECTION, SOLUTION INTRAMUSCULAR; INTRAVENOUS
Qty: 100 | Refills: 0 | Status: DISCONTINUED | OUTPATIENT
Start: 2017-02-01 | End: 2017-02-01

## 2017-02-01 RX ORDER — ONDANSETRON 8 MG/1
4 TABLET, FILM COATED ORAL ONCE
Qty: 0 | Refills: 0 | Status: COMPLETED | OUTPATIENT
Start: 2017-02-01 | End: 2017-02-01

## 2017-02-01 RX ORDER — FENTANYL CITRATE 50 UG/ML
1 INJECTION INTRAVENOUS
Qty: 2500 | Refills: 0 | Status: DISCONTINUED | OUTPATIENT
Start: 2017-02-01 | End: 2017-02-01

## 2017-02-01 RX ORDER — MIDAZOLAM HYDROCHLORIDE 1 MG/ML
5 INJECTION, SOLUTION INTRAMUSCULAR; INTRAVENOUS
Qty: 100 | Refills: 0 | Status: DISCONTINUED | OUTPATIENT
Start: 2017-02-01 | End: 2017-02-02

## 2017-02-01 RX ORDER — PROPOFOL 10 MG/ML
7.65 INJECTION, EMULSION INTRAVENOUS
Qty: 1000 | Refills: 0 | Status: DISCONTINUED | OUTPATIENT
Start: 2017-02-01 | End: 2017-02-13

## 2017-02-01 RX ORDER — PROPOFOL 10 MG/ML
5 INJECTION, EMULSION INTRAVENOUS
Qty: 1000 | Refills: 0 | Status: DISCONTINUED | OUTPATIENT
Start: 2017-02-01 | End: 2017-02-01

## 2017-02-01 RX ORDER — CHLORHEXIDINE GLUCONATE 213 G/1000ML
15 SOLUTION TOPICAL
Qty: 0 | Refills: 0 | Status: DISCONTINUED | OUTPATIENT
Start: 2017-02-01 | End: 2017-02-15

## 2017-02-01 RX ORDER — PROPOFOL 10 MG/ML
7.65 INJECTION, EMULSION INTRAVENOUS
Qty: 1000 | Refills: 0 | Status: DISCONTINUED | OUTPATIENT
Start: 2017-02-01 | End: 2017-02-01

## 2017-02-01 RX ORDER — MIDAZOLAM HYDROCHLORIDE 1 MG/ML
5 INJECTION, SOLUTION INTRAMUSCULAR; INTRAVENOUS ONCE
Qty: 0 | Refills: 0 | Status: DISCONTINUED | OUTPATIENT
Start: 2017-02-01 | End: 2017-02-01

## 2017-02-01 RX ADMIN — Medication 100 GRAM(S): at 03:21

## 2017-02-01 RX ADMIN — Medication 100 GRAM(S): at 13:47

## 2017-02-01 RX ADMIN — PROPOFOL 3.27 MICROGRAM(S)/KG/MIN: 10 INJECTION, EMULSION INTRAVENOUS at 03:20

## 2017-02-01 RX ADMIN — Medication 100 GRAM(S): at 07:34

## 2017-02-01 RX ADMIN — CHLORHEXIDINE GLUCONATE 15 MILLILITER(S): 213 SOLUTION TOPICAL at 05:59

## 2017-02-01 RX ADMIN — HEPARIN SODIUM 7500 UNIT(S): 5000 INJECTION INTRAVENOUS; SUBCUTANEOUS at 13:41

## 2017-02-01 RX ADMIN — HEPARIN SODIUM 7500 UNIT(S): 5000 INJECTION INTRAVENOUS; SUBCUTANEOUS at 21:40

## 2017-02-01 RX ADMIN — ONDANSETRON 4 MILLIGRAM(S): 8 TABLET, FILM COATED ORAL at 13:41

## 2017-02-01 RX ADMIN — PROPOFOL 5 MICROGRAM(S)/KG/MIN: 10 INJECTION, EMULSION INTRAVENOUS at 17:20

## 2017-02-01 RX ADMIN — MIDAZOLAM HYDROCHLORIDE 5 MILLIGRAM(S): 1 INJECTION, SOLUTION INTRAMUSCULAR; INTRAVENOUS at 15:35

## 2017-02-01 RX ADMIN — Medication 100 GRAM(S): at 16:35

## 2017-02-01 RX ADMIN — HEPARIN SODIUM 7500 UNIT(S): 5000 INJECTION INTRAVENOUS; SUBCUTANEOUS at 06:00

## 2017-02-01 RX ADMIN — Medication 100 GRAM(S): at 19:54

## 2017-02-01 RX ADMIN — MIDAZOLAM HYDROCHLORIDE 5 MG/HR: 1 INJECTION, SOLUTION INTRAMUSCULAR; INTRAVENOUS at 17:15

## 2017-02-01 RX ADMIN — PANTOPRAZOLE SODIUM 40 MILLIGRAM(S): 20 TABLET, DELAYED RELEASE ORAL at 13:41

## 2017-02-01 RX ADMIN — PROPOFOL 5 MICROGRAM(S)/KG/MIN: 10 INJECTION, EMULSION INTRAVENOUS at 17:14

## 2017-02-01 RX ADMIN — PROPOFOL 5 MICROGRAM(S)/KG/MIN: 10 INJECTION, EMULSION INTRAVENOUS at 21:38

## 2017-02-01 NOTE — CHART NOTE - NSCHARTNOTEFT_GEN_A_CORE
Brief Hospital Course:  30 yo F  PMHx HTN, HLD, congenital ASD s/p repair, MICHAEL, HOCM, family h/o of neuromuscular hypoventilation syndrome,  DVT in 2008 (s/p Coumadin last dose 2009) presented with SOB/CP/n/v and was admitted to MICU after hypercapnic resp failure and MSSA pna. The pt presented in the ED with CC of n/v. After several hours in the ED she developed resp distress and was found altered. Bipap was put on and pt vomited and aspirated. MICU was consulted and pt was urgently intubated. In the MICU the pt developed a multilobar pna. She was started on empiric abx. She was bronched and Cx + MSSA so she was switched to oxacillin. Pulmonology was consulted given her family h/o of neuromuscular resp dysfunction. Cardiology was consulted given her echo showing HCOM. They did not think the HCOM was related to the resp failure. Hematology was consulted for the pt's thrombocytopenia on admission along with her h/o DVTs. LE duplex was negative for DVTs. The pt clinically improved over 2 days and was extubated on 2/1. She was put on bipap, became nauseas 1-2 hours later and vomited, the nurse removed the bipap before gross aspiration. She was switched to nasal bipap but she was becoming altered. An ABG showed acute CO2 retention and she was reintubated. A CTH was ordered to look for acute abnormality that could explain pt's presentation.

## 2017-02-01 NOTE — PROVIDER CONTACT NOTE (CHANGE IN STATUS NOTIFICATION) - SITUATION
Patient extubated approx 10:15am, has been increasingly lethargic since that time, was having decrease in pulse ox, was able to improve with encouraging deep breaths, now no longer helping

## 2017-02-01 NOTE — AIRWAY PLACEMENT NOTE ADULT - POST AIRWAY PLACEMENT ASSESSMENT:
CXR pending/chest excursion noted/breath sounds bilateral/positive end tidal CO2 noted/breath sounds equal

## 2017-02-01 NOTE — PROGRESS NOTE ADULT - SUBJECTIVE AND OBJECTIVE BOX
INTERVAL HPI/OVERNIGHT EVENTS: 1/31: given lasix. ordered cardiology consult for HCOM. ordered hem consult for thrombocytopenia. c/w oxacillin for MSSA pna.   o/n: febrile to 100.6     VITAL SIGNS:  Vital Signs Last 24 Hrs  T(C): 37.1, Max: 38.4 (01-31 @ 10:00)  T(F): 98.7, Max: 101.1 (01-31 @ 10:00)  HR: 81 (70 - 108)  BP: 118/73 (93/57 - 151/114)  BP(mean): 92 (69 - 128)  RR: 15 (11 - 30) MV  SpO2: 100% (91% - 100%)  I&O: urine output 125 last hour, 24 hour net neg 1.7L  Drips: propofol (now off)    PHYSICAL EXAM:    Constitutional:  Eyes:  ENMT:  Neck:  Respiratory:  Cardiovascular:  Gastrointestinal:  Extremities:  Vascular:  Neurological:  Musculoskeletal:  Tubes/Lines:    MEDICATIONS  (STANDING):  chlorhexidine 0.12% Liquid 15milliLiter(s) Swish and Spit two times a day  heparin  Injectable 7500Unit(s) SubCutaneous every 8 hours  pantoprazole  Injectable 40milliGRAM(s) IV Push daily  oxacillin IVPB 2Gram(s) IV Intermittent every 4 hours  oxacillin IVPB  IV Intermittent   fentaNYL   Infusion 1MICROgram(s)/kG/Hr IV Continuous <Continuous>  midazolam Infusion 5mG/Hr IV Continuous <Continuous>  propofol Infusion 5MICROgram(s)/kG/Min IV Continuous <Continuous>    MEDICATIONS  (PRN):  acetaminophen   Tablet 650milliGRAM(s) Oral every 6 hours PRN For Temp greater than 38 C (100.4 F)      Allergies    No Known Drug Allergies  Seafood (Rash)    Intolerances        LABS:                        13.5   6.9   )-----------( 106      ( 31 Jan 2017 07:07 )             44.8     31 Jan 2017 07:07    142    |  106    |  12     ----------------------------<  83     3.7     |  28     |  0.77     Ca    8.3        31 Jan 2017 07:07  Phos  3.3       30 Jan 2017 07:27  Mg     2.2       31 Jan 2017 07:07            RADIOLOGY & ADDITIONAL TESTS:    A/P    30 yo F  PMHx HTN, HLD, congenital ASD s/p repair, MICHAEL, HOCM, family h/o of neuromuscular hypoventilation syndrome,  DVT in 2008 (s/p Coumadin last dose 2009) presented with SOB/CP/n/v and was admitted to MICU after hypercapnic resp failure and MSSA pna.     #Pulm  #Hypercapnic resp failure with unclear etiology.  May be from severe pna. But pt with MICHAEL and witnessed Cheyne Mcmillan breathing on exam today. Will check TSH for myxedema role in hypoventilation. May have similar neuromuscular disorder as her father who required diaphragmatic pacemaker. Additionally HOCM may be playing a role. Will consult cardiology. Ordering duplex to r/o DVT for possible PE role in resp failure. Continue with MV and daily CPAP trials.     #MSSA pna. C/w oxacillin. Will diurese to dry lungs out but will f/u with cardiology recs regarding HCOM.       Cardiology  #HD. H/o HTN on home BP meds. Hold BP while septic. May consider restarting when clinically improved. [place] for strict I&Os.     #Pump. EF 60%. No diastolic HF. Echo finding of asymmetric septal wall hypertrophy. HCOM? Ordered cardiology consult    #Electric. NSR    ID  #MSSA PNA. oxacillin 2g q4.     #Hem  #Thrombocytopenia with h/o DVT. Hem consult for possible APLAS. Ordered duplex to r/o DVT.     FEN: NPO with tube feeds Jevity 1.2 1560 ml a day.     PPX: SQH, PPI    Access: peripheral.     Full CODE    Dispo: MICU     FULL CODE INTERVAL HPI/OVERNIGHT EVENTS: : given lasix. ordered cardiology consult for HCOM. ordered hem consult for thrombocytopenia. c/w oxacillin for MSSA pna.   o/n: febrile to 100.6     VITAL SIGNS:  Vital Signs Last 24 Hrs  T(C): 37.1, Max: 38.4 ( @ 10:00)  T(F): 98.7, Max: 101.1 ( @ 10:00)  HR: 81 (70 - 108)  BP: 118/73 (93/57 - 151/114)  BP(mean): 92 (69 - 128)  RR: 15 (11 - 30) MV  SpO2: 100% (91% - 100%)  I&O: urine output 125 last hour, 24 hour net neg 1.7L  Drips: propofol (now off)    PHYSICAL EXAM:    Constitutional:   Eyes:  ENMT:  Neck:  Respiratory:  Cardiovascular:  Gastrointestinal:  Extremities:  Vascular:  Neurological:  Musculoskeletal:  Tubes/Lines:    MEDICATIONS  (STANDING):  chlorhexidine 0.12% Liquid 15milliLiter(s) Swish and Spit two times a day  heparin  Injectable 7500Unit(s) SubCutaneous every 8 hours  pantoprazole  Injectable 40milliGRAM(s) IV Push daily  oxacillin IVPB 2Gram(s) IV Intermittent every 4 hours  oxacillin IVPB  IV Intermittent   fentaNYL   Infusion 1MICROgram(s)/kG/Hr IV Continuous <Continuous>  midazolam Infusion 5mG/Hr IV Continuous <Continuous>  propofol Infusion 5MICROgram(s)/kG/Min IV Continuous <Continuous>    MEDICATIONS  (PRN):  acetaminophen   Tablet 650milliGRAM(s) Oral every 6 hours PRN For Temp greater than 38 C (100.4 F)      Allergies    No Known Drug Allergies  Seafood (Rash)    Intolerances      LABS:                        14.6   6.2   )-----------( 124      ( 2017 07:34 )             47.3     2017 07:34    141    |  105    |  12     ----------------------------<  76     4.0     |  29     |  0.78     Ca    8.3        2017 07:34  Phos  4.4       2017 07:34  Mg     2.2       2017 07:34      PT/INR - ( 2017 13:14 )   PT: 13.2 sec;   INR: 1.19          PTT - ( 2017 13:14 )  PTT:36.7 sec  Urinalysis Basic - ( 2017 08:10 )    Color: Yellow / Appearance: Clear / S.010 / pH: x  Gluc: x / Ketone: NEGATIVE  / Bili: NEGATIVE / Urobili: >=8.0 E.U./dL   Blood: x / Protein: 30 mg/dL / Nitrite: NEGATIVE   Leuk Esterase: NEGATIVE / RBC: 5-10 /HPF / WBC 5-10 /HPF   Sq Epi: x / Non Sq Epi: Few /HPF / Bacteria: Present /HPF      RADIOLOGY & ADDITIONAL TESTS: CXR wet read. AP, likely taken on expiratory phase. ETT 3cm from elysia, NGT in stomach. RUL and LLL infiltrates.     LE duplex negative for DVT    A/P    32 yo F  PMHx HTN, HLD, congenital ASD s/p repair, MICHAEL, HOCM, family h/o of neuromuscular hypoventilation syndrome,  DVT in  (s/p Coumadin last dose ) presented with SOB/CP/n/v and was admitted to MICU after hypercapnic resp failure and MSSA pna.     #Pulm  #Hypercapnic resp failure with unclear etiology.  Pt with MICHAEL and witnessed Cheyne Mcmillan breathing on exam today. TSH WNL. May have similar neuromuscular disorder as her father who required diaphragmatic pacemaker. Ordered for genetic test.     #MSSA pna. C/w oxacillin. Diuresed yesterday to dry lungs out with improvement in resp status.       Cardiology  #HD. H/o HTN on home BP meds. Will restart when clinically improved. [place] for strict I&Os.     #Pump. EF 60%. No diastolic HF. Echo finding of asymmetric septal wall hypertrophy. Cardiology consulted and dx HCOM but low suspicion for causing resp failure and also low likelihood of arrhythmogenic. Cardiology recommends care in not reducing preload and also starting beta blocker when clinically appropriate.     ID  #MSSA PNA. oxacillin 2g q4, day 5. Pt spiked fever yesterday and today. BCx drawn. UA neg for UTI. PNA appears to be improving on current abx. Will broaden spectrum if clinically worsens.     #Hem  #Thrombocytopenia with h/o DVT. Hem consult for possible APLAS.     FEN: NPO with tube feeds Jevity 1.2 1560 ml a day.     PPX: SQH, PPI    Access: peripheral.     Full CODE    Dispo: MICU     FULL CODE INTERVAL HPI/OVERNIGHT EVENTS: : given lasix. ordered cardiology consult for HCOM. ordered hem consult for thrombocytopenia. c/w oxacillin for MSSA pna.   o/n: febrile to 100.6     VITAL SIGNS:  Vital Signs Last 24 Hrs  T(C): 37.1, Max: 38.4 ( @ 10:00)  T(F): 98.7, Max: 101.1 ( @ 10:00)  HR: 81 (70 - 108)  BP: 118/73 (93/57 - 151/114)  BP(mean): 92 (69 - 128)  RR: 15 (11 - 30) MV  SpO2: 100% (91% - 100%)  I&O: urine output 125 last hour, 24 hour net neg 1.7L  Drips: propofol (now off)    PHYSICAL EXAM:    Constitutional: intubated, follows commands, able to communicate with writing, uncomfortable on vent  Eyes: PERRL  ENMT: MMM  Neck: No distended neck veins  Respiratory: good air movement, rales in right lung fields, left lung fields sound clear.    Cardiovascular: RRR. +S1, S2. No murmurs or gallops  Gastrointestinal: Obese, soft, NT  Extremities: WWP. No pitting edema. +DPs  Tubes/Lines: miner    MEDICATIONS  (STANDING):  chlorhexidine 0.12% Liquid 15milliLiter(s) Swish and Spit two times a day  heparin  Injectable 7500Unit(s) SubCutaneous every 8 hours  pantoprazole  Injectable 40milliGRAM(s) IV Push daily  oxacillin IVPB 2Gram(s) IV Intermittent every 4 hours  oxacillin IVPB  IV Intermittent   fentaNYL   Infusion 1MICROgram(s)/kG/Hr IV Continuous <Continuous>  midazolam Infusion 5mG/Hr IV Continuous <Continuous>  propofol Infusion 5MICROgram(s)/kG/Min IV Continuous <Continuous>    MEDICATIONS  (PRN):  acetaminophen   Tablet 650milliGRAM(s) Oral every 6 hours PRN For Temp greater than 38 C (100.4 F)      Allergies    No Known Drug Allergies  Seafood (Rash)    Intolerances      LABS:                        14.6   6.2   )-----------( 124      ( 2017 07:34 )             47.3     2017 07:34    141    |  105    |  12     ----------------------------<  76     4.0     |  29     |  0.78     Ca    8.3        2017 07:34  Phos  4.4       2017 07:34  Mg     2.2       2017 07:34      PT/INR - ( 2017 13:14 )   PT: 13.2 sec;   INR: 1.19          PTT - ( 2017 13:14 )  PTT:36.7 sec  Urinalysis Basic - ( 2017 08:10 )    Color: Yellow / Appearance: Clear / S.010 / pH: x  Gluc: x / Ketone: NEGATIVE  / Bili: NEGATIVE / Urobili: >=8.0 E.U./dL   Blood: x / Protein: 30 mg/dL / Nitrite: NEGATIVE   Leuk Esterase: NEGATIVE / RBC: 5-10 /HPF / WBC 5-10 /HPF   Sq Epi: x / Non Sq Epi: Few /HPF / Bacteria: Present /HPF      RADIOLOGY & ADDITIONAL TESTS: CXR wet read. AP, likely taken on expiratory phase. ETT 3cm from elysia, NGT in stomach. RUL and LLL infiltrates.     LE duplex negative for DVT    A/P    30 yo F  PMHx HTN, HLD, congenital ASD s/p repair, MICHAEL, HOCM, family h/o of neuromuscular hypoventilation syndrome,  DVT in  (s/p Coumadin last dose ) presented with SOB/CP/n/v and was admitted to MICU after hypercapnic resp failure and MSSA pna.     #Pulm  #Hypercapnic resp failure with unclear etiology.  Pt with MICHAEL and witnessed Cheyne Mcmillan breathing on exam today. TSH WNL. May have similar neuromuscular disorder as her father who required diaphragmatic pacemaker. Ordered for genetic test. Pulm consulted and will follow their recs.     #MSSA pna. C/w oxacillin. Consider Diuresed yesterday to dry lungs out with improvement in resp status.       Cardiology  #HD. H/o HTN on home BP meds. Will restart when clinically improved. [place] for strict I&Os.     #Pump. EF 60%. No diastolic HF. Echo finding of asymmetric septal wall hypertrophy. Cardiology consulted and dx HCOM but low suspicion for causing resp failure and also low likelihood of arrhythmogenic. Cardiology recommends care in not reducing preload and also starting beta blocker when clinically appropriate.     ID  #MSSA PNA. oxacillin 2g q4, day 5. Pt spiked fever yesterday and today. BCx drawn. UA neg for UTI. PNA appears to be improving on current abx. Will broaden spectrum if clinically worsens.     #Hem  #Thrombocytopenia with h/o DVT. US neg for DVT. Platelets uptrended today. Hem consult for possible APLAS.     FEN: NPO with tube feeds Jevity 1.2 1560 ml a day.     PPX: SQH, SCDs, PPI    Access: peripheral.     Full CODE    Dispo: MICU     FULL CODE INTERVAL HPI/OVERNIGHT EVENTS: : given lasix. ordered cardiology consult for HCOM. ordered hem consult for thrombocytopenia. c/w oxacillin for MSSA pna. Extubated this AM but reintubated for hypercapneic respiratory failure  o/n: febrile to 100.6   No HA, CP, +vomitting once, no diarrhea, dysuria    VITAL SIGNS:  Vital Signs Last 24 Hrs  T(C): 37.1, Max: 38.4 ( @ 10:00)  T(F): 98.7, Max: 101.1 ( @ 10:00)  HR: 81 (70 - 108)  BP: 118/73 (93/57 - 151/114)  BP(mean): 92 (69 - 128)  RR: 15 (11 - 30) MV  SpO2: 100% (91% - 100%)  I&O: urine output 125 last hour, 24 hour net neg 1.7L  Drips: propofol (now off)    PHYSICAL EXAM:    Constitutional: intubated, follows commands, able to communicate with writing, uncomfortable on vent  Eyes: PERRL  ENMT: MMM  Neck: No distended neck veins  Respiratory: good air movement, rales in right lung fields, left lung fields sound clear.    Cardiovascular: RRR. +S1, S2. No murmurs or gallops  Gastrointestinal: Obese, soft, NT  Extremities: WWP. No pitting edema. +DPs  Vasc: 2+ pulses throughout  MSK no joint swelling  Tubes/Lines: miner    MEDICATIONS  (STANDING):  chlorhexidine 0.12% Liquid 15milliLiter(s) Swish and Spit two times a day  heparin  Injectable 7500Unit(s) SubCutaneous every 8 hours  pantoprazole  Injectable 40milliGRAM(s) IV Push daily  oxacillin IVPB 2Gram(s) IV Intermittent every 4 hours  oxacillin IVPB  IV Intermittent   fentaNYL   Infusion 1MICROgram(s)/kG/Hr IV Continuous <Continuous>  midazolam Infusion 5mG/Hr IV Continuous <Continuous>  propofol Infusion 5MICROgram(s)/kG/Min IV Continuous <Continuous>    MEDICATIONS  (PRN):  acetaminophen   Tablet 650milliGRAM(s) Oral every 6 hours PRN For Temp greater than 38 C (100.4 F)      Allergies    No Known Drug Allergies  Seafood (Rash)    Intolerances      LABS:                        14.6   6.2   )-----------( 124      ( 2017 07:34 )             47.3     2017 07:34    141    |  105    |  12     ----------------------------<  76     4.0     |  29     |  0.78     Ca    8.3        2017 07:34  Phos  4.4       2017 07:34  Mg     2.2       2017 07:34      PT/INR - ( 2017 13:14 )   PT: 13.2 sec;   INR: 1.19          PTT - ( 2017 13:14 )  PTT:36.7 sec  Urinalysis Basic - ( 2017 08:10 )    Color: Yellow / Appearance: Clear / S.010 / pH: x  Gluc: x / Ketone: NEGATIVE  / Bili: NEGATIVE / Urobili: >=8.0 E.U./dL   Blood: x / Protein: 30 mg/dL / Nitrite: NEGATIVE   Leuk Esterase: NEGATIVE / RBC: 5-10 /HPF / WBC 5-10 /HPF   Sq Epi: x / Non Sq Epi: Few /HPF / Bacteria: Present /HPF      RADIOLOGY & ADDITIONAL TESTS: CXR wet read. AP, likely taken on expiratory phase. ETT 3cm from elysia, NGT in stomach. RUL and LLL infiltrates.     LE duplex negative for DVT    A/P    32 yo F  PMHx HTN, HLD, congenital ASD s/p repair, MICHAEL, HOCM, family h/o of neuromuscular hypoventilation syndrome,  DVT in  (s/p Coumadin last dose ) presented with SOB/CP/n/v and was admitted to MICU after hypercapnic resp failure and MSSA pna. Thrombocytopenia.     #Pulm  #Hypercapnic resp failure with unclear etiology.  Pt with MICHAEL and witnessed Cheyne Mcmillan breathing on exam today. TSH WNL. May have similar neuromuscular disorder as her father who required diaphragmatic pacemaker. Ordered for genetic test. Pulm consulted and will follow their recs.     #MSSA pna. C/w oxacillin. Consider Diuresed yesterday to dry lungs out with improvement in resp status.       Cardiology  #HD. H/o HTN on home BP meds. Will restart when clinically improved. [place] for strict I&Os.     #Pump. EF 60%. No diastolic HF. Echo finding of asymmetric septal wall hypertrophy. Cardiology consulted and dx HCOM but low suspicion for causing resp failure and also low likelihood of arrhythmogenic. Cardiology recommends care in not reducing preload and also starting beta blocker when clinically appropriate.     ID  #MSSA PNA. oxacillin 2g q4, day 5. Pt spiked fever yesterday and today. BCx drawn. UA neg for UTI. PNA appears to be improving on current abx. Will broaden spectrum if clinically worsens.     #Hem  #Thrombocytopenia with h/o DVT. US neg for DVT. Platelets uptrended today. Hem consult for possible APLAS.     FEN: NPO with tube feeds Jevity 1.2 1560 ml a day.     PPX: SQH, SCDs, PPI    Access: peripheral.     Full CODE    Dispo: MICU     FULL CODE

## 2017-02-01 NOTE — PROGRESS NOTE ADULT - SUBJECTIVE AND OBJECTIVE BOX
Interval Events:  Patient seen and examined at bedside.  Patient awake and alert on the ventilator following commands with no acute events overnight.  The patient was extubated during the day then became hypercapneic and more somnolent with intermittent hypoxia.  The patient was reintubated.        MEDICATIONS:  Pulmonary:    Antimicrobials:  oxacillin IVPB 2Gram(s) IV Intermittent every 4 hours  oxacillin IVPB  IV Intermittent     Anticoagulants:  heparin  Injectable 7500Unit(s) SubCutaneous every 8 hours    Cardiac:      Allergies    No Known Drug Allergies  Seafood (Rash)    Intolerances        Vital Signs Last 24 Hrs  T(C): 37, Max: 38.1 ( @ 23:11)  T(F): 98.6, Max: 100.6 ( @ 23:11)  HR: 94 (72 - 128)  BP: 125/77 (97/51 - 183/90)  BP(mean): 96 (69 - 139)  RR: 14 (10 - 31)  SpO2: 96% (82% - 100%)  I & Os for 24h ending  @ 07:00  =============================================  IN: 2115.5 ml / OUT: 3655 ml / NET: -1539.5 ml    I & Os for current day (as of  @ 21:30)  =============================================  IN: 566 ml / OUT: 865 ml / NET: -299 ml    Mode: AC/ CMV (Assist Control/ Continuous Mandatory Ventilation)  RR (machine): 14  TV (machine): 390  FiO2: 40  PEEP: 5  ITime: 0.8  MAP: 8.1  PIP: 21      PHYSICAL EXAM:    General: Well developed; well nourished; in no acute distress  Neck: Supple; non tender; no masses  Respiratory: Clear to auscultation bilaterally without wheezing, rhonchi or rales  Cardiovascular: tachycardic but regular  Gastrointestinal: Soft non-tender non-distended; Normal bowel sounds  Extremities: Normal range of motion, No clubbing, cyanosis or edema  Neurological: Alert and oriented x3  Skin: Warm and dry. No obvious rash    LABS:  ABG - ( 2017 17:05 )  pH: 7.37  /  pCO2: 52    /  pO2: 169   / HCO3: 29    / Base Excess: 2.7   /  SaO2: 99                  CBC Full  -  ( 2017 07:34 )  WBC Count : 6.2 K/uL  Hemoglobin : 14.6 g/dL  Hematocrit : 47.3 %  Platelet Count - Automated : 124 K/uL  Mean Cell Volume : 73.9 fL  Mean Cell Hemoglobin : 22.8 pg  Mean Cell Hemoglobin Concentration : 30.9 g/dL  Auto Neutrophil # : x  Auto Lymphocyte # : x  Auto Monocyte # : x  Auto Eosinophil # : x  Auto Basophil # : x  Auto Neutrophil % : 63.0 %  Auto Lymphocyte % : 19.0 %  Auto Monocyte % : 9.0 %  Auto Eosinophil % : 4.0 %  Auto Basophil % : x    2017 07:34    141    |  105    |  12     ----------------------------<  76     4.0     |  29     |  0.78     Ca    8.3        2017 07:34  Phos  4.4       2017 07:34  Mg     2.2       2017 07:34      PT/INR - ( 2017 13:14 )   PT: 13.2 sec;   INR: 1.19          PTT - ( 2017 13:14 )  PTT:36.7 sec      Urinalysis Basic - ( 2017 08:10 )    Color: Yellow / Appearance: Clear / S.010 / pH: x  Gluc: x / Ketone: NEGATIVE  / Bili: NEGATIVE / Urobili: >=8.0 E.U./dL   Blood: x / Protein: 30 mg/dL / Nitrite: NEGATIVE   Leuk Esterase: NEGATIVE / RBC: 5-10 /HPF / WBC 5-10 /HPF   Sq Epi: x / Non Sq Epi: Few /HPF / Bacteria: Present /HPF                RADIOLOGY & ADDITIONAL STUDIES (The following images were personally reviewed):

## 2017-02-01 NOTE — PROGRESS NOTE ADULT - PROBLEM SELECTOR PLAN 1
Patient with hypercapneic respiratory failure possibly due to central hypoventilation syndrome.  The patient was diagnosed with "sleep apnea" when she was 5 years old and has had to use CPAP at night ever since.  Her father has central hypoventilation syndrome with a +PHOX2B gene and has diagraphmatic pacemaker.  -The patient was reintubated today for hypercapneic respiratory failure.  Check PHOX2B gene and will discuss tracheostomy with father and patient.

## 2017-02-01 NOTE — PROVIDER CONTACT NOTE (CHANGE IN STATUS NOTIFICATION) - BACKGROUND
Patient s/p extubation approx 1.5hours ago, extubated directly to bipap and vomitted as soon as we got her out of bed to chair.

## 2017-02-02 LAB
ANION GAP SERPL CALC-SCNC: 7 MMOL/L — LOW (ref 9–16)
B2 GLYCOPROT1 AB SER QL: NEGATIVE — SIGNIFICANT CHANGE UP
BUN SERPL-MCNC: 12 MG/DL — SIGNIFICANT CHANGE UP (ref 7–23)
CALCIUM SERPL-MCNC: 8.6 MG/DL — SIGNIFICANT CHANGE UP (ref 8.5–10.5)
CARDIOLIPIN AB SER-ACNC: NEGATIVE — SIGNIFICANT CHANGE UP
CHLORIDE SERPL-SCNC: 104 MMOL/L — SIGNIFICANT CHANGE UP (ref 96–108)
CO2 SERPL-SCNC: 28 MMOL/L — SIGNIFICANT CHANGE UP (ref 22–31)
CREAT SERPL-MCNC: 0.82 MG/DL — SIGNIFICANT CHANGE UP (ref 0.5–1.3)
DRVVT SCREEN TO CONFIRM RATIO: SIGNIFICANT CHANGE UP
GLUCOSE SERPL-MCNC: 84 MG/DL — SIGNIFICANT CHANGE UP (ref 70–99)
HCT VFR BLD CALC: 43.5 % — SIGNIFICANT CHANGE UP (ref 34.5–45)
HGB BLD-MCNC: 14.8 G/DL — SIGNIFICANT CHANGE UP (ref 11.5–15.5)
LA NT DPL PPP QL: 24.1 SEC — SIGNIFICANT CHANGE UP
MAGNESIUM SERPL-MCNC: 1.9 MG/DL — SIGNIFICANT CHANGE UP (ref 1.6–2.4)
MCHC RBC-ENTMCNC: 25.6 PG — LOW (ref 27–34)
MCHC RBC-ENTMCNC: 34 G/DL — SIGNIFICANT CHANGE UP (ref 32–36)
MCV RBC AUTO: 75.3 FL — LOW (ref 80–100)
PLATELET # BLD AUTO: 122 K/UL — LOW (ref 150–400)
POTASSIUM SERPL-MCNC: 4.2 MMOL/L — SIGNIFICANT CHANGE UP (ref 3.5–5.3)
POTASSIUM SERPL-SCNC: 4.2 MMOL/L — SIGNIFICANT CHANGE UP (ref 3.5–5.3)
RBC # BLD: 5.78 M/UL — HIGH (ref 3.8–5.2)
RBC # FLD: 25.9 % — HIGH (ref 10.3–16.9)
SODIUM SERPL-SCNC: 139 MMOL/L — SIGNIFICANT CHANGE UP (ref 135–145)
WBC # BLD: 8.6 K/UL — SIGNIFICANT CHANGE UP (ref 3.8–10.5)
WBC # FLD AUTO: 8.6 K/UL — SIGNIFICANT CHANGE UP (ref 3.8–10.5)

## 2017-02-02 PROCEDURE — 70450 CT HEAD/BRAIN W/O DYE: CPT | Mod: 26

## 2017-02-02 PROCEDURE — 99233 SBSQ HOSP IP/OBS HIGH 50: CPT | Mod: GC

## 2017-02-02 PROCEDURE — 71010: CPT | Mod: 26

## 2017-02-02 PROCEDURE — 99232 SBSQ HOSP IP/OBS MODERATE 35: CPT | Mod: GC

## 2017-02-02 RX ORDER — MODAFINIL 200 MG/1
200 TABLET ORAL DAILY
Qty: 0 | Refills: 0 | Status: DISCONTINUED | OUTPATIENT
Start: 2017-02-02 | End: 2017-02-08

## 2017-02-02 RX ORDER — MIDAZOLAM HYDROCHLORIDE 1 MG/ML
5 INJECTION, SOLUTION INTRAMUSCULAR; INTRAVENOUS
Qty: 100 | Refills: 0 | Status: DISCONTINUED | OUTPATIENT
Start: 2017-02-02 | End: 2017-02-07

## 2017-02-02 RX ADMIN — HEPARIN SODIUM 7500 UNIT(S): 5000 INJECTION INTRAVENOUS; SUBCUTANEOUS at 13:50

## 2017-02-02 RX ADMIN — MODAFINIL 200 MILLIGRAM(S): 200 TABLET ORAL at 12:47

## 2017-02-02 RX ADMIN — Medication 100 GRAM(S): at 08:35

## 2017-02-02 RX ADMIN — MIDAZOLAM HYDROCHLORIDE 5 MG/HR: 1 INJECTION, SOLUTION INTRAMUSCULAR; INTRAVENOUS at 08:35

## 2017-02-02 RX ADMIN — HEPARIN SODIUM 7500 UNIT(S): 5000 INJECTION INTRAVENOUS; SUBCUTANEOUS at 22:30

## 2017-02-02 RX ADMIN — MIDAZOLAM HYDROCHLORIDE 5 MG/HR: 1 INJECTION, SOLUTION INTRAMUSCULAR; INTRAVENOUS at 08:24

## 2017-02-02 RX ADMIN — PROPOFOL 5 MICROGRAM(S)/KG/MIN: 10 INJECTION, EMULSION INTRAVENOUS at 08:24

## 2017-02-02 RX ADMIN — CHLORHEXIDINE GLUCONATE 15 MILLILITER(S): 213 SOLUTION TOPICAL at 18:06

## 2017-02-02 RX ADMIN — Medication 100 GRAM(S): at 00:19

## 2017-02-02 RX ADMIN — PROPOFOL 5 MICROGRAM(S)/KG/MIN: 10 INJECTION, EMULSION INTRAVENOUS at 19:54

## 2017-02-02 RX ADMIN — PANTOPRAZOLE SODIUM 40 MILLIGRAM(S): 20 TABLET, DELAYED RELEASE ORAL at 12:09

## 2017-02-02 RX ADMIN — PROPOFOL 5 MICROGRAM(S)/KG/MIN: 10 INJECTION, EMULSION INTRAVENOUS at 10:09

## 2017-02-02 RX ADMIN — PROPOFOL 5 MICROGRAM(S)/KG/MIN: 10 INJECTION, EMULSION INTRAVENOUS at 13:50

## 2017-02-02 RX ADMIN — Medication 100 GRAM(S): at 12:09

## 2017-02-02 RX ADMIN — Medication 100 GRAM(S): at 16:43

## 2017-02-02 RX ADMIN — HEPARIN SODIUM 7500 UNIT(S): 5000 INJECTION INTRAVENOUS; SUBCUTANEOUS at 06:26

## 2017-02-02 RX ADMIN — Medication 100 GRAM(S): at 03:23

## 2017-02-02 RX ADMIN — CHLORHEXIDINE GLUCONATE 15 MILLILITER(S): 213 SOLUTION TOPICAL at 07:26

## 2017-02-02 RX ADMIN — Medication 100 GRAM(S): at 19:52

## 2017-02-02 NOTE — PROGRESS NOTE ADULT - PROBLEM SELECTOR PLAN 1
Likely congenital hypoventilation syndrome.  CT head performed to r/o anatomical causes centrally.  Start provigil 200mg daily for now.  May need diagphramatic pacemaker.  Potential Tracheostomy be discussed

## 2017-02-02 NOTE — PROGRESS NOTE ADULT - SUBJECTIVE AND OBJECTIVE BOX
Interval Events:  Patient seen and examined at bedside.  Patient completely awake and following commands off of sedation.  Resedated for comfort.        MEDICATIONS:  Pulmonary:    Antimicrobials:  oxacillin IVPB 2Gram(s) IV Intermittent every 4 hours  oxacillin IVPB  IV Intermittent     Anticoagulants:  heparin  Injectable 7500Unit(s) SubCutaneous every 8 hours    Cardiac:      Allergies    No Known Drug Allergies  Seafood (Rash)    Intolerances        Vital Signs Last 24 Hrs  T(C): 37.8, Max: 37.8 ( @ 16:30)  T(F): 100, Max: 100 ( @ 16:30)  HR: 92 (82 - 102)  BP: 124/72 (115/73 - 171/125)  BP(mean): 88 (88 - 139)  RR: 13 (0 - 24)  SpO2: 96% (88% - 97%)  I & Os for 24h ending  @ 07:00  =============================================  IN: 1919 ml / OUT: 1610 ml / NET: 309 ml    I & Os for current day (as of  @ 19:08)  =============================================  IN: 770 ml / OUT: 1580 ml / NET: -810 ml    Mode: AC/ CMV (Assist Control/ Continuous Mandatory Ventilation)  RR (machine): 14  TV (machine): 500  FiO2: 50  PEEP: 5  ITime: 0.85  MAP: 9  PIP: 26      PHYSICAL EXAM:    General: Well developed; well nourished; in no acute distress  Neck: Supple; non tender; no masses  Respiratory: Clear to auscultation bilaterally without wheezing, rhonchi or rales  Cardiovascular: Regular rate and rhythm. S1 and S2 Normal; No murmurs, gallops or rubs  Gastrointestinal: Soft non-tender non-distended; Normal bowel sounds  Extremities: Normal range of motion, No clubbing, cyanosis or edema  Neurological: awake and alert  Skin: Warm and dry. No obvious rash    LABS:  ABG - ( 2017 17:05 )  pH: 7.37  /  pCO2: 52    /  pO2: 169   / HCO3: 29    / Base Excess: 2.7   /  SaO2: 99                  CBC Full  -  ( 2017 07:17 )  WBC Count : 8.6 K/uL  Hemoglobin : 14.8 g/dL  Hematocrit : 43.5 %  Platelet Count - Automated : 122 K/uL  Mean Cell Volume : 75.3 fL  Mean Cell Hemoglobin : 25.6 pg  Mean Cell Hemoglobin Concentration : 34.0 g/dL  Auto Neutrophil # : x  Auto Lymphocyte # : x  Auto Monocyte # : x  Auto Eosinophil # : x  Auto Basophil # : x  Auto Neutrophil % : x  Auto Lymphocyte % : x  Auto Monocyte % : x  Auto Eosinophil % : x  Auto Basophil % : x    2017 10:25    139    |  104    |  12     ----------------------------<  84     4.2     |  28     |  0.82     Ca    8.6        2017 10:25  Phos  4.4       2017 07:34  Mg     1.9       2017 10:25      PT/INR - ( 2017 13:14 )   PT: 13.2 sec;   INR: 1.19          PTT - ( 2017 13:14 )  PTT:36.7 sec      Urinalysis Basic - ( 2017 08:10 )    Color: Yellow / Appearance: Clear / S.010 / pH: x  Gluc: x / Ketone: NEGATIVE  / Bili: NEGATIVE / Urobili: >=8.0 E.U./dL   Blood: x / Protein: 30 mg/dL / Nitrite: NEGATIVE   Leuk Esterase: NEGATIVE / RBC: 5-10 /HPF / WBC 5-10 /HPF   Sq Epi: x / Non Sq Epi: Few /HPF / Bacteria: Present /HPF                RADIOLOGY & ADDITIONAL STUDIES (The following images were personally reviewed):

## 2017-02-02 NOTE — PROGRESS NOTE ADULT - SUBJECTIVE AND OBJECTIVE BOX
INTERVAL HPI/OVERNIGHT EVENTS:    OVERNIGHT: No overnight events.  SUBJECTIVE: Patient seen and examined at bedside.     ROS:  CV: Denies chest pain  Resp: Denies SOB  GI: Denies abdominal pain, constipation, diarrhea, nausea, vomiting  : Denies dysuria  ID: Denies fevers, chills  MSK: Denies joint pain     OBJECTIVE:    VITAL SIGNS:  ICU Vital Signs Last 24 Hrs  T(C): 36.6, Max: 37.5 ( @ 01:26)  T(F): 97.8, Max: 99.5 ( @ 01:26)  HR: 82 (82 - 128)  BP: 120/74 (114/64 - 183/90)  BP(mean): 96 (85 - 137)  ABP: --  ABP(mean): --  RR: 14 (0 - 31)  SpO2: 96% (82% - 100%)    Mode: AC/ CMV (Assist Control/ Continuous Mandatory Ventilation), RR (machine): 14, TV (machine): 390, FiO2: 40, PEEP: 5, ITime: 0.85, MAP: 11, PIP: 24  I & Os for 24h ending  @ 07:00  =============================================  IN: 2115.5 ml / OUT: 3655 ml / NET: -1539.5 ml    I & Os for current day (as of  @ 06:18)  =============================================  IN: 758 ml / OUT: 1440 ml / NET: -682 ml    CAPILLARY BLOOD GLUCOSE  205 (2017 07:00)      PHYSICAL EXAM:    Constitutional: intubated, follows commands, able to communicate with writing, uncomfortable on vent  Eyes: PERRL  ENMT: MMM  Neck: No distended neck veins  Respiratory: good air movement, rales in right lung fields, left lung fields sound clear.    Cardiovascular: RRR. +S1, S2. No murmurs or gallops  Gastrointestinal: Obese, soft, NT  Extremities: WWP. No pitting edema. +DPs  Vasc: 2+ pulses throughout  MSK no joint swelling  Tubes/Lines: miner      MEDICATIONS:  MEDICATIONS  (STANDING):  heparin  Injectable 7500Unit(s) SubCutaneous every 8 hours  pantoprazole  Injectable 40milliGRAM(s) IV Push daily  oxacillin IVPB 2Gram(s) IV Intermittent every 4 hours  oxacillin IVPB  IV Intermittent   propofol Infusion 7.652MICROgram(s)/kG/Min IV Continuous <Continuous>  midazolam Infusion 5mG/Hr IV Continuous <Continuous>  chlorhexidine 0.12% Liquid 15milliLiter(s) Swish and Spit two times a day    MEDICATIONS  (PRN):  acetaminophen   Tablet 650milliGRAM(s) Oral every 6 hours PRN For Temp greater than 38 C (100.4 F)      ALLERGIES:  Allergies    No Known Drug Allergies  Seafood (Rash)    Intolerances        LABS:                        14.6   6.2   )-----------( 124      ( 2017 07:34 )             47.3     2017 07:34    141    |  105    |  12     ----------------------------<  76     4.0     |  29     |  0.78     Ca    8.3        2017 07:34  Phos  4.4       2017 07:34  Mg     2.2       2017 07:34      PT/INR - ( 2017 13:14 )   PT: 13.2 sec;   INR: 1.19          PTT - ( 2017 13:14 )  PTT:36.7 sec  Urinalysis Basic - ( 2017 08:10 )    Color: Yellow / Appearance: Clear / S.010 / pH: x  Gluc: x / Ketone: NEGATIVE  / Bili: NEGATIVE / Urobili: >=8.0 E.U./dL   Blood: x / Protein: 30 mg/dL / Nitrite: NEGATIVE   Leuk Esterase: NEGATIVE / RBC: 5-10 /HPF / WBC 5-10 /HPF   Sq Epi: x / Non Sq Epi: Few /HPF / Bacteria: Present /HPF        RADIOLOGY & ADDITIONAL TESTS: Reviewed.      A/P    30 yo F  PMHx HTN, HLD, congenital ASD s/p repair, MICHAEL, HOCM, family h/o of neuromuscular hypoventilation syndrome,  DVT in  (s/p Coumadin last dose ) presented with SOB/CP/n/v and was admitted to MICU after hypercapnic resp failure and MSSA pna. Thrombocytopenia.     #Pulm  #Hypercapnic resp failure with unclear etiology.  Pt with MICHAEL and witnessed Cheyne Mcmillan breathing on exam today. TSH WNL. May have similar neuromuscular disorder as her father who required diaphragmatic pacemaker. Ordered for genetic test. Pulm consulted and will follow their recs.     #MSSA pna. C/w oxacillin. Consider Diuresed yesterday to dry lungs out with improvement in resp status.       Cardiology  #HD. H/o HTN on home BP meds. Will restart when clinically improved. Miner for strict I&Os.     #Pump. EF 60%. No diastolic HF. Echo finding of asymmetric septal wall hypertrophy. Cardiology consulted and dx HCOM but low suspicion for causing resp failure and also low likelihood of arrhythmogenic. Cardiology recommends care in not reducing preload and also starting beta blocker when clinically appropriate.     ID  #MSSA PNA. oxacillin 2g q4, day 5. Pt spiked fever yesterday and today. BCx drawn. UA neg for UTI. PNA appears to be improving on current abx. Will broaden spectrum if clinically worsens.     #Hem  #Thrombocytopenia with h/o DVT. US neg for DVT. Platelets uptrended today. Hem consult for possible APLAS.     FEN: NPO with tube feeds Jevity 1.2 1560 ml a day.     PPX: SQH, SCDs, PPI    Access: peripheral.     Full CODE    Dispo: MICU     FULL CODE INTERVAL HPI/OVERNIGHT EVENTS: Pt had some loose stool, non foul smelling.    OVERNIGHT: No overnight events.  SUBJECTIVE: Patient seen and examined at bedside.     ROS: unable to obtain as pt intubated and sedated    OBJECTIVE:    VITAL SIGNS:  ICU Vital Signs Last 24 Hrs  T(C): 36.6, Max: 37.5 ( @ 01:26)  T(F): 97.8, Max: 99.5 ( @ 01:26)  HR: 82 (82 - 128)  BP: 120/74 (114/64 - 183/90)  BP(mean): 96 (85 - 137)  ABP: --  ABP(mean): --  RR: 14 (0 - 31)  SpO2: 96% (82% - 100%)    Mode: AC/ CMV (Assist Control/ Continuous Mandatory Ventilation), RR (machine): 14, TV (machine): 390, FiO2: 40, PEEP: 5, ITime: 0.85, MAP: 11, PIP: 24  I & Os for 24h ending  @ 07:00  =============================================  IN: 2115.5 ml / OUT: 3655 ml / NET: -1539.5 ml    I & Os for current day (as of  @ 06:18)  =============================================  IN: 758 ml / OUT: 1440 ml / NET: -682 ml    CAPILLARY BLOOD GLUCOSE  205 (2017 07:00)      PHYSICAL EXAM:    Constitutional: intubated, sedated, comfortable on vent  Eyes: PERRL  ENMT: MMM  Neck: No distended neck veins  Respiratory: good air movement, decreased breath sounds at R base, no wheezing  Cardiovascular: RRR. +S1, S2, 2/6 systolic murmur heard at LLSB  Gastrointestinal: Obese, soft, nondistended, normoactive BS  Extremities: WWP, trace edema  Vasc: 2+ peripheral pulses  MSK no joint swelling  Tubes/Lines: [place]obato      MEDICATIONS:  MEDICATIONS  (STANDING):  heparin  Injectable 7500Unit(s) SubCutaneous every 8 hours  pantoprazole  Injectable 40milliGRAM(s) IV Push daily  oxacillin IVPB 2Gram(s) IV Intermittent every 4 hours  oxacillin IVPB  IV Intermittent   propofol Infusion 7.652MICROgram(s)/kG/Min IV Continuous <Continuous>  midazolam Infusion 5mG/Hr IV Continuous <Continuous>  chlorhexidine 0.12% Liquid 15milliLiter(s) Swish and Spit two times a day    MEDICATIONS  (PRN):  acetaminophen   Tablet 650milliGRAM(s) Oral every 6 hours PRN For Temp greater than 38 C (100.4 F)      ALLERGIES:  Allergies    No Known Drug Allergies  Seafood (Rash)    Intolerances        LABS:                        14.6   6.2   )-----------( 124      ( 2017 07:34 )             47.3     2017 07:34    141    |  105    |  12     ----------------------------<  76     4.0     |  29     |  0.78     Ca    8.3        2017 07:34  Phos  4.4       2017 07:34  Mg     2.2       2017 07:34      PT/INR - ( 2017 13:14 )   PT: 13.2 sec;   INR: 1.19          PTT - ( 2017 13:14 )  PTT:36.7 sec  Urinalysis Basic - ( 2017 08:10 )    Color: Yellow / Appearance: Clear / S.010 / pH: x  Gluc: x / Ketone: NEGATIVE  / Bili: NEGATIVE / Urobili: >=8.0 E.U./dL   Blood: x / Protein: 30 mg/dL / Nitrite: NEGATIVE   Leuk Esterase: NEGATIVE / RBC: 5-10 /HPF / WBC 5-10 /HPF   Sq Epi: x / Non Sq Epi: Few /HPF / Bacteria: Present /HPF        RADIOLOGY & ADDITIONAL TESTS: Reviewed.      A/P: 30 yo F  PMHx HTN, HLD, congenital ASD s/p repair, MICHAEL, HOCM, family h/o of neuromuscular hypoventilation syndrome,  DVT in  (s/p Coumadin last dose ) presented with SOB/CP/n/v and was admitted to MICU after hypercapnic respiratory failure and MSSA PNA.     Pulm  #Hypercapnic resp failure with unclear etiology:  Pt with MICHAEL and family hx of  neuromuscular disorder as her father who required diaphragmatic pacemaker. Ordered for genetic test. Pulm following  - Continue mechanical ventilation, consider trach if unable to wean off vent  - Start Provigil 200mg daily     #MSSA pna: afebrile, no white count. BCx NGTD   - C/w oxacillin. On Day 6 of Abx today      CV  #HD. H/o HTN on home BP meds. Will restart when clinically improved. [place] for strict I&Os.     #Pump. EF 60%. No diastolic HF. Echo finding of asymmetric septal wall hypertrophy. Cardiology consulted and dx HCOM but low suspicion for causing resp failure and also low likelihood of arrhythmogenic. Cardiology recommends care in not reducing preload and also starting beta blocker when clinically appropriate.     ID  #MSSA PNA: MSSA grown from bronchial wash. On day 6 of Abx. BCx NGTD  - c/w oxacillin 2g q4    Hem  #Thrombocytopenia with h/o DVT. US neg for DVT. Platelets uptrended today. Hem consult for possible APLAS.     FEN: NPO with tube feeds Jevity 1.2 1560 ml a day.     PPX: SQH, SCDs, PPI    Access: peripheral.     Full CODE    Dispo: MICU     FULL CODE INTERVAL HPI/OVERNIGHT EVENTS: Pt had some loose stool, non foul smelling.    OVERNIGHT: No overnight events.  SUBJECTIVE: Patient seen and examined at bedside.     ROS: unable to obtain as pt intubated and sedated    OBJECTIVE:    VITAL SIGNS:  ICU Vital Signs Last 24 Hrs  T(C): 36.6, Max: 37.5 ( @ 01:26)  T(F): 97.8, Max: 99.5 ( @ 01:26)  HR: 82 (82 - 128)  BP: 120/74 (114/64 - 183/90)  BP(mean): 96 (85 - 137)  ABP: --  ABP(mean): --  RR: 14 (0 - 31)  SpO2: 96% (82% - 100%)    Mode: AC/ CMV (Assist Control/ Continuous Mandatory Ventilation), RR (machine): 14, TV (machine): 390, FiO2: 40, PEEP: 5, ITime: 0.85, MAP: 11, PIP: 24  I & Os for 24h ending  @ 07:00  =============================================  IN: 2115.5 ml / OUT: 3655 ml / NET: -1539.5 ml    I & Os for current day (as of  @ 06:18)  =============================================  IN: 758 ml / OUT: 1440 ml / NET: -682 ml    CAPILLARY BLOOD GLUCOSE  205 (2017 07:00)      PHYSICAL EXAM:    Constitutional: intubated, sedated, comfortable on vent  Eyes: PERRL  ENMT: MMM  Neck: No distended neck veins  Respiratory: good air movement, decreased breath sounds at R base, no wheezing  Cardiovascular: RRR. +S1, S2, 2/6 systolic murmur heard at LLSB  Gastrointestinal: Obese, soft, nondistended, normoactive BS  Extremities: WWP, trace edema  Vasc: 2+ peripheral pulses  MSK no joint swelling  Tubes/Lines: Lobato      MEDICATIONS:  MEDICATIONS  (STANDING):  heparin  Injectable 7500Unit(s) SubCutaneous every 8 hours  pantoprazole  Injectable 40milliGRAM(s) IV Push daily  oxacillin IVPB 2Gram(s) IV Intermittent every 4 hours  oxacillin IVPB  IV Intermittent   propofol Infusion 7.652MICROgram(s)/kG/Min IV Continuous <Continuous>  midazolam Infusion 5mG/Hr IV Continuous <Continuous>  chlorhexidine 0.12% Liquid 15milliLiter(s) Swish and Spit two times a day    MEDICATIONS  (PRN):  acetaminophen   Tablet 650milliGRAM(s) Oral every 6 hours PRN For Temp greater than 38 C (100.4 F)      ALLERGIES:  Allergies    No Known Drug Allergies  Seafood (Rash)    Intolerances        LABS:                        14.6   6.2   )-----------( 124      ( 2017 07:34 )             47.3     2017 07:34    141    |  105    |  12     ----------------------------<  76     4.0     |  29     |  0.78     Ca    8.3        2017 07:34  Phos  4.4       2017 07:34  Mg     2.2       2017 07:34      PT/INR - ( 2017 13:14 )   PT: 13.2 sec;   INR: 1.19          PTT - ( 2017 13:14 )  PTT:36.7 sec  Urinalysis Basic - ( 2017 08:10 )    Color: Yellow / Appearance: Clear / S.010 / pH: x  Gluc: x / Ketone: NEGATIVE  / Bili: NEGATIVE / Urobili: >=8.0 E.U./dL   Blood: x / Protein: 30 mg/dL / Nitrite: NEGATIVE   Leuk Esterase: NEGATIVE / RBC: 5-10 /HPF / WBC 5-10 /HPF   Sq Epi: x / Non Sq Epi: Few /HPF / Bacteria: Present /HPF        RADIOLOGY & ADDITIONAL TESTS: Reviewed.      A/P: 32 yo F  PMHx HTN, HLD, congenital ASD s/p repair, MICHAEL, HOCM, family h/o of neuromuscular hypoventilation syndrome,  DVT in  (s/p Coumadin last dose ) presented with SOB/CP/n/v and was admitted to MICU after hypercapnic respiratory failure and MSSA PNA.     Pulm  #Hypercapnic resp failure with unclear etiology:  Pt with MICHAEL and family hx of  neuromuscular disorder as her father who required diaphragmatic pacemaker. Ordered for genetic test. Pulm following  - Continue mechanical ventilation, consider trach if unable to wean off vent  - Start Provigil 200mg daily     #MSSA pna: afebrile, no white count. BCx NGTD   - C/w oxacillin. On Day 6 of Abx today      CV  #HD. H/o HTN on home BP meds. Will restart when clinically improved. [place]obato for strict I&Os.     #Pump. EF 60%. No diastolic HF. Echo finding of asymmetric septal wall hypertrophy. Cardiology consulted and dx HCOM but low suspicion for causing resp failure and also low likelihood of arrhythmogenic. Cardiology recommends care in not reducing preload and also starting beta blocker when clinically appropriate.     ID  #MSSA PNA: MSSA grown from bronchial wash. On day 6 of Abx. BCx NGTD  - c/w oxacillin 2g q4    Hem  #Thrombocytopenia with h/o DVT. US neg for DVT. Platelets stable (124K yesterday, 122K today). Hem consult for possible APLAS.   - f/u Heme recs    Neuro  Pt apneic, required intubation. Unclear etiology, coudld be MICHAEL or neuromuscular disorder from family hx  - Obtain head CT to r/o central cause of apnea      FEN: NPO with tube feeds Jevity 1.2 1560 ml a day.     PPX: HSQ, SCDs, PPI    Access: peripheral.     Full CODE    Dispo: MICU

## 2017-02-03 LAB
ALBUMIN SERPL ELPH-MCNC: 3.6 G/DL
ALP BLD-CCNC: 46 U/L
ALT SERPL-CCNC: 15 U/L
ANION GAP SERPL CALC-SCNC: 22 MMOL/L
AST SERPL-CCNC: 20 U/L
BASOPHILS # BLD AUTO: 0.03 K/UL
BASOPHILS NFR BLD AUTO: 0.6 %
BILIRUB SERPL-MCNC: 2.1 MG/DL
BUN SERPL-MCNC: 13 MG/DL
CALCIUM SERPL-MCNC: 9.2 MG/DL
CHLORIDE SERPL-SCNC: 92 MMOL/L
CHOLEST SERPL-MCNC: 84 MG/DL
CHOLEST/HDLC SERPL: 3.2 RATIO
CO2 SERPL-SCNC: 30 MMOL/L
CREAT SERPL-MCNC: 0.95 MG/DL
EOSINOPHIL # BLD AUTO: 0.06 K/UL
EOSINOPHIL NFR BLD AUTO: 1.2 %
GLUCOSE SERPL-MCNC: 71 MG/DL
HBA1C MFR BLD HPLC: 6.6 %
HCT VFR BLD CALC: 54 %
HDLC SERPL-MCNC: 26 MG/DL
HGB BLD-MCNC: 15.4 G/DL
HIV1+2 AB SPEC QL IA.RAPID: NONREACTIVE
IMM GRANULOCYTES NFR BLD AUTO: 0.8 %
LDLC SERPL CALC-MCNC: 44 MG/DL
LYMPHOCYTES # BLD AUTO: 1.15 K/UL
LYMPHOCYTES NFR BLD AUTO: 22.1 %
MAN DIFF?: NORMAL
MCHC RBC-ENTMCNC: 23.5 PG
MCHC RBC-ENTMCNC: 28.5 GM/DL
MCV RBC AUTO: 82.3 FL
MONOCYTES # BLD AUTO: 0.52 K/UL
MONOCYTES NFR BLD AUTO: 10 %
NEUTROPHILS # BLD AUTO: 3.41 K/UL
NEUTROPHILS NFR BLD AUTO: 65.3 %
PLATELET # BLD AUTO: 108 K/UL
POTASSIUM SERPL-SCNC: 5.1 MMOL/L
PROT SERPL-MCNC: 7.4 G/DL
RBC # BLD: 6.56 M/UL
RBC # FLD: 24.2 %
SODIUM SERPL-SCNC: 144 MMOL/L
TRIGL SERPL-MCNC: 71 MG/DL
WBC # FLD AUTO: 5.21 K/UL

## 2017-02-03 PROCEDURE — 99232 SBSQ HOSP IP/OBS MODERATE 35: CPT | Mod: GC

## 2017-02-03 PROCEDURE — 99233 SBSQ HOSP IP/OBS HIGH 50: CPT | Mod: GC

## 2017-02-03 PROCEDURE — 71010: CPT | Mod: 26

## 2017-02-03 RX ADMIN — HEPARIN SODIUM 7500 UNIT(S): 5000 INJECTION INTRAVENOUS; SUBCUTANEOUS at 06:52

## 2017-02-03 RX ADMIN — Medication 100 GRAM(S): at 13:55

## 2017-02-03 RX ADMIN — Medication 100 GRAM(S): at 16:41

## 2017-02-03 RX ADMIN — HEPARIN SODIUM 7500 UNIT(S): 5000 INJECTION INTRAVENOUS; SUBCUTANEOUS at 22:12

## 2017-02-03 RX ADMIN — Medication 100 GRAM(S): at 19:39

## 2017-02-03 RX ADMIN — Medication 100 GRAM(S): at 03:54

## 2017-02-03 RX ADMIN — Medication 100 GRAM(S): at 08:34

## 2017-02-03 RX ADMIN — MODAFINIL 200 MILLIGRAM(S): 200 TABLET ORAL at 19:25

## 2017-02-03 RX ADMIN — PANTOPRAZOLE SODIUM 40 MILLIGRAM(S): 20 TABLET, DELAYED RELEASE ORAL at 13:55

## 2017-02-03 RX ADMIN — Medication 100 GRAM(S): at 00:56

## 2017-02-03 RX ADMIN — CHLORHEXIDINE GLUCONATE 15 MILLILITER(S): 213 SOLUTION TOPICAL at 19:39

## 2017-02-03 RX ADMIN — HEPARIN SODIUM 7500 UNIT(S): 5000 INJECTION INTRAVENOUS; SUBCUTANEOUS at 13:56

## 2017-02-03 RX ADMIN — PROPOFOL 5 MICROGRAM(S)/KG/MIN: 10 INJECTION, EMULSION INTRAVENOUS at 14:12

## 2017-02-03 RX ADMIN — CHLORHEXIDINE GLUCONATE 15 MILLILITER(S): 213 SOLUTION TOPICAL at 06:54

## 2017-02-03 NOTE — PROGRESS NOTE ADULT - SUBJECTIVE AND OBJECTIVE BOX
INTERVAL HPI/OVERNIGHT EVENTS:    OVERNIGHT: No overnight events.  SUBJECTIVE: Patient seen and examined at bedside.     ROS: sedated, intubated, unable to obtain ROS    OBJECTIVE:    VITAL SIGNS:  ICU Vital Signs Last 24 Hrs  T(C): 37.5, Max: 37.8 (02-02 @ 16:30)  T(F): 99.5, Max: 100 (02-02 @ 16:30)  HR: 92 (82 - 104)  BP: 140/89 (112/72 - 171/125)  BP(mean): 108 (68 - 139)  ABP: --  ABP(mean): --  RR: 21 (13 - 36)  SpO2: 94% (88% - 98%)    Mode: AC/ CMV (Assist Control/ Continuous Mandatory Ventilation), RR (machine): 14, TV (machine): 500, FiO2: 50, PEEP: 5, PS: 50, ITime: 0.85, MAP: 10, PIP: 30    I & Os for current day (as of 02-03 @ 08:36)  =============================================  IN: 1951 ml / OUT: 2300 ml / NET: -349 ml    CAPILLARY BLOOD GLUCOSE      PHYSICAL EXAM:    Constitutional: intubated, sedated, comfortable on vent  Eyes: PERRL  ENMT: MMM  Neck: No distended neck veins  Respiratory: good air movement, decreased breath sounds at R base, no wheezing  Cardiovascular: RRR. +S1, S2, 2/6 systolic murmur heard at LLSB  Gastrointestinal: Obese, soft, nondistended, normoactive BS  Extremities: WWP, trace edema  Vasc: 2+ peripheral pulses  MSK no joint swelling  Tubes/Lines: Lobato      MEDICATIONS:  MEDICATIONS  (STANDING):  heparin  Injectable 7500Unit(s) SubCutaneous every 8 hours  pantoprazole  Injectable 40milliGRAM(s) IV Push daily  oxacillin IVPB 2Gram(s) IV Intermittent every 4 hours  oxacillin IVPB  IV Intermittent   propofol Infusion 7.652MICROgram(s)/kG/Min IV Continuous <Continuous>  chlorhexidine 0.12% Liquid 15milliLiter(s) Swish and Spit two times a day  midazolam Infusion 5mG/Hr IV Continuous <Continuous>  modafinil 200milliGRAM(s) Oral daily    MEDICATIONS  (PRN):  acetaminophen   Tablet 650milliGRAM(s) Oral every 6 hours PRN For Temp greater than 38 C (100.4 F)      ALLERGIES:  Allergies    No Known Drug Allergies  Seafood (Rash)    Intolerances        LABS:                        14.8   8.6   )-----------( 122      ( 02 Feb 2017 07:17 )             43.5     02 Feb 2017 10:25    139    |  104    |  12     ----------------------------<  84     4.2     |  28     |  0.82     Ca    8.6        02 Feb 2017 10:25  Mg     1.9       02 Feb 2017 10:25      PT/INR - ( 01 Feb 2017 13:14 )   PT: 13.2 sec;   INR: 1.19          PTT - ( 01 Feb 2017 13:14 )  PTT:36.7 sec      RADIOLOGY & ADDITIONAL TESTS: Reviewed.    A/P: 30 yo F  PMHx HTN, HLD, congenital ASD s/p repair, MICHAEL, HOCM, family h/o of neuromuscular hypoventilation syndrome,  DVT in 2008 (s/p Coumadin last dose 2009) presented with SOB/CP/n/v and was admitted to MICU after hypercapnic respiratory failure and MSSA PNA.     Pulm  #Hypercapnic resp failure with unclear etiology:  Pt with MICHAEL and family hx of  neuromuscular disorder as her father who required diaphragmatic pacemaker. Ordered for genetic test. Pulm following  - Continue mechanical ventilation, consider trach if unable to wean off vent  - Start Provigil 200mg daily     #MSSA pna: afebrile, no white count. BCx NGTD   - C/w oxacillin. On Day 6 of Abx today      CV  #HD. H/o HTN on home BP meds. Will restart when clinically improved. Lobato for strict I&Os.     #Pump. EF 60%. No diastolic HF. Echo finding of asymmetric septal wall hypertrophy. Cardiology consulted and dx HCOM but low suspicion for causing resp failure and also low likelihood of arrhythmogenic. Cardiology recommends care in not reducing preload and also starting beta blocker when clinically appropriate.     ID  #MSSA PNA: MSSA grown from bronchial wash. On day 6 of Abx. BCx NGTD  - c/w oxacillin 2g q4    Hem  #Thrombocytopenia with h/o DVT. US neg for DVT. Platelets stable (124K yesterday, 122K today). Hem consult for possible APLAS.   - f/u Heme recs    Neuro  Pt apneic, required intubation. Unclear etiology, could be MICHAEL or neuromuscular disorder from family hx  - Obtain head CT to r/o central cause of apnea      FEN: NPO with tube feeds Jevity 1.2 1560 ml a day.     PPX: HSQ, SCDs, PPI    Access: peripheral.     Full CODE    Dispo: MICU INTERVAL HPI/OVERNIGHT EVENTS:     OVERNIGHT: No overnight events.  SUBJECTIVE: Patient seen and examined at bedside.     ROS: sedated, intubated, unable to obtain ROS    OBJECTIVE:    VITAL SIGNS:  ICU Vital Signs Last 24 Hrs  T(C): 37.5, Max: 37.8 (02-02 @ 16:30)  T(F): 99.5, Max: 100 (02-02 @ 16:30)  HR: 92 (82 - 104)  BP: 140/89 (112/72 - 171/125)  BP(mean): 108 (68 - 139)  ABP: --  ABP(mean): --  RR: 21 (13 - 36)  SpO2: 94% (88% - 98%)    Mode: AC/ CMV (Assist Control/ Continuous Mandatory Ventilation), RR (machine): 14, TV (machine): 500, FiO2: 50, PEEP: 5, PS: 50, ITime: 0.85, MAP: 10, PIP: 30    I & Os for current day (as of 02-03 @ 08:36)  =============================================  IN: 1951 ml / OUT: 2300 ml / NET: -349 ml    CAPILLARY BLOOD GLUCOSE      PHYSICAL EXAM:    Constitutional: intubated, sedated, comfortable on vent  Eyes: PERRL  ENMT: MMM  Neck: No distended neck veins  Respiratory: good air movement, coarse breath sounds in upper lung fields b/l, vesicular breath sounds at b/l bases  Cardiovascular: RRR, +S1, S2, no m/r/g  Gastrointestinal: Obese, soft, nondistended, normoactive BS  Extremities: WWP, trace lower extremity edema  Vasc: 2+ peripheral pulses  MSK no joint swelling  Tubes/Lines: Lobato      MEDICATIONS:  MEDICATIONS  (STANDING):  heparin  Injectable 7500Unit(s) SubCutaneous every 8 hours  pantoprazole  Injectable 40milliGRAM(s) IV Push daily  oxacillin IVPB 2Gram(s) IV Intermittent every 4 hours  oxacillin IVPB  IV Intermittent   propofol Infusion 7.652MICROgram(s)/kG/Min IV Continuous <Continuous>  chlorhexidine 0.12% Liquid 15milliLiter(s) Swish and Spit two times a day  midazolam Infusion 5mG/Hr IV Continuous <Continuous>  modafinil 200milliGRAM(s) Oral daily    MEDICATIONS  (PRN):  acetaminophen   Tablet 650milliGRAM(s) Oral every 6 hours PRN For Temp greater than 38 C (100.4 F)      ALLERGIES:  Allergies    No Known Drug Allergies  Seafood (Rash)    Intolerances        LABS:                        14.8   8.6   )-----------( 122      ( 02 Feb 2017 07:17 )             43.5     02 Feb 2017 10:25    139    |  104    |  12     ----------------------------<  84     4.2     |  28     |  0.82     Ca    8.6        02 Feb 2017 10:25  Mg     1.9       02 Feb 2017 10:25      PT/INR - ( 01 Feb 2017 13:14 )   PT: 13.2 sec;   INR: 1.19          PTT - ( 01 Feb 2017 13:14 )  PTT:36.7 sec      RADIOLOGY & ADDITIONAL TESTS: Reviewed.    A/P: 30 yo F  PMHx HTN, HLD, congenital ASD s/p repair, MICHAEL, HOCM, family h/o of neuromuscular hypoventilation syndrome,  DVT in 2008 (s/p Coumadin last dose 2009) presented with SOB/CP/n/v and was admitted to MICU after hypercapnic respiratory failure and MSSA PNA.     Pulm  #Hypercapnic resp failure with unclear etiology:  Pt with MICHAEL and family hx of  neuromuscular disorder as her father who required diaphragmatic pacemaker. Ordered for genetic test. Pulm following  - Continue mechanical ventilation, consider trach if unable to wean off vent  - Start Provigil 200mg daily     #MSSA pna:  MSSA grown from bronchial wash. Pt afebrile, no white count. BCx NGTD   - C/w oxacillin. On Day 7 of Abx today      CV  #HD. H/o HTN on home BP meds. Will restart when clinically improved. Lobato for strict I&Os.     #Pump. EF 60%. No diastolic HF. Echo finding of asymmetric septal wall hypertrophy. Cardiology consulted and dx HCOM but low suspicion for causing resp failure and also low likelihood of arrhythmogenic. Cardiology recommends care in not reducing preload and also starting beta blocker when clinically appropriate.     ID  #MSSA PNA: MSSA grown from bronchial wash. On day 7 of Abx. BCx NGTD  - c/w oxacillin 2g q4    Hem  #Thrombocytopenia with h/o DVT. US neg for DVT. Platelets stable. Hem consult for possible APLAS.   - f/u Heme recs    Neuro  Pt apneic, required intubation. Unclear etiology, could be central sleep apnea vs. MICHAEL or neuromuscular disorder from family hx. CT head shows no intracranial hemorrhage or acute transcortical infarct.   - c/w modafinil daily      FEN: NPO with tube feeds, switch from Jevity to Promote for more protein-energy, pt receiving high caloric content from propofol infusion    PPX: HSQ, SCDs, PPI    Access: peripheral.     Full CODE    Dispo: MICU INTERVAL HPI/OVERNIGHT EVENTS:     OVERNIGHT: No overnight events.  SUBJECTIVE: Patient seen and examined at bedside.     ROS: sedated, intubated, unable to obtain ROS    OBJECTIVE:    VITAL SIGNS:  ICU Vital Signs Last 24 Hrs  T(C): 37.5, Max: 37.8 (02-02 @ 16:30)  T(F): 99.5, Max: 100 (02-02 @ 16:30)  HR: 92 (82 - 104)  BP: 140/89 (112/72 - 171/125)  BP(mean): 108 (68 - 139)  ABP: --  ABP(mean): --  RR: 21 (13 - 36)  SpO2: 94% (88% - 98%)    Mode: AC/ CMV (Assist Control/ Continuous Mandatory Ventilation), RR (machine): 14, TV (machine): 500, FiO2: 50, PEEP: 5, PS: 50, ITime: 0.85, MAP: 10, PIP: 30    I & Os for current day (as of 02-03 @ 08:36)  =============================================  IN: 1951 ml / OUT: 2300 ml / NET: -349 ml    CAPILLARY BLOOD GLUCOSE      PHYSICAL EXAM:    Constitutional: intubated, sedated, comfortable on vent  Eyes: PERRL  ENMT: MMM  Neck: No distended neck veins  Respiratory: good air movement, coarse breath sounds in upper lung fields b/l, vesicular breath sounds at b/l bases  Cardiovascular: RRR, +S1, S2, no m/r/g  Gastrointestinal: Obese, soft, nondistended, normoactive BS  Extremities: WWP, trace lower extremity edema  Vasc: 2+ peripheral pulses  MSK no joint swelling  Tubes/Lines: Lobato      MEDICATIONS:  MEDICATIONS  (STANDING):  heparin  Injectable 7500Unit(s) SubCutaneous every 8 hours  pantoprazole  Injectable 40milliGRAM(s) IV Push daily  oxacillin IVPB 2Gram(s) IV Intermittent every 4 hours  oxacillin IVPB  IV Intermittent   propofol Infusion 7.652MICROgram(s)/kG/Min IV Continuous <Continuous>  chlorhexidine 0.12% Liquid 15milliLiter(s) Swish and Spit two times a day  midazolam Infusion 5mG/Hr IV Continuous <Continuous>  modafinil 200milliGRAM(s) Oral daily    MEDICATIONS  (PRN):  acetaminophen   Tablet 650milliGRAM(s) Oral every 6 hours PRN For Temp greater than 38 C (100.4 F)      ALLERGIES:  Allergies    No Known Drug Allergies  Seafood (Rash)    Intolerances        LABS:                        14.8   8.6   )-----------( 122      ( 02 Feb 2017 07:17 )             43.5     02 Feb 2017 10:25    139    |  104    |  12     ----------------------------<  84     4.2     |  28     |  0.82     Ca    8.6        02 Feb 2017 10:25  Mg     1.9       02 Feb 2017 10:25      PT/INR - ( 01 Feb 2017 13:14 )   PT: 13.2 sec;   INR: 1.19          PTT - ( 01 Feb 2017 13:14 )  PTT:36.7 sec      RADIOLOGY & ADDITIONAL TESTS: Reviewed.    A/P: 30 yo F  PMHx HTN, HLD, congenital ASD s/p repair, MICHAEL, HOCM, family h/o of neuromuscular hypoventilation syndrome,  DVT in 2008 (s/p Coumadin last dose 2009) presented with SOB/CP/n/v and was admitted to MICU after hypercapnic respiratory failure and MSSA PNA.     Pulm  #Hypercapnic resp failure with unclear etiology:  Pt with MICHAEL and family hx of  neuromuscular disorder as her father who required diaphragmatic pacemaker. Ordered for genetic test. Pulm following  - Continue mechanical ventilation, consider trach if unable to wean off vent  - Start Provigil 200mg daily     #MSSA pna:  MSSA grown from bronchial wash. Pt afebrile, no white count. BCx NGTD   - C/w oxacillin. On Day 7/10 of Abx today      CV  #HD. H/o HTN on home BP meds. Will restart when clinically improved. Lobato for strict I&Os.     #Pump. EF 60%. No diastolic HF. Echo finding of asymmetric septal wall hypertrophy. Cardiology consulted and dx HCOM but low suspicion for causing resp failure and also low likelihood of arrhythmogenic. Cardiology recommends care in not reducing preload and also starting beta blocker when clinically appropriate.     ID  #MSSA PNA: MSSA grown from bronchial wash. On day 7/10 of Abx. BCx NGTD  - c/w oxacillin 2g q4    Hem  #Thrombocytopenia with h/o DVT. US neg for DVT. Platelets stable. Hem consult for possible APLAS.   - f/u Heme recs    Neuro  Pt apneic, required intubation. Unclear etiology, could be central sleep apnea vs. MICHAEL or neuromuscular disorder from family hx. CT head shows no intracranial hemorrhage or acute transcortical infarct.   - c/w modafinil daily      FEN: NPO with tube feeds, switch from Jevity to Promote given high caloric content from propofol infusion    PPX: HSQ, SCDs, PPI    Access: peripheral.     Full CODE    Dispo: MICU

## 2017-02-03 NOTE — PROGRESS NOTE ADULT - SUBJECTIVE AND OBJECTIVE BOX
Interval Events:  Patient seen and examined at bedside.  Patient awake and alert on the ventilator following commands with no acute events overnight.  The patient was extubated during the day then became hypercapneic and more somnolent with intermittent hypoxia.  The patient was reintubated.    she is sedated secrtions are moderate    MEDICATIONS:  Pulmonary:    Antimicrobials:  oxacillin IVPB 2Gram(s) IV Intermittent every 4 hours  oxacillin IVPB  IV Intermittent     Anticoagulants:  heparin  Injectable 7500Unit(s) SubCutaneous every 8 hours    Cardiac:      Allergies    No Known Drug Allergies  Seafood (Rash)    Intolerances        Vital Signs Last 24 Hrs  T(C): 37, Max: 38.1 ( @ 23:11)  T(F): 98.6, Max: 100.6 ( @ 23:11)  HR: 94 (72 - 128)  BP: 125/77 (97/51 - 183/90)  BP(mean): 96 (69 - 139)  RR: 14 (10 - 31)  SpO2: 96% (82% - 100%)  I & Os for 24h ending  @ 07:00  =============================================  IN: 2115.5 ml / OUT: 3655 ml / NET: -1539.5 ml    I & Os for current day (as of  @ 21:30)  =============================================  IN: 566 ml / OUT: 865 ml / NET: -299 ml    Mode: AC/ CMV (Assist Control/ Continuous Mandatory Ventilation)  RR (machine): 14  TV (machine): 390  FiO2: 40  PEEP: 5  ITime: 0.8  MAP: 8.1  PIP: 21      PHYSICAL EXAM:    General: Well developed; well nourished; in no acute distress  Neck: Supple; non tender; no masses  Respiratory: Clear to auscultation bilaterally without wheezing, rhonchi or rales  Cardiovascular: tachycardic but regular  Gastrointestinal: Soft non-tender non-distended; Normal bowel sounds  Extremities: Normal range of motion, No clubbing, cyanosis or edema  Neurological: Alert and oriented x3  Skin: Warm and dry. No obvious rash    LABS:  ABG - ( 2017 17:05 )  pH: 7.37  /  pCO2: 52    /  pO2: 169   / HCO3: 29    / Base Excess: 2.7   /  SaO2: 99                  CBC Full  -  ( 2017 07:34 )  WBC Count : 6.2 K/uL  Hemoglobin : 14.6 g/dL  Hematocrit : 47.3 %  Platelet Count - Automated : 124 K/uL  Mean Cell Volume : 73.9 fL  Mean Cell Hemoglobin : 22.8 pg  Mean Cell Hemoglobin Concentration : 30.9 g/dL  Auto Neutrophil # : x  Auto Lymphocyte # : x  Auto Monocyte # : x  Auto Eosinophil # : x  Auto Basophil # : x  Auto Neutrophil % : 63.0 %  Auto Lymphocyte % : 19.0 %  Auto Monocyte % : 9.0 %  Auto Eosinophil % : 4.0 %  Auto Basophil % : x    2017 07:34    141    |  105    |  12     ----------------------------<  76     4.0     |  29     |  0.78     Ca    8.3        2017 07:34  Phos  4.4       2017 07:34  Mg     2.2       2017 07:34      PT/INR - ( 2017 13:14 )   PT: 13.2 sec;   INR: 1.19          PTT - ( 2017 13:14 )  PTT:36.7 sec      Urinalysis Basic - ( 2017 08:10 )    Color: Yellow / Appearance: Clear / S.010 / pH: x  Gluc: x / Ketone: NEGATIVE  / Bili: NEGATIVE / Urobili: >=8.0 E.U./dL   Blood: x / Protein: 30 mg/dL / Nitrite: NEGATIVE   Leuk Esterase: NEGATIVE / RBC: 5-10 /HPF / WBC 5-10 /HPF   Sq Epi: x / Non Sq Epi: Few /HPF / Bacteria: Present /HPF      XAM:  XR CHEST 1 VIEW PORT ROUTINE                           PROCEDURE DATE:  2017                 INTERPRETATION:  XR CHEST 1 VIEW PORT ROUTINE DATED 2/3/2017 5:42 AM    INDICATION: 31 years old Female with Shortness of Breath.    PRIOR STUDIES: Chest x-ray dated 2017.    FINDINGS: Single frontal view of the chest is submitted. Endotracheal and   enteric tube are unchanged in position. There is continued improvement in   right upper lobe consolidation, near completely resolved. There is   elevation of both hemidiaphragms. The heart is enlarged. An ASD occlusion   device is again noted. Bony structures intact.    IMPRESSION: Near complete resolution of right upper lobe consolidation.            RADIOLOGY & ADDITIONAL STUDIES (The following images were personally reviewed):

## 2017-02-03 NOTE — PROGRESS NOTE ADULT - PROBLEM SELECTOR PLAN 1
Patient with hypercapneic respiratory failure possibly due to central hypoventilation syndrome.  The patient was diagnosed with "sleep apnea" when she was 5 years old and has had to use CPAP at night ever since.  Her father has central hypoventilation syndrome with a +PHOX2B gene and has diagraphmatic pacemaker.  -The patient was reintubated today for hypercapneic respiratory failure.  Check PHOX2B gene and will discuss tracheostomy with father and patient.  Conitnue on the Provigil.  Continue on MV and evaluate monday either for extubation trial or trach

## 2017-02-04 LAB
ANION GAP SERPL CALC-SCNC: 7 MMOL/L — LOW (ref 9–16)
APTT BLD: 35.3 SEC — SIGNIFICANT CHANGE UP (ref 27.5–37.4)
BUN SERPL-MCNC: 13 MG/DL — SIGNIFICANT CHANGE UP (ref 7–23)
CALCIUM SERPL-MCNC: 7.4 MG/DL — LOW (ref 8.5–10.5)
CHLORIDE SERPL-SCNC: 99 MMOL/L — SIGNIFICANT CHANGE UP (ref 96–108)
CO2 SERPL-SCNC: 28 MMOL/L — SIGNIFICANT CHANGE UP (ref 22–31)
CREAT SERPL-MCNC: 0.78 MG/DL — SIGNIFICANT CHANGE UP (ref 0.5–1.3)
GLUCOSE SERPL-MCNC: 102 MG/DL — HIGH (ref 70–99)
HCT VFR BLD CALC: 41 % — SIGNIFICANT CHANGE UP (ref 34.5–45)
HGB BLD-MCNC: 14 G/DL — SIGNIFICANT CHANGE UP (ref 11.5–15.5)
INR BLD: 1.21 — HIGH (ref 0.88–1.16)
MAGNESIUM SERPL-MCNC: 2.1 MG/DL — SIGNIFICANT CHANGE UP (ref 1.6–2.4)
MCHC RBC-ENTMCNC: 23.7 PG — LOW (ref 27–34)
MCHC RBC-ENTMCNC: 34.1 G/DL — SIGNIFICANT CHANGE UP (ref 32–36)
MCV RBC AUTO: 69.4 FL — LOW (ref 80–100)
PHOSPHATE SERPL-MCNC: 4.5 MG/DL — SIGNIFICANT CHANGE UP (ref 2.5–4.5)
PLATELET # BLD AUTO: 197 K/UL — SIGNIFICANT CHANGE UP (ref 150–400)
POTASSIUM SERPL-MCNC: 5.2 MMOL/L — SIGNIFICANT CHANGE UP (ref 3.5–5.3)
POTASSIUM SERPL-SCNC: 5.2 MMOL/L — SIGNIFICANT CHANGE UP (ref 3.5–5.3)
PROTHROM AB SERPL-ACNC: 13.5 SEC — HIGH (ref 10–13.1)
RBC # BLD: 5.91 M/UL — HIGH (ref 3.8–5.2)
RBC # FLD: 25.9 % — HIGH (ref 10.3–16.9)
SODIUM SERPL-SCNC: 134 MMOL/L — LOW (ref 135–145)
WBC # BLD: 5.8 K/UL — SIGNIFICANT CHANGE UP (ref 3.8–10.5)
WBC # FLD AUTO: 5.8 K/UL — SIGNIFICANT CHANGE UP (ref 3.8–10.5)

## 2017-02-04 PROCEDURE — 99291 CRITICAL CARE FIRST HOUR: CPT

## 2017-02-04 PROCEDURE — 99233 SBSQ HOSP IP/OBS HIGH 50: CPT

## 2017-02-04 RX ADMIN — Medication 100 GRAM(S): at 19:55

## 2017-02-04 RX ADMIN — Medication 100 GRAM(S): at 04:03

## 2017-02-04 RX ADMIN — HEPARIN SODIUM 7500 UNIT(S): 5000 INJECTION INTRAVENOUS; SUBCUTANEOUS at 21:40

## 2017-02-04 RX ADMIN — CHLORHEXIDINE GLUCONATE 15 MILLILITER(S): 213 SOLUTION TOPICAL at 07:04

## 2017-02-04 RX ADMIN — Medication 100 GRAM(S): at 12:26

## 2017-02-04 RX ADMIN — PROPOFOL 5 MICROGRAM(S)/KG/MIN: 10 INJECTION, EMULSION INTRAVENOUS at 18:07

## 2017-02-04 RX ADMIN — PANTOPRAZOLE SODIUM 40 MILLIGRAM(S): 20 TABLET, DELAYED RELEASE ORAL at 12:26

## 2017-02-04 RX ADMIN — PROPOFOL 5 MICROGRAM(S)/KG/MIN: 10 INJECTION, EMULSION INTRAVENOUS at 13:55

## 2017-02-04 RX ADMIN — MODAFINIL 200 MILLIGRAM(S): 200 TABLET ORAL at 12:27

## 2017-02-04 RX ADMIN — Medication 100 GRAM(S): at 08:19

## 2017-02-04 RX ADMIN — CHLORHEXIDINE GLUCONATE 15 MILLILITER(S): 213 SOLUTION TOPICAL at 18:08

## 2017-02-04 RX ADMIN — PROPOFOL 5 MICROGRAM(S)/KG/MIN: 10 INJECTION, EMULSION INTRAVENOUS at 05:21

## 2017-02-04 RX ADMIN — HEPARIN SODIUM 7500 UNIT(S): 5000 INJECTION INTRAVENOUS; SUBCUTANEOUS at 13:49

## 2017-02-04 RX ADMIN — PROPOFOL 5 MICROGRAM(S)/KG/MIN: 10 INJECTION, EMULSION INTRAVENOUS at 07:04

## 2017-02-04 RX ADMIN — MIDAZOLAM HYDROCHLORIDE 5 MG/HR: 1 INJECTION, SOLUTION INTRAMUSCULAR; INTRAVENOUS at 07:04

## 2017-02-04 RX ADMIN — Medication 100 GRAM(S): at 16:13

## 2017-02-04 RX ADMIN — PROPOFOL 5 MICROGRAM(S)/KG/MIN: 10 INJECTION, EMULSION INTRAVENOUS at 10:56

## 2017-02-04 RX ADMIN — Medication 100 GRAM(S): at 00:16

## 2017-02-04 NOTE — PROGRESS NOTE ADULT - SUBJECTIVE AND OBJECTIVE BOX
Interval Events:  Patient seen and examined at bedside. Sedated, intubated.    REVIEW OF SYSTEMS:  Sedated , intubated.    MEDICATIONS:  Pulmonary:    Antimicrobials:  oxacillin IVPB 2Gram(s) IV Intermittent every 4 hours  oxacillin IVPB  IV Intermittent     Anticoagulants:  heparin  Injectable 7500Unit(s) SubCutaneous every 8 hours    Cardiac:      Allergies    No Known Drug Allergies  Seafood (Rash)    Intolerances        Vital Signs Last 24 Hrs  T(C): 37.3, Max: 37.8 (02-03 @ 13:00)  T(F): 99.1, Max: 100.1 (02-03 @ 13:00)  HR: 96 (80 - 105)  BP: 97/47 (95/58 - 147/91)  BP(mean): 62 (62 - 118)  RR: 14 (12 - 16)  SpO2: 97% (91% - 98%)  I & Os for 24h ending 02-04 @ 07:00  =============================================  IN: 2219 ml / OUT: 100 ml / NET: 2119 ml    I & Os for current day (as of 02-04 @ 08:47)  =============================================  IN: 106 ml / OUT: 0 ml / NET: 106 ml    Mode: AC/ CMV (Assist Control/ Continuous Mandatory Ventilation)  RR (machine): 14  TV (machine): 500  FiO2: 50  PEEP: 8  ITime: 0.85  MAP: 12  PIP: 28      PHYSICAL EXAM:    Constitutional: intubated, sedated, comfortable on vent  Eyes: PERRL  ENMT: MMM  Neck: No distended neck veins  Respiratory: good air movement, coarse breath sounds in upper lung fields b/l, vesicular breath sounds at b/l bases  Cardiovascular: RRR, +S1, S2, no m/r/g  Gastrointestinal: Obese, soft, nondistended, normoactive BS  Extremities: WWP, trace lower extremity edema  Vasc: 2+ peripheral pulses  MSK no joint swelling    LABS:      CBC Full  -  ( 04 Feb 2017 02:22 )  WBC Count : 5.8 K/uL  Hemoglobin : 14.0 g/dL  Hematocrit : 41.0 %  Platelet Count - Automated : 197 K/uL  Mean Cell Volume : 69.4 fL  Mean Cell Hemoglobin : 23.7 pg  Mean Cell Hemoglobin Concentration : 34.1 g/dL    04 Feb 2017 02:22    134    |  99     |  13     ----------------------------<  102    5.2     |  28     |  0.78     Ca    7.4        04 Feb 2017 02:22  Phos  4.5       04 Feb 2017 02:22  Mg     2.1       04 Feb 2017 02:22      PT/INR - ( 04 Feb 2017 02:22 )   PT: 13.5 sec;   INR: 1.21          PTT - ( 04 Feb 2017 02:22 )  PTT:35.3 sec        RADIOLOGY & ADDITIONAL STUDIES (The following images were personally reviewed):

## 2017-02-04 NOTE — PROGRESS NOTE ADULT - PROBLEM SELECTOR PLAN 1
Patient with hypercapnic respiratory failure possibly due to central hypoventilation syndrome. Intubated sedated. The patient was diagnosed with "sleep apnea" when she was 5 years old and has had to use CPAP at night ever since.  Her father has central hypoventilation syndrome with a +PHOX2B gene and has diaphragmatic pacemaker.  -The patient was reintubated today for hypercapnic respiratory failure.    -Check PHOX2B gene   -Continue on the Provigil trial.    -Will do extubation trial on Monday. If fail the trial we will consider Trach and possible pacemaker

## 2017-02-04 NOTE — PROGRESS NOTE ADULT - SUBJECTIVE AND OBJECTIVE BOX
ICU Vital Signs Last 24 Hrs  T(C): 37.5, Max: 37.8 (02-03 @ 13:00)  T(F): 99.5, Max: 100.1 (02-03 @ 13:00)  HR: 92 (80 - 105)  BP: 127/73 (95/58 - 147/91)  BP(mean): 94 (73 - 118)  ABP: --  ABP(mean): --  RR: 13 (7 - 21)  SpO2: 96% (91% - 97%) INTERVAL HPI/OVERNIGHT EVENTS: Patient with epistaxis and brown-tinged sputum throughout the night, H/H and platelets have remained stable. Patient seen and examined at bedside, responds to voice, without any complaints.     ROS:  CV: Denies chest pain  Resp: Denies SOB  GI: Denies abdominal pain, constipation, diarrhea, nausea, vomiting  : Denies dysuria  ID: Denies fevers, chills  MSK: Denies joint pain     OBJECTIVE:    VITAL SIGNS:  ICU Vital Signs Last 24 Hrs  T(C): 37.7, Max: 37.7 (02-04 @ 10:15)  T(F): 99.8, Max: 99.8 (02-04 @ 10:15)  HR: 86 (80 - 96)  BP: 140/83 (95/58 - 144/101)  BP(mean): 105 (62 - 120)  ABP: --  ABP(mean): --  RR: 17 (13 - 17)  SpO2: 95% (92% - 98%)    Mode: AC/ CMV (Assist Control/ Continuous Mandatory Ventilation), RR (machine): 14, TV (machine): 500, FiO2: 50, PEEP: 8, ITime: 0.85, MAP: 12, PIP: 27  I & Os for 24h ending 02-04 @ 07:00  =============================================  IN: 2219 ml / OUT: 100 ml / NET: 2119 ml    I & Os for current day (as of 02-04 @ 15:02)  =============================================  IN: 790 ml / OUT: 0 ml / NET: 790 ml    CAPILLARY BLOOD GLUCOSE      PHYSICAL EXAM:    Constitutional: intubated, sedated, comfortable on vent  Eyes: PERRL  ENMT: MMM, nasal packing for epistaxis in place   Neck: No distended neck veins  Respiratory: good air movement, coarse breath sounds in upper lung fields b/l, vesicular breath sounds at b/l bases  Cardiovascular: RRR, +S1, S2, no m/r/g  Gastrointestinal: Obese, soft, nondistended, normoactive BS  Extremities: WWP, trace lower extremity edema  Vasc: 2+ peripheral pulses  MSK no joint swelling  Tubes/Lines: [place]ob[place]      MEDICATIONS:  MEDICATIONS  (STANDING):  heparin  Injectable 7500Unit(s) SubCutaneous every 8 hours  pantoprazole  Injectable 40milliGRAM(s) IV Push daily  oxacillin IVPB 2Gram(s) IV Intermittent every 4 hours  oxacillin IVPB  IV Intermittent   propofol Infusion 7.652MICROgram(s)/kG/Min IV Continuous <Continuous>  chlorhexidine 0.12% Liquid 15milliLiter(s) Swish and Spit two times a day  midazolam Infusion 5mG/Hr IV Continuous <Continuous>  modafinil 200milliGRAM(s) Oral daily    MEDICATIONS  (PRN):  acetaminophen   Tablet 650milliGRAM(s) Oral every 6 hours PRN For Temp greater than 38 C (100.4 F)      ALLERGIES:  Allergies    No Known Drug Allergies  Seafood (Rash)    Intolerances        LABS:                        14.0   5.8   )-----------( 197      ( 04 Feb 2017 02:22 )             41.0     04 Feb 2017 02:22    134    |  99     |  13     ----------------------------<  102    5.2     |  28     |  0.78     Ca    7.4        04 Feb 2017 02:22  Phos  4.5       04 Feb 2017 02:22  Mg     2.1       04 Feb 2017 02:22      PT/INR - ( 04 Feb 2017 02:22 )   PT: 13.5 sec;   INR: 1.21          PTT - ( 04 Feb 2017 02:22 )  PTT:35.3 sec      RADIOLOGY & ADDITIONAL TESTS: Reviewed: right-sided infiltrate     A/P: 32 yo F  PMHx HTN, HLD, congenital ASD s/p repair, MICHAEL, HOCM, family h/o of neuromuscular hypoventilation syndrome, DVT in 2008 (s/p Coumadin last dose 2009) presented with SOB/CP/n/v and was admitted to MICU after hypercapnic respiratory failure and MSSA PNA.     Pulm  #Hypercapnic resp failure with unclear etiology:  Pt with MICHAEL and family hx of  neuromuscular disorder as her father who required diaphragmatic pacemaker. Ordered for genetic test. Pulm following  - Continue mechanical ventilation, consider trach if unable to wean off vent (Monday)  - Start Provigil 200mg daily     #MSSA pna:  MSSA grown from bronchial wash. Pt afebrile, no white count. BCx NGTD   - C/w oxacillin. On Day 8/10 of Abx today      CV  #HD. H/o HTN on home BP meds. Will restart when clinically improved. Lobato for strict I&Os.     #Pump. EF 60%. No diastolic HF. Echo finding of asymmetric septal wall hypertrophy. Cardiology consulted and dx HCOM but low suspicion for causing resp failure and also low likelihood of arrhythmogenic. Cardiology recommends care in not reducing preload and also starting beta blocker when clinically appropriate.     ID  #MSSA PNA: MSSA grown from bronchial wash. On day 8/10 of Abx. BCx NGTD  - c/w oxacillin 2g q4    Hem  #Thrombocytopenia with h/o DVT. US neg for DVT. Platelets stable. Hem consult for possible APLAS.   - f/u Heme recs    Neuro  Pt apneic, required intubation. Unclear etiology, could be central sleep apnea vs. MICHAEL or neuromuscular disorder from family hx. CT head shows no intracranial hemorrhage or acute transcortical infarct.   - c/w modafinil daily  - possible pacer placement      FEN: NPO with tube feeds with Promote    PPX: HSQ, SCDs, PPI    Access: peripheral.     Full CODE    Dispo:

## 2017-02-05 LAB
ANION GAP SERPL CALC-SCNC: 8 MMOL/L — LOW (ref 9–16)
BUN SERPL-MCNC: 14 MG/DL — SIGNIFICANT CHANGE UP (ref 7–23)
CALCIUM SERPL-MCNC: 8.3 MG/DL — LOW (ref 8.5–10.5)
CHLORIDE SERPL-SCNC: 109 MMOL/L — HIGH (ref 96–108)
CO2 SERPL-SCNC: 25 MMOL/L — SIGNIFICANT CHANGE UP (ref 22–31)
CREAT SERPL-MCNC: 0.55 MG/DL — SIGNIFICANT CHANGE UP (ref 0.5–1.3)
GLUCOSE SERPL-MCNC: 86 MG/DL — SIGNIFICANT CHANGE UP (ref 70–99)
HCT VFR BLD CALC: 44.6 % — SIGNIFICANT CHANGE UP (ref 34.5–45)
HGB BLD-MCNC: 13.7 G/DL — SIGNIFICANT CHANGE UP (ref 11.5–15.5)
MAGNESIUM SERPL-MCNC: 1.9 MG/DL — SIGNIFICANT CHANGE UP (ref 1.6–2.4)
MCHC RBC-ENTMCNC: 22.7 PG — LOW (ref 27–34)
MCHC RBC-ENTMCNC: 30.7 G/DL — LOW (ref 32–36)
MCV RBC AUTO: 74 FL — LOW (ref 80–100)
PHOSPHATE SERPL-MCNC: 4 MG/DL — SIGNIFICANT CHANGE UP (ref 2.5–4.5)
PLATELET # BLD AUTO: 190 K/UL — SIGNIFICANT CHANGE UP (ref 150–400)
POTASSIUM SERPL-MCNC: 4.6 MMOL/L — SIGNIFICANT CHANGE UP (ref 3.5–5.3)
POTASSIUM SERPL-SCNC: 4.6 MMOL/L — SIGNIFICANT CHANGE UP (ref 3.5–5.3)
RBC # BLD: 6.03 M/UL — HIGH (ref 3.8–5.2)
RBC # FLD: 24.2 % — HIGH (ref 10.3–16.9)
SODIUM SERPL-SCNC: 142 MMOL/L — SIGNIFICANT CHANGE UP (ref 135–145)
WBC # BLD: 7.1 K/UL — SIGNIFICANT CHANGE UP (ref 3.8–10.5)
WBC # FLD AUTO: 7.1 K/UL — SIGNIFICANT CHANGE UP (ref 3.8–10.5)

## 2017-02-05 PROCEDURE — 99291 CRITICAL CARE FIRST HOUR: CPT

## 2017-02-05 PROCEDURE — 71010: CPT | Mod: 26

## 2017-02-05 RX ORDER — HEPARIN SODIUM 5000 [USP'U]/ML
7500 INJECTION INTRAVENOUS; SUBCUTANEOUS EVERY 8 HOURS
Qty: 0 | Refills: 0 | Status: DISCONTINUED | OUTPATIENT
Start: 2017-02-05 | End: 2017-02-23

## 2017-02-05 RX ADMIN — PROPOFOL 5 MICROGRAM(S)/KG/MIN: 10 INJECTION, EMULSION INTRAVENOUS at 10:03

## 2017-02-05 RX ADMIN — Medication 100 GRAM(S): at 07:47

## 2017-02-05 RX ADMIN — Medication 100 GRAM(S): at 03:40

## 2017-02-05 RX ADMIN — MIDAZOLAM HYDROCHLORIDE 5 MG/HR: 1 INJECTION, SOLUTION INTRAMUSCULAR; INTRAVENOUS at 20:01

## 2017-02-05 RX ADMIN — PROPOFOL 5 MICROGRAM(S)/KG/MIN: 10 INJECTION, EMULSION INTRAVENOUS at 17:53

## 2017-02-05 RX ADMIN — PANTOPRAZOLE SODIUM 40 MILLIGRAM(S): 20 TABLET, DELAYED RELEASE ORAL at 12:44

## 2017-02-05 RX ADMIN — CHLORHEXIDINE GLUCONATE 15 MILLILITER(S): 213 SOLUTION TOPICAL at 07:19

## 2017-02-05 RX ADMIN — MIDAZOLAM HYDROCHLORIDE 5 MG/HR: 1 INJECTION, SOLUTION INTRAMUSCULAR; INTRAVENOUS at 01:20

## 2017-02-05 RX ADMIN — HEPARIN SODIUM 7500 UNIT(S): 5000 INJECTION INTRAVENOUS; SUBCUTANEOUS at 14:24

## 2017-02-05 RX ADMIN — HEPARIN SODIUM 7500 UNIT(S): 5000 INJECTION INTRAVENOUS; SUBCUTANEOUS at 07:19

## 2017-02-05 RX ADMIN — Medication 100 GRAM(S): at 16:02

## 2017-02-05 RX ADMIN — Medication 100 GRAM(S): at 01:21

## 2017-02-05 RX ADMIN — Medication 100 GRAM(S): at 12:44

## 2017-02-05 RX ADMIN — CHLORHEXIDINE GLUCONATE 15 MILLILITER(S): 213 SOLUTION TOPICAL at 17:53

## 2017-02-05 RX ADMIN — Medication 100 GRAM(S): at 23:33

## 2017-02-05 RX ADMIN — MODAFINIL 200 MILLIGRAM(S): 200 TABLET ORAL at 12:44

## 2017-02-05 RX ADMIN — HEPARIN SODIUM 7500 UNIT(S): 5000 INJECTION INTRAVENOUS; SUBCUTANEOUS at 23:33

## 2017-02-05 RX ADMIN — PROPOFOL 5 MICROGRAM(S)/KG/MIN: 10 INJECTION, EMULSION INTRAVENOUS at 14:04

## 2017-02-05 RX ADMIN — Medication 100 GRAM(S): at 20:01

## 2017-02-05 NOTE — PROGRESS NOTE ADULT - SUBJECTIVE AND OBJECTIVE BOX
INTERVAL HPI/OVERNIGHT EVENTS: MARY.     VITAL SIGNS:  Vital Signs Last 24 Hrs  T(C): 36.8, Max: 37.7 (02-04 @ 10:15)  T(F): 98.3, Max: 99.8 (02-04 @ 10:15)  HR: 86 (81 - 100)  BP: 135/85 (97/47 - 157/96)  BP(mean): 104 (62 - 125)  RR: 13 (8 - 18) MV  SpO2: 95% (94% - 99%)  I&O: not recorded in last several hours  Drips: versed, propofol    PHYSICAL EXAM:    Constitutional:  Eyes:  ENMT:  Neck:  Respiratory:  Cardiovascular:  Gastrointestinal:  Extremities:  Vascular:  Neurological:  Musculoskeletal:    MEDICATIONS  (STANDING):  heparin  Injectable 7500Unit(s) SubCutaneous every 8 hours  pantoprazole  Injectable 40milliGRAM(s) IV Push daily  oxacillin IVPB 2Gram(s) IV Intermittent every 4 hours  oxacillin IVPB  IV Intermittent   propofol Infusion 7.652MICROgram(s)/kG/Min IV Continuous <Continuous>  chlorhexidine 0.12% Liquid 15milliLiter(s) Swish and Spit two times a day  midazolam Infusion 5mG/Hr IV Continuous <Continuous>  modafinil 200milliGRAM(s) Oral daily    MEDICATIONS  (PRN):  acetaminophen   Tablet 650milliGRAM(s) Oral every 6 hours PRN For Temp greater than 38 C (100.4 F)      Allergies    No Known Drug Allergies  Seafood (Rash)    Intolerances        LABS:                        13.7   7.1   )-----------( 190      ( 05 Feb 2017 03:54 )             44.6     05 Feb 2017 03:54    142    |  109    |  14     ----------------------------<  86     4.6     |  25     |  0.55     Ca    8.3        05 Feb 2017 03:54  Phos  4.0       05 Feb 2017 03:54  Mg     1.9       05 Feb 2017 03:54      PT/INR - ( 04 Feb 2017 02:22 )   PT: 13.5 sec;   INR: 1.21          PTT - ( 04 Feb 2017 02:22 )  PTT:35.3 sec      RADIOLOGY & ADDITIONAL TESTS: No new imaging      A/P: 30 yo F  PMHx HTN, HLD, congenital ASD s/p repair, MICHAEL, HOCM, family h/o of neuromuscular hypoventilation syndrome, DVT in 2008 (s/p Coumadin last dose 2009) presented with SOB/CP/n/v and was admitted to MICU after hypercapnic respiratory failure and MSSA PNA.     Pulm  #Hypercapnic resp failure with unclear etiology:  Pt with MICHAEL and family hx of  neuromuscular disorder as her father who required diaphragmatic pacemaker. Ordered for genetic test. Pulm following  - Continue mechanical ventilation, consider trach if unable to wean off vent (Monday)  - Start Provigil 200mg daily     #MSSA pna:  MSSA grown from bronchial wash. Pt afebrile, no white count. BCx NGTD   - C/w oxacillin. On Day 8/10 of Abx today      CV  #HD. H/o HTN on home BP meds. Will restart when clinically improved. [place]obato for strict I&Os.     #Pump. EF 60%. No diastolic HF. Echo finding of asymmetric septal wall hypertrophy. Cardiology consulted and dx HCOM but low suspicion for causing resp failure and also low likelihood of arrhythmogenic. Cardiology recommends care in not reducing preload and also starting beta blocker when clinically appropriate.     ID  #MSSA PNA: MSSA grown from bronchial wash. On day 8/10 of Abx. BCx NGTD  - c/w oxacillin 2g q4    Hem  #Thrombocytopenia with h/o DVT. US neg for DVT. Platelets stable. Hem consult for possible APLAS.   - f/u Heme recs    Neuro  Pt apneic, required intubation. Unclear etiology, could be central sleep apnea vs. MICHAEL or neuromuscular disorder from family hx. CT head shows no intracranial hemorrhage or acute transcortical infarct.   - c/w modafinil daily  - possible pacer placement      FEN: NPO with tube feeds with Promote    PPX: HSQ, SCDs, PPI    Access: peripheral.     Full CODE    Dispo: INTERVAL HPI/OVERNIGHT EVENTS: MARY.     VITAL SIGNS:  Vital Signs Last 24 Hrs  T(C): 36.8, Max: 37.7 (02-04 @ 10:15)  T(F): 98.3, Max: 99.8 (02-04 @ 10:15)  HR: 86 (81 - 100)  BP: 135/85 (97/47 - 157/96)  BP(mean): 104 (62 - 125)  RR: 13 (8 - 18) MV  SpO2: 95% (94% - 99%)  I&O: not recorded in last several hours  Drips: versed, propofol    PHYSICAL EXAM:    Constitutional: intubated, NAD, responds to pain  Eyes: PERRL  ENMT: MMM  Neck: No JVD  Respiratory: RUL bronchial bs. LLL crackles  Cardiovascular: RRR. +S1, S2 no murmurs or gallops  Gastrointestinal: Obese, NTND  Extremities: WWP, trace edema      MEDICATIONS  (STANDING):  heparin  Injectable 7500Unit(s) SubCutaneous every 8 hours  pantoprazole  Injectable 40milliGRAM(s) IV Push daily  oxacillin IVPB 2Gram(s) IV Intermittent every 4 hours  oxacillin IVPB  IV Intermittent   propofol Infusion 7.652MICROgram(s)/kG/Min IV Continuous <Continuous>  chlorhexidine 0.12% Liquid 15milliLiter(s) Swish and Spit two times a day  midazolam Infusion 5mG/Hr IV Continuous <Continuous>  modafinil 200milliGRAM(s) Oral daily    MEDICATIONS  (PRN):  acetaminophen   Tablet 650milliGRAM(s) Oral every 6 hours PRN For Temp greater than 38 C (100.4 F)      Allergies    No Known Drug Allergies  Seafood (Rash)    Intolerances        LABS:                        13.7   7.1   )-----------( 190      ( 05 Feb 2017 03:54 )             44.6     05 Feb 2017 03:54    142    |  109    |  14     ----------------------------<  86     4.6     |  25     |  0.55     Ca    8.3        05 Feb 2017 03:54  Phos  4.0       05 Feb 2017 03:54  Mg     1.9       05 Feb 2017 03:54        RADIOLOGY & ADDITIONAL TESTS: No new imaging      A/P: 30 yo F  PMHx HTN, HLD, congenital ASD s/p repair, MICHAEL, HOCM, family h/o of neuromuscular hypoventilation syndrome, DVT in 2008 (s/p Coumadin last dose 2009) presented with SOB/CP/n/v and was admitted to MICU after hypercapnic respiratory failure and MSSA PNA.     Pulm  #Hypercapnic resp failure with unclear etiology:  Pt with MICHAEL and family hx of  neuromuscular disorder as her father who required diaphragmatic pacemaker. Ordered for genetic test. Pulm following  - Continue mechanical ventilation, consider trach if unable to wean off vent (Monday)  - Provigil 200mg daily     #MSSA pna:  MSSA grown from bronchial wash. Pt afebrile, no white count. BCx NGTD   - C/w oxacillin. On Day 9/10 of Abx today      CV  #HD. H/o HTN on home BP meds. Will restart when clinically improved. [place] for strict I&Os.     #Pump. EF 60%. No diastolic HF. Echo finding of asymmetric septal wall hypertrophy. Cardiology consulted and dx HCOM but low suspicion for causing resp failure and also low likelihood of arrhythmogenic. Cardiology recommends care in not reducing preload and also starting beta blocker when clinically appropriate.     ID  #MSSA PNA: MSSA grown from bronchial wash. On day 8/10 of Abx. BCx NGTD  - c/w oxacillin 2g q4    Hem  #Thrombocytopenia with h/o DVT. US neg for DVT. Platelets stable. Hem consult for possible APLAS.   - f/u Heme recs    Neuro  Pt apneic, required intubation. Unclear etiology, could be central sleep apnea vs. MICHAEL or neuromuscular disorder from family hx. CT head shows no intracranial hemorrhage or acute transcortical infarct.   - c/w modafinil daily  - possible pacer placement      FEN: NPO with tube feeds with Promote    PPX: HSQ, SCDs, PPI    Access: peripheral.     Full CODE    Dispo:

## 2017-02-06 LAB
ANION GAP SERPL CALC-SCNC: 11 MMOL/L — SIGNIFICANT CHANGE UP (ref 9–16)
BUN SERPL-MCNC: 14 MG/DL — SIGNIFICANT CHANGE UP (ref 7–23)
CALCIUM SERPL-MCNC: 8.8 MG/DL — SIGNIFICANT CHANGE UP (ref 8.5–10.5)
CHLORIDE SERPL-SCNC: 106 MMOL/L — SIGNIFICANT CHANGE UP (ref 96–108)
CO2 SERPL-SCNC: 25 MMOL/L — SIGNIFICANT CHANGE UP (ref 22–31)
CREAT SERPL-MCNC: 0.56 MG/DL — SIGNIFICANT CHANGE UP (ref 0.5–1.3)
CULTURE RESULTS: SIGNIFICANT CHANGE UP
CULTURE RESULTS: SIGNIFICANT CHANGE UP
GLUCOSE SERPL-MCNC: 96 MG/DL — SIGNIFICANT CHANGE UP (ref 70–99)
GRAM STN FLD: SIGNIFICANT CHANGE UP
HCT VFR BLD CALC: 44.9 % — SIGNIFICANT CHANGE UP (ref 34.5–45)
HGB BLD-MCNC: 13.7 G/DL — SIGNIFICANT CHANGE UP (ref 11.5–15.5)
MAGNESIUM SERPL-MCNC: 2 MG/DL — SIGNIFICANT CHANGE UP (ref 1.6–2.4)
MCHC RBC-ENTMCNC: 22.3 PG — LOW (ref 27–34)
MCHC RBC-ENTMCNC: 30.5 G/DL — LOW (ref 32–36)
MCV RBC AUTO: 73.2 FL — LOW (ref 80–100)
PLATELET # BLD AUTO: 214 K/UL — SIGNIFICANT CHANGE UP (ref 150–400)
POTASSIUM SERPL-MCNC: 4.1 MMOL/L — SIGNIFICANT CHANGE UP (ref 3.5–5.3)
POTASSIUM SERPL-SCNC: 4.1 MMOL/L — SIGNIFICANT CHANGE UP (ref 3.5–5.3)
RBC # BLD: 6.13 M/UL — HIGH (ref 3.8–5.2)
RBC # FLD: 24 % — HIGH (ref 10.3–16.9)
SODIUM SERPL-SCNC: 142 MMOL/L — SIGNIFICANT CHANGE UP (ref 135–145)
SPECIMEN SOURCE: SIGNIFICANT CHANGE UP
WBC # BLD: 9.8 K/UL — SIGNIFICANT CHANGE UP (ref 3.8–10.5)
WBC # FLD AUTO: 9.8 K/UL — SIGNIFICANT CHANGE UP (ref 3.8–10.5)

## 2017-02-06 PROCEDURE — 71010: CPT | Mod: 26,77

## 2017-02-06 PROCEDURE — 99233 SBSQ HOSP IP/OBS HIGH 50: CPT | Mod: GC

## 2017-02-06 PROCEDURE — 71010: CPT | Mod: 26

## 2017-02-06 RX ORDER — DEXMEDETOMIDINE HYDROCHLORIDE IN 0.9% SODIUM CHLORIDE 4 UG/ML
0.5 INJECTION INTRAVENOUS
Qty: 200 | Refills: 0 | Status: DISCONTINUED | OUTPATIENT
Start: 2017-02-06 | End: 2017-02-13

## 2017-02-06 RX ADMIN — PROPOFOL 5 MICROGRAM(S)/KG/MIN: 10 INJECTION, EMULSION INTRAVENOUS at 05:41

## 2017-02-06 RX ADMIN — Medication 100 GRAM(S): at 04:44

## 2017-02-06 RX ADMIN — MIDAZOLAM HYDROCHLORIDE 5 MG/HR: 1 INJECTION, SOLUTION INTRAMUSCULAR; INTRAVENOUS at 15:50

## 2017-02-06 RX ADMIN — DEXMEDETOMIDINE HYDROCHLORIDE IN 0.9% SODIUM CHLORIDE 13.61 MICROGRAM(S)/KG/HR: 4 INJECTION INTRAVENOUS at 23:55

## 2017-02-06 RX ADMIN — CHLORHEXIDINE GLUCONATE 15 MILLILITER(S): 213 SOLUTION TOPICAL at 05:42

## 2017-02-06 RX ADMIN — PROPOFOL 5 MICROGRAM(S)/KG/MIN: 10 INJECTION, EMULSION INTRAVENOUS at 13:01

## 2017-02-06 RX ADMIN — HEPARIN SODIUM 7500 UNIT(S): 5000 INJECTION INTRAVENOUS; SUBCUTANEOUS at 05:41

## 2017-02-06 RX ADMIN — DEXMEDETOMIDINE HYDROCHLORIDE IN 0.9% SODIUM CHLORIDE 13.61 MICROGRAM(S)/KG/HR: 4 INJECTION INTRAVENOUS at 19:19

## 2017-02-06 RX ADMIN — DEXMEDETOMIDINE HYDROCHLORIDE IN 0.9% SODIUM CHLORIDE 13.61 MICROGRAM(S)/KG/HR: 4 INJECTION INTRAVENOUS at 15:47

## 2017-02-06 RX ADMIN — MIDAZOLAM HYDROCHLORIDE 5 MG/HR: 1 INJECTION, SOLUTION INTRAMUSCULAR; INTRAVENOUS at 15:47

## 2017-02-06 RX ADMIN — DEXMEDETOMIDINE HYDROCHLORIDE IN 0.9% SODIUM CHLORIDE 13.61 MICROGRAM(S)/KG/HR: 4 INJECTION INTRAVENOUS at 21:13

## 2017-02-06 RX ADMIN — HEPARIN SODIUM 7500 UNIT(S): 5000 INJECTION INTRAVENOUS; SUBCUTANEOUS at 22:30

## 2017-02-06 RX ADMIN — CHLORHEXIDINE GLUCONATE 15 MILLILITER(S): 213 SOLUTION TOPICAL at 17:04

## 2017-02-06 RX ADMIN — PROPOFOL 5 MICROGRAM(S)/KG/MIN: 10 INJECTION, EMULSION INTRAVENOUS at 17:29

## 2017-02-06 RX ADMIN — HEPARIN SODIUM 7500 UNIT(S): 5000 INJECTION INTRAVENOUS; SUBCUTANEOUS at 15:51

## 2017-02-06 RX ADMIN — DEXMEDETOMIDINE HYDROCHLORIDE IN 0.9% SODIUM CHLORIDE 13.61 MICROGRAM(S)/KG/HR: 4 INJECTION INTRAVENOUS at 15:51

## 2017-02-06 RX ADMIN — PROPOFOL 5 MICROGRAM(S)/KG/MIN: 10 INJECTION, EMULSION INTRAVENOUS at 04:44

## 2017-02-06 RX ADMIN — PANTOPRAZOLE SODIUM 40 MILLIGRAM(S): 20 TABLET, DELAYED RELEASE ORAL at 13:01

## 2017-02-06 RX ADMIN — Medication 100 GRAM(S): at 11:30

## 2017-02-06 RX ADMIN — Medication 100 GRAM(S): at 07:50

## 2017-02-06 RX ADMIN — PROPOFOL 5 MICROGRAM(S)/KG/MIN: 10 INJECTION, EMULSION INTRAVENOUS at 20:49

## 2017-02-06 RX ADMIN — DEXMEDETOMIDINE HYDROCHLORIDE IN 0.9% SODIUM CHLORIDE 13.61 MICROGRAM(S)/KG/HR: 4 INJECTION INTRAVENOUS at 17:27

## 2017-02-06 RX ADMIN — MODAFINIL 200 MILLIGRAM(S): 200 TABLET ORAL at 17:04

## 2017-02-06 NOTE — PROVIDER CONTACT NOTE (CHANGE IN STATUS NOTIFICATION) - ASSESSMENT
circuit on vent assessed.  FIO2 increased to 100%.  cleaning stopped and patient sitting back up and continued to have low Pox (as low as 68%) increasing slowly and only receiving 200mL tidal volumes on vent (Vent set at 500mL)

## 2017-02-06 NOTE — PROVIDER CONTACT NOTE (CHANGE IN STATUS NOTIFICATION) - SITUATION
Patient had bowel movement while on side being cleaned, noticed decrease in Pox and was not receiving full tidal volumes on vent

## 2017-02-06 NOTE — PROVIDER CONTACT NOTE (CHANGE IN STATUS NOTIFICATION) - ACTION/TREATMENT ORDERED:
CXR ordered, tidal volume increased to 600mL, and PEEP increased to 10.  Pox and tidal volumes received began to come up slowly

## 2017-02-06 NOTE — PROGRESS NOTE ADULT - SUBJECTIVE AND OBJECTIVE BOX
INTERVAL HPI/OVERNIGHT EVENTS:    OVERNIGHT: No overnight events.  SUBJECTIVE: Patient seen and examined at bedside. Opens eyes to voice. Appears comfortable on vent.     ROS:  CV: Denies chest pain  Resp: Denies SOB  GI: Denies abdominal pain, constipation, diarrhea, nausea, vomiting  : Denies dysuria  ID: Denies fevers, chills  MSK: Denies joint pain     OBJECTIVE:    VITAL SIGNS:  ICU Vital Signs Last 24 Hrs  T(C): 37.4, Max: 38.1 (02-05 @ 18:00)  T(F): 99.3, Max: 100.5 (02-05 @ 18:00)  HR: 92 (70 - 110)  BP: 134/87 (95/51 - 157/90)  BP(mean): 105 (68 - 119)  ABP: --  ABP(mean): --  RR: 14 (13 - 29)  SpO2: 96% (93% - 100%)    Mode: AC/ CMV (Assist Control/ Continuous Mandatory Ventilation), RR (machine): 14, TV (machine): 500, FiO2: 50, PEEP: 8, ITime: 0.85, MAP: 12, PIP: 30  I & Os for 24h ending 02-06 @ 07:00  =============================================  IN: 1840.2 ml / OUT: 0 ml / NET: 1840.2 ml    I & Os for current day (as of 02-06 @ 15:04)  =============================================  IN: 364.2 ml / OUT: 0 ml / NET: 364.2 ml    CAPILLARY BLOOD GLUCOSE  86 (06 Feb 2017 12:00)      PHYSICAL EXAM:    General: NAD, comfortable  HEENT: NCAT, PERRL, clear conjunctiva, no scleral icterus  Neck: supple, no JVD  Respiratory: CTA b/l, no wheezing, rhonchi, rales  Cardiovascular: RRR, normal S1S2, no M/R/G  Abdomen: soft, NT/ND, bowel sounds in all four quadrants, no palpable masses  Extremities: WWP, no clubbing, cyanosis, or edema  Neuro:     MEDICATIONS:  MEDICATIONS  (STANDING):  pantoprazole  Injectable 40milliGRAM(s) IV Push daily  propofol Infusion 7.652MICROgram(s)/kG/Min IV Continuous <Continuous>  chlorhexidine 0.12% Liquid 15milliLiter(s) Swish and Spit two times a day  midazolam Infusion 5mG/Hr IV Continuous <Continuous>  modafinil 200milliGRAM(s) Oral daily  heparin  Injectable 7500Unit(s) SubCutaneous every 8 hours  dexmedetomidine Infusion 0.5MICROgram(s)/kG/Hr IV Continuous <Continuous>    MEDICATIONS  (PRN):  acetaminophen   Tablet 650milliGRAM(s) Oral every 6 hours PRN For Temp greater than 38 C (100.4 F)      ALLERGIES:  Allergies    No Known Drug Allergies  Seafood (Rash)    Intolerances        LABS:                        13.7   9.8   )-----------( 214      ( 06 Feb 2017 04:56 )             44.9     06 Feb 2017 04:55    142    |  106    |  14     ----------------------------<  96     4.1     |  25     |  0.56     Ca    8.8        06 Feb 2017 04:55  Phos  4.0       05 Feb 2017 03:54  Mg     2.0       06 Feb 2017 04:55            RADIOLOGY & ADDITIONAL TESTS: Reviewed.INTERVAL HPI/OVERNIGHT EVENTS:    OVERNIGHT: No overnight events.  SUBJECTIVE: Patient seen and examined at bedside.     ROS:  CV: Denies chest pain  Resp: Denies SOB  GI: Denies abdominal pain, constipation, diarrhea, nausea, vomiting  : Denies dysuria  ID: Denies fevers, chills  MSK: Denies joint pain     OBJECTIVE:    VITAL SIGNS:  ICU Vital Signs Last 24 Hrs  T(C): 37.4, Max: 38.1 (02-05 @ 18:00)  T(F): 99.3, Max: 100.5 (02-05 @ 18:00)  HR: 92 (70 - 110)  BP: 134/87 (95/51 - 157/90)  BP(mean): 105 (68 - 119)  ABP: --  ABP(mean): --  RR: 14 (13 - 29)  SpO2: 96% (93% - 100%)    Mode: AC/ CMV (Assist Control/ Continuous Mandatory Ventilation), RR (machine): 14, TV (machine): 500, FiO2: 50, PEEP: 8, ITime: 0.85, MAP: 12, PIP: 30  I & Os for 24h ending 02-06 @ 07:00  =============================================  IN: 1840.2 ml / OUT: 0 ml / NET: 1840.2 ml    I & Os for current day (as of 02-06 @ 15:04)  =============================================  IN: 364.2 ml / OUT: 0 ml / NET: 364.2 ml    CAPILLARY BLOOD GLUCOSE  86 (06 Feb 2017 12:00)      PHYSICAL EXAM:    General: NAD, comfortable  HEENT: NCAT, PERRL, clear conjunctiva, no scleral icterus  Neck: supple, no JVD  Respiratory: CTA b/l, no wheezing, rhonchi, rales  Cardiovascular: RRR, normal S1S2, no M/R/G  Abdomen: soft, NT/ND, bowel sounds in all four quadrants, no palpable masses  Extremities: WWP, no clubbing, cyanosis, or edema  Neuro:     MEDICATIONS:  MEDICATIONS  (STANDING):  pantoprazole  Injectable 40milliGRAM(s) IV Push daily  propofol Infusion 7.652MICROgram(s)/kG/Min IV Continuous <Continuous>  chlorhexidine 0.12% Liquid 15milliLiter(s) Swish and Spit two times a day  midazolam Infusion 5mG/Hr IV Continuous <Continuous>  modafinil 200milliGRAM(s) Oral daily  heparin  Injectable 7500Unit(s) SubCutaneous every 8 hours  dexmedetomidine Infusion 0.5MICROgram(s)/kG/Hr IV Continuous <Continuous>    MEDICATIONS  (PRN):  acetaminophen   Tablet 650milliGRAM(s) Oral every 6 hours PRN For Temp greater than 38 C (100.4 F)      ALLERGIES:  Allergies    No Known Drug Allergies  Seafood (Rash)    Intolerances        LABS:                        13.7   9.8   )-----------( 214      ( 06 Feb 2017 04:56 )             44.9     06 Feb 2017 04:55    142    |  106    |  14     ----------------------------<  96     4.1     |  25     |  0.56     Ca    8.8        06 Feb 2017 04:55  Phos  4.0       05 Feb 2017 03:54  Mg     2.0       06 Feb 2017 04:55    RADIOLOGY & ADDITIONAL TESTS: Reviewed. INTERVAL HPI/OVERNIGHT EVENTS:    OVERNIGHT: No overnight events.  SUBJECTIVE: Patient seen and examined at bedside. Opens eyes to voice. Appears comfortable on vent.    VITAL SIGNS:  ICU Vital Signs Last 24 Hrs  T(C): 37.4, Max: 38.1 (02-05 @ 18:00)  T(F): 99.3, Max: 100.5 (02-05 @ 18:00)  HR: 92 (70 - 110)  BP: 134/87 (95/51 - 157/90)  BP(mean): 105 (68 - 119)  ABP: --  ABP(mean): --  RR: 14 (13 - 29)  SpO2: 96% (93% - 100%)    Mode: AC/ CMV (Assist Control/ Continuous Mandatory Ventilation), RR (machine): 14, TV (machine): 500, FiO2: 50, PEEP: 8, ITime: 0.85, MAP: 12, PIP: 30  I & Os for 24h ending 02-06 @ 07:00  =============================================  IN: 1840.2 ml / OUT: 0 ml / NET: 1840.2 ml    I & Os for current day (as of 02-06 @ 15:04)  =============================================  IN: 364.2 ml / OUT: 0 ml / NET: 364.2 ml    CAPILLARY BLOOD GLUCOSE  86 (06 Feb 2017 12:00)      PHYSICAL EXAM:    General: NAD, comfortable  HEENT: NCAT, PERRL, clear conjunctiva, no scleral icterus  Neck: supple, no JVD  Respiratory: bronchial breath sounds b/l, bibasilar crackles L>R, no wheezing, rhonchi  Cardiovascular: RRR, normal S1S2, no M/R/G  Abdomen: soft, NT/ND, bowel sounds in all four quadrants, no palpable masses  Extremities: WWP, no clubbing, cyanosis, or edema  Neuro: responds to voice and pain    MEDICATIONS:  MEDICATIONS  (STANDING):  pantoprazole  Injectable 40milliGRAM(s) IV Push daily  propofol Infusion 7.652MICROgram(s)/kG/Min IV Continuous <Continuous>  chlorhexidine 0.12% Liquid 15milliLiter(s) Swish and Spit two times a day  midazolam Infusion 5mG/Hr IV Continuous <Continuous>  modafinil 200milliGRAM(s) Oral daily  heparin  Injectable 7500Unit(s) SubCutaneous every 8 hours  dexmedetomidine Infusion 0.5MICROgram(s)/kG/Hr IV Continuous <Continuous>    MEDICATIONS  (PRN):  acetaminophen   Tablet 650milliGRAM(s) Oral every 6 hours PRN For Temp greater than 38 C (100.4 F)      ALLERGIES:  Allergies    No Known Drug Allergies  Seafood (Rash)    Intolerances        LABS:                        13.7   9.8   )-----------( 214      ( 06 Feb 2017 04:56 )             44.9     06 Feb 2017 04:55    142    |  106    |  14     ----------------------------<  96     4.1     |  25     |  0.56     Ca    8.8        06 Feb 2017 04:55  Phos  4.0       05 Feb 2017 03:54  Mg     2.0       06 Feb 2017 04:55            RADIOLOGY & ADDITIONAL TESTS: Reviewed.INTERVAL HPI/OVERNIGHT EVENTS:    OVERNIGHT: No overnight events.  SUBJECTIVE: Patient seen and examined at bedside.     ROS:  CV: Denies chest pain  Resp: Denies SOB  GI: Denies abdominal pain, constipation, diarrhea, nausea, vomiting  : Denies dysuria  ID: Denies fevers, chills  MSK: Denies joint pain     OBJECTIVE:    VITAL SIGNS:  ICU Vital Signs Last 24 Hrs  T(C): 37.4, Max: 38.1 (02-05 @ 18:00)  T(F): 99.3, Max: 100.5 (02-05 @ 18:00)  HR: 92 (70 - 110)  BP: 134/87 (95/51 - 157/90)  BP(mean): 105 (68 - 119)  ABP: --  ABP(mean): --  RR: 14 (13 - 29)  SpO2: 96% (93% - 100%)    Mode: AC/ CMV (Assist Control/ Continuous Mandatory Ventilation), RR (machine): 14, TV (machine): 500, FiO2: 50, PEEP: 8, ITime: 0.85, MAP: 12, PIP: 30  I & Os for 24h ending 02-06 @ 07:00  =============================================  IN: 1840.2 ml / OUT: 0 ml / NET: 1840.2 ml    I & Os for current day (as of 02-06 @ 15:04)  =============================================  IN: 364.2 ml / OUT: 0 ml / NET: 364.2 ml    CAPILLARY BLOOD GLUCOSE  86 (06 Feb 2017 12:00)      PHYSICAL EXAM:    General: NAD, comfortable  HEENT: NCAT, PERRL, clear conjunctiva, no scleral icterus  Neck: supple, no JVD  Respiratory: CTA b/l, no wheezing, rhonchi, rales  Cardiovascular: RRR, normal S1S2, no M/R/G  Abdomen: soft, NT/ND, bowel sounds in all four quadrants, no palpable masses  Extremities: WWP, no clubbing, cyanosis, or edema  Neuro:     MEDICATIONS:  MEDICATIONS  (STANDING):  pantoprazole  Injectable 40milliGRAM(s) IV Push daily  propofol Infusion 7.652MICROgram(s)/kG/Min IV Continuous <Continuous>  chlorhexidine 0.12% Liquid 15milliLiter(s) Swish and Spit two times a day  midazolam Infusion 5mG/Hr IV Continuous <Continuous>  modafinil 200milliGRAM(s) Oral daily  heparin  Injectable 7500Unit(s) SubCutaneous every 8 hours  dexmedetomidine Infusion 0.5MICROgram(s)/kG/Hr IV Continuous <Continuous>    MEDICATIONS  (PRN):  acetaminophen   Tablet 650milliGRAM(s) Oral every 6 hours PRN For Temp greater than 38 C (100.4 F)      ALLERGIES:  Allergies    No Known Drug Allergies  Seafood (Rash)    Intolerances        LABS:                        13.7   9.8   )-----------( 214      ( 06 Feb 2017 04:56 )             44.9     06 Feb 2017 04:55    142    |  106    |  14     ----------------------------<  96     4.1     |  25     |  0.56     Ca    8.8        06 Feb 2017 04:55  Phos  4.0       05 Feb 2017 03:54  Mg     2.0       06 Feb 2017 04:55    RADIOLOGY & ADDITIONAL TESTS: Reviewed.    Assessment/Plan:  30 y/o F with PMHx HTN, HLD, congenital ASD s/p repair, MICHAEL, HOCM, family h/o of neuromuscular hypoventilation syndrome, DVT in 2008 (s/p Coumadin last dose 2009) presented with SOB/CP/n/v and was admitted to MICU after hypercapnic respiratory failure and MSSA PNA.     Pulm  #Hypercapnic resp failure with unclear etiology:  Pt with MICHAEL and family hx of  neuromuscular disorder as her father required diaphragmatic pacemaker. Ordered for genetic test. Pulm following.  - c/w mechanical ventilation  - for trach 2/6 if unable to wean off vent  - Provigil 200mg daily     #MSSA pna:  MSSA grown from bronchial wash. Pt afebrile, no white count. BCx NGTD   - completed 10 day course of oxacillin    CV  #HD. H/o HTN on home BP meds. Will restart when clinically improved. [place] for strict I&Os.     #Pump. EF 60%. No diastolic HF. Echo finding of asymmetric septal wall hypertrophy. Cardiology consulted and dx HCOM but low suspicion for causing resp failure and also low likelihood of arrhythmogenic. Cardiology recommends care in not reducing preload and also starting beta blocker when clinically appropriate.     ID  #MSSA PNA: MSSA grown from bronchial wash. Completed 10 day course of oxacillin. BCx NGTD    Hem  #Thrombocytopenia with h/o DVT. US neg for DVT. Platelets stable. Hem consult for possible APLAS.   - f/u Heme recs    Neuro  Pt apneic, required intubation. Unclear etiology, could be central sleep apnea vs. MICHAEL or neuromuscular disorder from family hx. CT head shows no intracranial hemorrhage or acute transcortical infarct.   - c/w modafinil daily  - possible pacer placement      FEN: NPO for possible trach; tube feeds with Promote    PPX: HSQ, SCDs, PPI    Access: peripheral.     Full CODE INTERVAL HPI/OVERNIGHT EVENTS:    OVERNIGHT: No overnight events.  SUBJECTIVE: Patient seen and examined at bedside. Opens eyes to voice. Appears comfortable on vent.    VITAL SIGNS:  ICU Vital Signs Last 24 Hrs  T(C): 37.4, Max: 38.1 (02-05 @ 18:00)  T(F): 99.3, Max: 100.5 (02-05 @ 18:00)  HR: 92 (70 - 110)  BP: 134/87 (95/51 - 157/90)  BP(mean): 105 (68 - 119)  ABP: --  ABP(mean): --  RR: 14 (13 - 29)  SpO2: 96% (93% - 100%)    Mode: AC/ CMV (Assist Control/ Continuous Mandatory Ventilation), RR (machine): 14, TV (machine): 500, FiO2: 50, PEEP: 8, ITime: 0.85, MAP: 12, PIP: 30  I & Os for 24h ending 02-06 @ 07:00  =============================================  IN: 1840.2 ml / OUT: 0 ml / NET: 1840.2 ml    I & Os for current day (as of 02-06 @ 15:04)  =============================================  IN: 364.2 ml / OUT: 0 ml / NET: 364.2 ml    CAPILLARY BLOOD GLUCOSE  86 (06 Feb 2017 12:00)      PHYSICAL EXAM:    General: NAD, comfortable  HEENT: NCAT, PERRL, clear conjunctiva, no scleral icterus  Neck: supple, no JVD  Respiratory: bronchial breath sounds b/l, bibasilar crackles L>R, no wheezing, rhonchi  Cardiovascular: RRR, normal S1S2, no M/R/G  Abdomen: soft, NT/ND, bowel sounds in all four quadrants, no palpable masses  Extremities: WWP, no clubbing, cyanosis, or edema  Neuro: responds to voice and pain    MEDICATIONS:  MEDICATIONS  (STANDING):  pantoprazole  Injectable 40milliGRAM(s) IV Push daily  propofol Infusion 7.652MICROgram(s)/kG/Min IV Continuous <Continuous>  chlorhexidine 0.12% Liquid 15milliLiter(s) Swish and Spit two times a day  midazolam Infusion 5mG/Hr IV Continuous <Continuous>  modafinil 200milliGRAM(s) Oral daily  heparin  Injectable 7500Unit(s) SubCutaneous every 8 hours  dexmedetomidine Infusion 0.5MICROgram(s)/kG/Hr IV Continuous <Continuous>    MEDICATIONS  (PRN):  acetaminophen   Tablet 650milliGRAM(s) Oral every 6 hours PRN For Temp greater than 38 C (100.4 F)      ALLERGIES:  Allergies    No Known Drug Allergies  Seafood (Rash)    Intolerances        LABS:                        13.7   9.8   )-----------( 214      ( 06 Feb 2017 04:56 )             44.9     06 Feb 2017 04:55    142    |  106    |  14     ----------------------------<  96     4.1     |  25     |  0.56     Ca    8.8        06 Feb 2017 04:55  Phos  4.0       05 Feb 2017 03:54  Mg     2.0       06 Feb 2017 04:55            RADIOLOGY & ADDITIONAL TESTS: Reviewed.INTERVAL HPI/OVERNIGHT EVENTS:    Assessment/Plan:  32 y/o F with PMHx HTN, HLD, congenital ASD s/p repair, MICHAEL, HOCM, family h/o of neuromuscular hypoventilation syndrome, DVT in 2008 (s/p Coumadin last dose 2009) presented with SOB/CP/n/v and was admitted to MICU after hypercapnic respiratory failure and MSSA PNA.     Pulm  #Hypercapnic resp failure with unclear etiology:  Pt with MICHAEL and family hx of  neuromuscular disorder as her father required diaphragmatic pacemaker. Ordered for genetic test. Pulm following. b/l opacities on CXR, small L pleural effusion on bedside US on 2/6.  - c/w mechanical ventilation  - for trach 2/6 if unable to wean off vent  - Provigil 200mg daily     #MSSA pna:  MSSA grown from bronchial wash. Pt afebrile, no white count. BCx NGTD   - completed 10 day course of oxacillin    CV  #HD. H/o HTN on home BP meds. Will restart when clinically improved. [place] for strict I&Os.     #Pump. EF 60%. No diastolic HF. Echo finding of asymmetric septal wall hypertrophy. Cardiology consulted and dx HCOM but low suspicion for causing resp failure and also low likelihood of arrhythmogenic. Cardiology recommends care in not reducing preload and also starting beta blocker when clinically appropriate.     ID  #MSSA PNA: MSSA grown from bronchial wash. Completed 10 day course of oxacillin. BCx NGTD    Hem  #Thrombocytopenia with h/o DVT. US neg for DVT. Platelets stable. Hem consult for possible APLAS.   - f/u Heme recs    Neuro  Pt apneic, required intubation. Unclear etiology, could be central sleep apnea vs. MICHAEL or neuromuscular disorder from family hx. CT head shows no intracranial hemorrhage or acute transcortical infarct.   - c/w modafinil daily  - possible pacer placement      FEN: NPO for possible trach; tube feeds with Promote    PPX: HSQ, SCDs, PPI    Access: peripheral.     Full CODE INTERVAL HPI/OVERNIGHT EVENTS:    OVERNIGHT: No overnight events.  SUBJECTIVE: Patient seen and examined at bedside. Opens eyes to voice. Appears comfortable on vent. Not answering ROS questions.    VITAL SIGNS:  ICU Vital Signs Last 24 Hrs  T(C): 37.4, Max: 38.1 (02-05 @ 18:00)  T(F): 99.3, Max: 100.5 (02-05 @ 18:00)  HR: 92 (70 - 110)  BP: 134/87 (95/51 - 157/90)  BP(mean): 105 (68 - 119)  ABP: --  ABP(mean): --  RR: 14 (13 - 29)  SpO2: 96% (93% - 100%)    Mode: AC/ CMV (Assist Control/ Continuous Mandatory Ventilation), RR (machine): 14, TV (machine): 500, FiO2: 50, PEEP: 8, ITime: 0.85, MAP: 12, PIP: 30  I & Os for 24h ending 02-06 @ 07:00  =============================================  IN: 1840.2 ml / OUT: 0 ml / NET: 1840.2 ml    I & Os for current day (as of 02-06 @ 15:04)  =============================================  IN: 364.2 ml / OUT: 0 ml / NET: 364.2 ml    CAPILLARY BLOOD GLUCOSE  86 (06 Feb 2017 12:00)      PHYSICAL EXAM:    General: NAD, comfortable  HEENT: NCAT, PERRL, clear conjunctiva, no scleral icterus; MMM no thrush  Neck: supple, no JVD  Respiratory: bronchial breath sounds b/l, bibasilar crackles L>R, no wheezing, rhonchi  Cardiovascular: RRR, normal S1S2, no M/R/G  Abdomen: soft, NT/ND, bowel sounds in all four quadrants, no palpable masses  Extremities: WWP, no clubbing, cyanosis, or edema  Neuro: responds to voice and pain  Vasc: 2+ Carotid, radial, femoral, 1+ DP pulses  MSK: no joint swelling    MEDICATIONS:  MEDICATIONS  (STANDING):  pantoprazole  Injectable 40milliGRAM(s) IV Push daily  propofol Infusion 7.652MICROgram(s)/kG/Min IV Continuous <Continuous>  chlorhexidine 0.12% Liquid 15milliLiter(s) Swish and Spit two times a day  midazolam Infusion 5mG/Hr IV Continuous <Continuous>  modafinil 200milliGRAM(s) Oral daily  heparin  Injectable 7500Unit(s) SubCutaneous every 8 hours  dexmedetomidine Infusion 0.5MICROgram(s)/kG/Hr IV Continuous <Continuous>    MEDICATIONS  (PRN):  acetaminophen   Tablet 650milliGRAM(s) Oral every 6 hours PRN For Temp greater than 38 C (100.4 F)      ALLERGIES:  Allergies    No Known Drug Allergies  Seafood (Rash)    Intolerances        LABS:                        13.7   9.8   )-----------( 214      ( 06 Feb 2017 04:56 )             44.9     06 Feb 2017 04:55    142    |  106    |  14     ----------------------------<  96     4.1     |  25     |  0.56     Ca    8.8        06 Feb 2017 04:55  Phos  4.0       05 Feb 2017 03:54  Mg     2.0       06 Feb 2017 04:55            RADIOLOGY & ADDITIONAL TESTS: Reviewed.INTERVAL HPI/OVERNIGHT EVENTS:    Assessment/Plan:  32 y/o F with PMHx HTN, HLD, congenital ASD s/p repair, MICHAEL, HOCM, family h/o of neuromuscular hypoventilation syndrome, DVT in 2008 (s/p Coumadin last dose 2009) presented with SOB/CP/n/v and was admitted to MICU after hypercapnic respiratory failure and MSSA PNA.     Pulm  #Hypercapnic resp failure with unclear etiology:  Pt with MICHAEL and family hx of  neuromuscular disorder as her father required diaphragmatic pacemaker. Ordered for genetic test. Pulm following. b/l opacities on CXR, small L pleural effusion on bedside US on 2/6.  - c/w mechanical ventilation  - for trach if unable to wean off vent  - Provigil 200mg daily     #MSSA pna:  MSSA grown from bronchial wash. Pt afebrile, no white count. BCx NGTD   - completed 10 day course of oxacillin    CV  #HD. H/o HTN on home BP meds. Will restart when clinically improved. [place] for strict I&Os.     #Pump. EF 60%. No diastolic HF. Echo finding of asymmetric septal wall hypertrophy. Cardiology consulted and dx HCOM but low suspicion for causing resp failure and also low likelihood of arrhythmogenic. Cardiology recommends care in not reducing preload and also starting beta blocker when clinically appropriate.     ID  #MSSA PNA: MSSA grown from bronchial wash. Completed 10 day course of oxacillin. BCx NGTD    Hem  #Thrombocytopenia with h/o DVT. US neg for DVT. Platelets stable. Hem consult for possible APLAS.   - f/u Heme recs    Neuro  Pt apneic, required intubation. Unclear etiology, could be central sleep apnea vs. MICHAEL or neuromuscular disorder from family hx. CT head shows no intracranial hemorrhage or acute transcortical infarct.   - c/w modafinil daily  - possible diaphragmatic pacer placement  eventually    FEN:  tube feeds with Promote    PPX: HSQ, SCDs, PPI    Sedation: will try to convert to precedex for possible delirium    Access: peripheral.     Full CODE

## 2017-02-06 NOTE — PROGRESS NOTE ADULT - SUBJECTIVE AND OBJECTIVE BOX
Interval Events:  Patient seen and examined at bedside.  Patient awake and alert on the ventilator following commands with no acute events overnight.  The patient was extubated during the day then became hypercapneic and more somnolent with intermittent hypoxia.  The patient was reintubated.    she is sedated secrtions are moderate    MEDICATIONS:  Pulmonary:    Antimicrobials:  oxacillin IVPB 2Gram(s) IV Intermittent every 4 hours  oxacillin IVPB  IV Intermittent     Anticoagulants:  heparin  Injectable 7500Unit(s) SubCutaneous every 8 hours    Cardiac:      Allergies    No Known Drug Allergies  Seafood (Rash)    Intolerances        Vital Signs Last 24 Hrs  T(C): 37, Max: 38.1 ( @ 23:11)  T(F): 98.6, Max: 100.6 ( @ 23:11)  HR: 94 (72 - 128)  BP: 125/77 (97/51 - 183/90)  BP(mean): 96 (69 - 139)  RR: 14 (10 - 31)  SpO2: 96% (82% - 100%)  I & Os for 24h ending  @ 07:00  =============================================  IN: 2115.5 ml / OUT: 3655 ml / NET: -1539.5 ml    I & Os for current day (as of  @ 21:30)  =============================================  IN: 566 ml / OUT: 865 ml / NET: -299 ml    Mode: AC/ CMV (Assist Control/ Continuous Mandatory Ventilation)  RR (machine): 14  TV (machine): 390  FiO2: 40  PEEP: 5  ITime: 0.8  MAP: 8.1  PIP: 21      PHYSICAL EXAM:    General: Well developed; well nourished; in no acute distress  Neck: Supple; non tender; no masses  Respiratory: Clear to auscultation bilaterally without wheezing, rhonchi or rales  Cardiovascular: tachycardic but regular  Gastrointestinal: Soft non-tender non-distended; Normal bowel sounds  Extremities: Normal range of motion, No clubbing, cyanosis or edema  Neurological: Alert and oriented x3  Skin: Warm and dry. No obvious rash    LABS:  ABG - ( 2017 17:05 )  pH: 7.37  /  pCO2: 52    /  pO2: 169   / HCO3: 29    / Base Excess: 2.7   /  SaO2: 99                  CBC Full  -  ( 2017 07:34 )  WBC Count : 6.2 K/uL  Hemoglobin : 14.6 g/dL  Hematocrit : 47.3 %  Platelet Count - Automated : 124 K/uL  Mean Cell Volume : 73.9 fL  Mean Cell Hemoglobin : 22.8 pg  Mean Cell Hemoglobin Concentration : 30.9 g/dL  Auto Neutrophil # : x  Auto Lymphocyte # : x  Auto Monocyte # : x  Auto Eosinophil # : x  Auto Basophil # : x  Auto Neutrophil % : 63.0 %  Auto Lymphocyte % : 19.0 %  Auto Monocyte % : 9.0 %  Auto Eosinophil % : 4.0 %  Auto Basophil % : x    2017 07:34    141    |  105    |  12     ----------------------------<  76     4.0     |  29     |  0.78     Ca    8.3        2017 07:34  Phos  4.4       2017 07:34  Mg     2.2       2017 07:34      PT/INR - ( 2017 13:14 )   PT: 13.2 sec;   INR: 1.19          PTT - ( 2017 13:14 )  PTT:36.7 sec      Urinalysis Basic - ( 2017 08:10 )    Color: Yellow / Appearance: Clear / S.010 / pH: x  Gluc: x / Ketone: NEGATIVE  / Bili: NEGATIVE / Urobili: >=8.0 E.U./dL   Blood: x / Protein: 30 mg/dL / Nitrite: NEGATIVE   Leuk Esterase: NEGATIVE / RBC: 5-10 /HPF / WBC 5-10 /HPF   Sq Epi: x / Non Sq Epi: Few /HPF / Bacteria: Present /HPF                                  RADIOLOGY & ADDITIONAL STUDIES (The following images were personally reviewed):

## 2017-02-06 NOTE — PROGRESS NOTE ADULT - PROBLEM SELECTOR PLAN 1
Patient with hypercapneic respiratory failure possibly due to central hypoventilation syndrome.  The patient was diagnosed with "sleep apnea" when she was 5 years old and has had to use CPAP at night ever since.  Her father has central hypoventilation syndrome with a +PHOX2B gene and has diagraphmatic pacemaker.  -The patient was reintubated today for hypercapneic respiratory failure.  Check PHOX2B gene and will discuss tracheostomy with father and patient.  Conitnue on the Provigil.  Continue on MV and evaluate tomorrow either for extubation trial or trach.  She was changed to Precedex

## 2017-02-07 LAB
ANION GAP SERPL CALC-SCNC: 13 MMOL/L — SIGNIFICANT CHANGE UP (ref 9–16)
ANISOCYTOSIS BLD QL: SLIGHT — SIGNIFICANT CHANGE UP
BUN SERPL-MCNC: 14 MG/DL — SIGNIFICANT CHANGE UP (ref 7–23)
CALCIUM SERPL-MCNC: 9.3 MG/DL — SIGNIFICANT CHANGE UP (ref 8.5–10.5)
CHLORIDE SERPL-SCNC: 106 MMOL/L — SIGNIFICANT CHANGE UP (ref 96–108)
CO2 SERPL-SCNC: 24 MMOL/L — SIGNIFICANT CHANGE UP (ref 22–31)
CREAT SERPL-MCNC: 0.63 MG/DL — SIGNIFICANT CHANGE UP (ref 0.5–1.3)
GLUCOSE SERPL-MCNC: 105 MG/DL — HIGH (ref 70–99)
HCT VFR BLD CALC: 47 % — HIGH (ref 34.5–45)
HGB BLD-MCNC: 14.5 G/DL — SIGNIFICANT CHANGE UP (ref 11.5–15.5)
HYPOCHROMIA BLD QL: SLIGHT — SIGNIFICANT CHANGE UP
LG PLATELETS BLD QL AUTO: PRESENT — SIGNIFICANT CHANGE UP
LYMPHOCYTES # BLD AUTO: 7 % — LOW (ref 13–44)
MAGNESIUM SERPL-MCNC: 2 MG/DL — SIGNIFICANT CHANGE UP (ref 1.6–2.4)
MANUAL DIF COMMENT BLD-IMP: SIGNIFICANT CHANGE UP
MANUAL SMEAR VERIFICATION: SIGNIFICANT CHANGE UP
MCHC RBC-ENTMCNC: 22.6 PG — LOW (ref 27–34)
MCHC RBC-ENTMCNC: 30.9 G/DL — LOW (ref 32–36)
MCV RBC AUTO: 73.3 FL — LOW (ref 80–100)
MICROCYTES BLD QL: SLIGHT — SIGNIFICANT CHANGE UP
MONOCYTES NFR BLD AUTO: 7 % — SIGNIFICANT CHANGE UP (ref 2–14)
NEUTROPHILS NFR BLD AUTO: 80 % — HIGH (ref 43–77)
NEUTS BAND # BLD: 6 % — SIGNIFICANT CHANGE UP
PHOSPHATE SERPL-MCNC: 3.9 MG/DL — SIGNIFICANT CHANGE UP (ref 2.5–4.5)
PLAT MORPH BLD: (no result)
PLATELET # BLD AUTO: 266 K/UL — SIGNIFICANT CHANGE UP (ref 150–400)
POLYCHROMASIA BLD QL SMEAR: SLIGHT — SIGNIFICANT CHANGE UP
POTASSIUM SERPL-MCNC: 3.9 MMOL/L — SIGNIFICANT CHANGE UP (ref 3.5–5.3)
POTASSIUM SERPL-SCNC: 3.9 MMOL/L — SIGNIFICANT CHANGE UP (ref 3.5–5.3)
RBC # BLD: 6.41 M/UL — HIGH (ref 3.8–5.2)
RBC # FLD: 24.1 % — HIGH (ref 10.3–16.9)
RBC BLD AUTO: (no result)
SODIUM SERPL-SCNC: 143 MMOL/L — SIGNIFICANT CHANGE UP (ref 135–145)
WBC # BLD: 12.7 K/UL — HIGH (ref 3.8–10.5)
WBC # FLD AUTO: 12.7 K/UL — HIGH (ref 3.8–10.5)

## 2017-02-07 PROCEDURE — 74000: CPT | Mod: 26

## 2017-02-07 PROCEDURE — 71010: CPT | Mod: 26

## 2017-02-07 PROCEDURE — 99233 SBSQ HOSP IP/OBS HIGH 50: CPT | Mod: GC

## 2017-02-07 RX ORDER — ACETAMINOPHEN 500 MG
650 TABLET ORAL EVERY 6 HOURS
Qty: 0 | Refills: 0 | Status: DISCONTINUED | OUTPATIENT
Start: 2017-02-07 | End: 2017-02-23

## 2017-02-07 RX ORDER — METOCLOPRAMIDE HCL 10 MG
5 TABLET ORAL
Qty: 0 | Refills: 0 | Status: DISCONTINUED | OUTPATIENT
Start: 2017-02-07 | End: 2017-02-07

## 2017-02-07 RX ORDER — MIDAZOLAM HYDROCHLORIDE 1 MG/ML
5 INJECTION, SOLUTION INTRAMUSCULAR; INTRAVENOUS
Qty: 100 | Refills: 0 | Status: DISCONTINUED | OUTPATIENT
Start: 2017-02-07 | End: 2017-02-08

## 2017-02-07 RX ORDER — PIPERACILLIN AND TAZOBACTAM 4; .5 G/20ML; G/20ML
3.38 INJECTION, POWDER, LYOPHILIZED, FOR SOLUTION INTRAVENOUS EVERY 6 HOURS
Qty: 0 | Refills: 0 | Status: DISCONTINUED | OUTPATIENT
Start: 2017-02-07 | End: 2017-02-08

## 2017-02-07 RX ORDER — HYDRALAZINE HCL 50 MG
5 TABLET ORAL ONCE
Qty: 0 | Refills: 0 | Status: DISCONTINUED | OUTPATIENT
Start: 2017-02-07 | End: 2017-02-07

## 2017-02-07 RX ORDER — METOCLOPRAMIDE HCL 10 MG
5 TABLET ORAL EVERY 12 HOURS
Qty: 0 | Refills: 0 | Status: DISCONTINUED | OUTPATIENT
Start: 2017-02-07 | End: 2017-02-08

## 2017-02-07 RX ORDER — ACETAMINOPHEN 500 MG
650 TABLET ORAL ONCE
Qty: 0 | Refills: 0 | Status: COMPLETED | OUTPATIENT
Start: 2017-02-07 | End: 2017-02-07

## 2017-02-07 RX ORDER — AMLODIPINE BESYLATE 2.5 MG/1
5 TABLET ORAL DAILY
Qty: 0 | Refills: 0 | Status: DISCONTINUED | OUTPATIENT
Start: 2017-02-07 | End: 2017-02-09

## 2017-02-07 RX ORDER — QUETIAPINE FUMARATE 200 MG/1
50 TABLET, FILM COATED ORAL
Qty: 0 | Refills: 0 | Status: DISCONTINUED | OUTPATIENT
Start: 2017-02-07 | End: 2017-02-08

## 2017-02-07 RX ADMIN — QUETIAPINE FUMARATE 50 MILLIGRAM(S): 200 TABLET, FILM COATED ORAL at 22:00

## 2017-02-07 RX ADMIN — MODAFINIL 200 MILLIGRAM(S): 200 TABLET ORAL at 12:24

## 2017-02-07 RX ADMIN — Medication 650 MILLIGRAM(S): at 10:15

## 2017-02-07 RX ADMIN — PROPOFOL 5 MICROGRAM(S)/KG/MIN: 10 INJECTION, EMULSION INTRAVENOUS at 22:02

## 2017-02-07 RX ADMIN — PIPERACILLIN AND TAZOBACTAM 200 GRAM(S): 4; .5 INJECTION, POWDER, LYOPHILIZED, FOR SOLUTION INTRAVENOUS at 18:00

## 2017-02-07 RX ADMIN — PIPERACILLIN AND TAZOBACTAM 200 GRAM(S): 4; .5 INJECTION, POWDER, LYOPHILIZED, FOR SOLUTION INTRAVENOUS at 23:30

## 2017-02-07 RX ADMIN — HEPARIN SODIUM 7500 UNIT(S): 5000 INJECTION INTRAVENOUS; SUBCUTANEOUS at 14:00

## 2017-02-07 RX ADMIN — DEXMEDETOMIDINE HYDROCHLORIDE IN 0.9% SODIUM CHLORIDE 13.61 MICROGRAM(S)/KG/HR: 4 INJECTION INTRAVENOUS at 04:40

## 2017-02-07 RX ADMIN — DEXMEDETOMIDINE HYDROCHLORIDE IN 0.9% SODIUM CHLORIDE 13.61 MICROGRAM(S)/KG/HR: 4 INJECTION INTRAVENOUS at 23:31

## 2017-02-07 RX ADMIN — Medication 650 MILLIGRAM(S): at 22:40

## 2017-02-07 RX ADMIN — CHLORHEXIDINE GLUCONATE 15 MILLILITER(S): 213 SOLUTION TOPICAL at 05:41

## 2017-02-07 RX ADMIN — CHLORHEXIDINE GLUCONATE 15 MILLILITER(S): 213 SOLUTION TOPICAL at 18:34

## 2017-02-07 RX ADMIN — AMLODIPINE BESYLATE 5 MILLIGRAM(S): 2.5 TABLET ORAL at 05:41

## 2017-02-07 RX ADMIN — PROPOFOL 5 MICROGRAM(S)/KG/MIN: 10 INJECTION, EMULSION INTRAVENOUS at 01:11

## 2017-02-07 RX ADMIN — Medication 5 MILLIGRAM(S): at 22:01

## 2017-02-07 RX ADMIN — PROPOFOL 5 MICROGRAM(S)/KG/MIN: 10 INJECTION, EMULSION INTRAVENOUS at 11:05

## 2017-02-07 RX ADMIN — PROPOFOL 5 MICROGRAM(S)/KG/MIN: 10 INJECTION, EMULSION INTRAVENOUS at 03:33

## 2017-02-07 RX ADMIN — DEXMEDETOMIDINE HYDROCHLORIDE IN 0.9% SODIUM CHLORIDE 13.61 MICROGRAM(S)/KG/HR: 4 INJECTION INTRAVENOUS at 07:08

## 2017-02-07 RX ADMIN — DEXMEDETOMIDINE HYDROCHLORIDE IN 0.9% SODIUM CHLORIDE 13.61 MICROGRAM(S)/KG/HR: 4 INJECTION INTRAVENOUS at 22:02

## 2017-02-07 RX ADMIN — PROPOFOL 5 MICROGRAM(S)/KG/MIN: 10 INJECTION, EMULSION INTRAVENOUS at 07:08

## 2017-02-07 RX ADMIN — Medication 5 MILLIGRAM(S): at 10:00

## 2017-02-07 RX ADMIN — HEPARIN SODIUM 7500 UNIT(S): 5000 INJECTION INTRAVENOUS; SUBCUTANEOUS at 22:01

## 2017-02-07 RX ADMIN — DEXMEDETOMIDINE HYDROCHLORIDE IN 0.9% SODIUM CHLORIDE 13.61 MICROGRAM(S)/KG/HR: 4 INJECTION INTRAVENOUS at 12:24

## 2017-02-07 RX ADMIN — PIPERACILLIN AND TAZOBACTAM 200 GRAM(S): 4; .5 INJECTION, POWDER, LYOPHILIZED, FOR SOLUTION INTRAVENOUS at 12:24

## 2017-02-07 RX ADMIN — HEPARIN SODIUM 7500 UNIT(S): 5000 INJECTION INTRAVENOUS; SUBCUTANEOUS at 05:41

## 2017-02-07 RX ADMIN — DEXMEDETOMIDINE HYDROCHLORIDE IN 0.9% SODIUM CHLORIDE 13.61 MICROGRAM(S)/KG/HR: 4 INJECTION INTRAVENOUS at 09:55

## 2017-02-07 RX ADMIN — DEXMEDETOMIDINE HYDROCHLORIDE IN 0.9% SODIUM CHLORIDE 13.61 MICROGRAM(S)/KG/HR: 4 INJECTION INTRAVENOUS at 01:33

## 2017-02-07 RX ADMIN — Medication 650 MILLIGRAM(S): at 01:12

## 2017-02-07 RX ADMIN — PANTOPRAZOLE SODIUM 40 MILLIGRAM(S): 20 TABLET, DELAYED RELEASE ORAL at 12:24

## 2017-02-07 RX ADMIN — QUETIAPINE FUMARATE 50 MILLIGRAM(S): 200 TABLET, FILM COATED ORAL at 10:15

## 2017-02-07 NOTE — PROGRESS NOTE ADULT - SUBJECTIVE AND OBJECTIVE BOX
INTERVAL HPI/OVERNIGHT EVENTS:    OVERNIGHT: No overnight events.  SUBJECTIVE: Patient seen and examined at bedside.     VITAL SIGNS:  ICU Vital Signs Last 24 Hrs  T(C): 38.7, Max: 39 (02-07 @ 01:00)  T(F): 101.7, Max: 102.2 (02-07 @ 01:00)  HR: 82 (70 - 124)  BP: 134/91 (132/81 - 184/107)  BP(mean): 107 (99 - 142)  ABP: --  ABP(mean): --  RR: 16 (13 - 27)  SpO2: 96% (82% - 100%)    Mode: AC/ CMV (Assist Control/ Continuous Mandatory Ventilation), RR (machine): 14, TV (machine): 530, FiO2: 50, PEEP: 10, ITime: 0.85, MAP: 14, PIP: 31  I & Os for 24h ending 02-07 @ 07:00  =============================================  IN: 2315.2 ml / OUT: 0 ml / NET: 2315.2 ml    I & Os for current day (as of 02-07 @ 13:58)  =============================================  IN: 308.9 ml / OUT: 0 ml / NET: 308.9 ml    CAPILLARY BLOOD GLUCOSE  104 (07 Feb 2017 06:00)      PHYSICAL EXAM:    General: NAD, comfortable  HEENT: NCAT, PERRL, clear conjunctiva, no scleral icterus  Neck: supple, no JVD  Respiratory: CTA b/l, no wheezing, rhonchi, rales  Cardiovascular: RRR, normal S1S2, no M/R/G  Abdomen: soft, NT/ND, bowel sounds in all four quadrants, no palpable masses  Extremities: WWP, no clubbing, cyanosis, or edema  Neuro:     MEDICATIONS:  MEDICATIONS  (STANDING):  pantoprazole  Injectable 40milliGRAM(s) IV Push daily  propofol Infusion 7.652MICROgram(s)/kG/Min IV Continuous <Continuous>  chlorhexidine 0.12% Liquid 15milliLiter(s) Swish and Spit two times a day  modafinil 200milliGRAM(s) Oral daily  heparin  Injectable 7500Unit(s) SubCutaneous every 8 hours  dexmedetomidine Infusion 0.5MICROgram(s)/kG/Hr IV Continuous <Continuous>  amLODIPine   Tablet 5milliGRAM(s) Oral daily  metoclopramide Injectable 5milliGRAM(s) IV Push every 12 hours  QUEtiapine 50milliGRAM(s) Oral two times a day  piperacillin/tazobactam IVPB. 3.375Gram(s) IV Intermittent every 6 hours  midazolam Infusion 5mG/Hr IV Continuous <Continuous>    MEDICATIONS  (PRN):  acetaminophen   Tablet 650milliGRAM(s) Oral every 6 hours PRN For Temp greater than 38 C (100.4 F)      ALLERGIES:  Allergies  No Known Drug Allergies  Seafood (Rash)  Intolerances      LABS:                        14.5   12.7  )-----------( 266      ( 07 Feb 2017 05:24 )             47.0     07 Feb 2017 05:21    143    |  106    |  14     ----------------------------<  105    3.9     |  24     |  0.63     Ca    9.3        07 Feb 2017 05:21  Phos  3.9       07 Feb 2017 05:21  Mg     2.0       07 Feb 2017 05:21    RADIOLOGY & ADDITIONAL TESTS: Reviewed. INTERVAL HPI/OVERNIGHT EVENTS:    OVERNIGHT: On precedex and weaned off versed and propofol, although patient remains alert and attempting to get out of bed. Started on amlodipine for HTN to 170s.   SUBJECTIVE: Patient seen and examined at bedside. Is alert and aware of surroundings, trying to get out of bed. Reports discomfort with ET tube, but otherwise no complaints.    VITAL SIGNS:  ICU Vital Signs Last 24 Hrs  T(C): 38.7, Max: 39 (02-07 @ 01:00)  T(F): 101.7, Max: 102.2 (02-07 @ 01:00)  HR: 82 (70 - 124)  BP: 134/91 (132/81 - 184/107)  BP(mean): 107 (99 - 142)  ABP: --  ABP(mean): --  RR: 16 (13 - 27)  SpO2: 96% (82% - 100%)    Mode: AC/ CMV (Assist Control/ Continuous Mandatory Ventilation), RR (machine): 14, TV (machine): 530, FiO2: 50, PEEP: 10, ITime: 0.85, MAP: 14, PIP: 31  I & Os for 24h ending 02-07 @ 07:00  =============================================  IN: 2315.2 ml / OUT: 0 ml / NET: 2315.2 ml    I & Os for current day (as of 02-07 @ 13:58)  =============================================  IN: 308.9 ml / OUT: 0 ml / NET: 308.9 ml    CAPILLARY BLOOD GLUCOSE  104 (07 Feb 2017 06:00)      PHYSICAL EXAM:    General: NAD, comfortable  HEENT: NCAT, PERRL, clear conjunctiva, no scleral icterus; MMM no thrush  Neck: supple, no JVD  Respiratory: bronchial breath sounds b/l, bibasilar crackles L>R, no wheezing, rhonchi  Cardiovascular: RRR, normal S1S2, no M/R/G  Abdomen: soft, NT/ND, bowel sounds in all four quadrants, no palpable masses  Extremities: WWP, no clubbing, cyanosis, or edema  Neuro: responds to voice and pain  Vasc: 2+ Carotid, radial, femoral, 1+ DP pulses  MSK: no joint swelling    MEDICATIONS:  MEDICATIONS  (STANDING):  pantoprazole  Injectable 40milliGRAM(s) IV Push daily  propofol Infusion 7.652MICROgram(s)/kG/Min IV Continuous <Continuous>  chlorhexidine 0.12% Liquid 15milliLiter(s) Swish and Spit two times a day  modafinil 200milliGRAM(s) Oral daily  heparin  Injectable 7500Unit(s) SubCutaneous every 8 hours  dexmedetomidine Infusion 0.5MICROgram(s)/kG/Hr IV Continuous <Continuous>  amLODIPine   Tablet 5milliGRAM(s) Oral daily  metoclopramide Injectable 5milliGRAM(s) IV Push every 12 hours  QUEtiapine 50milliGRAM(s) Oral two times a day  piperacillin/tazobactam IVPB. 3.375Gram(s) IV Intermittent every 6 hours  midazolam Infusion 5mG/Hr IV Continuous <Continuous>    MEDICATIONS  (PRN):  acetaminophen   Tablet 650milliGRAM(s) Oral every 6 hours PRN For Temp greater than 38 C (100.4 F)      ALLERGIES:  Allergies  No Known Drug Allergies  Seafood (Rash)  Intolerances      LABS:                        14.5   12.7  )-----------( 266      ( 07 Feb 2017 05:24 )             47.0     07 Feb 2017 05:21    143    |  106    |  14     ----------------------------<  105    3.9     |  24     |  0.63     Ca    9.3        07 Feb 2017 05:21  Phos  3.9       07 Feb 2017 05:21  Mg     2.0       07 Feb 2017 05:21    RADIOLOGY & ADDITIONAL TESTS: Reviewed.    Assessment/Plan:  30 y/o F with PMHx HTN, HLD, congenital ASD s/p repair, MICHAEL, HOCM, family h/o of neuromuscular hypoventilation syndrome, DVT in 2008 (s/p Coumadin last dose 2009) admitted to MICU after hypercapnic respiratory failure 2/2 MSSA PNA.     Pulm  #Hypercapnic respiratory failure 2/2 MSSA PNA. Pt with MICHAEL and family hx of neuromuscular disorder as her father required diaphragmatic pacemaker. Ordered for genetic test. Pulm following. b/l opacities on CXR, small L pleural effusion on bedside US on 2/6.  - c/w mechanical ventilation  - for trach if unable to wean off vent; as per Pulm, will wait and monitor progress  - Provigil 200mg daily     #MSSA pna:  MSSA grown from bronchial wash. Pt afebrile, no white count. BCx NGTD   - completed 10 day course of oxacillin    CV  #HD. H/o HTN on home BP meds. Will restart when clinically improved. [place] for strict I&Os.     #Pump. EF 60%. No diastolic HF. Echo finding of asymmetric septal wall hypertrophy. Cardiology consulted and dx HCOM but low suspicion for causing resp failure and also low likelihood of arrhythmogenic. Cardiology recommends care in not reducing preload and also starting beta blocker when clinically appropriate.     ID  #MSSA PNA: MSSA grown from bronchial wash. Completed 10 day course of oxacillin. BCx NGTD  - Continued to spike after course of oxacillin. 102.2 on 2/7. Given that only GNR were isolated on culture, will start on Zosyn at this time. No indication for MRSA coverage. Can broaden to vancomycin if patient's condition worsens. Zosyn coverage appropriate given resistance levels <10%.  - c/w Zosyn (day 1, 2/7)  - f/u bcx    Hem  #Thrombocytopenia with h/o DVT. US neg for DVT. Platelets stable. Hem consult for possible APLAS.   - f/u Heme recs    Neuro  Pt apneic, required intubation. Unclear etiology, could be central sleep apnea vs. MICHAEL or neuromuscular disorder from family hx. CT head shows no intracranial hemorrhage or acute transcortical infarct.   - c/w modafinil daily  - possible diaphragmatic pacer placement  eventually    FEN:  tube feeds with Promote  PPX: HSQ, SCDs, PPI  Sedation: c/w precedex and seroquel for added effect; f/u EKG in AM to monitor QT (seroquel and reglan)  Access: peripheral.     Full CODE INTERVAL HPI/OVERNIGHT EVENTS:    OVERNIGHT: On precedex and weaned off versed and propofol, although patient remains alert and attempting to get out of bed. Started on amlodipine for HTN to 170s.   SUBJECTIVE: Patient seen and examined at bedside. Is alert and aware of surroundings, trying to get out of bed. Reports discomfort with ET tube, but otherwise no complaints.    VITAL SIGNS:  ICU Vital Signs Last 24 Hrs  T(C): 38.7, Max: 39 (02-07 @ 01:00)  T(F): 101.7, Max: 102.2 (02-07 @ 01:00)  HR: 82 (70 - 124)  BP: 134/91 (132/81 - 184/107)  BP(mean): 107 (99 - 142)  ABP: --  ABP(mean): --  RR: 16 (13 - 27)  SpO2: 96% (82% - 100%)    Mode: AC/ CMV (Assist Control/ Continuous Mandatory Ventilation), RR (machine): 14, TV (machine): 530, FiO2: 50, PEEP: 10, ITime: 0.85, MAP: 14, PIP: 31  I & Os for 24h ending 02-07 @ 07:00  =============================================  IN: 2315.2 ml / OUT: 0 ml / NET: 2315.2 ml    I & Os for current day (as of 02-07 @ 13:58)  =============================================  IN: 308.9 ml / OUT: 0 ml / NET: 308.9 ml    CAPILLARY BLOOD GLUCOSE  104 (07 Feb 2017 06:00)      PHYSICAL EXAM:    General: NAD, comfortable  HEENT: NCAT, PERRL, clear conjunctiva, no scleral icterus; MMM no thrush  Neck: supple, no JVD  Respiratory: bronchial breath sounds b/l, bibasilar crackles L>R, no wheezing, rhonchi  Cardiovascular: RRR, normal S1S2, no M/R/G  Abdomen: soft, NT/ND, bowel sounds in all four quadrants, no palpable masses  Extremities: WWP, no clubbing, cyanosis, or edema  Neuro: responds to voice and pain  Vasc: 2+ Carotid, radial, femoral, 1+ DP pulses  MSK: no joint swelling    MEDICATIONS:  MEDICATIONS  (STANDING):  pantoprazole  Injectable 40milliGRAM(s) IV Push daily  propofol Infusion 7.652MICROgram(s)/kG/Min IV Continuous <Continuous>  chlorhexidine 0.12% Liquid 15milliLiter(s) Swish and Spit two times a day  modafinil 200milliGRAM(s) Oral daily  heparin  Injectable 7500Unit(s) SubCutaneous every 8 hours  dexmedetomidine Infusion 0.5MICROgram(s)/kG/Hr IV Continuous <Continuous>  amLODIPine   Tablet 5milliGRAM(s) Oral daily  metoclopramide Injectable 5milliGRAM(s) IV Push every 12 hours  QUEtiapine 50milliGRAM(s) Oral two times a day  piperacillin/tazobactam IVPB. 3.375Gram(s) IV Intermittent every 6 hours  midazolam Infusion 5mG/Hr IV Continuous <Continuous>    MEDICATIONS  (PRN):  acetaminophen   Tablet 650milliGRAM(s) Oral every 6 hours PRN For Temp greater than 38 C (100.4 F)      ALLERGIES:  Allergies  No Known Drug Allergies  Seafood (Rash)  Intolerances      LABS:                        14.5   12.7  )-----------( 266      ( 07 Feb 2017 05:24 )             47.0     07 Feb 2017 05:21    143    |  106    |  14     ----------------------------<  105    3.9     |  24     |  0.63     Ca    9.3        07 Feb 2017 05:21  Phos  3.9       07 Feb 2017 05:21  Mg     2.0       07 Feb 2017 05:21    RADIOLOGY & ADDITIONAL TESTS: Reviewed.    Assessment/Plan:  30 y/o F with PMHx HTN, HLD, congenital ASD s/p repair, MICHAEL, HOCM, family h/o of neuromuscular hypoventilation syndrome, DVT in 2008 (s/p Coumadin last dose 2009) admitted to MICU after hypercapnic respiratory failure 2/2 MSSA PNA, now s/p 10-day course of oxacillin. With persistent fevers and started on Zosyn for additional coverage.    Pulm  #Hypercapnic respiratory failure 2/2 MSSA PNA. Pt with MICHAEL and family hx of neuromuscular disorder as her father required diaphragmatic pacemaker. Ordered for genetic test. Pulm following. b/l opacities on CXR, small L pleural effusion on bedside US on 2/6.  - c/w mechanical ventilation  - for trach if unable to wean off vent; as per Pulm, will wait and monitor progress  - Provigil 200mg daily     #MSSA pna:  MSSA grown from bronchial wash. Pt afebrile, no white count. BCx NGTD   - completed 10 day course of oxacillin    CV  #HD. H/o HTN on home BP meds. Will restart when clinically improved. [place] for strict I&Os.     #Pump. EF 60%. No diastolic HF. Echo finding of asymmetric septal wall hypertrophy. Cardiology consulted and dx HCOM but low suspicion for causing resp failure and also low likelihood of arrhythmogenic. Cardiology recommends care in not reducing preload and also starting beta blocker when clinically appropriate.     ID  #HAP vs. VAP: Initially treated for MSSA PNA, completing 10-day course of oxacillin. MSSA grown from bronchial wash. BCx NGTD. Now with persistent fevers - 102.2 on 2/7. Given that only GNR were isolated on culture, will start on Zosyn at this time. No indication for MRSA coverage. Can broaden to vancomycin if patient's condition worsens. Zosyn coverage appropriate given resistance levels <10%.  - c/w Zosyn (day 1, 2/7)  - f/u bcx    Hem  #Thrombocytopenia with h/o DVT. US neg for DVT. Platelets stable. Hem consult for possible APLAS.   - f/u Heme recs    Neuro  Pt apneic, required intubation. Unclear etiology, could be central sleep apnea vs. MICHAEL or neuromuscular disorder from family hx. CT head shows no intracranial hemorrhage or acute transcortical infarct.   - c/w modafinil daily  - possible diaphragmatic pacer placement  eventually    FEN:  tube feeds with Promote  PPX: HSQ, SCDs, PPI  Sedation: c/w precedex and seroquel for added effect; f/u EKG in AM to monitor QT (seroquel and reglan)  Access: peripheral.     Full CODE INTERVAL HPI/OVERNIGHT EVENTS:    OVERNIGHT: On precedex and weaned off versed and propofol, although patient remains alert and attempting to get out of bed. Started on amlodipine for HTN to 170s.   SUBJECTIVE: Patient seen and examined at bedside. Is alert and aware of surroundings, trying to get out of bed. Reports discomfort with ET tube, but otherwise no complaints. Otherwise not cooperative with ROS.    VITAL SIGNS:  ICU Vital Signs Last 24 Hrs  T(C): 38.7, Max: 39 (02-07 @ 01:00)  T(F): 101.7, Max: 102.2 (02-07 @ 01:00)  HR: 82 (70 - 124)  BP: 134/91 (132/81 - 184/107)  BP(mean): 107 (99 - 142)  ABP: --  ABP(mean): --  RR: 16 (13 - 27)  SpO2: 96% (82% - 100%)    Mode: AC/ CMV (Assist Control/ Continuous Mandatory Ventilation), RR (machine): 14, TV (machine): 530, FiO2: 50, PEEP: 10, ITime: 0.85, MAP: 14, PIP: 31  I & Os for 24h ending 02-07 @ 07:00  =============================================  IN: 2315.2 ml / OUT: 0 ml / NET: 2315.2 ml    I & Os for current day (as of 02-07 @ 13:58)  =============================================  IN: 308.9 ml / OUT: 0 ml / NET: 308.9 ml    CAPILLARY BLOOD GLUCOSE  104 (07 Feb 2017 06:00)      PHYSICAL EXAM:    General: NAD, comfortable  HEENT: NCAT, PERRL, clear conjunctiva, no scleral icterus; MMM no thrush  Neck: supple, no JVD  Respiratory: bronchial breath sounds b/l, bibasilar crackles L>R, no wheezing, rhonchi  Cardiovascular: RRR, normal S1S2, no M/R/G  Abdomen: soft, NT/ND, bowel sounds in all four quadrants, no palpable masses  Extremities: WWP, no clubbing, cyanosis, or edema  Neuro: responds to voice and pain  Vasc: 2+ Carotid, radial, femoral, 1+ DP pulses  MSK: no joint swelling    MEDICATIONS:  MEDICATIONS  (STANDING):  pantoprazole  Injectable 40milliGRAM(s) IV Push daily  propofol Infusion 7.652MICROgram(s)/kG/Min IV Continuous <Continuous>  chlorhexidine 0.12% Liquid 15milliLiter(s) Swish and Spit two times a day  modafinil 200milliGRAM(s) Oral daily  heparin  Injectable 7500Unit(s) SubCutaneous every 8 hours  dexmedetomidine Infusion 0.5MICROgram(s)/kG/Hr IV Continuous <Continuous>  amLODIPine   Tablet 5milliGRAM(s) Oral daily  metoclopramide Injectable 5milliGRAM(s) IV Push every 12 hours  QUEtiapine 50milliGRAM(s) Oral two times a day  piperacillin/tazobactam IVPB. 3.375Gram(s) IV Intermittent every 6 hours  midazolam Infusion 5mG/Hr IV Continuous <Continuous>    MEDICATIONS  (PRN):  acetaminophen   Tablet 650milliGRAM(s) Oral every 6 hours PRN For Temp greater than 38 C (100.4 F)      ALLERGIES:  Allergies  No Known Drug Allergies  Seafood (Rash)  Intolerances      LABS:                        14.5   12.7  )-----------( 266      ( 07 Feb 2017 05:24 )             47.0     07 Feb 2017 05:21    143    |  106    |  14     ----------------------------<  105    3.9     |  24     |  0.63     Ca    9.3        07 Feb 2017 05:21  Phos  3.9       07 Feb 2017 05:21  Mg     2.0       07 Feb 2017 05:21    RADIOLOGY & ADDITIONAL TESTS: Reviewed.    Assessment/Plan:  32 y/o F with PMHx HTN, HLD, congenital ASD s/p repair, MICHAEL, HOCM, family h/o of neuromuscular hypoventilation syndrome, DVT in 2008 (s/p Coumadin last dose 2009) admitted to MICU after hypercapnic respiratory failure 2/2 MSSA PNA, now s/p 10-day course of oxacillin. With persistent fevers and started on Zosyn for additional coverage for likely new pneumonia.    Pulm  #Hypercapnic respiratory failure 2/2 MSSA PNA. Pt with MICHAEL and family hx of neuromuscular disorder as her father required diaphragmatic pacemaker. Ordered for genetic test. Pulm following. b/l opacities on CXR, small L pleural effusion on bedside US on 2/6.  - c/w mechanical ventilation  - for trach if unable to wean off vent; as per Pulm, will wait and monitor progress  - Provigil 200mg daily     #MSSA pna:  MSSA grown from bronchial wash. Pt afebrile, no white count. BCx NGTD   - completed 10 day course of oxacillin    CV  #HD. H/o HTN on home BP meds. Will restart when clinically improved. [place] for strict I&Os.     #Pump. EF 60%. No diastolic HF. Echo finding of asymmetric septal wall hypertrophy. Cardiology consulted and dx HCOM but low suspicion for causing resp failure and also low likelihood of arrhythmogenic. Cardiology recommends care in not reducing preload and also starting beta blocker when clinically appropriate.     ID  #HAP vs. VAP: Initially treated for MSSA PNA, completing 10-day course of oxacillin. MSSA grown from bronchial wash. BCx NGTD. Now with persistent fevers - 102.2 on 2/7. Given that only GNR were isolated on culture, will start on Zosyn at this time. No indication for MRSA coverage. Can broaden to vancomycin if patient's condition worsens. Zosyn coverage appropriate given resistance levels <10%.  - c/w Zosyn (day 1, 2/7)  - f/u bcx    Hem  #Thrombocytopenia with h/o DVT. US neg for DVT. Platelets stable. Hem consult for possible APLAS.   - f/u Heme recs    Neuro  Pt apneic, required intubation. Unclear etiology, could be central sleep apnea vs. MICHAEL or neuromuscular disorder from family hx. CT head shows no intracranial hemorrhage or acute transcortical infarct.   - c/w modafinil daily  - possible diaphragmatic pacer placement  eventually    FEN:  tube feeds with Promote  PPX: HSQ, SCDs, PPI  Sedation: c/w precedex and seroquel for added effect; f/u EKG in AM to monitor QT (seroquel and reglan)  Access: peripheral.     Full CODE PGY-1 OFF-SERVICE NOTE:  Hospital course: 32 y/o F poor historian, PMHx HTN, HLD, ?paroxysmal afib (reports during pregnancy), congenital cardiac malformation s/p repair, ?MICHAEL, DVT in 2008 (s/p Coumadin last dose 2009), h/o UGIB s/p endoscopy, recent 2 day admission (Jan 2017) to [facility] for SOB/CP/n/v r/o ACS, ECHO (EF 60%, normal sized LA, severely asymmetric septal hypertrophy) admission c/b 10 minute unresponsiveness, resolving on its own with non contributory full w/u, presented to  ED (1/27 at night) again per the patient with similar symptoms that brought her to the hospital earlier this month, SOB/CP/n/v. Admitted to MICU for hypercapnic respiratory failure 2/2 MSSA PNA, emergently intubated, and now s/p 10-day course of oxacillin. With persistent fevers and started on Zosyn for additional coverage for likely new pneumonia. Given that only GNR were isolated on culture, Zosyn is appropriate at this time. No indication for MRSA coverage. Can broaden to vancomycin if patient's condition worsens. Respiratory status has been improving by objective measures, but still unable to wean, despite continued attempts. At this time considering tracheostomy.     Patient is hemodynamically stable. Will stay in MICU at this time for monitoring of respiratory status while intubated.    INTERVAL HPI/OVERNIGHT EVENTS:    OVERNIGHT: On precedex and weaned off versed and propofol, although patient remains alert and attempting to get out of bed. Started on amlodipine for HTN to 170s.   SUBJECTIVE: Patient seen and examined at bedside. Is alert and aware of surroundings, trying to get out of bed. Reports discomfort with ET tube, but otherwise no complaints. Otherwise not cooperative with ROS.    VITAL SIGNS:  ICU Vital Signs Last 24 Hrs  T(C): 38.7, Max: 39 (02-07 @ 01:00)  T(F): 101.7, Max: 102.2 (02-07 @ 01:00)  HR: 82 (70 - 124)  BP: 134/91 (132/81 - 184/107)  BP(mean): 107 (99 - 142)  ABP: --  ABP(mean): --  RR: 16 (13 - 27)  SpO2: 96% (82% - 100%)    Mode: AC/ CMV (Assist Control/ Continuous Mandatory Ventilation), RR (machine): 14, TV (machine): 530, FiO2: 50, PEEP: 10, ITime: 0.85, MAP: 14, PIP: 31  I & Os for 24h ending 02-07 @ 07:00  =============================================  IN: 2315.2 ml / OUT: 0 ml / NET: 2315.2 ml    I & Os for current day (as of 02-07 @ 13:58)  =============================================  IN: 308.9 ml / OUT: 0 ml / NET: 308.9 ml    CAPILLARY BLOOD GLUCOSE  104 (07 Feb 2017 06:00)      PHYSICAL EXAM:    General: NAD, comfortable  HEENT: NCAT, PERRL, clear conjunctiva, no scleral icterus; MMM no thrush  Neck: supple, no JVD  Respiratory: bronchial breath sounds b/l, bibasilar crackles L>R, no wheezing, rhonchi  Cardiovascular: RRR, normal S1S2, no M/R/G  Abdomen: soft, NT/ND, bowel sounds in all four quadrants, no palpable masses  Extremities: WWP, no clubbing, cyanosis, or edema  Neuro: responds to voice and pain  Vasc: 2+ Carotid, radial, femoral, 1+ DP pulses  MSK: no joint swelling    MEDICATIONS:  MEDICATIONS  (STANDING):  pantoprazole  Injectable 40milliGRAM(s) IV Push daily  propofol Infusion 7.652MICROgram(s)/kG/Min IV Continuous <Continuous>  chlorhexidine 0.12% Liquid 15milliLiter(s) Swish and Spit two times a day  modafinil 200milliGRAM(s) Oral daily  heparin  Injectable 7500Unit(s) SubCutaneous every 8 hours  dexmedetomidine Infusion 0.5MICROgram(s)/kG/Hr IV Continuous <Continuous>  amLODIPine   Tablet 5milliGRAM(s) Oral daily  metoclopramide Injectable 5milliGRAM(s) IV Push every 12 hours  QUEtiapine 50milliGRAM(s) Oral two times a day  piperacillin/tazobactam IVPB. 3.375Gram(s) IV Intermittent every 6 hours  midazolam Infusion 5mG/Hr IV Continuous <Continuous>    MEDICATIONS  (PRN):  acetaminophen   Tablet 650milliGRAM(s) Oral every 6 hours PRN For Temp greater than 38 C (100.4 F)      ALLERGIES:  Allergies  No Known Drug Allergies  Seafood (Rash)  Intolerances      LABS:                        14.5   12.7  )-----------( 266      ( 07 Feb 2017 05:24 )             47.0     07 Feb 2017 05:21    143    |  106    |  14     ----------------------------<  105    3.9     |  24     |  0.63     Ca    9.3        07 Feb 2017 05:21  Phos  3.9       07 Feb 2017 05:21  Mg     2.0       07 Feb 2017 05:21    RADIOLOGY & ADDITIONAL TESTS: Reviewed.    Assessment/Plan:  32 y/o F with PMHx HTN, HLD, congenital ASD s/p repair, MICHAEL, HOCM, family h/o of neuromuscular hypoventilation syndrome, DVT in 2008 (s/p Coumadin last dose 2009) admitted to MICU after hypercapnic respiratory failure 2/2 MSSA PNA, now s/p 10-day course of oxacillin. With persistent fevers and started on Zosyn for additional coverage for likely new pneumonia.    Pulm  #Hypercapnic respiratory failure 2/2 MSSA PNA. Pt with MICHAEL and family hx of neuromuscular disorder as her father required diaphragmatic pacemaker. Ordered for genetic test. Pulm following. b/l opacities on CXR, small L pleural effusion on bedside US on 2/6.  - c/w mechanical ventilation  - for trach if unable to wean off vent; as per Pulm, will wait and monitor progress  - Provigil 200mg daily     #MSSA pna:  MSSA grown from bronchial wash. Pt afebrile, no white count. BCx NGTD   - completed 10 day course of oxacillin    CV  #HD. H/o HTN on home BP meds. Will restart when clinically improved. [place] for strict I&Os.     #Pump. EF 60%. No diastolic HF. Echo finding of asymmetric septal wall hypertrophy. Cardiology consulted and dx HCOM but low suspicion for causing resp failure and also low likelihood of arrhythmogenic. Cardiology recommends care in not reducing preload and also starting beta blocker when clinically appropriate.     ID  #HAP vs. VAP: Initially treated for MSSA PNA, completing 10-day course of oxacillin. MSSA grown from bronchial wash. BCx NGTD. Now with persistent fevers - 102.2 on 2/7. Given that only GNR were isolated on culture, will start on Zosyn at this time. No indication for MRSA coverage. Can broaden to vancomycin if patient's condition worsens. Zosyn coverage appropriate given resistance levels <10%.  - c/w Zosyn (day 1, 2/7)  - f/u bcx    Hem  #Thrombocytopenia with h/o DVT. US neg for DVT. Platelets stable. Hem consult for possible APLAS.   - f/u Heme recs    Neuro  Pt apneic, required intubation. Unclear etiology, could be central sleep apnea vs. MICHAEL or neuromuscular disorder from family hx. CT head shows no intracranial hemorrhage or acute transcortical infarct.   - c/w modafinil daily  - possible diaphragmatic pacer placement  eventually    FEN:  tube feeds with Promote  PPX: HSQ, SCDs, PPI  Sedation: c/w precedex and seroquel for added effect; f/u EKG in AM to monitor QT (seroquel and reglan)  Access: peripheral.     Full CODE

## 2017-02-07 NOTE — PROGRESS NOTE ADULT - SUBJECTIVE AND OBJECTIVE BOX
Interval Events:  Patient seen and examined at bedside.  Patient awake and alert on the ventilator following commands with no acute events overnight.  The patient was extubated during the day then became hypercapneic and more somnolent with intermittent hypoxia.  The patient was reintubated.    she is sedated secrtions are moderate    MEDICATIONS:  Pulmonary:    Antimicrobials:  oxacillin IVPB 2Gram(s) IV Intermittent every 4 hours  oxacillin IVPB  IV Intermittent     Anticoagulants:  heparin  Injectable 7500Unit(s) SubCutaneous every 8 hours    Cardiac:      Allergies    No Known Drug Allergies  Seafood (Rash)    Intolerances        Vital Signs Last 24 Hrs  T(C): 37, Max: 38.1 ( @ 23:11)  T(F): 98.6, Max: 100.6 ( @ 23:11)  HR: 94 (72 - 128)  BP: 125/77 (97/51 - 183/90)  BP(mean): 96 (69 - 139)  RR: 14 (10 - 31)  SpO2: 96% (82% - 100%)  I & Os for 24h ending  @ 07:00  =============================================  IN: 2115.5 ml / OUT: 3655 ml / NET: -1539.5 ml    I & Os for current day (as of  @ 21:30)  =============================================  IN: 566 ml / OUT: 865 ml / NET: -299 ml    Mode: AC/ CMV (Assist Control/ Continuous Mandatory Ventilation)  RR (machine): 14  TV (machine): 390  FiO2: 40  PEEP: 5  ITime: 0.8  MAP: 8.1  PIP: 21      PHYSICAL EXAM:    General: Well developed; well nourished; in no acute distress  Neck: Supple; non tender; no masses  Respiratory: Clear to auscultation bilaterally without wheezing, rhonchi or rales  Cardiovascular: tachycardic but regular  Gastrointestinal: Soft non-tender non-distended; Normal bowel sounds  Extremities: Normal range of motion, No clubbing, cyanosis or edema  Neurological: Alert and oriented x3  Skin: Warm and dry. No obvious rash    LABS:  ABG - ( 2017 17:05 )  pH: 7.37  /  pCO2: 52    /  pO2: 169   / HCO3: 29    / Base Excess: 2.7   /  SaO2: 99                  CBC Full  -  ( 2017 07:34 )  WBC Count : 6.2 K/uL  Hemoglobin : 14.6 g/dL  Hematocrit : 47.3 %  Platelet Count - Automated : 124 K/uL  Mean Cell Volume : 73.9 fL  Mean Cell Hemoglobin : 22.8 pg  Mean Cell Hemoglobin Concentration : 30.9 g/dL  Auto Neutrophil # : x  Auto Lymphocyte # : x  Auto Monocyte # : x  Auto Eosinophil # : x  Auto Basophil # : x  Auto Neutrophil % : 63.0 %  Auto Lymphocyte % : 19.0 %  Auto Monocyte % : 9.0 %  Auto Eosinophil % : 4.0 %  Auto Basophil % : x    2017 07:34    141    |  105    |  12     ----------------------------<  76     4.0     |  29     |  0.78     Ca    8.3        2017 07:34  Phos  4.4       2017 07:34  Mg     2.2       2017 07:34      PT/INR - ( 2017 13:14 )   PT: 13.2 sec;   INR: 1.19          PTT - ( 2017 13:14 )  PTT:36.7 sec      Urinalysis Basic - ( 2017 08:10 )    Color: Yellow / Appearance: Clear / S.010 / pH: x  Gluc: x / Ketone: NEGATIVE  / Bili: NEGATIVE / Urobili: >=8.0 E.U./dL   Blood: x / Protein: 30 mg/dL / Nitrite: NEGATIVE   Leuk Esterase: NEGATIVE / RBC: 5-10 /HPF / WBC 5-10 /HPF   Sq Epi: x / Non Sq Epi: Few /HPF / Bacteria: Present /HPF                          EXAM:  XR ABDOMEN 1 VIEW PORT URGENT                           PROCEDURE DATE:  2017                 INTERPRETATION:  Abdomen    History: 31-year-old female with high residual feeds.    Comparison made with CT scan of abdomen and pelvis from 2017.    Findings: Supine abdominal x-ray obtained at bedside. NG tube present in   stomach. Surgical clips right upper quadrant consistent with   cholecystectomy. Contrast material and stool are present within   nondilated rectum. Gas and stool in remainder of colon and gas within a   few nondilated small bowel loops. No evidence of bowel obstruction or   free air. An IUD overlies the left sacrum. Phleboliths right pelvis.   Bones unremarkable.    Impression: Unremarkable bowel gas pattern.    EXAM:  XR CHEST 1 VIEW PORT ROUTINE                           PROCEDURE DATE:  2017                 INTERPRETATION:    PORTABLE CHEST X-RAY    Indication: et tube    Bedside examination of the chest dated 2017 5:22 AM is compared to   theprevious study of 2017.    Findings: Endotracheal tube tip 3.6 cm proximal to elysia. NG tube again   present, with distal portion projecting superiorly in gastric fundus.   Shallow inspiration. No pneumothorax. Bibasilar infiltrates and possible  small left effusion again noted.    Impression: Endotracheal tube in satisfactory position. Bibasilar   infiltrates.          RADIOLOGY & ADDITIONAL STUDIES (The following images were personally reviewed):

## 2017-02-08 LAB
ANION GAP SERPL CALC-SCNC: 10 MMOL/L — SIGNIFICANT CHANGE UP (ref 9–16)
BLD GP AB SCN SERPL QL: NEGATIVE — SIGNIFICANT CHANGE UP
BUN SERPL-MCNC: 16 MG/DL — SIGNIFICANT CHANGE UP (ref 7–23)
CALCIUM SERPL-MCNC: 9.4 MG/DL — SIGNIFICANT CHANGE UP (ref 8.5–10.5)
CHLORIDE SERPL-SCNC: 105 MMOL/L — SIGNIFICANT CHANGE UP (ref 96–108)
CO2 SERPL-SCNC: 24 MMOL/L — SIGNIFICANT CHANGE UP (ref 22–31)
CREAT SERPL-MCNC: 0.69 MG/DL — SIGNIFICANT CHANGE UP (ref 0.5–1.3)
GLUCOSE SERPL-MCNC: 108 MG/DL — HIGH (ref 70–99)
HCT VFR BLD CALC: 45.3 % — HIGH (ref 34.5–45)
HGB BLD-MCNC: 14.1 G/DL — SIGNIFICANT CHANGE UP (ref 11.5–15.5)
MAGNESIUM SERPL-MCNC: 2.1 MG/DL — SIGNIFICANT CHANGE UP (ref 1.6–2.4)
MCHC RBC-ENTMCNC: 22.6 PG — LOW (ref 27–34)
MCHC RBC-ENTMCNC: 31.1 G/DL — LOW (ref 32–36)
MCV RBC AUTO: 72.7 FL — LOW (ref 80–100)
PHOSPHATE SERPL-MCNC: 4.1 MG/DL — SIGNIFICANT CHANGE UP (ref 2.5–4.5)
PLATELET # BLD AUTO: 277 K/UL — SIGNIFICANT CHANGE UP (ref 150–400)
POTASSIUM SERPL-MCNC: 3.3 MMOL/L — LOW (ref 3.5–5.3)
POTASSIUM SERPL-SCNC: 3.3 MMOL/L — LOW (ref 3.5–5.3)
RBC # BLD: 6.23 M/UL — HIGH (ref 3.8–5.2)
RBC # FLD: 23.8 % — HIGH (ref 10.3–16.9)
RH IG SCN BLD-IMP: POSITIVE — SIGNIFICANT CHANGE UP
SODIUM SERPL-SCNC: 139 MMOL/L — SIGNIFICANT CHANGE UP (ref 135–145)
WBC # BLD: 8.6 K/UL — SIGNIFICANT CHANGE UP (ref 3.8–10.5)
WBC # FLD AUTO: 8.6 K/UL — SIGNIFICANT CHANGE UP (ref 3.8–10.5)

## 2017-02-08 PROCEDURE — 93010 ELECTROCARDIOGRAM REPORT: CPT | Mod: 77

## 2017-02-08 PROCEDURE — 71010: CPT | Mod: 26

## 2017-02-08 PROCEDURE — 99233 SBSQ HOSP IP/OBS HIGH 50: CPT | Mod: GC

## 2017-02-08 PROCEDURE — 93010 ELECTROCARDIOGRAM REPORT: CPT

## 2017-02-08 RX ORDER — MIDAZOLAM HYDROCHLORIDE 1 MG/ML
2 INJECTION, SOLUTION INTRAMUSCULAR; INTRAVENOUS
Qty: 100 | Refills: 0 | Status: DISCONTINUED | OUTPATIENT
Start: 2017-02-08 | End: 2017-02-08

## 2017-02-08 RX ORDER — PIPERACILLIN AND TAZOBACTAM 4; .5 G/20ML; G/20ML
4.5 INJECTION, POWDER, LYOPHILIZED, FOR SOLUTION INTRAVENOUS EVERY 6 HOURS
Qty: 0 | Refills: 0 | Status: DISCONTINUED | OUTPATIENT
Start: 2017-02-08 | End: 2017-02-10

## 2017-02-08 RX ORDER — MIDAZOLAM HYDROCHLORIDE 1 MG/ML
0.04 INJECTION, SOLUTION INTRAMUSCULAR; INTRAVENOUS
Qty: 100 | Refills: 0 | Status: DISCONTINUED | OUTPATIENT
Start: 2017-02-08 | End: 2017-02-09

## 2017-02-08 RX ORDER — MIDAZOLAM HYDROCHLORIDE 1 MG/ML
2 INJECTION, SOLUTION INTRAMUSCULAR; INTRAVENOUS ONCE
Qty: 0 | Refills: 0 | Status: DISCONTINUED | OUTPATIENT
Start: 2017-02-08 | End: 2017-02-08

## 2017-02-08 RX ORDER — VENLAFAXINE HCL 75 MG
150 CAPSULE, EXT RELEASE 24 HR ORAL DAILY
Qty: 0 | Refills: 0 | Status: DISCONTINUED | OUTPATIENT
Start: 2017-02-08 | End: 2017-02-09

## 2017-02-08 RX ORDER — RISPERIDONE 4 MG/1
1 TABLET ORAL DAILY
Qty: 0 | Refills: 0 | Status: DISCONTINUED | OUTPATIENT
Start: 2017-02-08 | End: 2017-02-10

## 2017-02-08 RX ORDER — QUETIAPINE FUMARATE 200 MG/1
100 TABLET, FILM COATED ORAL
Qty: 0 | Refills: 0 | Status: DISCONTINUED | OUTPATIENT
Start: 2017-02-08 | End: 2017-02-08

## 2017-02-08 RX ORDER — FENTANYL CITRATE 50 UG/ML
0.5 INJECTION INTRAVENOUS
Qty: 2500 | Refills: 0 | Status: DISCONTINUED | OUTPATIENT
Start: 2017-02-08 | End: 2017-02-12

## 2017-02-08 RX ORDER — POTASSIUM CHLORIDE 20 MEQ
40 PACKET (EA) ORAL EVERY 4 HOURS
Qty: 0 | Refills: 0 | Status: COMPLETED | OUTPATIENT
Start: 2017-02-08 | End: 2017-02-08

## 2017-02-08 RX ORDER — METOCLOPRAMIDE HCL 10 MG
10 TABLET ORAL EVERY 6 HOURS
Qty: 0 | Refills: 0 | Status: COMPLETED | OUTPATIENT
Start: 2017-02-08 | End: 2017-02-08

## 2017-02-08 RX ADMIN — Medication 5 MILLIGRAM(S): at 10:34

## 2017-02-08 RX ADMIN — AMLODIPINE BESYLATE 5 MILLIGRAM(S): 2.5 TABLET ORAL at 06:09

## 2017-02-08 RX ADMIN — DEXMEDETOMIDINE HYDROCHLORIDE IN 0.9% SODIUM CHLORIDE 13.61 MICROGRAM(S)/KG/HR: 4 INJECTION INTRAVENOUS at 16:00

## 2017-02-08 RX ADMIN — PANTOPRAZOLE SODIUM 40 MILLIGRAM(S): 20 TABLET, DELAYED RELEASE ORAL at 12:25

## 2017-02-08 RX ADMIN — RISPERIDONE 1 MILLIGRAM(S): 4 TABLET ORAL at 22:24

## 2017-02-08 RX ADMIN — HEPARIN SODIUM 7500 UNIT(S): 5000 INJECTION INTRAVENOUS; SUBCUTANEOUS at 13:25

## 2017-02-08 RX ADMIN — HEPARIN SODIUM 7500 UNIT(S): 5000 INJECTION INTRAVENOUS; SUBCUTANEOUS at 06:12

## 2017-02-08 RX ADMIN — Medication 40 MILLIEQUIVALENT(S): at 10:33

## 2017-02-08 RX ADMIN — Medication 40 MILLIEQUIVALENT(S): at 06:09

## 2017-02-08 RX ADMIN — QUETIAPINE FUMARATE 100 MILLIGRAM(S): 200 TABLET, FILM COATED ORAL at 18:11

## 2017-02-08 RX ADMIN — PIPERACILLIN AND TAZOBACTAM 200 GRAM(S): 4; .5 INJECTION, POWDER, LYOPHILIZED, FOR SOLUTION INTRAVENOUS at 06:08

## 2017-02-08 RX ADMIN — PIPERACILLIN AND TAZOBACTAM 200 GRAM(S): 4; .5 INJECTION, POWDER, LYOPHILIZED, FOR SOLUTION INTRAVENOUS at 12:25

## 2017-02-08 RX ADMIN — MIDAZOLAM HYDROCHLORIDE 0.04 MG/HR: 1 INJECTION, SOLUTION INTRAMUSCULAR; INTRAVENOUS at 18:12

## 2017-02-08 RX ADMIN — CHLORHEXIDINE GLUCONATE 15 MILLILITER(S): 213 SOLUTION TOPICAL at 06:08

## 2017-02-08 RX ADMIN — DEXMEDETOMIDINE HYDROCHLORIDE IN 0.9% SODIUM CHLORIDE 13.61 MICROGRAM(S)/KG/HR: 4 INJECTION INTRAVENOUS at 10:33

## 2017-02-08 RX ADMIN — PROPOFOL 5 MICROGRAM(S)/KG/MIN: 10 INJECTION, EMULSION INTRAVENOUS at 13:26

## 2017-02-08 RX ADMIN — FENTANYL CITRATE 5.45 MICROGRAM(S)/KG/HR: 50 INJECTION INTRAVENOUS at 10:34

## 2017-02-08 RX ADMIN — PROPOFOL 5 MICROGRAM(S)/KG/MIN: 10 INJECTION, EMULSION INTRAVENOUS at 10:32

## 2017-02-08 RX ADMIN — PIPERACILLIN AND TAZOBACTAM 200 GRAM(S): 4; .5 INJECTION, POWDER, LYOPHILIZED, FOR SOLUTION INTRAVENOUS at 18:11

## 2017-02-08 RX ADMIN — Medication 10 MILLIGRAM(S): at 18:49

## 2017-02-08 RX ADMIN — HEPARIN SODIUM 7500 UNIT(S): 5000 INJECTION INTRAVENOUS; SUBCUTANEOUS at 22:24

## 2017-02-08 RX ADMIN — DEXMEDETOMIDINE HYDROCHLORIDE IN 0.9% SODIUM CHLORIDE 13.61 MICROGRAM(S)/KG/HR: 4 INJECTION INTRAVENOUS at 13:26

## 2017-02-08 RX ADMIN — CHLORHEXIDINE GLUCONATE 15 MILLILITER(S): 213 SOLUTION TOPICAL at 18:11

## 2017-02-08 RX ADMIN — MIDAZOLAM HYDROCHLORIDE 2 MILLIGRAM(S): 1 INJECTION, SOLUTION INTRAMUSCULAR; INTRAVENOUS at 17:58

## 2017-02-08 NOTE — PROGRESS NOTE ADULT - SUBJECTIVE AND OBJECTIVE BOX
Interval Events:  Patient seen and examined at bedside.  Patient awake and alert on the ventilator following commands with no acute events overnight.  The patient was extubated during the day then became hypercapneic and more somnolent with intermittent hypoxia.  The patient was reintubated.    she is sedated secrtions are moderate    MEDICATIONS:  Pulmonary:    Antimicrobials:  oxacillin IVPB 2Gram(s) IV Intermittent every 4 hours  oxacillin IVPB  IV Intermittent     Anticoagulants:  heparin  Injectable 7500Unit(s) SubCutaneous every 8 hours    Cardiac:      Allergies    No Known Drug Allergies  Seafood (Rash)    Intolerances        Vital Signs Last 24 Hrs  T(C): 37, Max: 38.1 ( @ 23:11)  T(F): 98.6, Max: 100.6 ( @ 23:11)  HR: 94 (72 - 128)  BP: 125/77 (97/51 - 183/90)  BP(mean): 96 (69 - 139)  RR: 14 (10 - 31)  SpO2: 96% (82% - 100%)  I & Os for 24h ending  @ 07:00  =============================================  IN: 2115.5 ml / OUT: 3655 ml / NET: -1539.5 ml    I & Os for current day (as of  @ 21:30)  =============================================  IN: 566 ml / OUT: 865 ml / NET: -299 ml    Mode: AC/ CMV (Assist Control/ Continuous Mandatory Ventilation)  RR (machine): 14  TV (machine): 390  FiO2: 40  PEEP: 5  ITime: 0.8  MAP: 8.1  PIP: 21      PHYSICAL EXAM:    General: Well developed; well nourished; in no acute distress  Neck: Supple; non tender; no masses  Respiratory: Clear to auscultation bilaterally without wheezing, rhonchi or rales  Cardiovascular: tachycardic but regular  Gastrointestinal: Soft non-tender non-distended; Normal bowel sounds  Extremities: Normal range of motion, No clubbing, cyanosis or edema  Neurological: Alert and oriented x3  Skin: Warm and dry. No obvious rash    LABS:  ABG - ( 2017 17:05 )  pH: 7.37  /  pCO2: 52    /  pO2: 169   / HCO3: 29    / Base Excess: 2.7   /  SaO2: 99                  CBC Full  -  ( 2017 07:34 )  WBC Count : 6.2 K/uL  Hemoglobin : 14.6 g/dL  Hematocrit : 47.3 %  Platelet Count - Automated : 124 K/uL  Mean Cell Volume : 73.9 fL  Mean Cell Hemoglobin : 22.8 pg  Mean Cell Hemoglobin Concentration : 30.9 g/dL  Auto Neutrophil # : x  Auto Lymphocyte # : x  Auto Monocyte # : x  Auto Eosinophil # : x  Auto Basophil # : x  Auto Neutrophil % : 63.0 %  Auto Lymphocyte % : 19.0 %  Auto Monocyte % : 9.0 %  Auto Eosinophil % : 4.0 %  Auto Basophil % : x    2017 07:34    141    |  105    |  12     ----------------------------<  76     4.0     |  29     |  0.78     Ca    8.3        2017 07:34  Phos  4.4       2017 07:34  Mg     2.2       2017 07:34      PT/INR - ( 2017 13:14 )   PT: 13.2 sec;   INR: 1.19          PTT - ( 2017 13:14 )  PTT:36.7 sec      Urinalysis Basic - ( 2017 08:10 )    Color: Yellow / Appearance: Clear / S.010 / pH: x  Gluc: x / Ketone: NEGATIVE  / Bili: NEGATIVE / Urobili: >=8.0 E.U./dL   Blood: x / Protein: 30 mg/dL / Nitrite: NEGATIVE   Leuk Esterase: NEGATIVE / RBC: 5-10 /HPF / WBC 5-10 /HPF   Sq Epi: x / Non Sq Epi: Few /HPF / Bacteria: Present /HPF    EXAM:  XR CHEST 1 VIEW PORT ROUTINE                           PROCEDURE DATE:  2017                 INTERPRETATION:    PORTABLE CHEST X-RAY    Indication: et tube    Bedside examination of the chest dated 2017 5:22 AM is compared to   theprevious study of 2017.    Findings: Endotracheal tube tip 3.6 cm proximal to elysia. NG tube again   present, with distal portion projecting superiorly in gastric fundus.   Shallow inspiration. No pneumothorax. Bibasilar infiltrates and possible  small left effusion again noted.    Impression: Endotracheal tube in satisfactory position. Bibasilar   infiltrates.                                    RADIOLOGY & ADDITIONAL STUDIES (The following images were personally reviewed):

## 2017-02-08 NOTE — PROGRESS NOTE ADULT - SUBJECTIVE AND OBJECTIVE BOX
INTERVAL HPI/OVERNIGHT EVENTS:    OVERNIGHT: No overnight events.  SUBJECTIVE: Patient seen and examined at bedside.     ROS:  CV: Denies chest pain  Resp: Denies SOB  GI: Denies abdominal pain, constipation, diarrhea, nausea, vomiting  : Denies dysuria  ID: Denies fevers, chills  MSK: Denies joint pain     OBJECTIVE:    VITAL SIGNS:  ICU Vital Signs Last 24 Hrs  T(C): 37.8, Max: 38.7 (02-07 @ 10:00)  T(F): 100.1, Max: 101.7 (02-07 @ 10:00)  HR: 83 (70 - 124)  BP: 132/87 (131/71 - 176/88)  BP(mean): 102 (89 - 135)  ABP: --  ABP(mean): --  RR: 14 (13 - 27)  SpO2: 98% (94% - 100%)    Mode: AC/ CMV (Assist Control/ Continuous Mandatory Ventilation), RR (machine): 14, TV (machine): 500, FiO2: 50, PEEP: 10, ITime: 0.85, MAP: 15, PIP: 29  I & Os for 24h ending 02-07 @ 07:00  =============================================  IN: 2315.2 ml / OUT: 0 ml / NET: 2315.2 ml    I & Os for current day (as of 02-08 @ 06:43)  =============================================  IN: 2099.9 ml / OUT: 0 ml / NET: 2099.9 ml    CAPILLARY BLOOD GLUCOSE  104 (07 Feb 2017 06:00)      PHYSICAL EXAM:    General: NAD, comfortable  HEENT: NCAT, PERRL, clear conjunctiva, no scleral icterus  Neck: supple, no JVD  Respiratory: CTA b/l, no wheezing, rhonchi, rales  Cardiovascular: RRR, normal S1S2, no M/R/G  Abdomen: soft, NT/ND, bowel sounds in all four quadrants, no palpable masses  Extremities: WWP, no clubbing, cyanosis, or edema  Neuro:     MEDICATIONS:  MEDICATIONS  (STANDING):  pantoprazole  Injectable 40milliGRAM(s) IV Push daily  propofol Infusion 7.652MICROgram(s)/kG/Min IV Continuous <Continuous>  chlorhexidine 0.12% Liquid 15milliLiter(s) Swish and Spit two times a day  modafinil 200milliGRAM(s) Oral daily  heparin  Injectable 7500Unit(s) SubCutaneous every 8 hours  dexmedetomidine Infusion 0.5MICROgram(s)/kG/Hr IV Continuous <Continuous>  amLODIPine   Tablet 5milliGRAM(s) Oral daily  metoclopramide Injectable 5milliGRAM(s) IV Push every 12 hours  QUEtiapine 50milliGRAM(s) Oral two times a day  piperacillin/tazobactam IVPB. 3.375Gram(s) IV Intermittent every 6 hours  midazolam Infusion 5mG/Hr IV Continuous <Continuous>  potassium chloride   Powder 40milliEquivalent(s) Oral every 4 hours    MEDICATIONS  (PRN):  acetaminophen    Suspension 650milliGRAM(s) Oral every 6 hours PRN For Temp greater than 38 C (100.4 F)      ALLERGIES:  Allergies    No Known Drug Allergies  Seafood (Rash)    Intolerances        LABS:                        14.1   8.6   )-----------( 277      ( 08 Feb 2017 04:58 )             45.3     08 Feb 2017 04:58    139    |  105    |  16     ----------------------------<  108    3.3     |  24     |  0.69     Ca    9.4        08 Feb 2017 04:58  Phos  4.1       08 Feb 2017 04:58  Mg     2.1       08 Feb 2017 04:58            RADIOLOGY & ADDITIONAL TESTS: Reviewed. INTERVAL HPI/OVERNIGHT EVENTS: Febrile to 100.9.       SUBJECTIVE: Patient seen and examined at bedside.     OBJECTIVE:    VITAL SIGNS:  ICU Vital Signs Last 24 Hrs  T(C): 37.8, Max: 38.7 (02-07 @ 10:00)  T(F): 100.1, Max: 101.7 (02-07 @ 10:00)  HR: 83 (70 - 124)  BP: 132/87 (131/71 - 176/88)  BP(mean): 102 (89 - 135)  ABP: --  ABP(mean): --  RR: 14 (13 - 27)  SpO2: 98% (94% - 100%)    Mode: AC/ CMV (Assist Control/ Continuous Mandatory Ventilation), RR (machine): 14, TV (machine): 500, FiO2: 50, PEEP: 10, ITime: 0.85, MAP: 15, PIP: 29  I & Os for 24h ending 02-07 @ 07:00  =============================================  IN: 2315.2 ml / OUT: 0 ml / NET: 2315.2 ml    I & Os for current day (as of 02-08 @ 06:43)  =============================================  IN: 2099.9 ml / OUT: 0 ml / NET: 2099.9 ml    CAPILLARY BLOOD GLUCOSE  104 (07 Feb 2017 06:00)      PHYSICAL EXAM:    General: NAD, comfortable  HEENT: NCAT, PERRL, clear conjunctiva, no scleral icterus; MMM no thrush  Neck: supple, no JVD  Respiratory: bronchial breath sounds b/l, bibasilar crackles L>R, no wheezing, rhonchi  Cardiovascular: RRR, normal S1S2, no M/R/G  Abdomen: soft, NT/ND, bowel sounds in all four quadrants, small fluctuant mass near heparin subq injection site   Extremities: WWP, no clubbing, cyanosis, or edema  Neuro: responds to voice and pain  Vasc: 2+ Carotid, radial, femoral, 1+ DP pulses  MSK: no joint swelling          MEDICATIONS  (STANDING):  pantoprazole  Injectable 40milliGRAM(s) IV Push daily  propofol Infusion 7.652MICROgram(s)/kG/Min IV Continuous <Continuous>  chlorhexidine 0.12% Liquid 15milliLiter(s) Swish and Spit two times a day  modafinil 200milliGRAM(s) Oral daily  heparin  Injectable 7500Unit(s) SubCutaneous every 8 hours  dexmedetomidine Infusion 0.5MICROgram(s)/kG/Hr IV Continuous <Continuous>  amLODIPine   Tablet 5milliGRAM(s) Oral daily  metoclopramide Injectable 5milliGRAM(s) IV Push every 12 hours  QUEtiapine 50milliGRAM(s) Oral two times a day  piperacillin/tazobactam IVPB. 3.375Gram(s) IV Intermittent every 6 hours  midazolam Infusion 5mG/Hr IV Continuous <Continuous>  potassium chloride   Powder 40milliEquivalent(s) Oral every 4 hours    MEDICATIONS  (PRN):  acetaminophen    Suspension 650milliGRAM(s) Oral every 6 hours PRN For Temp greater than 38 C (100.4 F)      ALLERGIES:  Allergies    No Known Drug Allergies  Seafood (Rash)    Intolerances        LABS:                        14.1   8.6   )-----------( 277      ( 08 Feb 2017 04:58 )             45.3     08 Feb 2017 04:58    139    |  105    |  16     ----------------------------<  108    3.3     |  24     |  0.69     Ca    9.4        08 Feb 2017 04:58  Phos  4.1       08 Feb 2017 04:58  Mg     2.1       08 Feb 2017 04:58            RADIOLOGY & ADDITIONAL TESTS: Reviewed. Bibasilar infiltrates         Assessment/Plan:  32 y/o F with PMHx HTN, HLD, congenital ASD s/p repair, MICHAEL, HOCM, family h/o of neuromuscular hypoventilation syndrome, DVT in 2008 (s/p Coumadin last dose 2009) admitted to MICU after hypercapnic respiratory failure 2/2 MSSA PNA, now s/p 10-day course of oxacillin. With persistent fevers and started on Zosyn for additional coverage for likely new pneumonia.          Pulm  #Hypercapnic respiratory failure 2/2 MSSA PNA complicated by potential diaphragmatic issue. Pt with MICHAEL and family hx of neuromuscular disorder as her father required diaphragmatic pacemaker. Ordered for genetic test. Pulm following. b/l opacities on CXR, small L pleural effusion on bedside US on 2/6.  - c/w mechanical ventilation  - for trach if unable to wean off vent; as per Pulm, will wait and monitor progress  - Provigil 200mg daily   - FU serum genetic tests for diaphragmatic issue     ID  #HAP vs. VAP: Initially treated for MSSA PNA, completing 10-day course of oxacillin. MSSA grown from bronchial wash. BCx NGTD. Now with persistent fevers - 102.2 on 2/7 but downtrending fever curve on 02/08. . Given that only GNR were isolated on gram stain  No indication for MRSA coverage. Can broaden to vancomycin if patient's condition worsens. Zosyn coverage appropriate given resistance levels <10% and likely gram negative in etiology.  - c/w Zosyn (day 2, begun on 2/7)  - f/u bcx    CV  #HD. H/o HTN on home BP meds. Will restart when clinically improved. [place] for strict I&Os.     #Pump. EF 60%. No diastolic HF. Echo finding of asymmetric septal wall hypertrophy. Cardiology consulted and dx HCOM but low suspicion for causing resp failure and also low likelihood of arrhythmogenic. Cardiology recommends care in not reducing preload and also starting beta blocker when clinically appropriate.         Heme  #Thrombocytopenia with h/o DVT. US neg for DVT. Platelets stable. Hem consult for possible APLAS.   - f/u Heme recs    Neuro  Pt apneic, required intubation. Unclear etiology, could be central sleep apnea vs. MICHAEL or neuromuscular disorder from family hx. CT head shows no intracranial hemorrhage or acute transcortical infarct.   - c/w modafinil daily  - possible diaphragmatic pacer placement  eventually    FEN:  tube feeds with Promote  PPX: HSQ, SCDs, PPI  Sedation: c/w precedex and seroquel for added effect; f/u EKG in AM to monitor QT (seroquel and reglan)  Access: peripheral.     Full CODE INTERVAL HPI/OVERNIGHT EVENTS: Febrile to 100.9.       SUBJECTIVE: Patient seen and examined at bedside.   ROS: not obtainable as patient not cooperative    OBJECTIVE:    VITAL SIGNS:  ICU Vital Signs Last 24 Hrs  T(C): 37.8, Max: 38.7 (02-07 @ 10:00)  T(F): 100.1, Max: 101.7 (02-07 @ 10:00)  HR: 83 (70 - 124)  BP: 132/87 (131/71 - 176/88)  BP(mean): 102 (89 - 135)  ABP: --  ABP(mean): --  RR: 14 (13 - 27)  SpO2: 98% (94% - 100%)    Mode: AC/ CMV (Assist Control/ Continuous Mandatory Ventilation), RR (machine): 14, TV (machine): 500, FiO2: 50, PEEP: 10, ITime: 0.85, MAP: 15, PIP: 29  I & Os for 24h ending 02-07 @ 07:00  =============================================  IN: 2315.2 ml / OUT: 0 ml / NET: 2315.2 ml    I & Os for current day (as of 02-08 @ 06:43)  =============================================  IN: 2099.9 ml / OUT: 0 ml / NET: 2099.9 ml    CAPILLARY BLOOD GLUCOSE  104 (07 Feb 2017 06:00)      PHYSICAL EXAM:    General: NAD, comfortable  HEENT: NCAT, PERRL, clear conjunctiva, no scleral icterus; MMM no thrush  Neck: supple, no JVD  Respiratory: bronchial breath sounds b/l, bibasilar crackles L>R, no wheezing, rhonchi  Cardiovascular: RRR, normal S1S2, no M/R/G  Abdomen: soft, NT/ND, bowel sounds in all four quadrants, small fluctuant mass near heparin subq injection site   Extremities: WWP, no clubbing, cyanosis, or edema  Neuro: responds to voice and pain  Vasc: 2+ Carotid, radial, femoral, 1+ DP pulses  MSK: no joint swelling          MEDICATIONS  (STANDING):  pantoprazole  Injectable 40milliGRAM(s) IV Push daily  propofol Infusion 7.652MICROgram(s)/kG/Min IV Continuous <Continuous>  chlorhexidine 0.12% Liquid 15milliLiter(s) Swish and Spit two times a day  modafinil 200milliGRAM(s) Oral daily  heparin  Injectable 7500Unit(s) SubCutaneous every 8 hours  dexmedetomidine Infusion 0.5MICROgram(s)/kG/Hr IV Continuous <Continuous>  amLODIPine   Tablet 5milliGRAM(s) Oral daily  metoclopramide Injectable 5milliGRAM(s) IV Push every 12 hours  QUEtiapine 50milliGRAM(s) Oral two times a day  piperacillin/tazobactam IVPB. 3.375Gram(s) IV Intermittent every 6 hours  midazolam Infusion 5mG/Hr IV Continuous <Continuous>  potassium chloride   Powder 40milliEquivalent(s) Oral every 4 hours    MEDICATIONS  (PRN):  acetaminophen    Suspension 650milliGRAM(s) Oral every 6 hours PRN For Temp greater than 38 C (100.4 F)      ALLERGIES:  Allergies    No Known Drug Allergies  Seafood (Rash)    Intolerances        LABS:                        14.1   8.6   )-----------( 277      ( 08 Feb 2017 04:58 )             45.3     08 Feb 2017 04:58    139    |  105    |  16     ----------------------------<  108    3.3     |  24     |  0.69     Ca    9.4        08 Feb 2017 04:58  Phos  4.1       08 Feb 2017 04:58  Mg     2.1       08 Feb 2017 04:58            RADIOLOGY & ADDITIONAL TESTS: Reviewed. Bibasilar infiltrates         Assessment/Plan:  30 y/o F with PMHx HTN, HLD, congenital ASD s/p repair, MICHAEL, HOCM, family h/o of neuromuscular hypoventilation syndrome, DVT in 2008 (s/p Coumadin last dose 2009) admitted to MICU after hypercapnic respiratory failure 2/2 MSSA PNA, now s/p 10-day course of oxacillin. With persistent fevers and started on Zosyn for additional coverage for likely new pneumonia.          Pulm  #Hypercapnic respiratory failure 2/2 MSSA PNA complicated by potential diaphragmatic issue. Pt with MICHAEL and family hx of neuromuscular disorder as her father required diaphragmatic pacemaker. Ordered for genetic test. Pulm following. b/l opacities on CXR, small L pleural effusion on bedside US on 2/6.  - c/w mechanical ventilation  - for trach if unable to wean off vent; as per Pulm, will wait and monitor progress  - Provigil 200mg daily   - FU serum genetic tests for diaphragmatic issue     ID  #HAP vs. VAP: Initially treated for MSSA PNA, completing 10-day course of oxacillin. MSSA grown from bronchial wash. BCx NGTD. Now with persistent fevers - 102.2 on 2/7 but downtrending fever curve on 02/08. . Given that only GNR were isolated on gram stain  No indication for MRSA coverage. Can broaden to vancomycin if patient's condition worsens. Zosyn coverage appropriate given resistance levels <10% and likely gram negative in etiology.  - c/w Zosyn (day 2, begun on 2/7)  - f/u bcx    CV  #HD. H/o HTN on home BP meds. Will restart when clinically improved. [place] for strict I&Os.     #Pump. EF 60%. No diastolic HF. Echo finding of asymmetric septal wall hypertrophy. Cardiology consulted and dx HCOM but low suspicion for causing resp failure and also low likelihood of arrhythmogenic. Cardiology recommends care in not reducing preload and also starting beta blocker when clinically appropriate.         Heme  #Thrombocytopenia with h/o DVT. US neg for DVT. Platelets stable. Hem consult for possible APLAS.   - f/u Heme recs    Neuro  Pt apneic, required intubation. Unclear etiology, could be central sleep apnea vs. MICHAEL or neuromuscular disorder from family hx. CT head shows no intracranial hemorrhage or acute transcortical infarct.   - c/w modafinil daily  - possible diaphragmatic pacer placement  eventually    Psych  Restart effoxor/risperidone    FEN:  tube feeds with Promote  PPX: HSQ, SCDs, PPI  Sedation: c/w precedex; f/u EKG in AM to monitor QT (reglan)  Access: peripheral.     Full CODE

## 2017-02-08 NOTE — PROGRESS NOTE ADULT - PROBLEM SELECTOR PLAN 1
Patient with hypercapneic respiratory failure possibly due to central hypoventilation syndrome.  The patient was diagnosed with "sleep apnea" when she was 5 years old and has had to use CPAP at night ever since.  Her father has central hypoventilation syndrome with a +PHOX2B gene and has diagraphmatic pacemaker.  -The patient was reintubated today for hypercapneic respiratory failure.  Check PHOX2B gene and will discuss tracheostomy with father and patient.  Conitnue on the Provigil.  Continue on MV and evaluate tomorrow either for extubation trial or trach.  She was changed to Precedex.

## 2017-02-08 NOTE — PROGRESS NOTE ADULT - PROBLEM SELECTOR PLAN 3
she developed nosocomial pneumonia and will follow on cultures.  The antibitoics broadened.  The cultures are negative to date

## 2017-02-09 LAB
ANION GAP SERPL CALC-SCNC: 9 MMOL/L — SIGNIFICANT CHANGE UP (ref 9–16)
ANISOCYTOSIS BLD QL: SLIGHT — SIGNIFICANT CHANGE UP
APPEARANCE UR: CLEAR — SIGNIFICANT CHANGE UP
BASOPHILS NFR BLD AUTO: 1 % — SIGNIFICANT CHANGE UP (ref 0–2)
BILIRUB UR-MCNC: (no result)
BUN SERPL-MCNC: 16 MG/DL — SIGNIFICANT CHANGE UP (ref 7–23)
CALCIUM SERPL-MCNC: 9.2 MG/DL — SIGNIFICANT CHANGE UP (ref 8.5–10.5)
CHLORIDE SERPL-SCNC: 107 MMOL/L — SIGNIFICANT CHANGE UP (ref 96–108)
CO2 SERPL-SCNC: 25 MMOL/L — SIGNIFICANT CHANGE UP (ref 22–31)
COLOR SPEC: YELLOW — SIGNIFICANT CHANGE UP
CREAT SERPL-MCNC: 0.66 MG/DL — SIGNIFICANT CHANGE UP (ref 0.5–1.3)
CULTURE RESULTS: SIGNIFICANT CHANGE UP
DIFF PNL FLD: NEGATIVE — SIGNIFICANT CHANGE UP
EOSINOPHIL NFR BLD AUTO: 4 % — SIGNIFICANT CHANGE UP (ref 0–6)
GLUCOSE SERPL-MCNC: 97 MG/DL — SIGNIFICANT CHANGE UP (ref 70–99)
GLUCOSE UR QL: NEGATIVE — SIGNIFICANT CHANGE UP
HCT VFR BLD CALC: 43.6 % — SIGNIFICANT CHANGE UP (ref 34.5–45)
HCT VFR BLD CALC: 45.3 % — HIGH (ref 34.5–45)
HGB BLD-MCNC: 13.1 G/DL — SIGNIFICANT CHANGE UP (ref 11.5–15.5)
HGB BLD-MCNC: 13.6 G/DL — SIGNIFICANT CHANGE UP (ref 11.5–15.5)
HYPOCHROMIA BLD QL: SLIGHT — SIGNIFICANT CHANGE UP
KETONES UR-MCNC: NEGATIVE — SIGNIFICANT CHANGE UP
LACTATE SERPL-SCNC: 1.3 MMOL/L — SIGNIFICANT CHANGE UP (ref 0.5–2)
LEUKOCYTE ESTERASE UR-ACNC: NEGATIVE — SIGNIFICANT CHANGE UP
LYMPHOCYTES # BLD AUTO: 26 % — SIGNIFICANT CHANGE UP (ref 13–44)
MACROCYTES BLD QL: SLIGHT — SIGNIFICANT CHANGE UP
MAGNESIUM SERPL-MCNC: 2 MG/DL — SIGNIFICANT CHANGE UP (ref 1.6–2.4)
MANUAL DIF COMMENT BLD-IMP: SIGNIFICANT CHANGE UP
MANUAL SMEAR VERIFICATION: SIGNIFICANT CHANGE UP
MCHC RBC-ENTMCNC: 21.4 PG — LOW (ref 27–34)
MCHC RBC-ENTMCNC: 21.7 PG — LOW (ref 27–34)
MCHC RBC-ENTMCNC: 30 G/DL — LOW (ref 32–36)
MCHC RBC-ENTMCNC: 30 G/DL — LOW (ref 32–36)
MCV RBC AUTO: 71.2 FL — LOW (ref 80–100)
MCV RBC AUTO: 72.2 FL — LOW (ref 80–100)
MICROCYTES BLD QL: SLIGHT — SIGNIFICANT CHANGE UP
MONOCYTES NFR BLD AUTO: 11 % — SIGNIFICANT CHANGE UP (ref 2–14)
NEUTROPHILS NFR BLD AUTO: 58 % — SIGNIFICANT CHANGE UP (ref 43–77)
NITRITE UR-MCNC: NEGATIVE — SIGNIFICANT CHANGE UP
PH UR: 6 — SIGNIFICANT CHANGE UP (ref 4–8)
PLAT MORPH BLD: (no result)
PLATELET # BLD AUTO: 295 K/UL — SIGNIFICANT CHANGE UP (ref 150–400)
PLATELET # BLD AUTO: 302 K/UL — SIGNIFICANT CHANGE UP (ref 150–400)
POIKILOCYTOSIS BLD QL AUTO: SLIGHT — SIGNIFICANT CHANGE UP
POTASSIUM SERPL-MCNC: 3.9 MMOL/L — SIGNIFICANT CHANGE UP (ref 3.5–5.3)
POTASSIUM SERPL-SCNC: 3.9 MMOL/L — SIGNIFICANT CHANGE UP (ref 3.5–5.3)
PROT UR-MCNC: NEGATIVE MG/DL — SIGNIFICANT CHANGE UP
RBC # BLD: 6.12 M/UL — HIGH (ref 3.8–5.2)
RBC # BLD: 6.27 M/UL — HIGH (ref 3.8–5.2)
RBC # FLD: 24.2 % — HIGH (ref 10.3–16.9)
RBC # FLD: 24.4 % — HIGH (ref 10.3–16.9)
RBC BLD AUTO: (no result)
SODIUM SERPL-SCNC: 141 MMOL/L — SIGNIFICANT CHANGE UP (ref 135–145)
SP GR SPEC: 1.02 — SIGNIFICANT CHANGE UP (ref 1–1.03)
SPECIMEN SOURCE: SIGNIFICANT CHANGE UP
UROBILINOGEN FLD QL: 1 E.U./DL — SIGNIFICANT CHANGE UP
WBC # BLD: 6.3 K/UL — SIGNIFICANT CHANGE UP (ref 3.8–10.5)
WBC # BLD: 7.3 K/UL — SIGNIFICANT CHANGE UP (ref 3.8–10.5)
WBC # FLD AUTO: 6.3 K/UL — SIGNIFICANT CHANGE UP (ref 3.8–10.5)
WBC # FLD AUTO: 7.3 K/UL — SIGNIFICANT CHANGE UP (ref 3.8–10.5)

## 2017-02-09 PROCEDURE — 93010 ELECTROCARDIOGRAM REPORT: CPT

## 2017-02-09 PROCEDURE — 99233 SBSQ HOSP IP/OBS HIGH 50: CPT | Mod: GC

## 2017-02-09 PROCEDURE — 99291 CRITICAL CARE FIRST HOUR: CPT

## 2017-02-09 PROCEDURE — 93010 ELECTROCARDIOGRAM REPORT: CPT | Mod: 77

## 2017-02-09 PROCEDURE — 71010: CPT | Mod: 26

## 2017-02-09 RX ORDER — POTASSIUM CHLORIDE 20 MEQ
20 PACKET (EA) ORAL ONCE
Qty: 0 | Refills: 0 | Status: COMPLETED | OUTPATIENT
Start: 2017-02-09 | End: 2017-02-09

## 2017-02-09 RX ORDER — SODIUM CHLORIDE 9 MG/ML
1000 INJECTION INTRAMUSCULAR; INTRAVENOUS; SUBCUTANEOUS ONCE
Qty: 0 | Refills: 0 | Status: COMPLETED | OUTPATIENT
Start: 2017-02-09 | End: 2017-02-09

## 2017-02-09 RX ORDER — ERYTHROMYCIN ETHYLSUCCINATE 400 MG
250 TABLET ORAL EVERY 12 HOURS
Qty: 0 | Refills: 0 | Status: DISCONTINUED | OUTPATIENT
Start: 2017-02-09 | End: 2017-02-09

## 2017-02-09 RX ORDER — ERYTHROMYCIN ETHYLSUCCINATE 400 MG
325 TABLET ORAL EVERY 12 HOURS
Qty: 0 | Refills: 0 | Status: DISCONTINUED | OUTPATIENT
Start: 2017-02-09 | End: 2017-02-10

## 2017-02-09 RX ORDER — MIDAZOLAM HYDROCHLORIDE 1 MG/ML
2 INJECTION, SOLUTION INTRAMUSCULAR; INTRAVENOUS
Qty: 100 | Refills: 0 | Status: DISCONTINUED | OUTPATIENT
Start: 2017-02-09 | End: 2017-02-13

## 2017-02-09 RX ORDER — PANTOPRAZOLE SODIUM 20 MG/1
40 TABLET, DELAYED RELEASE ORAL DAILY
Qty: 0 | Refills: 0 | Status: DISCONTINUED | OUTPATIENT
Start: 2017-02-09 | End: 2017-02-22

## 2017-02-09 RX ORDER — VENLAFAXINE HCL 75 MG
75 CAPSULE, EXT RELEASE 24 HR ORAL EVERY 12 HOURS
Qty: 0 | Refills: 0 | Status: DISCONTINUED | OUTPATIENT
Start: 2017-02-09 | End: 2017-02-10

## 2017-02-09 RX ORDER — ERYTHROMYCIN ETHYLSUCCINATE 400 MG
250 TABLET ORAL ONCE
Qty: 0 | Refills: 0 | Status: COMPLETED | OUTPATIENT
Start: 2017-02-09 | End: 2017-02-09

## 2017-02-09 RX ADMIN — MIDAZOLAM HYDROCHLORIDE 2 MG/HR: 1 INJECTION, SOLUTION INTRAMUSCULAR; INTRAVENOUS at 19:30

## 2017-02-09 RX ADMIN — RISPERIDONE 1 MILLIGRAM(S): 4 TABLET ORAL at 12:12

## 2017-02-09 RX ADMIN — CHLORHEXIDINE GLUCONATE 15 MILLILITER(S): 213 SOLUTION TOPICAL at 07:39

## 2017-02-09 RX ADMIN — HEPARIN SODIUM 7500 UNIT(S): 5000 INJECTION INTRAVENOUS; SUBCUTANEOUS at 13:02

## 2017-02-09 RX ADMIN — PIPERACILLIN AND TAZOBACTAM 200 GRAM(S): 4; .5 INJECTION, POWDER, LYOPHILIZED, FOR SOLUTION INTRAVENOUS at 18:05

## 2017-02-09 RX ADMIN — PIPERACILLIN AND TAZOBACTAM 200 GRAM(S): 4; .5 INJECTION, POWDER, LYOPHILIZED, FOR SOLUTION INTRAVENOUS at 07:38

## 2017-02-09 RX ADMIN — Medication 20 MILLIEQUIVALENT(S): at 08:31

## 2017-02-09 RX ADMIN — SODIUM CHLORIDE 4000 MILLILITER(S): 9 INJECTION INTRAMUSCULAR; INTRAVENOUS; SUBCUTANEOUS at 15:30

## 2017-02-09 RX ADMIN — Medication 75 MILLIGRAM(S): at 12:40

## 2017-02-09 RX ADMIN — AMLODIPINE BESYLATE 5 MILLIGRAM(S): 2.5 TABLET ORAL at 00:57

## 2017-02-09 RX ADMIN — PIPERACILLIN AND TAZOBACTAM 200 GRAM(S): 4; .5 INJECTION, POWDER, LYOPHILIZED, FOR SOLUTION INTRAVENOUS at 12:12

## 2017-02-09 RX ADMIN — Medication 650 MILLIGRAM(S): at 16:00

## 2017-02-09 RX ADMIN — PROPOFOL 5 MICROGRAM(S)/KG/MIN: 10 INJECTION, EMULSION INTRAVENOUS at 12:13

## 2017-02-09 RX ADMIN — DEXMEDETOMIDINE HYDROCHLORIDE IN 0.9% SODIUM CHLORIDE 13.61 MICROGRAM(S)/KG/HR: 4 INJECTION INTRAVENOUS at 15:30

## 2017-02-09 RX ADMIN — Medication 650 MILLIGRAM(S): at 07:37

## 2017-02-09 RX ADMIN — PANTOPRAZOLE SODIUM 40 MILLIGRAM(S): 20 TABLET, DELAYED RELEASE ORAL at 12:12

## 2017-02-09 RX ADMIN — DEXMEDETOMIDINE HYDROCHLORIDE IN 0.9% SODIUM CHLORIDE 13.61 MICROGRAM(S)/KG/HR: 4 INJECTION INTRAVENOUS at 12:12

## 2017-02-09 RX ADMIN — Medication 250 MILLIGRAM(S): at 13:03

## 2017-02-09 RX ADMIN — FENTANYL CITRATE 5.45 MICROGRAM(S)/KG/HR: 50 INJECTION INTRAVENOUS at 12:13

## 2017-02-09 RX ADMIN — Medication 250 MILLIGRAM(S): at 22:55

## 2017-02-09 RX ADMIN — CHLORHEXIDINE GLUCONATE 15 MILLILITER(S): 213 SOLUTION TOPICAL at 18:05

## 2017-02-09 RX ADMIN — PROPOFOL 5 MICROGRAM(S)/KG/MIN: 10 INJECTION, EMULSION INTRAVENOUS at 20:00

## 2017-02-09 RX ADMIN — PIPERACILLIN AND TAZOBACTAM 200 GRAM(S): 4; .5 INJECTION, POWDER, LYOPHILIZED, FOR SOLUTION INTRAVENOUS at 00:52

## 2017-02-09 RX ADMIN — HEPARIN SODIUM 7500 UNIT(S): 5000 INJECTION INTRAVENOUS; SUBCUTANEOUS at 07:37

## 2017-02-09 RX ADMIN — Medication 75 MILLIGRAM(S): at 22:55

## 2017-02-09 RX ADMIN — HEPARIN SODIUM 7500 UNIT(S): 5000 INJECTION INTRAVENOUS; SUBCUTANEOUS at 21:27

## 2017-02-09 NOTE — PROGRESS NOTE ADULT - SUBJECTIVE AND OBJECTIVE BOX
INTERVAL HPI/OVERNIGHT EVENTS:    OVERNIGHT: No overnight events.  SUBJECTIVE: Patient seen and examined at bedside.     OBJECTIVE:    VITAL SIGNS:  ICU Vital Signs Last 24 Hrs  T(C): 38.4, Max: 38.4 (02-09 @ 01:00)  T(F): 101.1, Max: 101.1 (02-09 @ 01:00)  HR: 79 (76 - 136)  BP: 139/84 (119/63 - 179/125)  BP(mean): 100 (80 - 149)  ABP: --  ABP(mean): --  RR: 14 (13 - 27)  SpO2: 98% (90% - 100%)    Mode: AC/ CMV (Assist Control/ Continuous Mandatory Ventilation), RR (machine): 14, TV (machine): 530, FiO2: 50, PEEP: 8, ITime: 0.85, MAP: 12, PIP: 26  I & Os for 24h ending 02-08 @ 07:00  =============================================  IN: 2129.3 ml / OUT: 0 ml / NET: 2129.3 ml    I & Os for current day (as of 02-09 @ 06:55)  =============================================  IN: 1776.2 ml / OUT: 0 ml / NET: 1776.2 ml    CAPILLARY BLOOD GLUCOSE  107 (08 Feb 2017 18:00)      PHYSICAL EXAM:    General: NAD, comfortable  HEENT: NCAT, PERRL, clear conjunctiva, no scleral icterus; MMM no thrush  Neck: supple, no JVD  Respiratory: bronchial breath sounds b/l, bibasilar crackles L>R, no wheezing, rhonchi  Cardiovascular: RRR, normal S1S2, no M/R/G  Abdomen: soft, NT/ND, bowel sounds in all four quadrants, small fluctuant mass near heparin subq injection site   Extremities: WWP, no clubbing, cyanosis, or edema  Neuro: responds to voice and pain  Vasc: 2+ Carotid, radial, femoral, 1+ DP pulses  MSK: no joint swelling    MEDICATIONS:  MEDICATIONS  (STANDING):  pantoprazole  Injectable 40milliGRAM(s) IV Push daily  propofol Infusion 7.652MICROgram(s)/kG/Min IV Continuous <Continuous>  chlorhexidine 0.12% Liquid 15milliLiter(s) Swish and Spit two times a day  heparin  Injectable 7500Unit(s) SubCutaneous every 8 hours  dexmedetomidine Infusion 0.5MICROgram(s)/kG/Hr IV Continuous <Continuous>  amLODIPine   Tablet 5milliGRAM(s) Oral daily  fentaNYL   Infusion 0.5MICROgram(s)/kG/Hr IV Continuous <Continuous>  piperacillin/tazobactam IVPB. 4.5Gram(s) IV Intermittent every 6 hours  midazolam Infusion 0.04mG/Hr IV Continuous <Continuous>  risperiDONE   Tablet 1milliGRAM(s) Oral daily  venlafaxine XR. 150milliGRAM(s) Oral daily    MEDICATIONS  (PRN):  acetaminophen    Suspension 650milliGRAM(s) Oral every 6 hours PRN For Temp greater than 38 C (100.4 F)      ALLERGIES:  Allergies    No Known Drug Allergies  Seafood (Rash)    Intolerances        LABS:                        14.1   8.6   )-----------( 277      ( 08 Feb 2017 04:58 )             45.3     08 Feb 2017 04:58    139    |  105    |  16     ----------------------------<  108    3.3     |  24     |  0.69     Ca    9.4        08 Feb 2017 04:58  Phos  4.1       08 Feb 2017 04:58  Mg     2.1       08 Feb 2017 04:58            RADIOLOGY & ADDITIONAL TESTS: Reviewed.      Assessment/Plan:  30 y/o F with PMHx HTN, HLD, congenital ASD s/p repair, MICHAEL, HOCM, family h/o of neuromuscular hypoventilation syndrome, DVT in 2008 (s/p Coumadin last dose 2009) admitted to MICU after hypercapnic respiratory failure 2/2 MSSA PNA, now s/p 10-day course of oxacillin. With persistent fevers and started on Zosyn for additional coverage for likely new pneumonia.          Pulm  #Hypercapnic respiratory failure 2/2 MSSA PNA complicated by potential diaphragmatic issue. Pt with MICHAEL and family hx of neuromuscular disorder as her father required diaphragmatic pacemaker. Ordered for genetic test. Pulm following. b/l opacities on CXR, small L pleural effusion on bedside US on 2/6.  - c/w mechanical ventilation  - for trach if unable to wean off vent; as per Pulm, will wait and monitor progress  - Provigil 200mg daily   - FU serum genetic tests for diaphragmatic issue     ID  #HAP vs. VAP: Initially treated for MSSA PNA, completing 10-day course of oxacillin. MSSA grown from bronchial wash. BCx NGTD. Now with persistent fevers - 102.2 on 2/7 but downtrending fever curve on 02/08. . Given that only GNR were isolated on gram stain  No indication for MRSA coverage. Can broaden to vancomycin if patient's condition worsens. Zosyn coverage appropriate given resistance levels <10% and likely gram negative in etiology.  - c/w Zosyn (day 2, begun on 2/7)  - f/u bcx    CV  #HD. H/o HTN on home BP meds. Will restart when clinically improved. [place] for strict I&Os.     #Pump. EF 60%. No diastolic HF. Echo finding of asymmetric septal wall hypertrophy. Cardiology consulted and dx HCOM but low suspicion for causing resp failure and also low likelihood of arrhythmogenic. Cardiology recommends care in not reducing preload and also starting beta blocker when clinically appropriate.         Heme  #Thrombocytopenia with h/o DVT. US neg for DVT. Platelets stable. Hem consult for possible APLAS.   - f/u Heme recs    Neuro  Pt apneic, required intubation. Unclear etiology, could be central sleep apnea vs. MICHAEL or neuromuscular disorder from family hx. CT head shows no intracranial hemorrhage or acute transcortical infarct.   - c/w modafinil daily  - possible diaphragmatic pacer placement  eventually    Psych  Restart effoxor/risperidone    FEN:  tube feeds with Promote  PPX: HSQ, SCDs, PPI  Sedation: c/w precedex; f/u EKG in AM to monitor QT (reglan)  Access: peripheral.     Full CODE INTERVAL HPI/OVERNIGHT EVENTS: Spiked to 101.1 overnight.     OVERNIGHT: No overnight events.  SUBJECTIVE: Patient seen and examined at bedside.     OBJECTIVE:    VITAL SIGNS:  ICU Vital Signs Last 24 Hrs  T(C): 38.4, Max: 38.4 (02-09 @ 01:00)  T(F): 101.1, Max: 101.1 (02-09 @ 01:00)  HR: 79 (76 - 136)  BP: 139/84 (119/63 - 179/125)  BP(mean): 100 (80 - 149)  ABP: --  ABP(mean): --  RR: 14 (13 - 27)  SpO2: 98% (90% - 100%)    Mode: AC/ CMV (Assist Control/ Continuous Mandatory Ventilation), RR (machine): 14, TV (machine): 530, FiO2: 50, PEEP: 8, ITime: 0.85, MAP: 12, PIP: 26  I & Os for 24h ending 02-08 @ 07:00  =============================================  IN: 2129.3 ml / OUT: 0 ml / NET: 2129.3 ml    I & Os for current day (as of 02-09 @ 06:55)  =============================================  IN: 1776.2 ml / OUT: 0 ml / NET: 1776.2 ml    CAPILLARY BLOOD GLUCOSE  107 (08 Feb 2017 18:00)      PHYSICAL EXAM:    General: NAD, comfortable  HEENT: NCAT, PERRL, clear conjunctiva, no scleral icterus; MMM no thrush  Neck: supple, no JVD  Respiratory: bronchial breath sounds b/l, bibasilar crackles L>R, no wheezing, rhonchi  Cardiovascular: RRR, normal S1S2, no M/R/G  Abdomen: soft, NT/ND, bowel sounds in all four quadrants, small fluctuant mass near heparin subq injection site   Extremities: WWP, no clubbing, cyanosis, or edema  Neuro: responds to voice and pain  Vasc: 2+ Carotid, radial, femoral, 1+ DP pulses  MSK: no joint swelling    MEDICATIONS:  MEDICATIONS  (STANDING):  pantoprazole  Injectable 40milliGRAM(s) IV Push daily  propofol Infusion 7.652MICROgram(s)/kG/Min IV Continuous <Continuous>  chlorhexidine 0.12% Liquid 15milliLiter(s) Swish and Spit two times a day  heparin  Injectable 7500Unit(s) SubCutaneous every 8 hours  dexmedetomidine Infusion 0.5MICROgram(s)/kG/Hr IV Continuous <Continuous>  amLODIPine   Tablet 5milliGRAM(s) Oral daily  fentaNYL   Infusion 0.5MICROgram(s)/kG/Hr IV Continuous <Continuous>  piperacillin/tazobactam IVPB. 4.5Gram(s) IV Intermittent every 6 hours  midazolam Infusion 0.04mG/Hr IV Continuous <Continuous>  risperiDONE   Tablet 1milliGRAM(s) Oral daily  venlafaxine XR. 150milliGRAM(s) Oral daily    MEDICATIONS  (PRN):  acetaminophen    Suspension 650milliGRAM(s) Oral every 6 hours PRN For Temp greater than 38 C (100.4 F)      ALLERGIES:  Allergies    No Known Drug Allergies  Seafood (Rash)    Intolerances        LABS:                        14.1   8.6   )-----------( 277      ( 08 Feb 2017 04:58 )             45.3     08 Feb 2017 04:58    139    |  105    |  16     ----------------------------<  108    3.3     |  24     |  0.69     Ca    9.4        08 Feb 2017 04:58  Phos  4.1       08 Feb 2017 04:58  Mg     2.1       08 Feb 2017 04:58            RADIOLOGY & ADDITIONAL TESTS: Reviewed.      Assessment/Plan:  30 y/o F with PMHx HTN, HLD, congenital ASD s/p repair, MICHAEL, HOCM, family h/o of neuromuscular hypoventilation syndrome, DVT in 2008 (s/p Coumadin last dose 2009) admitted to MICU after hypercapnic respiratory failure 2/2 MSSA PNA, now s/p 10-day course of oxacillin. With persistent fevers and started on Zosyn for additional coverage for likely new pneumonia.    Pulm  #Hypercapnic respiratory failure 2/2 MSSA PNA complicated by potential diaphragmatic issue. Pt with MICHAEL and family hx of neuromuscular disorder as her father required diaphragmatic pacemaker. Ordered for genetic test. Pulm following. b/l opacities on CXR, small L pleural effusion on bedside US on 2/6.  - c/w mechanical ventilation - patient went apneic today on CPAP trial.   - for trach if unable to wean off vent; as per Pulm, will wait and monitor progress  - Provigil 200mg daily   - FU serum genetic tests for diaphragmatic issue   - Will attempt to find medical staff for EMG testing of diaphragm.     ID  #HAP vs. VAP: Initially treated for MSSA PNA, completing 10-day course of oxacillin for MSSA grown from bronchial wash. Now with persistent fevers -starting [id number] on 2/7 but downtrending fever curve on 02/09. . Given that only GNR and Gram Neg diplococci( likely moraxella) were isolated on gram stain of sputum No indication for MRSA coverage . Zosyn coverage appropriate given resistance levels <10% and likely gram negative in etiology.  - c/w Zosyn (day 3, begun on 2/7)  - f/u bcx - NGTD     CV  #HD. H/o HTN on home BP meds. Will restart when clinically improved. [place] for strict I&Os.     #Pump. EF 60%. No diastolic HF. Echo finding of asymmetric septal wall hypertrophy. Cardiology consulted and dx HCOM but low suspicion for causing resp failure and also low likelihood of arrhythmogenic. Cardiology recommends care in not reducing preload and also starting beta blocker when clinically appropriate.     Neuro  Pt apneic, required intubation. Unclear etiology, could be central sleep apnea vs. MICHAEL or neuromuscular disorder from family hx. CT head shows no intracranial hemorrhage or acute transcortical infarct.   - c/w modafinil daily  - possible diaphragmatic pacer placement  eventually    Psych  Patient has a history of bipolar disorder on effoxor/risperidone. She currently seems to have an element of ICU delirium and was treated on Seroquel.   -Will consider Psychiatry eval for optimal pharmacologic therapy     FEN:  tube feeds with Promote  PPX: HSQ, SCDs, PPI  Sedation: c/w precedex; f/u EKG in AM to monitor QT (reglan)  Access: peripheral.     Full CODE INTERVAL HPI/OVERNIGHT EVENTS: Spiked to 101.1 overnight.     OVERNIGHT: No overnight events.  SUBJECTIVE: Patient seen and examined at bedside.     OBJECTIVE:    VITAL SIGNS:  ICU Vital Signs Last 24 Hrs  T(C): 38.4, Max: 38.4 (02-09 @ 01:00)  T(F): 101.1, Max: 101.1 (02-09 @ 01:00)  HR: 79 (76 - 136)  BP: 139/84 (119/63 - 179/125)  BP(mean): 100 (80 - 149)  ABP: --  ABP(mean): --  RR: 14 (13 - 27)  SpO2: 98% (90% - 100%)    Mode: AC/ CMV (Assist Control/ Continuous Mandatory Ventilation), RR (machine): 14, TV (machine): 530, FiO2: 50, PEEP: 8, ITime: 0.85, MAP: 12, PIP: 26  I & Os for 24h ending 02-08 @ 07:00  =============================================  IN: 2129.3 ml / OUT: 0 ml / NET: 2129.3 ml    I & Os for current day (as of 02-09 @ 06:55)  =============================================  IN: 1776.2 ml / OUT: 0 ml / NET: 1776.2 ml    CAPILLARY BLOOD GLUCOSE  107 (08 Feb 2017 18:00)      PHYSICAL EXAM:    General: NAD, comfortable  HEENT: NCAT, PERRL, clear conjunctiva, no scleral icterus; MMM no thrush  Neck: supple, no JVD  Respiratory: bronchial breath sounds b/l, bibasilar crackles L>R, no wheezing, rhonchi  Cardiovascular: RRR, normal S1S2, no M/R/G  Abdomen: soft, NT/ND, bowel sounds in all four quadrants, small fluctuant mass near heparin subq injection site   Extremities: WWP, no clubbing, cyanosis, or edema  Neuro: responds to voice and pain  Vasc: 2+ Carotid, radial, femoral, 1+ DP pulses  MSK: no joint swelling    MEDICATIONS:  MEDICATIONS  (STANDING):  pantoprazole  Injectable 40milliGRAM(s) IV Push daily  propofol Infusion 7.652MICROgram(s)/kG/Min IV Continuous <Continuous>  chlorhexidine 0.12% Liquid 15milliLiter(s) Swish and Spit two times a day  heparin  Injectable 7500Unit(s) SubCutaneous every 8 hours  dexmedetomidine Infusion 0.5MICROgram(s)/kG/Hr IV Continuous <Continuous>  amLODIPine   Tablet 5milliGRAM(s) Oral daily  fentaNYL   Infusion 0.5MICROgram(s)/kG/Hr IV Continuous <Continuous>  piperacillin/tazobactam IVPB. 4.5Gram(s) IV Intermittent every 6 hours  midazolam Infusion 0.04mG/Hr IV Continuous <Continuous>  risperiDONE   Tablet 1milliGRAM(s) Oral daily  venlafaxine XR. 150milliGRAM(s) Oral daily    MEDICATIONS  (PRN):  acetaminophen    Suspension 650milliGRAM(s) Oral every 6 hours PRN For Temp greater than 38 C (100.4 F)      ALLERGIES:  Allergies    No Known Drug Allergies  Seafood (Rash)    Intolerances        LABS:                        14.1   8.6   )-----------( 277      ( 08 Feb 2017 04:58 )             45.3     08 Feb 2017 04:58    139    |  105    |  16     ----------------------------<  108    3.3     |  24     |  0.69     Ca    9.4        08 Feb 2017 04:58  Phos  4.1       08 Feb 2017 04:58  Mg     2.1       08 Feb 2017 04:58            RADIOLOGY & ADDITIONAL TESTS: Reviewed.      Assessment/Plan:  30 y/o F with PMHx HTN, HLD, congenital ASD s/p repair, MICHAEL, HOCM, family h/o of neuromuscular hypoventilation syndrome, DVT in 2008 (s/p Coumadin last dose 2009) admitted to MICU after hypercapnic respiratory failure 2/2 MSSA PNA, now s/p 10-day course of oxacillin. With persistent fevers and started on Zosyn for additional coverage for likely new pneumonia.    Pulm  #Hypercapnic respiratory failure 2/2 MSSA PNA complicated by potential diaphragmatic issue. Pt with MICHAEL and family hx of neuromuscular disorder as her father required diaphragmatic pacemaker. Ordered for genetic test. Pulm following. b/l opacities on CXR, small L pleural effusion on bedside US on 2/6. Placed on TC with decreased sedation and clearly hypoventilating.  - c/w mechanical ventilation - patient went apneic today on CPAP trial.   - for trach if unable to wean off vent; as per Pulm, will wait and monitor progress  - Provigil 200mg daily   - FU serum genetic tests for diaphragmatic issue   - Will attempt to find medical staff for EMG testing of diaphragm.     ID  #HAP vs. VAP: Initially treated for MSSA PNA, completing 10-day course of oxacillin for MSSA grown from bronchial wash. Now with persistent fevers -starting [id number] on 2/7 but downtrending fever curve on 02/09. . Given that only GNR and Gram Neg diplococci( likely moraxella) were isolated on gram stain of sputum No indication for MRSA coverage . Zosyn coverage appropriate given resistance levels <10% and likely gram negative in etiology.  - c/w Zosyn (day 3, begun on 2/7)  - f/u bcx - NGTD     CV  #HD. H/o HTN on home BP meds. Will restart when clinically improved. [place] for strict I&Os.     #Pump. EF 60%. No diastolic HF. Echo finding of asymmetric septal wall hypertrophy. Cardiology consulted and dx HCOM but low suspicion for causing resp failure and also low likelihood of arrhythmogenic. Cardiology recommends care in not reducing preload and also starting beta blocker when clinically appropriate.     Neuro  Pt apneic, required intubation. Unclear etiology, could be central sleep apnea vs. MICHAEL or neuromuscular disorder from family hx. CT head shows no intracranial hemorrhage or acute transcortical infarct.   - c/w modafinil daily  - possible diaphragmatic pacer placement  eventually    Psych  Patient has a history of bipolar disorder on effoxor/risperidone. She currently seems to have an element of ICU delirium and was treated on Seroquel but this was DC'd in favor of the risperidone.        FEN:  tube feeds with Promote  PPX: HSQ, SCDs, PPI  Sedation: c/w precedex; f/u EKG in AM to monitor QT (reglan)  Access: peripheral.     Full CODE INTERVAL HPI/OVERNIGHT EVENTS: Spiked to 101.1 overnight.     SUBJECTIVE: Patient seen and examined at bedside. Patient unable to give subjective information due to intubation.     OBJECTIVE:    VITAL SIGNS:  ICU Vital Signs Last 24 Hrs  T(C): 38.4, Max: 38.4 (02-09 @ 01:00)  T(F): 101.1, Max: 101.1 (02-09 @ 01:00)  HR: 79 (76 - 136)  BP: 139/84 (119/63 - 179/125)  BP(mean): 100 (80 - 149)  ABP: --  ABP(mean): --  RR: 14 (13 - 27)  SpO2: 98% (90% - 100%)    Mode: AC/ CMV (Assist Control/ Continuous Mandatory Ventilation), RR (machine): 14, TV (machine): 530, FiO2: 50, PEEP: 8, ITime: 0.85, MAP: 12, PIP: 26  I & Os for 24h ending 02-08 @ 07:00  =============================================  IN: 2129.3 ml / OUT: 0 ml / NET: 2129.3 ml    I & Os for current day (as of 02-09 @ 06:55)  =============================================  IN: 1776.2 ml / OUT: 0 ml / NET: 1776.2 ml    CAPILLARY BLOOD GLUCOSE  107 (08 Feb 2017 18:00)      PHYSICAL EXAM:    General: NAD, comfortable  HEENT: NCAT, PERRL, clear conjunctiva, no scleral icterus; MMM no thrush  Neck: supple, no JVD  Respiratory: bronchial breath sounds b/l, bibasilar crackles L>R, no wheezing, rhonchi  Cardiovascular: RRR, normal S1S2, no M/R/G  Abdomen: soft, NT/ND, bowel sounds in all four quadrants, small fluctuant mass near heparin subq injection site   Extremities: WWP, no clubbing, cyanosis, or edema  Neuro: responds to voice and pain  Vasc: 2+ Carotid, radial, femoral, 1+ DP pulses  MSK: no joint swelling    MEDICATIONS:  MEDICATIONS  (STANDING):  pantoprazole  Injectable 40milliGRAM(s) IV Push daily  propofol Infusion 7.652MICROgram(s)/kG/Min IV Continuous <Continuous>  chlorhexidine 0.12% Liquid 15milliLiter(s) Swish and Spit two times a day  heparin  Injectable 7500Unit(s) SubCutaneous every 8 hours  dexmedetomidine Infusion 0.5MICROgram(s)/kG/Hr IV Continuous <Continuous>  amLODIPine   Tablet 5milliGRAM(s) Oral daily  fentaNYL   Infusion 0.5MICROgram(s)/kG/Hr IV Continuous <Continuous>  piperacillin/tazobactam IVPB. 4.5Gram(s) IV Intermittent every 6 hours  midazolam Infusion 0.04mG/Hr IV Continuous <Continuous>  risperiDONE   Tablet 1milliGRAM(s) Oral daily  venlafaxine XR. 150milliGRAM(s) Oral daily    MEDICATIONS  (PRN):  acetaminophen    Suspension 650milliGRAM(s) Oral every 6 hours PRN For Temp greater than 38 C (100.4 F)      ALLERGIES:  Allergies    No Known Drug Allergies  Seafood (Rash)    Intolerances        LABS:                        14.1   8.6   )-----------( 277      ( 08 Feb 2017 04:58 )             45.3     08 Feb 2017 04:58    139    |  105    |  16     ----------------------------<  108    3.3     |  24     |  0.69     Ca    9.4        08 Feb 2017 04:58  Phos  4.1       08 Feb 2017 04:58  Mg     2.1       08 Feb 2017 04:58            RADIOLOGY & ADDITIONAL TESTS: Reviewed.      Assessment/Plan:  32 y/o F with PMHx HTN, HLD, congenital ASD s/p repair, MICHAEL, HOCM, family h/o of neuromuscular hypoventilation syndrome, DVT in 2008 (s/p Coumadin last dose 2009) admitted to MICU after hypercapnic respiratory failure 2/2 MSSA PNA, now s/p 10-day course of oxacillin. With persistent fevers and started on Zosyn for additional coverage for likely new pneumonia.    Pulm  #Hypercapnic respiratory failure 2/2 MSSA PNA complicated by potential diaphragmatic issue. Pt with MICHAEL and family hx of neuromuscular disorder as her father required diaphragmatic pacemaker. Ordered for genetic test. Pulm following. b/l opacities on CXR, small L pleural effusion on bedside US on 2/6. Placed on TC with decreased sedation and clearly hypoventilating.  - c/w mechanical ventilation - patient went apneic today on CPAP trial.   - for trach if unable to wean off vent; as per Pulm, will wait and monitor progress  - Provigil 200mg daily   - FU serum genetic tests for diaphragmatic issue   - Will attempt to find medical staff for EMG testing of diaphragm.     ID  #HAP vs. VAP: Initially treated for MSSA PNA, completing 10-day course of oxacillin for MSSA grown from bronchial wash. Now with persistent fevers -starting [id number] on 2/7 but downtrending fever curve on 02/09. . Given that only GNR and Gram Neg diplococci( likely moraxella) were isolated on gram stain of sputum No indication for MRSA coverage . Zosyn coverage appropriate given resistance levels <10% and likely gram negative in etiology.  - c/w Zosyn (day 3, begun on 2/7)  - f/u bcx - NGTD     CV  #HD. H/o HTN on home BP meds. Will restart when clinically improved. [place] for strict I&Os.     #Pump. EF 60%. No diastolic HF. Echo finding of asymmetric septal wall hypertrophy. Cardiology consulted and dx HCOM but low suspicion for causing resp failure and also low likelihood of arrhythmogenic. Cardiology recommends care in not reducing preload and also starting beta blocker when clinically appropriate.     Neuro  Pt apneic, required intubation. Unclear etiology, could be central sleep apnea vs. MICHAEL or neuromuscular disorder from family hx. CT head shows no intracranial hemorrhage or acute transcortical infarct.   - c/w modafinil daily  - possible diaphragmatic pacer placement  eventually    Psych  Patient has a history of bipolar disorder on effoxor/risperidone. She currently seems to have an element of ICU delirium and was treated on Seroquel but this was DC'd in favor of the risperidone.        FEN:  tube feeds with Promote  PPX: HSQ, SCDs, PPI  Sedation: c/w precedex; f/u EKG in AM to monitor QT (reglan)  Access: peripheral.     Full CODE INTERVAL HPI/OVERNIGHT EVENTS: Spiked to 101.1 overnight.     SUBJECTIVE: Patient seen and examined at bedside. Patient unable to give subjective information due to intubation.     OBJECTIVE:    VITAL SIGNS:  ICU Vital Signs Last 24 Hrs  T(C): 38.4, Max: 38.4 (02-09 @ 01:00)  T(F): 101.1, Max: 101.1 (02-09 @ 01:00)  HR: 79 (76 - 136)  BP: 139/84 (119/63 - 179/125)  BP(mean): 100 (80 - 149)  ABP: --  ABP(mean): --  RR: 14 (13 - 27)  SpO2: 98% (90% - 100%)    Mode: AC/ CMV (Assist Control/ Continuous Mandatory Ventilation), RR (machine): 14, TV (machine): 530, FiO2: 50, PEEP: 8, ITime: 0.85, MAP: 12, PIP: 26  I & Os for 24h ending 02-08 @ 07:00  =============================================  IN: 2129.3 ml / OUT: 0 ml / NET: 2129.3 ml    I & Os for current day (as of 02-09 @ 06:55)  =============================================  IN: 1776.2 ml / OUT: 0 ml / NET: 1776.2 ml    CAPILLARY BLOOD GLUCOSE  107 (08 Feb 2017 18:00)      PHYSICAL EXAM:    General: NAD, comfortable  HEENT: NCAT, PERRL, clear conjunctiva, no scleral icterus; MMM no thrush  Neck: supple, no JVD  Respiratory: bronchial breath sounds b/l, bibasilar crackles L>R, no wheezing, rhonchi  Cardiovascular: RRR, normal S1S2, no M/R/G  Abdomen: soft, NT/ND, bowel sounds in all four quadrants, small fluctuant mass near heparin subq injection site   Extremities: WWP, no clubbing, cyanosis, or edema  Neuro: responds to voice and pain  Vasc: 2+ Carotid, radial, femoral, 1+ DP pulses  MSK: no joint swelling    MEDICATIONS:  MEDICATIONS  (STANDING):  pantoprazole  Injectable 40milliGRAM(s) IV Push daily  propofol Infusion 7.652MICROgram(s)/kG/Min IV Continuous <Continuous>  chlorhexidine 0.12% Liquid 15milliLiter(s) Swish and Spit two times a day  heparin  Injectable 7500Unit(s) SubCutaneous every 8 hours  dexmedetomidine Infusion 0.5MICROgram(s)/kG/Hr IV Continuous <Continuous>  amLODIPine   Tablet 5milliGRAM(s) Oral daily  fentaNYL   Infusion 0.5MICROgram(s)/kG/Hr IV Continuous <Continuous>  piperacillin/tazobactam IVPB. 4.5Gram(s) IV Intermittent every 6 hours  midazolam Infusion 0.04mG/Hr IV Continuous <Continuous>  risperiDONE   Tablet 1milliGRAM(s) Oral daily  venlafaxine XR. 150milliGRAM(s) Oral daily    MEDICATIONS  (PRN):  acetaminophen    Suspension 650milliGRAM(s) Oral every 6 hours PRN For Temp greater than 38 C (100.4 F)      ALLERGIES:  Allergies    No Known Drug Allergies  Seafood (Rash)    Intolerances        LABS:                        14.1   8.6   )-----------( 277      ( 08 Feb 2017 04:58 )             45.3     08 Feb 2017 04:58    139    |  105    |  16     ----------------------------<  108    3.3     |  24     |  0.69     Ca    9.4        08 Feb 2017 04:58  Phos  4.1       08 Feb 2017 04:58  Mg     2.1       08 Feb 2017 04:58            RADIOLOGY & ADDITIONAL TESTS: Reviewed.      Assessment/Plan:  30 y/o F with PMHx HTN, HLD, congenital ASD s/p repair, MICHAEL, HOCM, family h/o of neuromuscular hypoventilation syndrome, DVT in 2008 (s/p Coumadin last dose 2009) admitted to MICU after hypercapnic respiratory failure 2/2 MSSA PNA, was doing well after 10 day course of oxacillin now with persistent fevers and started on Zosyn for additional coverage for likely new pneumonia and now pending trach for respiratory status.    Pulm  #Hypercapnic respiratory failure 2/2 MSSA PNA complicated by potential diaphragmatic issue. Pt with MICHAEL and family hx of neuromuscular disorder as her father required diaphragmatic pacemaker. Ordered for genetic test. Pulm following. b/l opacities on CXR, small L pleural effusion on bedside US on 2/6. Placed on TC with decreased sedation and clearly hypoventilating.  - c/w mechanical ventilation - patient went apneic today on CPAP trial.   -  trach tomorrow and NPO  since difficultly with  weaningf vent; as per Pulm, will wait and monitor progress  - Provigil 200mg daily   - FU serum genetic tests for diaphragmatic issue   - Will attempt to find medical staff for EMG testing of diaphragm.     ID  #HAP vs. VAP: Initially treated for MSSA PNA, completing 10-day course of oxacillin for MSSA grown from bronchial wash. Now with persistent fevers -starting [id number] on 2/7 but downtrending fever curve on 02/09. . Given that only GNR and Gram Neg diplococci( likely moraxella) were isolated on gram stain of sputum No indication for MRSA coverage . Zosyn coverage appropriate given resistance levels <10% and likely gram negative in etiology.  - c/w Zosyn (day 3, begun on 2/7)  - f/u bcx - NGTD     CV  #HD. H/o HTN on home BP meds. Will restart when clinically improved. [place] for strict I&Os.     #Pump. EF 60%. No diastolic HF. Echo finding of asymmetric septal wall hypertrophy. Cardiology consulted and dx HCOM but low suspicion for causing resp failure and also low likelihood of arrhythmogenic. Cardiology recommends care in not reducing preload and also starting beta blocker when clinically appropriate.     Neuro  Pt apneic, required intubation. Unclear etiology, could be central sleep apnea vs. MICHAEL or neuromuscular disorder from family hx. CT head shows no intracranial hemorrhage or acute transcortical infarct.   - c/w modafinil daily  - possible diaphragmatic pacer placement  eventually    Psych  Patient has a history of bipolar disorder on effoxor/risperidone. She currently seems to have an element of ICU delirium and was treated on Seroquel but this was DC'd in favor of the risperidone.   - Currently still on home regimen will assess if a change is needed back to seroquel       FEN:  tube feeds with Promote  PPX: HSQ, SCDs, PPI  Sedation: c/w precedex; f/u EKG in AM to monitor QT (reglan)  Access: peripheral.   DISPO: To remain in MICU for further stabilization  Full CODE

## 2017-02-09 NOTE — CONSULT NOTE ADULT - ATTENDING COMMENTS
Ms. Pardo most likely has congenital central hypoventilation syndrome, as her father has it, it's autosomal dominant, and her clinical picture of long periods of apnea and decompensation during hypercapnia / hypoxia is typical. Do not feel that diaphragm EMG would be helpful as this is a central, not muscular, condition; it would merely show electrical silence between breaths, and normal muscle activity when breath is initiated.     Please call back with further questions or if genetic testing is negative and an alternate explanation is needed.

## 2017-02-09 NOTE — PROGRESS NOTE ADULT - PROBLEM SELECTOR PLAN 1
Patient with hypercapneic respiratory failure possibly due to central hypoventilation syndrome.  The patient was diagnosed with "sleep apnea" when she was 5 years old and has had to use CPAP at night ever since.  Her father has central hypoventilation syndrome with a +PHOX2B gene and has diagraphmatic pacemaker.  -The patient was reintubated today for hypercapneic respiratory failure.  Check PHOX2B gene and will discuss tracheostomy with father and patient.  Conitnue on the Provigil.  Continue on MV and evaluate tomorrow either for extubation trial or trach.  She was changed to Precedex.  She will require trach

## 2017-02-09 NOTE — CHART NOTE - NSCHARTNOTEFT_GEN_A_CORE
EVENT: Called in by RN for hypotension this afternoon to 82/45. She received 1 L of NS bolus and had repeat cultures and lactate drawn. Patient quickly responded and became normotensive and now is currently 169/110 with no signs of shock. Patient was managed and physical assessment performed by Dr. Espinoza.     T(C): 39.1, Max: 39.1 (02-09 @ 15:00)  HR: 98 (74 - 128)  BP: 169/110 (82/45 - 183/107)  RR: 15 (11 - 27)  SpO2: 94% (94% - 100%) EVENT: Called in by RN for hypotension this afternoon to 82/45. She received 1 L of NS bolus and had repeat cultures and lactate drawn. Patient quickly responded and became normotensive and now is currently 169/110 with no signs of shock. Patient was managed and physical assessment performed by Dr. Espinoza.     T(C): 39.1, Max: 39.1 (02-09 @ 15:00)  HR: 98 (74 - 128)  BP: 169/110 (82/45 - 183/107)  RR: 15 (11 - 27)  SpO2: 94% (94% - 100%)    PGY-3 Addendum  Pt also noted to have a rectal temp of 102.3. Physical exam unchanged from prior. Pt was fluid responsive after receiving 1 L of NS. Lactate WNL. UA showed no signs of infection, CBC significant for increasing leukocytosis, however, WNL. At this time, no further escalation in antibiotics. Will check sputum culture to assess for potential VAP, although pt currently on Zosyn, however, with pts prolonged hospital course pt may have developed suprainfection with resistant bacteria. If further pt becomes HD unstable with evidence of septic shock will consider giving Vancomycin and Gentamicin for empiric treatment.

## 2017-02-09 NOTE — CONSULT NOTE ADULT - SUBJECTIVE AND OBJECTIVE BOX
CHIEF COMPLAINT: Central hypoventilation syndrome.    HPI:  30 yo F extremely poor historian, PMHx HTN, HLD, ?paroxysmal afib (reports during pregnancy), congenital cardiac malformation s/p repair, ?MICHAEL, DVT in 2008 (s/p Coumadin last dose 2009), h/o UGIB s/p endoscopy, recent 2 day admission (Jan 2017) to [facility] for SOB/CP/n/v r/o ACS, ECHO (EF 60%, normal sized LA, severely asymmetric septal hypertrophy) admission c/b 10 minute unresponsiveness, resolving on its own with non contributory full w/u, presented to  ED (1/27 at night) again per the patient with similar symptoms that brought her to the hospital earlier this month, SOB/CP/n/v.     In the ED VSS T 98.5, , /98, RR 22, POx 92 RA. Labs and CT were not impressive other than cardiomegaly with ASD repair.  The patient was in the ED for a number of hours. In the early morning the pt vomited and became more tachypneic. She was placed on a non-rebreather. ABG showed CO2 retention. The patient was put on Bipap and MICU was consulted. By the time MICU arrived the patient had further decompensated and was urgently intubated. (28 Jan 2017 19:13)      REVIEW OF SYSTEMS:    Constitutional: No fever, weight loss, or fatigue  Eyes: No eye pain, visual disturbances, or discharge  ENMT:  No difficulty hearing, tinnitus, vertigo; No sinus or throat pain  Neck: No pain or stiffness  Respiratory: No cough, wheezing, or shortness of breath  Cardiovascular: No chest pain, palpitations, or leg swelling  Gastrointestinal: No abdominal pain, nausea, vomiting, diarrhea or constipation.   Genitourinary: No dysuria, frequency, hematuria, or incontinence  Neurological: As per HPI  Skin: No itching, burning, rashes, or lesions   Endocrine: No heat or cold intolerance; No hair loss  Musculoskeletal: No joint pain or swelling; No muscle, back, or extremity pain  Psychiatric: No depression, anxiety, mood swings, or difficulty sleeping  Heme/Lymph: No easy bruising or bleeding; No enlarged glands    PAST MEDICAL & SURGICAL HISTORY:  DVT (deep venous thrombosis)  GIB (gastrointestinal bleeding)  Afib  HTN (hypertension)  Chronic systolic congestive heart failure  HLD (hyperlipidemia)  Myocardial infarction  Congenital heart disease    MEDICATIONS  (STANDING):  propofol Infusion 7.652MICROgram(s)/kG/Min IV Continuous <Continuous>  chlorhexidine 0.12% Liquid 15milliLiter(s) Swish and Spit two times a day  heparin  Injectable 7500Unit(s) SubCutaneous every 8 hours  dexmedetomidine Infusion 0.5MICROgram(s)/kG/Hr IV Continuous <Continuous>  fentaNYL   Infusion 0.5MICROgram(s)/kG/Hr IV Continuous <Continuous>  piperacillin/tazobactam IVPB. 4.5Gram(s) IV Intermittent every 6 hours  risperiDONE   Tablet 1milliGRAM(s) Oral daily  venlafaxine 75milliGRAM(s) Oral every 12 hours  pantoprazole   Suspension 40milliGRAM(s) Oral daily  erythromycin   IVPB 325milliGRAM(s) IV Intermittent every 12 hours    MEDICATIONS  (PRN):  acetaminophen    Suspension 650milliGRAM(s) Oral every 6 hours PRN For Temp greater than 38 C (100.4 F)    Allergies    No Known Drug Allergies  Seafood (Rash)    Intolerances      FAMILY HISTORY:      SOCIAL HISTORY:    Living Situation:    Occupation:    Tobacco:    Alcohol:    Drugs:      VITAL SIGNS:  Vital Signs Last 24 Hrs  T(C): 36.5, Max: 39.1 (02-09 @ 15:00)  T(F): 97.7, Max: 102.3 (02-09 @ 15:00)  HR: 86 (74 - 128)  BP: 174/114 (82/45 - 183/107)  BP(mean): 150 (64 - 150)  RR: 13 (11 - 27)  SpO2: 98% (94% - 100%)    PHYSICAL EXAMINATION:  General: Well-developed, well nourished, in no acute distress.  Eyes: Conjunctiva and sclera clear.  Cardiovascular: Regular rate and rhythm; S1 and S2 Normal; No murmurs, gallops or rubs.  Neurologic:  - Mental Status:  Alert, awake, oriented to person, place, and time; Speech is fluent with intact naming, repetition, and comprehension; Immediate recall is 3/3 words and delayed recall is 3/3 words at 5 minutes; Able to spell WORLD backwards and perform serial 7 subtraction; Able to read and write a sentence; Able to copy a cube; Good overall fund of knowledge.  - Cranial Nerves II-XII:    II:  Visual acuity is 20/20 bilaterally; Visual fields are full to confronation; Fundoscopic exam is normal with sharp discs; Pupils are equal, round, and reactive to light.  III, IV, VI:  Extraocular movements are intact without nystagmus.  V:  Facial sensation is intact in the V1-V3 distribution bilaterally.  VII:  Face is symmetric with normal eye closure and smile  VIII:  Hearing is intact to finger rub.  IX, X:  Uvula is midline and soft palate rises symmetrically  XI:  Head turning and shoulder shrug are intact.  XII:  Tongue protudes in the midline.  - Motor:  Strength is 5/5 throughout.  There is no prontator drift.  Normal muscle bulk and tone throughout.  - Reflexes:  2+ and symmetric at the biceps, triceps, brachioradialis, knees, and ankles.  Plantar responses flexor.  - Sensory:  Intact to light touch, pin prick, vibration, and joint-position sense throughout.  - Coordination:  Finger-nose-finger and heel-knee-shin intact without dysmetria.  Rapid alternating hand movements intact.  - Gait:   Normal steps, base, arm swing, and turning.  Heel and toe walking are normal.  Tandem gait is normal.  Romberg testing is negative.    LABS:      RADIOLOGY & ADDITIONAL STUDIES:      IMPRESSION:      PLAN:  1.   2.   3. CHIEF COMPLAINT: Central hypoventilation syndrome.    HPI:  Pt is a 32 y/o F poor historian w/ PMH HTN, HLD, ?paroxysmal afib (reports during pregnancy), congenital cardiac malformation s/p repair, ?MICHAEL, DVT in 2008 (s/p Coumadin last dose 2009), h/o UGIB s/p endoscopy, recent 2 day admission (Jan 2017) to [facility] for SOB/CP/n/v r/o ACS, ECHO (EF 60%, normal sized LA, severely asymmetric septal hypertrophy) in MICU for hypercapnic respiratory failure 2/2 to suspected central hypoventiliation syndrome. Pt currently intubated and unable to provide a ROS. Pt does however deny any previous neurological conditions or weakness.       REVIEW OF SYSTEMS:    Constitutional: No fever, weight loss, or fatigue  Eyes: No eye pain, visual disturbances, or discharge  ENMT:  No difficulty hearing, tinnitus, vertigo; No sinus or throat pain  Neck: No pain or stiffness  Respiratory: No cough, wheezing, or shortness of breath  Cardiovascular: No chest pain, palpitations, or leg swelling  Gastrointestinal: No abdominal pain, nausea, vomiting, diarrhea or constipation.   Genitourinary: No dysuria, frequency, hematuria, or incontinence  Neurological: As per HPI  Skin: No itching, burning, rashes, or lesions   Endocrine: No heat or cold intolerance; No hair loss  Musculoskeletal: No joint pain or swelling; No muscle, back, or extremity pain  Psychiatric: No depression, anxiety, mood swings, or difficulty sleeping  Heme/Lymph: No easy bruising or bleeding; No enlarged glands    PAST MEDICAL & SURGICAL HISTORY:  DVT (deep venous thrombosis)  GIB (gastrointestinal bleeding)  Afib  HTN (hypertension)  Chronic systolic congestive heart failure  HLD (hyperlipidemia)  Myocardial infarction  Congenital heart disease    MEDICATIONS  (STANDING):  propofol Infusion 7.652MICROgram(s)/kG/Min IV Continuous <Continuous>  chlorhexidine 0.12% Liquid 15milliLiter(s) Swish and Spit two times a day  heparin  Injectable 7500Unit(s) SubCutaneous every 8 hours  dexmedetomidine Infusion 0.5MICROgram(s)/kG/Hr IV Continuous <Continuous>  fentaNYL   Infusion 0.5MICROgram(s)/kG/Hr IV Continuous <Continuous>  piperacillin/tazobactam IVPB. 4.5Gram(s) IV Intermittent every 6 hours  risperiDONE   Tablet 1milliGRAM(s) Oral daily  venlafaxine 75milliGRAM(s) Oral every 12 hours  pantoprazole   Suspension 40milliGRAM(s) Oral daily  erythromycin   IVPB 325milliGRAM(s) IV Intermittent every 12 hours    MEDICATIONS  (PRN):  acetaminophen    Suspension 650milliGRAM(s) Oral every 6 hours PRN For Temp greater than 38 C (100.4 F)    Allergies    No Known Drug Allergies  Seafood (Rash)    Intolerances      FAMILY HISTORY:      SOCIAL HISTORY:    Living Situation:    Occupation:    Tobacco:    Alcohol:    Drugs:      VITAL SIGNS:  Vital Signs Last 24 Hrs  T(C): 36.5, Max: 39.1 (02-09 @ 15:00)  T(F): 97.7, Max: 102.3 (02-09 @ 15:00)  HR: 86 (74 - 128)  BP: 174/114 (82/45 - 183/107)  BP(mean): 150 (64 - 150)  RR: 13 (11 - 27)  SpO2: 98% (94% - 100%)    PHYSICAL EXAMINATION:  General: Well-developed, well nourished, in no acute distress.  Eyes: Conjunctiva and sclera clear.  Cardiovascular: Regular rate and rhythm; S1 and S2 Normal; No murmurs, gallops or rubs.  Neurologic:  - Mental Status:  Alert, awake, oriented to person, place, and time; Speech is fluent with intact naming, repetition, and comprehension; Immediate recall is 3/3 words and delayed recall is 3/3 words at 5 minutes; Able to spell WORLD backwards and perform serial 7 subtraction; Able to read and write a sentence; Able to copy a cube; Good overall fund of knowledge.  - Cranial Nerves II-XII:    II:  Visual acuity is 20/20 bilaterally; Visual fields are full to confronation; Fundoscopic exam is normal with sharp discs; Pupils are equal, round, and reactive to light.  III, IV, VI:  Extraocular movements are intact without nystagmus.  V:  Facial sensation is intact in the V1-V3 distribution bilaterally.  VII:  Face is symmetric with normal eye closure and smile  VIII:  Hearing is intact to finger rub.  IX, X:  Uvula is midline and soft palate rises symmetrically  XI:  Head turning and shoulder shrug are intact.  XII:  Tongue protudes in the midline.  - Motor:  Strength is 5/5 throughout.  There is no prontator drift.  Normal muscle bulk and tone throughout.  - Reflexes:  2+ and symmetric at the biceps, triceps, brachioradialis, knees, and ankles.  Plantar responses flexor.  - Sensory:  Intact to light touch, pin prick, vibration, and joint-position sense throughout.  - Coordination:  Finger-nose-finger and heel-knee-shin intact without dysmetria.  Rapid alternating hand movements intact.  - Gait:   Normal steps, base, arm swing, and turning.  Heel and toe walking are normal.  Tandem gait is normal.  Romberg testing is negative.    LABS:      RADIOLOGY & ADDITIONAL STUDIES:      IMPRESSION:      PLAN:  1.   2.   3. CHIEF COMPLAINT: Central hypoventilation syndrome.    HPI:  Pt is a 32 y/o F poor historian w/ PMH HTN, HLD, ?paroxysmal afib (reports during pregnancy), congenital cardiac malformation s/p repair, ?MICHAEL, DVT in 2008 (s/p Coumadin last dose 2009), h/o UGIB s/p endoscopy, recent 2 day admission (Jan 2017) to [facility] for SOB/CP/n/v r/o ACS, ECHO (EF 60%, normal sized LA, severely asymmetric septal hypertrophy) in MICU for hypercapnic respiratory failure 2/2 to suspected central hypoventiliation syndrome. Pt currently intubated and unable to provide a ROS. Aunt at bedside does however deny any previous neurological conditions or weakness.       REVIEW OF SYSTEMS:  unable to obtain as pt intubated   PAST MEDICAL & SURGICAL HISTORY:  DVT (deep venous thrombosis)  GIB (gastrointestinal bleeding)  Afib  HTN (hypertension)  Chronic systolic congestive heart failure  HLD (hyperlipidemia)  Myocardial infarction  Congenital heart disease    MEDICATIONS  (STANDING):  propofol Infusion 7.652MICROgram(s)/kG/Min IV Continuous <Continuous>  chlorhexidine 0.12% Liquid 15milliLiter(s) Swish and Spit two times a day  heparin  Injectable 7500Unit(s) SubCutaneous every 8 hours  dexmedetomidine Infusion 0.5MICROgram(s)/kG/Hr IV Continuous <Continuous>  fentaNYL   Infusion 0.5MICROgram(s)/kG/Hr IV Continuous <Continuous>  piperacillin/tazobactam IVPB. 4.5Gram(s) IV Intermittent every 6 hours  risperiDONE   Tablet 1milliGRAM(s) Oral daily  venlafaxine 75milliGRAM(s) Oral every 12 hours  pantoprazole   Suspension 40milliGRAM(s) Oral daily  erythromycin   IVPB 325milliGRAM(s) IV Intermittent every 12 hours    MEDICATIONS  (PRN):  acetaminophen    Suspension 650milliGRAM(s) Oral every 6 hours PRN For Temp greater than 38 C (100.4 F)    Allergies    No Known Drug Allergies  Seafood (Rash)    Intolerances      FAMILY HISTORY:    Father w/ hx of central hypoventilation syndrome    SOCIAL HISTORY:  unable to obtain from pt as she is intubated    VITAL SIGNS:  Vital Signs Last 24 Hrs  T(C): 36.5, Max: 39.1 (02-09 @ 15:00)  T(F): 97.7, Max: 102.3 (02-09 @ 15:00)  HR: 86 (74 - 128)  BP: 174/114 (82/45 - 183/107)  BP(mean): 150 (64 - 150)  RR: 13 (11 - 27)  SpO2: 98% (94% - 100%)    PHYSICAL EXAMINATION:  General: Well-developed, well nourished female, intubated, obese  Eyes: Conjunctiva and sclera clear. EOMI  Cardiovascular: Regular rate and rhythm; S1 and S2 Normal; No murmurs, gallops or rubs.  Neurologic:  - Mental Status:  unable to fully assess as pt is intubated and non-communicable. Alert despite sedation  Cranial Nerves II-XII:  Grossly intact.   II:  Visual fields are full to confronation, Pupils are equal, round, and reactive to light.  III, IV, VI:  Extraocular movements are intact without nystagmus.  V:  Facial sensation is intact in the V1-V3 distribution bilaterally.  VII:  Face is symmetric with normal eye closure and smile  XI:  Head turning and shoulder shrug are intact.  - Motor:  diminished strength is 4/5 throughout. Normal muscle bulk and tone throughout.  - Reflexes:  Intact and symmetric at the biceps, triceps, brachioradialis, knees, and ankles.  - Sensory:  Intact to light and crude touch  - Coordination:  Finger-nose-finger and heel-knee-shin intact without dysmetria.  Rapid alternating hand movements slow.  - Gait: unable to assess  LABS:                          13.6   7.3   )-----------( 295      ( 09 Feb 2017 16:24 )             45.3   09 Feb 2017 06:49    141    |  107    |  16     ----------------------------<  97     3.9     |  25     |  0.66     Ca    9.2        09 Feb 2017 06:49  Phos  4.1       08 Feb 2017 04:58  Mg     2.0       09 Feb 2017 06:49      RADIOLOGY & ADDITIONAL STUDIES:    EXAM:  CT BRAIN                           PROCEDURE DATE:  02/02/2017                 INTERPRETATION:  PROCEDURE: CT brain without intravenous contrast    INDICATION: Altered mental status    TECHNIQUE: Multiple axial images were obtained at 5 mm intervals from the   skull base to the vertex. The images were reviewed in brain and bone   windows.    COMPARISON: CT brain 1/12/2017    FINDINGS: The CT examination demonstrates the ventricles, cisternal   spaces, and cortical sulci to be within normal limits. There is no   midline shift or extra axial collections. The gray white differentiation   appears within normal limits. There is no intracranial hemorrhage or   acute transcortical infarct. There is a approximately 11 mm calcified   lesion along the anterior left middle cranial fossa which may represent a   calcified meningioma or dural plaque (series 3 image 13). This was   present on the prior study and appears stable. The bony windows   demonstrates no fractures. There is mucosal thickening with small fluid   levels within the maxillary sinuses bilaterally. There is interval soft   tissue opacification of the right frontal, ethmoid and sphenoid sinuses.   The visualized paranasal sinuses are within normal limits. The mastoid   aircells are well aerated.    IMPRESSION: No intracranial hemorrhage or acute transcortical infarct.   Inflammatory paranasal sinus disease.      IMPRESSION:  Pt is a 32 y/o female w/ above [facility] who is being evaluated for Central hypoventilation syndrome.   Do not appreciate any other neurological deficits on exam. Decreased strength throughout likely attributable to disease processes and increased hospital stay/deconditioning. Do not feel that diaphragmatic EMG would be of clinical value as above condition is autosomal dominant and her father has. Genetic testing is currently pending and if positive would be sufficient to make diagnosis given family hx and clinical context, warranting diaphragmatic pacemaker. Recommend f/u genetic testing. CHIEF COMPLAINT: Central hypoventilation syndrome.    HPI:  Pt is a 32 y/o F poor historian w/ PMH HTN, HLD, ?paroxysmal afib (reports during pregnancy), congenital cardiac malformation s/p repair, ?MICHAEL, DVT in 2008 (s/p Coumadin last dose 2009), h/o UGIB s/p endoscopy, recent 2 day admission (Jan 2017) to [facility] for SOB/CP/n/v r/o ACS, ECHO (EF 60%, normal sized LA, severely asymmetric septal hypertrophy) in MICU for hypercapnic respiratory failure 2/2 to suspected central hypoventiliation syndrome. Pt currently intubated and unable to provide a ROS. Aunt at bedside does however deny any previous neurological conditions or weakness.       REVIEW OF SYSTEMS:  unable to obtain as pt intubated   PAST MEDICAL & SURGICAL HISTORY:  DVT (deep venous thrombosis)  GIB (gastrointestinal bleeding)  Afib  HTN (hypertension)  Chronic systolic congestive heart failure  HLD (hyperlipidemia)  Myocardial infarction  Congenital heart disease    MEDICATIONS  (STANDING):  propofol Infusion 7.652MICROgram(s)/kG/Min IV Continuous <Continuous>  chlorhexidine 0.12% Liquid 15milliLiter(s) Swish and Spit two times a day  heparin  Injectable 7500Unit(s) SubCutaneous every 8 hours  dexmedetomidine Infusion 0.5MICROgram(s)/kG/Hr IV Continuous <Continuous>  fentaNYL   Infusion 0.5MICROgram(s)/kG/Hr IV Continuous <Continuous>  piperacillin/tazobactam IVPB. 4.5Gram(s) IV Intermittent every 6 hours  risperiDONE   Tablet 1milliGRAM(s) Oral daily  venlafaxine 75milliGRAM(s) Oral every 12 hours  pantoprazole   Suspension 40milliGRAM(s) Oral daily  erythromycin   IVPB 325milliGRAM(s) IV Intermittent every 12 hours    MEDICATIONS  (PRN):  acetaminophen    Suspension 650milliGRAM(s) Oral every 6 hours PRN For Temp greater than 38 C (100.4 F)    Allergies    No Known Drug Allergies  Seafood (Rash)    Intolerances      FAMILY HISTORY:    Father w/ hx of central hypoventilation syndrome    SOCIAL HISTORY:  unable to obtain from pt as she is intubated    VITAL SIGNS:  Vital Signs Last 24 Hrs  T(C): 36.5, Max: 39.1 (02-09 @ 15:00)  T(F): 97.7, Max: 102.3 (02-09 @ 15:00)  HR: 86 (74 - 128)  BP: 174/114 (82/45 - 183/107)  BP(mean): 150 (64 - 150)  RR: 13 (11 - 27)  SpO2: 98% (94% - 100%)    PHYSICAL EXAMINATION:  General: Well-developed, well nourished female, intubated, obese  Eyes: Conjunctiva and sclera clear. EOMI  Cardiovascular: Regular rate and rhythm; S1 and S2 Normal; No murmurs, gallops or rubs.  Neurologic:  - Mental Status:  unable to fully assess as pt is intubated and non-communicable. Alert despite sedation  Cranial Nerves II-XII:  Grossly intact.   II:  Visual fields are full to confronation, Pupils are equal, round, and reactive to light.  III, IV, VI:  Extraocular movements are intact without nystagmus.  V:  Facial sensation is intact in the V1-V3 distribution bilaterally.  VII:  Face is symmetric with normal eye closure and smile  XI:  Head turning and shoulder shrug are intact.  - Motor:  diminished strength is 4/5 throughout. Normal muscle bulk and tone throughout.  - Reflexes:  Intact and symmetric at the biceps, triceps, brachioradialis, knees, and ankles.  - Sensory:  Intact to light and crude touch  - Coordination:  Finger-nose-finger and heel-knee-shin intact without dysmetria.  Rapid alternating hand movements slow.  - Gait: unable to assess  LABS:                          13.6   7.3   )-----------( 295      ( 09 Feb 2017 16:24 )             45.3   09 Feb 2017 06:49    141    |  107    |  16     ----------------------------<  97     3.9     |  25     |  0.66     Ca    9.2        09 Feb 2017 06:49  Phos  4.1       08 Feb 2017 04:58  Mg     2.0       09 Feb 2017 06:49      RADIOLOGY & ADDITIONAL STUDIES:    EXAM:  CT BRAIN                           PROCEDURE DATE:  02/02/2017                 INTERPRETATION:  PROCEDURE: CT brain without intravenous contrast    INDICATION: Altered mental status    TECHNIQUE: Multiple axial images were obtained at 5 mm intervals from the   skull base to the vertex. The images were reviewed in brain and bone   windows.    COMPARISON: CT brain 1/12/2017    FINDINGS: The CT examination demonstrates the ventricles, cisternal   spaces, and cortical sulci to be within normal limits. There is no   midline shift or extra axial collections. The gray white differentiation   appears within normal limits. There is no intracranial hemorrhage or   acute transcortical infarct. There is a approximately 11 mm calcified   lesion along the anterior left middle cranial fossa which may represent a   calcified meningioma or dural plaque (series 3 image 13). This was   present on the prior study and appears stable. The bony windows   demonstrates no fractures. There is mucosal thickening with small fluid   levels within the maxillary sinuses bilaterally. There is interval soft   tissue opacification of the right frontal, ethmoid and sphenoid sinuses.   The visualized paranasal sinuses are within normal limits. The mastoid   aircells are well aerated.    IMPRESSION: No intracranial hemorrhage or acute transcortical infarct.   Inflammatory paranasal sinus disease.      IMPRESSION:  Pt is a 32 y/o female w/ above [facility] who is being evaluated for Central hypoventilation syndrome.   Do not appreciate any other neurological deficits on exam other than mild generalized weakness likely due to deconditioning during hospitalization. Do not feel that diaphragmatic EMG would be of clinical value as her likely condition (congenital central hypoventilation syndrome) is not a neuromuscular issue but a central one. Genetic testing is currently pending and if positive would be sufficient to make diagnosis given family hx and clinical context, warranting diaphragmatic pacemaker. Recommend f/u genetic testing.

## 2017-02-09 NOTE — PROGRESS NOTE ADULT - SUBJECTIVE AND OBJECTIVE BOX
Interval Events:  Patient seen and examined at bedside.  Patient awake and alert on the ventilator following commands with no acute events overnight.  The patient was extubated during the day then became hypercapneic and more somnolent with intermittent hypoxia.  The patient was reintubated.    she is sedated secrtions are moderate.  She is awake agitated and delerious    MEDICATIONS:  Pulmonary:    Antimicrobials:  oxacillin IVPB 2Gram(s) IV Intermittent every 4 hours  oxacillin IVPB  IV Intermittent     Anticoagulants:  heparin  Injectable 7500Unit(s) SubCutaneous every 8 hours    Cardiac:      Allergies    No Known Drug Allergies  Seafood (Rash)    Intolerances        Vital Signs Last 24 Hrs  T(C): 37, Max: 38.1 ( @ 23:11)  T(F): 98.6, Max: 100.6 ( @ 23:11)  HR: 94 (72 - 128)  BP: 125/77 (97/51 - 183/90)  BP(mean): 96 (69 - 139)  RR: 14 (10 - 31)  SpO2: 96% (82% - 100%)  I & Os for 24h ending  @ 07:00  =============================================  IN: 2115.5 ml / OUT: 3655 ml / NET: -1539.5 ml    I & Os for current day (as of  @ 21:30)  =============================================  IN: 566 ml / OUT: 865 ml / NET: -299 ml    Mode: AC/ CMV (Assist Control/ Continuous Mandatory Ventilation)  RR (machine): 14  TV (machine): 390  FiO2: 40  PEEP: 5  ITime: 0.8  MAP: 8.1  PIP: 21      PHYSICAL EXAM:    General: Well developed; well nourished; in no acute distress  Neck: Supple; non tender; no masses  Respiratory: Clear to auscultation bilaterally without wheezing, rhonchi or rales  Cardiovascular: tachycardic but regular  Gastrointestinal: Soft non-tender non-distended; Normal bowel sounds  Extremities: Normal range of motion, No clubbing, cyanosis or edema  Neurological: Alert and oriented x3  Skin: Warm and dry. No obvious rash    LABS:  ABG - ( 2017 17:05 )  pH: 7.37  /  pCO2: 52    /  pO2: 169   / HCO3: 29    / Base Excess: 2.7   /  SaO2: 99                  CBC Full  -  ( 2017 07:34 )  WBC Count : 6.2 K/uL  Hemoglobin : 14.6 g/dL  Hematocrit : 47.3 %  Platelet Count - Automated : 124 K/uL  Mean Cell Volume : 73.9 fL  Mean Cell Hemoglobin : 22.8 pg  Mean Cell Hemoglobin Concentration : 30.9 g/dL  Auto Neutrophil # : x  Auto Lymphocyte # : x  Auto Monocyte # : x  Auto Eosinophil # : x  Auto Basophil # : x  Auto Neutrophil % : 63.0 %  Auto Lymphocyte % : 19.0 %  Auto Monocyte % : 9.0 %  Auto Eosinophil % : 4.0 %  Auto Basophil % : x    2017 07:34    141    |  105    |  12     ----------------------------<  76     4.0     |  29     |  0.78     Ca    8.3        2017 07:34  Phos  4.4       2017 07:34  Mg     2.2       2017 07:34      PT/INR - ( 2017 13:14 )   PT: 13.2 sec;   INR: 1.19          PTT - ( 2017 13:14 )  PTT:36.7 sec      Urinalysis Basic - ( 2017 08:10 )    Color: Yellow / Appearance: Clear / S.010 / pH: x  Gluc: x / Ketone: NEGATIVE  / Bili: NEGATIVE / Urobili: >=8.0 E.U./dL   Blood: x / Protein: 30 mg/dL / Nitrite: NEGATIVE   Leuk Esterase: NEGATIVE / RBC: 5-10 /HPF / WBC 5-10 /HPF   Sq Epi: x / Non Sq Epi: Few /HPF / Bacteria: Present /HPF                      EXAM:  XR CHEST 1 VIEW PORT URGENT                           PROCEDURE DATE:  2017                 INTERPRETATION:  AP Portable CXR dated 2017 6:06 AM    CLINICAL INFORMATION: 31 years, Female, fever    PRIOR STUDIES: Portable CXR on 2017    FINDINGS:   Endotracheal tube and enteric tube are again noted. The heart is   enlarged. Right lower lung zone infiltrate/atelectasis, unchanged.   Unchanged left lower lobe atelectasis/pleural effusion. Mild pulmonary   venous congestion.    IMPRESSION:  Right lower lung zone infiltrate/atelectasis, unchanged. Unchanged left   lower lobe atelectasis/pleural effusion. Mild pulmonary venous congestion.                                   RADIOLOGY & ADDITIONAL STUDIES (The following images were personally reviewed):

## 2017-02-10 LAB
APTT BLD: 31.7 SEC — SIGNIFICANT CHANGE UP (ref 27.5–37.4)
B PERT IGG+IGM PNL SER: SIGNIFICANT CHANGE UP
BASOPHILS NFR BLD AUTO: 0.7 % — SIGNIFICANT CHANGE UP (ref 0–2)
COLOR FLD: SIGNIFICANT CHANGE UP
CULTURE RESULTS: NO GROWTH — SIGNIFICANT CHANGE UP
EOSINOPHIL NFR BLD AUTO: 5.6 % — SIGNIFICANT CHANGE UP (ref 0–6)
FLUID INTAKE SUBSTANCE CLASS: SIGNIFICANT CHANGE UP
FLUID SEGMENTED GRANULOCYTES: 98 % — SIGNIFICANT CHANGE UP
GRAM STN FLD: SIGNIFICANT CHANGE UP
HCT VFR BLD CALC: 42.5 % — SIGNIFICANT CHANGE UP (ref 34.5–45)
HGB BLD-MCNC: 13 G/DL — SIGNIFICANT CHANGE UP (ref 11.5–15.5)
INR BLD: 1.31 — HIGH (ref 0.88–1.16)
LYMPHOCYTES # BLD AUTO: 19.1 % — SIGNIFICANT CHANGE UP (ref 13–44)
LYMPHOCYTES # FLD: 2 % — SIGNIFICANT CHANGE UP
MCHC RBC-ENTMCNC: 22.8 PG — LOW (ref 27–34)
MCHC RBC-ENTMCNC: 30.6 G/DL — LOW (ref 32–36)
MCV RBC AUTO: 74.7 FL — LOW (ref 80–100)
MONOCYTES NFR BLD AUTO: 9.8 % — SIGNIFICANT CHANGE UP (ref 2–14)
NEUTROPHILS NFR BLD AUTO: 64.8 % — SIGNIFICANT CHANGE UP (ref 43–77)
PLATELET # BLD AUTO: 255 K/UL — SIGNIFICANT CHANGE UP (ref 150–400)
PROTHROM AB SERPL-ACNC: 14.6 SEC — HIGH (ref 10–13.1)
RBC # BLD: 5.69 M/UL — HIGH (ref 3.8–5.2)
RBC # FLD: 23.4 % — HIGH (ref 10.3–16.9)
RCV VOL RI: HIGH /UL (ref 0–5)
SPECIMEN SOURCE FLD: SIGNIFICANT CHANGE UP
SPECIMEN SOURCE: SIGNIFICANT CHANGE UP
SPECIMEN SOURCE: SIGNIFICANT CHANGE UP
TOTAL NUCLEATED CELL COUNT, BODY FLUID: HIGH /UL (ref 0–5)
TUBE TYPE: SIGNIFICANT CHANGE UP
WBC # BLD: 6.9 K/UL — SIGNIFICANT CHANGE UP (ref 3.8–10.5)
WBC # FLD AUTO: 6.9 K/UL — SIGNIFICANT CHANGE UP (ref 3.8–10.5)

## 2017-02-10 PROCEDURE — 99233 SBSQ HOSP IP/OBS HIGH 50: CPT | Mod: 25,GC

## 2017-02-10 PROCEDURE — 99233 SBSQ HOSP IP/OBS HIGH 50: CPT | Mod: GC

## 2017-02-10 PROCEDURE — 99223 1ST HOSP IP/OBS HIGH 75: CPT

## 2017-02-10 PROCEDURE — 31600 PLANNED TRACHEOSTOMY: CPT

## 2017-02-10 PROCEDURE — 31622 DX BRONCHOSCOPE/WASH: CPT | Mod: GC

## 2017-02-10 PROCEDURE — 71010: CPT | Mod: 26,76

## 2017-02-10 RX ORDER — SODIUM CHLORIDE 9 MG/ML
1000 INJECTION INTRAMUSCULAR; INTRAVENOUS; SUBCUTANEOUS ONCE
Qty: 0 | Refills: 0 | Status: DISCONTINUED | OUTPATIENT
Start: 2017-02-10 | End: 2017-02-12

## 2017-02-10 RX ORDER — ERYTHROMYCIN ETHYLSUCCINATE 400 MG
250 TABLET ORAL EVERY 12 HOURS
Qty: 0 | Refills: 0 | Status: COMPLETED | OUTPATIENT
Start: 2017-02-10 | End: 2017-02-13

## 2017-02-10 RX ORDER — LABETALOL HCL 100 MG
10 TABLET ORAL ONCE
Qty: 0 | Refills: 0 | Status: COMPLETED | OUTPATIENT
Start: 2017-02-10 | End: 2017-02-10

## 2017-02-10 RX ORDER — VENLAFAXINE HCL 75 MG
75 CAPSULE, EXT RELEASE 24 HR ORAL EVERY 12 HOURS
Qty: 0 | Refills: 0 | Status: DISCONTINUED | OUTPATIENT
Start: 2017-02-10 | End: 2017-02-23

## 2017-02-10 RX ORDER — PIPERACILLIN AND TAZOBACTAM 4; .5 G/20ML; G/20ML
4.5 INJECTION, POWDER, LYOPHILIZED, FOR SOLUTION INTRAVENOUS EVERY 6 HOURS
Qty: 0 | Refills: 0 | Status: COMPLETED | OUTPATIENT
Start: 2017-02-10 | End: 2017-02-13

## 2017-02-10 RX ORDER — QUETIAPINE FUMARATE 200 MG/1
100 TABLET, FILM COATED ORAL EVERY 12 HOURS
Qty: 0 | Refills: 0 | Status: DISCONTINUED | OUTPATIENT
Start: 2017-02-10 | End: 2017-02-12

## 2017-02-10 RX ORDER — AMLODIPINE BESYLATE 2.5 MG/1
5 TABLET ORAL DAILY
Qty: 0 | Refills: 0 | Status: DISCONTINUED | OUTPATIENT
Start: 2017-02-10 | End: 2017-02-11

## 2017-02-10 RX ORDER — CISATRACURIUM BESYLATE 2 MG/ML
16 INJECTION INTRAVENOUS ONCE
Qty: 0 | Refills: 0 | Status: COMPLETED | OUTPATIENT
Start: 2017-02-10 | End: 2017-02-10

## 2017-02-10 RX ADMIN — Medication 75 MILLIGRAM(S): at 12:06

## 2017-02-10 RX ADMIN — CISATRACURIUM BESYLATE 16 MILLIGRAM(S): 2 INJECTION INTRAVENOUS at 10:00

## 2017-02-10 RX ADMIN — PROPOFOL 5 MICROGRAM(S)/KG/MIN: 10 INJECTION, EMULSION INTRAVENOUS at 12:06

## 2017-02-10 RX ADMIN — RISPERIDONE 1 MILLIGRAM(S): 4 TABLET ORAL at 12:06

## 2017-02-10 RX ADMIN — Medication 650 MILLIGRAM(S): at 14:35

## 2017-02-10 RX ADMIN — PROPOFOL 5 MICROGRAM(S)/KG/MIN: 10 INJECTION, EMULSION INTRAVENOUS at 18:45

## 2017-02-10 RX ADMIN — Medication 10 MILLIGRAM(S): at 18:15

## 2017-02-10 RX ADMIN — Medication 75 MILLIGRAM(S): at 22:08

## 2017-02-10 RX ADMIN — CHLORHEXIDINE GLUCONATE 15 MILLILITER(S): 213 SOLUTION TOPICAL at 18:24

## 2017-02-10 RX ADMIN — PIPERACILLIN AND TAZOBACTAM 200 GRAM(S): 4; .5 INJECTION, POWDER, LYOPHILIZED, FOR SOLUTION INTRAVENOUS at 00:22

## 2017-02-10 RX ADMIN — DEXMEDETOMIDINE HYDROCHLORIDE IN 0.9% SODIUM CHLORIDE 13.61 MICROGRAM(S)/KG/HR: 4 INJECTION INTRAVENOUS at 18:24

## 2017-02-10 RX ADMIN — PIPERACILLIN AND TAZOBACTAM 200 GRAM(S): 4; .5 INJECTION, POWDER, LYOPHILIZED, FOR SOLUTION INTRAVENOUS at 12:06

## 2017-02-10 RX ADMIN — QUETIAPINE FUMARATE 100 MILLIGRAM(S): 200 TABLET, FILM COATED ORAL at 22:09

## 2017-02-10 RX ADMIN — DEXMEDETOMIDINE HYDROCHLORIDE IN 0.9% SODIUM CHLORIDE 13.61 MICROGRAM(S)/KG/HR: 4 INJECTION INTRAVENOUS at 15:15

## 2017-02-10 RX ADMIN — HEPARIN SODIUM 7500 UNIT(S): 5000 INJECTION INTRAVENOUS; SUBCUTANEOUS at 14:01

## 2017-02-10 RX ADMIN — PANTOPRAZOLE SODIUM 40 MILLIGRAM(S): 20 TABLET, DELAYED RELEASE ORAL at 12:06

## 2017-02-10 RX ADMIN — DEXMEDETOMIDINE HYDROCHLORIDE IN 0.9% SODIUM CHLORIDE 13.61 MICROGRAM(S)/KG/HR: 4 INJECTION INTRAVENOUS at 03:26

## 2017-02-10 RX ADMIN — Medication 250 MILLIGRAM(S): at 18:24

## 2017-02-10 RX ADMIN — DEXMEDETOMIDINE HYDROCHLORIDE IN 0.9% SODIUM CHLORIDE 13.61 MICROGRAM(S)/KG/HR: 4 INJECTION INTRAVENOUS at 20:45

## 2017-02-10 RX ADMIN — AMLODIPINE BESYLATE 5 MILLIGRAM(S): 2.5 TABLET ORAL at 19:57

## 2017-02-10 RX ADMIN — CHLORHEXIDINE GLUCONATE 15 MILLILITER(S): 213 SOLUTION TOPICAL at 05:33

## 2017-02-10 RX ADMIN — PROPOFOL 5 MICROGRAM(S)/KG/MIN: 10 INJECTION, EMULSION INTRAVENOUS at 15:30

## 2017-02-10 RX ADMIN — HEPARIN SODIUM 7500 UNIT(S): 5000 INJECTION INTRAVENOUS; SUBCUTANEOUS at 05:33

## 2017-02-10 RX ADMIN — FENTANYL CITRATE 5.45 MICROGRAM(S)/KG/HR: 50 INJECTION INTRAVENOUS at 10:00

## 2017-02-10 RX ADMIN — DEXMEDETOMIDINE HYDROCHLORIDE IN 0.9% SODIUM CHLORIDE 13.61 MICROGRAM(S)/KG/HR: 4 INJECTION INTRAVENOUS at 12:06

## 2017-02-10 RX ADMIN — HEPARIN SODIUM 7500 UNIT(S): 5000 INJECTION INTRAVENOUS; SUBCUTANEOUS at 22:08

## 2017-02-10 RX ADMIN — PIPERACILLIN AND TAZOBACTAM 200 GRAM(S): 4; .5 INJECTION, POWDER, LYOPHILIZED, FOR SOLUTION INTRAVENOUS at 05:33

## 2017-02-10 RX ADMIN — PIPERACILLIN AND TAZOBACTAM 200 GRAM(S): 4; .5 INJECTION, POWDER, LYOPHILIZED, FOR SOLUTION INTRAVENOUS at 18:24

## 2017-02-10 NOTE — PROGRESS NOTE ADULT - PROBLEM SELECTOR PLAN 3
she developed nosocomial pneumonia and will follow on cultures.  She antibiotics broadened.  Follow culture from the bronchial wash.  There was copious secrtions from the lower lobes

## 2017-02-10 NOTE — CONSULT NOTE ADULT - SUBJECTIVE AND OBJECTIVE BOX
HPI: (via EMR as pt non-communicative)    32 yo F extremely poor historian, PMHx HTN, HLD, ?paroxysmal afib (reports during pregnancy), congenital cardiac malformation s/p repair, ?MICHAEL, DVT in  (s/p Coumadin last dose ), h/o UGIB s/p endoscopy, recent 2 day admission (2017) to [facility] for SOB/CP/n/v r/o ACS, ECHO (EF 60%, normal sized LA, severely asymmetric septal hypertrophy) admission c/b 10 minute unresponsiveness, resolving on its own with non contributory full w/u. GI consulted to evaluate for PEG placement.       PAST MEDICAL & SURGICAL HISTORY:  DVT (deep venous thrombosis)  GIB (gastrointestinal bleeding)  Afib  HTN (hypertension)  Chronic systolic congestive heart failure  HLD (hyperlipidemia)  Myocardial infarction  Congenital heart disease      MEDICATIONS  (STANDING):  propofol Infusion 7.652MICROgram(s)/kG/Min IV Continuous <Continuous>  chlorhexidine 0.12% Liquid 15milliLiter(s) Swish and Spit two times a day  heparin  Injectable 7500Unit(s) SubCutaneous every 8 hours  dexmedetomidine Infusion 0.5MICROgram(s)/kG/Hr IV Continuous <Continuous>  fentaNYL   Infusion 0.5MICROgram(s)/kG/Hr IV Continuous <Continuous>  risperiDONE   Tablet 1milliGRAM(s) Oral daily  venlafaxine 75milliGRAM(s) Oral every 12 hours  pantoprazole   Suspension 40milliGRAM(s) Oral daily  midazolam Infusion 2mG/Hr IV Continuous <Continuous>  sodium chloride 0.9% Bolus 1000milliLiter(s) IV Bolus once  erythromycin   IVPB 250milliGRAM(s) IV Intermittent every 12 hours  piperacillin/tazobactam IVPB. 4.5Gram(s) IV Intermittent every 6 hours    MEDICATIONS  (PRN):  acetaminophen    Suspension 650milliGRAM(s) Oral every 6 hours PRN For Temp greater than 38 C (100.4 F)      Allergies    No Known Drug Allergies  Seafood (Rash)    Intolerances        SOCIAL HISTORY: unable to obtain    FAMILY HISTORY:  No pertinent family history in first degree relatives      Vital Signs Last 24 Hrs  T(C): 38.5, Max: 39.1 ( @ 15:00)  T(F): 101.3, Max: 102.3 ( @ 15:00)  HR: 74 (74 - 128)  BP: 142/97 (70/39 - 174/115)  BP(mean): 111 (47 - 150)  RR: 13 (4 - 21)  SpO2: 100% (92% - 100%)    PHYSICAL EXAM:    GEN:track verbal stimuli, NAD  HEENT: anicteric, trach NGT inplace  CHEST: coarse lung sounds b/l  CVS: no m/r/g  ABD: obese, soft, nt, nd, bs+       LABS:                        13.0   6.9   )-----------( 255      ( 10 Feb 2017 05:36 )             42.5     2017 06:49    141    |  107    |  16     ----------------------------<  97     3.9     |  25     |  0.66     Ca    9.2        2017 06:49  Mg     2.0       2017 06:49      PT/INR - ( 10 Feb 2017 05:37 )   PT: 14.6 sec;   INR: 1.31          PTT - ( 10 Feb 2017 05:37 )  PTT:31.7 sec  Urinalysis Basic - ( 2017 16:22 )    Color: Yellow / Appearance: Clear / S.020 / pH: x  Gluc: x / Ketone: NEGATIVE  / Bili: Small / Urobili: 1.0 E.U./dL   Blood: x / Protein: NEGATIVE mg/dL / Nitrite: NEGATIVE   Leuk Esterase: NEGATIVE / RBC: x / WBC x   Sq Epi: x / Non Sq Epi: x / Bacteria: x        RADIOLOGY & ADDITIONAL STUDIES:

## 2017-02-10 NOTE — CONSULT NOTE ADULT - ASSESSMENT
31 year old female with extensive medical history now on with trach placement. GI consulted for PEG tube placement. Will plan for PEG monday 2/13    -NPO midnight 2/12  -Hold AC  -CBC/CMP/Coags prior to procedure  -Plan for PEG monday

## 2017-02-10 NOTE — PROGRESS NOTE ADULT - SUBJECTIVE AND OBJECTIVE BOX
INTERVAL HPI/OVERNIGHT EVENTS: SBP decreased to the 70s and then given a bolus of 500 cc with adequate response.     ICU Vital Signs Last 24 Hrs  T(C): 37.2, Max: 39.1 (- @ 15:00)  T(F): 99, Max: 102.3 (- @ 15:00)  HR: 75 (74 - 128)  BP: 153/89 (70/39 - 183/107)  BP(mean): 122 (47 - 150)  ABP: --  ABP(mean): --  RR: 17 (4 - 27)  SpO2: 97% (92% - 100%)    I&O's Summary  I & Os for 24h ending 2017 07:00  =============================================  IN: 1944.8 ml / OUT: 0 ml / NET: 1944.8 ml    I & Os for current day (as of 10 Feb 2017 06:45)  =============================================  IN: 3789.3 ml / OUT: 2265 ml / NET: 1524.3 ml    PHYSICAL EXAM:    Constitutional: NAD. Well Appearing.   Eyes: No scleral icterus. No Conj Pallor.   ENMT: MMM. Neck   Neck: No JVD  Respiratory: CTABL   Cardiovascular: Normal S1 and S2 no MRG   Gastrointestinal: BS normoactive. S/NT/ND.   Extremities: WWP. DP 2 plus.   Neurological: AAOX3. CN II- XII intact. Reflexes 3 plus. Strength 5/5 upper and lower. No Dysmetria.      MEDICATIONS  (STANDING):  propofol Infusion 7.652MICROgram(s)/kG/Min IV Continuous <Continuous>  chlorhexidine 0.12% Liquid 15milliLiter(s) Swish and Spit two times a day  heparin  Injectable 7500Unit(s) SubCutaneous every 8 hours  dexmedetomidine Infusion 0.5MICROgram(s)/kG/Hr IV Continuous <Continuous>  fentaNYL   Infusion 0.5MICROgram(s)/kG/Hr IV Continuous <Continuous>  piperacillin/tazobactam IVPB. 4.5Gram(s) IV Intermittent every 6 hours  risperiDONE   Tablet 1milliGRAM(s) Oral daily  venlafaxine 75milliGRAM(s) Oral every 12 hours  pantoprazole   Suspension 40milliGRAM(s) Oral daily  erythromycin   IVPB 325milliGRAM(s) IV Intermittent every 12 hours  midazolam Infusion 2mG/Hr IV Continuous <Continuous>  sodium chloride 0.9% Bolus 1000milliLiter(s) IV Bolus once    MEDICATIONS  (PRN):  acetaminophen    Suspension 650milliGRAM(s) Oral every 6 hours PRN For Temp greater than 38 C (100.4 F)      LABS:                        13.0   6.9   )-----------( 255      ( 10 Feb 2017 05:36 )             42.5     2017 06:49    141    |  107    |  16     ----------------------------<  97     3.9     |  25     |  0.66     Ca    9.2        2017 06:49  Mg     2.0       2017 06:49      PT/INR - ( 10 Feb 2017 05:37 )   PT: 14.6 sec;   INR: 1.31          PTT - ( 10 Feb 2017 05:37 )  PTT:31.7 sec  Urinalysis Basic - ( 2017 16:22 )    Color: Yellow / Appearance: Clear / S.020 / pH: x  Gluc: x / Ketone: NEGATIVE  / Bili: Small / Urobili: 1.0 E.U./dL   Blood: x / Protein: NEGATIVE mg/dL / Nitrite: NEGATIVE   Leuk Esterase: NEGATIVE / RBC: x / WBC x   Sq Epi: x / Non Sq Epi: x / Bacteria: x      I&O's Summary  I & Os for 24h ending 2017 07:00  =============================================  IN: 1944.8 ml / OUT: 0 ml / NET: 1944.8 ml    I & Os for current day (as of 10 Feb 2017 06:45)  =============================================  IN: 3789.3 ml / OUT: 2265 ml / NET: 1524.3 ml          Assessment/Plan:  32 y/o F with PMHx HTN, HLD, congenital ASD s/p repair, MICHAEL, HOCM, family h/o of neuromuscular hypoventilation syndrome, DVT in  (s/p Coumadin last dose ) admitted to MICU after hypercapnic respiratory failure 2/2 MSSA PNA, was doing well after 10 day course of oxacillin now with persistent fevers and started on Zosyn for additional coverage for likely new pneumonia and now pending trach for respiratory status.    Pulm  #Hypercapnic respiratory failure 2/2 MSSA PNA complicated by potential diaphragmatic issue. Pt with MICHAEL and family hx of neuromuscular disorder as her father required diaphragmatic pacemaker. Ordered for genetic test. Pulm following. b/l opacities on CXR, small L pleural effusion on bedside US on . Placed on TC with decreased sedation and clearly hypoventilating.  - c/w mechanical ventilation - patient went apneic today on CPAP trial.   -  trach on 02/10 and NPO  since difficultly with  weaning  vent; as per Pulm, will wait and monitor progress  - Provigil 200mg daily   - FU serum genetic tests for diaphragmatic issue   - Will attempt to find medical staff for EMG testing of diaphragm.     ID  #HAP vs. VAP: Initially treated for MSSA PNA, completing 10-day course of oxacillin for MSSA grown from bronchial wash. Now with persistent fevers -starting with 102.2 on  but downtrending fever curve on . . Given that only GNR and Gram Neg diplococci( likely moraxella) were isolated on gram stain of sputum No indication for MRSA coverage . Zosyn coverage appropriate given resistance levels <10% and likely gram negative in etiology.  - c/w Zosyn (day 3, begun on )  - f/u bcx - NGTD     CV  #HD. H/o HTN on home BP meds. Will restart when clinically improved. [place] for strict I&Os.     #Pump. EF 60%. No diastolic HF. Echo finding of asymmetric septal wall hypertrophy. Cardiology consulted and dx HCOM but low suspicion for causing resp failure and also low likelihood of arrhythmogenic. Cardiology recommends care in not reducing preload and also starting beta blocker when clinically appropriate.     #Neuro  Pt apneic, required intubation. Unclear etiology, could be central sleep apnea vs. MICHAEL or neuromuscular disorder from family hx. CT head shows no intracranial hemorrhage or acute transcortical infarct.   - c/w modafinil daily  - possible diaphragmatic pacer placement  eventually    Psych  Patient has a history of bipolar disorder on effoxor/risperidone. She currently seems to have an element of ICU delirium and was treated on Seroquel but this was DC'd in favor of the risperidone.   - Currently still on home regimen will assess if a change is needed back to seroquel       FEN:  tube feeds with Promote  PPX: HSQ, SCDs, PPI  Sedation: c/w precedex; f/u EKG in AM to monitor QT (reglan)  Access: peripheral.   DISPO: To remain in MICU for further stabilization  Full CODE INTERVAL HPI/OVERNIGHT EVENTS: SBP decreased to the 70s and then given a bolus of 500 cc with adequate response.     ICU Vital Signs Last 24 Hrs  T(C): 37.2, Max: 39.1 (- @ 15:00)  T(F): 99, Max: 102.3 (- @ 15:00)  HR: 75 (74 - 128)  BP: 153/89 (70/39 - 183/107)  BP(mean): 122 (47 - 150)  ABP: --  ABP(mean): --  RR: 17 (4 - 27)  SpO2: 97% (92% - 100%)    I&O's Summary  I & Os for 24h ending 2017 07:00  =============================================  IN: 1944.8 ml / OUT: 0 ml / NET: 1944.8 ml    I & Os for current day (as of 10 Feb 2017 06:45)  =============================================  IN: 3789.3 ml / OUT: 2265 ml / NET: 1524.3 ml    PHYSICAL EXAM:    Constitutional: NAD.    Eyes: No scleral icterus. No Conj Pallor.   ENMT: MMM. No thrush  Neck: No JVD  Respiratory: CTABL   Cardiovascular: Normal S1 and S2 no MRG   Gastrointestinal: BS normoactive. S/NT/ND.  : miner in place   Extremities: WWP. DP 2 plus.   Neurological: AAOX3. CN II- XII intact. Reflexes 3 plus. Strength 5/5 upper and lower. No Dysmetria.  MSK: no joint swelling   Vasc: 2+ pulses throughout      MEDICATIONS  (STANDING):  propofol Infusion 7.652MICROgram(s)/kG/Min IV Continuous <Continuous>  chlorhexidine 0.12% Liquid 15milliLiter(s) Swish and Spit two times a day  heparin  Injectable 7500Unit(s) SubCutaneous every 8 hours  dexmedetomidine Infusion 0.5MICROgram(s)/kG/Hr IV Continuous <Continuous>  fentaNYL   Infusion 0.5MICROgram(s)/kG/Hr IV Continuous <Continuous>  piperacillin/tazobactam IVPB. 4.5Gram(s) IV Intermittent every 6 hours  risperiDONE   Tablet 1milliGRAM(s) Oral daily  venlafaxine 75milliGRAM(s) Oral every 12 hours  pantoprazole   Suspension 40milliGRAM(s) Oral daily  erythromycin   IVPB 325milliGRAM(s) IV Intermittent every 12 hours  midazolam Infusion 2mG/Hr IV Continuous <Continuous>  sodium chloride 0.9% Bolus 1000milliLiter(s) IV Bolus once    MEDICATIONS  (PRN):  acetaminophen    Suspension 650milliGRAM(s) Oral every 6 hours PRN For Temp greater than 38 C (100.4 F)      LABS:                        13.0   6.9   )-----------( 255      ( 10 Feb 2017 05:36 )             42.5     2017 06:49    141    |  107    |  16     ----------------------------<  97     3.9     |  25     |  0.66     Ca    9.2        2017 06:49  Mg     2.0       2017 06:49      PT/INR - ( 10 Feb 2017 05:37 )   PT: 14.6 sec;   INR: 1.31          PTT - ( 10 Feb 2017 05:37 )  PTT:31.7 sec  Urinalysis Basic - ( 2017 16:22 )    Color: Yellow / Appearance: Clear / S.020 / pH: x  Gluc: x / Ketone: NEGATIVE  / Bili: Small / Urobili: 1.0 E.U./dL   Blood: x / Protein: NEGATIVE mg/dL / Nitrite: NEGATIVE   Leuk Esterase: NEGATIVE / RBC: x / WBC x   Sq Epi: x / Non Sq Epi: x / Bacteria: x      I&O's Summary  I & Os for 24h ending 2017 07:00  =============================================  IN: 1944.8 ml / OUT: 0 ml / NET: 1944.8 ml    I & Os for current day (as of 10 Feb 2017 06:45)  =============================================  IN: 3789.3 ml / OUT: 2265 ml / NET: 1524.3 ml          Assessment/Plan:  32 y/o F with PMHx HTN, HLD, congenital ASD s/p repair, MICHAEL, HOCM, family h/o of neuromuscular hypoventilation syndrome, DVT in  (s/p Coumadin last dose ) admitted to MICU after hypercapnic respiratory failure 2/2 MSSA PNA, was doing well after 10 day course of oxacillin now with persistent fevers and started on Zosyn for additional coverage for likely new pneumonia and now pending trach for respiratory status.    Pulm  #Hypercapnic respiratory failure 2/2 MSSA PNA complicated by potential diaphragmatic issue. Pt with MICHAEL and family hx of neuromuscular disorder as her father required diaphragmatic pacemaker. Ordered for genetic test. Pulm following. b/l opacities on CXR, small L pleural effusion on bedside US on . Placed on TC with decreased sedation and clearly hypoventilating.  - c/w mechanical ventilation - patient went apneic today on CPAP trial.   -  trach on 02/10 since difficultly with  weaning  vent; as per Pulm, will wait and monitor progress  - Provigil 200mg daily   - FU serum genetic tests for diaphragmatic issue   - discussed EMG of diaphragm with neurology and  feeling is that this would not be helpful in elucidating diagnosis which is most likely central in origin    ID  #HAP vs. VAP: Initially treated for MSSA PNA, completing 10-day course of oxacillin for MSSA grown from bronchial wash. Now with persistent fevers -starting with 102.2 on  but downtrending fever curve on . . Given that only GNR and Gram Neg diplococci( likely moraxella) were isolated on gram stain of sputum No indication for MRSA coverage . Zosyn coverage appropriate given resistance levels <10% and likely gram negative in etiology.  - c/w Zosyn (day 3, begun on )  - f/u bcx - NGTD     CV  #HD. H/o HTN on home BP meds. Will restart when clinically improved.     #Pump. EF 60%. No diastolic HF. Echo finding of asymmetric septal wall hypertrophy. Cardiology consulted and dx HCOM but low suspicion for causing resp failure and also low likelihood of arrhythmogenic. Cardiology recommends care in not reducing preload and also starting beta blocker when clinically appropriate.     #Neuro  Pt apneic, required intubation. Unclear etiology, could be central sleep apnea vs. MICHAEL or neuromuscular disorder from family hx. CT head shows no intracranial hemorrhage or acute transcortical infarct.   - c/w modafinil daily  - possible diaphragmatic pacer placement  eventually    Psych  Patient has a history of bipolar disorder on effoxor/risperidone. She currently seems to have an element of ICU delirium and was treated on Seroquel but this was DC'd in favor of the risperidone.   - Currently still on home regimen will assess if a change is needed back to seroquel       FEN:  tube feeds with Promote  PPX: HSQ, SCDs, PPI  Sedation: c/w precedex; f/u EKG in AM to monitor QT (reglan)  Access: peripheral.   miner for urinary retention, to retry voicing trial over weekend  DISPO: To remain in MICU for further stabilization  Full CODE INTERVAL HPI/OVERNIGHT EVENTS: SBP decreased to the 70s and then given a bolus of 500 cc with adequate response.     ICU Vital Signs Last 24 Hrs  T(C): 37.2, Max: 39.1 (- @ 15:00)  T(F): 99, Max: 102.3 (- @ 15:00)  HR: 75 (74 - 128)  BP: 153/89 (70/39 - 183/107)  BP(mean): 122 (47 - 150)  ABP: --  ABP(mean): --  RR: 17 (4 - 27)  SpO2: 97% (92% - 100%)    I&O's Summary  I & Os for 24h ending 2017 07:00  =============================================  IN: 1944.8 ml / OUT: 0 ml / NET: 1944.8 ml    I & Os for current day (as of 10 Feb 2017 06:45)  =============================================  IN: 3789.3 ml / OUT: 2265 ml / NET: 1524.3 ml    PHYSICAL EXAM:    Constitutional: NAD.    Eyes: No scleral icterus. No Conj Pallor.   ENMT: MMM. No thrush  Neck: No JVD. Trach in place.   Respiratory: Some ronchorous sounds on the Left. No wheezes, no crackles.   Cardiovascular: Normal S1 and S2 no MRG   Gastrointestinal: BS normoactive. S/NT/ND.  : miner in place   Extremities: WWP. DP 2 plus.   Neurological: AAOX3. CN II- XII intact. Reflexes 3 plus. Strength 5/5 upper and lower. No Dysmetria.  MSK: no joint swelling   Vasc: 2+ pulses throughout      MEDICATIONS  (STANDING):  propofol Infusion 7.652MICROgram(s)/kG/Min IV Continuous <Continuous>  chlorhexidine 0.12% Liquid 15milliLiter(s) Swish and Spit two times a day  heparin  Injectable 7500Unit(s) SubCutaneous every 8 hours  dexmedetomidine Infusion 0.5MICROgram(s)/kG/Hr IV Continuous <Continuous>  fentaNYL   Infusion 0.5MICROgram(s)/kG/Hr IV Continuous <Continuous>  piperacillin/tazobactam IVPB. 4.5Gram(s) IV Intermittent every 6 hours  risperiDONE   Tablet 1milliGRAM(s) Oral daily  venlafaxine 75milliGRAM(s) Oral every 12 hours  pantoprazole   Suspension 40milliGRAM(s) Oral daily  erythromycin   IVPB 325milliGRAM(s) IV Intermittent every 12 hours  midazolam Infusion 2mG/Hr IV Continuous <Continuous>  sodium chloride 0.9% Bolus 1000milliLiter(s) IV Bolus once    MEDICATIONS  (PRN):  acetaminophen    Suspension 650milliGRAM(s) Oral every 6 hours PRN For Temp greater than 38 C (100.4 F)      LABS:                        13.0   6.9   )-----------( 255      ( 10 Feb 2017 05:36 )             42.5     2017 06:49    141    |  107    |  16     ----------------------------<  97     3.9     |  25     |  0.66     Ca    9.2        2017 06:49  Mg     2.0       2017 06:49      PT/INR - ( 10 Feb 2017 05:37 )   PT: 14.6 sec;   INR: 1.31          PTT - ( 10 Feb 2017 05:37 )  PTT:31.7 sec  Urinalysis Basic - ( 2017 16:22 )    Color: Yellow / Appearance: Clear / S.020 / pH: x  Gluc: x / Ketone: NEGATIVE  / Bili: Small / Urobili: 1.0 E.U./dL   Blood: x / Protein: NEGATIVE mg/dL / Nitrite: NEGATIVE   Leuk Esterase: NEGATIVE / RBC: x / WBC x   Sq Epi: x / Non Sq Epi: x / Bacteria: x    Assessment/Plan:  32 y/o F with PMHx HTN, HLD, congenital ASD s/p repair, MICHAEL, HOCM, family h/o of neuromuscular hypoventilation syndrome, DVT in  (s/p Coumadin last dose ) admitted to MICU after hypercapnic respiratory failure 2/2 MSSA PNA, was doing well after 10 day course of oxacillin now with persistent fevers and started on Zosyn for additional coverage for likely new pneumonia and now on trach for respiratory status.    Pulm  #Hypercapnic respiratory failure 2/ MSSA PNA complicated by potential diaphragmatic issue. Pt with like central apnea which is in her family history and without ventilation goes apneic immediately.  b/l opacities on CXR, small L pleural effusion on bedside US on .   - c/w mechanical ventilation trach in place  - Provigil 200mg daily   - FU serum genetic tests for diaphragmatic issue   - discussed EMG of diaphragm with neurology and  feeling is that this would not be helpful in elucidating diagnosis which is most likely central in origin    ID  #HAP vs. VAP: Initially treated for MSSA PNA, completing 10-day course of oxacillin for MSSA grown from bronchial wash. Now with persistent fevers -starting with 102.2 on  but downtrending fever curve on . Grams tain now has likely staphylcoccus with gram negative rods. Zosyn coverage appropriate at this time. If clinically worsens or clear rise inf ever curve will begin MRSA coverage.   - c/w Zosyn (day 3, begun on )  - f/u bcx - NGTD     CV  #HD. Patient with two episodes of hypotension on  and 02/10 responsive to fluids, now hypertensive.   - Will hold home anti-hypertensive medications for now    #Pump. EF 60%. No diastolic HF. Echo finding of asymmetric septal wall hypertrophy. Cardiology consulted and dx HCOM but low suspicion for causing resp failure and also low likelihood of arrhythmogenic. Cardiology recommends care in not reducing preload and also starting beta blocker when clinically appropriate.       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 CT head shows no intracranial hemorrhage or acute transcortical infarct.   - c/w modafinil daily  - possible diaphragmatic pacer placement  eventually    Psych  Patient has a history of bipolar disorder on effoxor/risperidone. She currently seems to have an element of ICU delirium and was treated on Seroquel but this was DC'd in favor of the risperidone.   - Currently still on home regimen - psych consult to evalaute for medication reccomendations       FEN:  tube feeds with Promote  PPX: HSQ, SCDs, PPI  Sedation: c/w precedex  Access: peripheral.   miner for urinary retention  DISPO: To remain in MICU for further stabilization  Full CODE INTERVAL HPI/OVERNIGHT EVENTS: SBP decreased to the 70s and then given a bolus of 500 cc with adequate response.   Subjective: Unable to communicate needs due to sedation       ICU Vital Signs Last 24 Hrs  T(C): 37.2, Max: 39.1 (- @ 15:00)  T(F): 99, Max: 102.3 (- @ 15:00)  HR: 75 (74 - 128)  BP: 153/89 (70/39 - 183/107)  BP(mean): 122 (47 - 150)  ABP: --  ABP(mean): --  RR: 17 (4 - 27)  SpO2: 97% (92% - 100%)    I&O's Summary  I & Os for 24h ending 2017 07:00  =============================================  IN: 1944.8 ml / OUT: 0 ml / NET: 1944.8 ml    I & Os for current day (as of 10 Feb 2017 06:45)  =============================================  IN: 3789.3 ml / OUT: 2265 ml / NET: 1524.3 ml    PHYSICAL EXAM:    Constitutional: NAD.    Eyes: No scleral icterus. No Conj Pallor.   ENMT: MMM. No thrush  Neck: No JVD. Trach in place.   Respiratory: Some ronchorous sounds on the Left. No wheezes, no crackles.   Cardiovascular: Normal S1 and S2 no MRG   Gastrointestinal: BS normoactive. S/NT/ND.  : miner in place   Extremities: WWP. DP 2 plus.   Neurological: AAOX3. CN II- XII intact. Reflexes 3 plus. Strength 5/5 upper and lower. No Dysmetria.  MSK: no joint swelling   Vasc: 2+ pulses throughout      MEDICATIONS  (STANDING):  propofol Infusion 7.652MICROgram(s)/kG/Min IV Continuous <Continuous>  chlorhexidine 0.12% Liquid 15milliLiter(s) Swish and Spit two times a day  heparin  Injectable 7500Unit(s) SubCutaneous every 8 hours  dexmedetomidine Infusion 0.5MICROgram(s)/kG/Hr IV Continuous <Continuous>  fentaNYL   Infusion 0.5MICROgram(s)/kG/Hr IV Continuous <Continuous>  piperacillin/tazobactam IVPB. 4.5Gram(s) IV Intermittent every 6 hours  risperiDONE   Tablet 1milliGRAM(s) Oral daily  venlafaxine 75milliGRAM(s) Oral every 12 hours  pantoprazole   Suspension 40milliGRAM(s) Oral daily  erythromycin   IVPB 325milliGRAM(s) IV Intermittent every 12 hours  midazolam Infusion 2mG/Hr IV Continuous <Continuous>  sodium chloride 0.9% Bolus 1000milliLiter(s) IV Bolus once    MEDICATIONS  (PRN):  acetaminophen    Suspension 650milliGRAM(s) Oral every 6 hours PRN For Temp greater than 38 C (100.4 F)      LABS:                        13.0   6.9   )-----------( 255      ( 10 Feb 2017 05:36 )             42.5     2017 06:49    141    |  107    |  16     ----------------------------<  97     3.9     |  25     |  0.66     Ca    9.2        2017 06:49  Mg     2.0       2017 06:49      PT/INR - ( 10 Feb 2017 05:37 )   PT: 14.6 sec;   INR: 1.31          PTT - ( 10 Feb 2017 05:37 )  PTT:31.7 sec  Urinalysis Basic - ( 2017 16:22 )    Color: Yellow / Appearance: Clear / S.020 / pH: x  Gluc: x / Ketone: NEGATIVE  / Bili: Small / Urobili: 1.0 E.U./dL   Blood: x / Protein: NEGATIVE mg/dL / Nitrite: NEGATIVE   Leuk Esterase: NEGATIVE / RBC: x / WBC x   Sq Epi: x / Non Sq Epi: x / Bacteria: x    Assessment/Plan:  30 y/o F with PMHx HTN, HLD, congenital ASD s/p repair, MICHAEL, HOCM, family h/o of neuromuscular hypoventilation syndrome, DVT in  (s/p Coumadin last dose ) admitted to MICU after hypercapnic respiratory failure 2/2 MSSA PNA, was doing well after 10 day course of oxacillin now with persistent fevers and started on Zosyn for additional coverage for likely new pneumonia and now on trach for respiratory status.    Pulm  #Hypercapnic respiratory failure 2/2 MSSA PNA complicated by potential diaphragmatic issue. Pt with like central apnea which is in her family history and without ventilation goes apneic immediately.  b/l opacities on CXR, small L pleural effusion on bedside US on .   - c/w mechanical ventilation trach in place  - Provigil 200mg daily   - FU serum genetic tests for diaphragmatic issue   - discussed EMG of diaphragm with neurology and  feeling is that this would not be helpful in elucidating diagnosis which is most likely central in origin    ID  #HAP vs. VAP: Initially treated for MSSA PNA, completing 10-day course of oxacillin for MSSA grown from bronchial wash. Now with persistent fevers -starting with 102.2 on  but downtrending fever curve on . Grams tain now has likely staphylcoccus with gram negative rods. Zosyn coverage appropriate at this time. If clinically worsens or clear rise inf ever curve will begin MRSA coverage.   - c/w Zosyn (day 3, begun on )  - f/u bcx - NGTD     CV  #HD. Patient with two episodes of hypotension on  and 02/10 responsive to fluids, now hypertensive.   - Will hold home anti-hypertensive medications for now    #Pump. EF 60%. No diastolic HF. Echo finding of asymmetric septal wall hypertrophy. Cardiology consulted and dx HCOM but low suspicion for causing resp failure and also low likelihood of arrhythmogenic. Cardiology recommends care in not reducing preload and also starting beta blocker when clinically appropriate.       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 CT head shows no intracranial hemorrhage or acute transcortical infarct.   - c/w modafinil daily  - possible diaphragmatic pacer placement  eventually    Psych  Patient has a history of bipolar disorder on effoxor/risperidone. She currently seems to have an element of ICU delirium and was treated on Seroquel but this was DC'd in favor of the risperidone.   - Currently still on home regimen - psych consult to evalaute for medication reccomendations       FEN:  tube feeds with Promote  PPX: HSQ, SCDs, PPI  Sedation: c/w precedex  Access: peripheral.   miner for urinary retention  DISPO: To remain in MICU for further stabilization  Full CODE

## 2017-02-10 NOTE — CONSULT NOTE ADULT - ASSESSMENT
IMP:  31-yr-old female with reported hx of depression vs bipolar d/o, noncompliant with medications (venlafaxine and risperidone), also with multiple medical problems, now anxious and restless secondary to intubation, despite medications.    DX: Anxiety due to Medical Condition. R/O Delirium Hx of Depression vs Bipolar D/O    REC:  Observation Status: As per medical/ICU team.  Medication Recs:  Would provide rx regimen to calm pt as per ICU (i.e. Seroquel), titrating as warranted/tolerated. Although pt reportedly noncompliant with Venlafaxine, she's been on 75 mg po BID x 2 days in ICU. Doubt that this is primary cause of her anxiety. Would continue for now; consider change to  mg po qdaily.  Follow-up: Will follow.

## 2017-02-10 NOTE — PROGRESS NOTE ADULT - PROBLEM SELECTOR PLAN 1
Patient with hypercapneic respiratory failure possibly due to central hypoventilation syndrome.  The patient was diagnosed with "sleep apnea" when she was 5 years old and has had to use CPAP at night ever since.  Her father has central hypoventilation syndrome with a +PHOX2B gene and has diagraphmatic pacemaker.  -The patient was reintubated today for hypercapneic respiratory failure.  Check PHOX2B gene and will discuss tracheostomy with father and patient.  Conitnue on the Provigil.  Continue on MV and evaluate tomorrow either for extubation trial or trach.  She was changed to Precedex. TRACH was done and will continue on the current setting for now

## 2017-02-10 NOTE — CONSULT NOTE ADULT - SUBJECTIVE AND OBJECTIVE BOX
Pt seen; chart reviewed and discussed with team.    HPI (from admit):  30 yo F extremely poor historian, PMHx HTN, HLD, ?paroxysmal afib (reports during pregnancy), congenital cardiac malformation s/p repair, ?MICHAEL, DVT in 2008 (s/p Coumadin last dose 2009), h/o UGIB s/p endoscopy, recent 2 day admission (Jan 2017) to [facility] for SOB/CP/n/v r/o ACS, ECHO (EF 60%, normal sized LA, severely asymmetric septal hypertrophy) admission c/b 10 minute unresponsiveness, resolving on its own with non contributory full w/u, presented to  ED (1/27 at night) again per the patient with similar symptoms that brought her to the hospital earlier this month, SOB/CP/n/v.     In the ED VSS T 98.5, , /98, RR 22, POx 92 RA. Labs and CT were not impressive other than cardiomegaly with ASD repair.  The patient was in the ED for a number of hours. In the early morning the pt vomited and became more tachypneic. She was placed on a non-rebreather. ABG showed CO2 retention. The patient was put on Bipap and MICU was consulted. By the time MICU arrived the patient had further decompensated and was urgently intubated." (28 Jan 2017 19:13)    Pt seen by psychiatry consult-liaison service (Dr.'s Bran and Enedelia) on January 13th, during a prior admit. At that time she stated she had a hx of depression. She was noted to be a poor historian.      Psych HPI: Per team, pt currently very anxious, having difficulty with intubation despite 4 sedative medications.    Past Psych Hx: Per team, pt's mother reports pt rx'ed with venlafaxine  mg po qdaily, risperidone. Basically noncompliant.        PAST MEDICAL & SURGICAL HISTORY:  As above.    DVT (deep venous thrombosis)  GIB (gastrointestinal bleeding)  Afib  HTN (hypertension)  Chronic systolic congestive heart failure  HLD (hyperlipidemia)  Myocardial infarction  Congenital heart disease      Allergies    No Known Drug Allergies  Seafood (Rash)    Intolerances      MEDICATIONS  (STANDING):  propofol Infusion 7.652MICROgram(s)/kG/Min IV Continuous <Continuous>  chlorhexidine 0.12% Liquid 15milliLiter(s) Swish and Spit two times a day  heparin  Injectable 7500Unit(s) SubCutaneous every 8 hours  dexmedetomidine Infusion 0.5MICROgram(s)/kG/Hr IV Continuous <Continuous>  fentaNYL   Infusion 0.5MICROgram(s)/kG/Hr IV Continuous <Continuous>  pantoprazole   Suspension 40milliGRAM(s) Oral daily  midazolam Infusion 2mG/Hr IV Continuous <Continuous>  sodium chloride 0.9% Bolus 1000milliLiter(s) IV Bolus once  erythromycin   IVPB 250milliGRAM(s) IV Intermittent every 12 hours  piperacillin/tazobactam IVPB. 4.5Gram(s) IV Intermittent every 6 hours  venlafaxine 75milliGRAM(s) Oral every 12 hours  QUEtiapine 100milliGRAM(s) Oral every 12 hours    MEDICATIONS  (PRN):  acetaminophen    Suspension 650milliGRAM(s) Oral every 6 hours PRN For Temp greater than 38 C (100.4 F)      Social Hx:   Sub Abuse Hx:    Family Hx:    ROS: Psych: See HPI.  All other systems negative.                          13.0   6.9   )-----------( 255      ( 10 Feb 2017 05:36 )             42.5   09 Feb 2017 06:49    141    |  107    |  16     ----------------------------<  97     3.9     |  25     |  0.66     Ca    9.2        09 Feb 2017 06:49  Mg     2.0       09 Feb 2017 06:49      TSH:     Utox:  Imaging:  Other Tests:    Old Records reviewed: Psych consult from 1/13 (prior admission)    Collateral: Per team, pt's mother provided some hx including psychotropic rx info, stating that pt is noncompliant with medications.    EXAM:  Vital Signs Last 24 Hrs  T(C): 38.5, Max: 38.5 (02-10 @ 14:30)  T(F): 101.3, Max: 101.3 (02-10 @ 14:30)  HR: 74 (74 - 128)  BP: 142/97 (70/39 - 174/115)  BP(mean): 111 (47 - 150)  RR: 13 (4 - 21)  SpO2: 100% (92% - 100%)  Gen Appearance:  Gait/Station/Muscle Tone:  Abnl Movements:  Speech:  TP;  Associations:  TC:  Mood:  Affect:  Consciousness/orientation:  Memory:   Recent:    Remote:  Attention/Concentration:  Language:  Fund of Knowledge:  Insight:  Judgment:    Suicide Risk Assessment:    IMP:    DX:    REC:  Observation Status:  Additional Work-up:  Medication Recs:  Follow-up: Pt seen; chart reviewed and discussed with team.    HPI (from admit):  32 yo F extremely poor historian, PMHx HTN, HLD, ?paroxysmal afib (reports during pregnancy), congenital cardiac malformation s/p repair, ?MICHAEL, DVT in  (s/p Coumadin last dose ), h/o UGIB s/p endoscopy, recent 2 day admission (2017) to [facility] for SOB/CP/n/v r/o ACS, ECHO (EF 60%, normal sized LA, severely asymmetric septal hypertrophy) admission c/b 10 minute unresponsiveness, resolving on its own with non contributory full w/u, presented to  ED ( at night) again per the patient with similar symptoms that brought her to the hospital earlier this month, SOB/CP/n/v.     In the ED VSS T 98.5, , /98, RR 22, POx 92 RA. Labs and CT were not impressive other than cardiomegaly with ASD repair.  The patient was in the ED for a number of hours. In the early morning the pt vomited and became more tachypneic. She was placed on a non-rebreather. ABG showed CO2 retention. The patient was put on Bipap and MICU was consulted. By the time MICU arrived the patient had further decompensated and was urgently intubated." (2017 19:13)    Pt seen by psychiatry consult-liaison service (Dr.'s Bran and Enedelia) on , during a prior admit. At that time she stated she had a hx of depression. She was noted to be a poor historian.      Psych HPI: Per team, pt currently very anxious, having difficulty with intubation despite 4 sedative medications.    Past Psych Hx: Per team, pt's mother reports pt rx'ed with venlafaxine  mg po qdaily, risperidone. Basically noncompliant.        PAST MEDICAL & SURGICAL HISTORY:  As above.    DVT (deep venous thrombosis)  GIB (gastrointestinal bleeding)  Afib  HTN (hypertension)  Chronic systolic congestive heart failure  HLD (hyperlipidemia)  Myocardial infarction  Congenital heart disease      Allergies    No Known Drug Allergies  Seafood (Rash)    Intolerances      MEDICATIONS  (STANDING):  propofol Infusion 7.652MICROgram(s)/kG/Min IV Continuous <Continuous>  chlorhexidine 0.12% Liquid 15milliLiter(s) Swish and Spit two times a day  heparin  Injectable 7500Unit(s) SubCutaneous every 8 hours  dexmedetomidine Infusion 0.5MICROgram(s)/kG/Hr IV Continuous <Continuous>  fentaNYL   Infusion 0.5MICROgram(s)/kG/Hr IV Continuous <Continuous>  pantoprazole   Suspension 40milliGRAM(s) Oral daily  midazolam Infusion 2mG/Hr IV Continuous <Continuous>  sodium chloride 0.9% Bolus 1000milliLiter(s) IV Bolus once  erythromycin   IVPB 250milliGRAM(s) IV Intermittent every 12 hours  piperacillin/tazobactam IVPB. 4.5Gram(s) IV Intermittent every 6 hours  venlafaxine 75milliGRAM(s) Oral every 12 hours  QUEtiapine 100milliGRAM(s) Oral every 12 hours    MEDICATIONS  (PRN):  acetaminophen    Suspension 650milliGRAM(s) Oral every 6 hours PRN For Temp greater than 38 C (100.4 F)      Social Hx: (Info from  consult): Father reports that pt has 3 kids who are living in [facility] with patient's mother. Pt had been living with her partner in [place] until Sept when she and her partner  and pt returned to [place] to live with her mother and her kids. Partner  a few days before [place] [facility] .     Sub Abuse Hx: (Info from  consult): Pt denied.    Family Hx: (Info from  consult): ? Depression in some family members    ROS: Psych: See HPI.  All other systems negative.                          13.0   6.9   )-----------( 255      ( 10 Feb 2017 05:36 )             42.5   2017 06:49    141    |  107    |  16     ----------------------------<  97     3.9     |  25     |  0.66     Ca    9.2        2017 06:49  Mg     2.0       2017 06:49      TSH:     Utox:  Imaging:  Other Tests:    Old Records reviewed: Psych consult from  (prior admission)    Collateral: Per team, pt's mother provided some hx including psychotropic rx info, stating that pt is noncompliant with medications.    EXAM:  Vital Signs Last 24 Hrs  T(C): 38.5, Max: 38.5 (02-10 @ 14:30)  T(F): 101.3, Max: 101.3 (02-10 @ 14:30)  HR: 74 (74 - 128)  BP: 142/97 (70/39 - 174/115)  BP(mean): 111 (47 - 150)  RR: 13 (4 - 21)  SpO2: 100% (92% - 100%)    MSE:   Gen Appearance:  Gait/Station/Muscle Tone:  Abnl Movements:  Speech:  TP;  Associations:  TC:  Mood:  Affect:  Consciousness/orientation:  Memory:   Recent:    Remote:  Attention/Concentration:  Language:  Fund of Knowledge:  Insight:  Judgment: Pt seen; chart reviewed and discussed with team. Seen with father at bedside. Pt intubated; cannot be interviewed.    HPI (from admit):  30 yo F extremely poor historian, PMHx HTN, HLD, ?paroxysmal afib (reports during pregnancy), congenital cardiac malformation s/p repair, ?MICHAEL, DVT in  (s/p Coumadin last dose ), h/o UGIB s/p endoscopy, recent 2 day admission (2017) to [facility] for SOB/CP/n/v r/o ACS, ECHO (EF 60%, normal sized LA, severely asymmetric septal hypertrophy) admission c/b 10 minute unresponsiveness, resolving on its own with non contributory full w/u, presented to  ED ( at night) again per the patient with similar symptoms that brought her to the hospital earlier this month, SOB/CP/n/v.     In the ED VSS T 98.5, , /98, RR 22, POx 92 RA. Labs and CT were not impressive other than cardiomegaly with ASD repair.  The patient was in the ED for a number of hours. In the early morning the pt vomited and became more tachypneic. She was placed on a non-rebreather. ABG showed CO2 retention. The patient was put on Bipap and MICU was consulted. By the time MICU arrived the patient had further decompensated and was urgently intubated." (2017 19:13)    Pt seen by psychiatry consult-liaison service (Dr.'s Bran and Enedelia) on , during a prior admit. At that time she stated she had a hx of depression. She was noted to be a poor historian.      Psych HPI: Per team, pt currently very anxious, having difficulty with intubation despite 4 sedative medications.    Past Psych Hx: Per team, pt's mother reports pt rx'ed with venlafaxine  mg po qdaily, risperidone. Basically noncompliant.        PAST MEDICAL & SURGICAL HISTORY:  As above.    DVT (deep venous thrombosis)  GIB (gastrointestinal bleeding)  Afib  HTN (hypertension)  Chronic systolic congestive heart failure  HLD (hyperlipidemia)  Myocardial infarction  Congenital heart disease      Allergies    No Known Drug Allergies  Seafood (Rash)    Intolerances      MEDICATIONS  (STANDING):  propofol Infusion 7.652MICROgram(s)/kG/Min IV Continuous <Continuous>  chlorhexidine 0.12% Liquid 15milliLiter(s) Swish and Spit two times a day  heparin  Injectable 7500Unit(s) SubCutaneous every 8 hours  dexmedetomidine Infusion 0.5MICROgram(s)/kG/Hr IV Continuous <Continuous>  fentaNYL   Infusion 0.5MICROgram(s)/kG/Hr IV Continuous <Continuous>  pantoprazole   Suspension 40milliGRAM(s) Oral daily  midazolam Infusion 2mG/Hr IV Continuous <Continuous>  sodium chloride 0.9% Bolus 1000milliLiter(s) IV Bolus once  erythromycin   IVPB 250milliGRAM(s) IV Intermittent every 12 hours  piperacillin/tazobactam IVPB. 4.5Gram(s) IV Intermittent every 6 hours  venlafaxine 75milliGRAM(s) Oral every 12 hours  QUEtiapine 100milliGRAM(s) Oral every 12 hours    MEDICATIONS  (PRN):  acetaminophen    Suspension 650milliGRAM(s) Oral every 6 hours PRN For Temp greater than 38 C (100.4 F)      Social Hx: (Info from  consult): Father reports that pt has 3 kids who are living in [facility] with patient's mother. Pt had been living with her partner in [place] until Sept when she and her partner  and pt returned to [place] to live with her mother and her kids. Partner  a few days before [place] so pt traveled to [facility] a few days later to attend his .     Sub Abuse Hx: (Info from  consult): Pt denied.    Family Hx: (Info from  consult): ? Depression in some family members    ROS: Psych: See HPI.  All other systems negative.                          13.0   6.9   )-----------( 255      ( 10 Feb 2017 05:36 )             42.5   2017 06:49    141    |  107    |  16     ----------------------------<  97     3.9     |  25     |  0.66     Ca    9.2        2017 06:49  Mg     2.0       2017 06:49      TSH:     Utox:  Imaging:  Other Tests:    Old Records reviewed: Psych consult from  (prior admission)    Collateral: Per team, pt's mother provided some hx including psychotropic rx info, stating that pt is noncompliant with medications.    EXAM:  Vital Signs Last 24 Hrs  T(C): 38.5, Max: 38.5 (02-10 @ 14:30)  T(F): 101.3, Max: 101.3 (02-10 @ 14:30)  HR: 74 (74 - 128)  BP: 142/97 (70/39 - 174/115)  BP(mean): 111 (47 - 150)  RR: 13 (4 - 21)  SpO2: 100% (92% - 100%)    MSE: Pt currently intubated. Cannot be interviewed at present time.  Gen Appearance: Intubated.  Gait/Station/Muscle Tone: Intubated  Abnl Movements: None  Speech:Intubated  TP; UTO  Associations: UTO  TC: UTO  Mood: UTO  Affect: Intubated, currently calm  Consciousness/orientation: Sedated  Memory:   Recent:    Remote: UTO. From prior interviews, pt is poor historian  Attention/Concentration: Intubated  Language: UTO  Fund of Knowledge: UTO  Insight: UTO  Judgment: Based on pt's noncompliance with tx/rx, judgment can be inferred to be limited/impaired

## 2017-02-10 NOTE — BRIEF OPERATIVE NOTE - POST-OP DX
Pneumonia  01/29/2017    Active  Arturo Uribe  Respiratory failure with hypercapnia, unspecified chronicity  02/10/2017    Active  Niles Urias
Pneumonia  01/29/2017    Active  Arturo Uribe
Pneumonia  01/29/2017    Active  Arturo Uribe

## 2017-02-10 NOTE — BRIEF OPERATIVE NOTE - PROCEDURE
Tracheostomy percutaneous  02/10/2017    Active  ELKIN
Bronchoscopy  01/29/2017    Active  ANDRÉS
Bronchoscopy  01/29/2017    Active  Arturo Uribe

## 2017-02-10 NOTE — BRIEF OPERATIVE NOTE - OPERATION/FINDINGS
Patient’s neck was positioned with a towel roll behind his shoulder blades. This allowed for mild extension of the cervical neck. The neck was then prepped with chlorhexidine solution. The area was then draped in a sterile fashion. Attention was directed at the midline trachea, where the cricothyroid membrane was palpated. Approximately two finger-lengths above the sternal notch, a midline vertical incision was created with a scalpel. With Bronchoscopic assistance, The Blue Rhino Kit was used. The needle was visualized into the trachea, a guidewire was introduced with the seldinger technique. Dilation was performed with the tracheal ring dilator and Large dilator. A #8 Shiley tracheostomy was then inserted over the guidewire and sheath.. The Ventilator was then connected and Bronchoscopy was visualized in the correct position. The Tracheostomy was then sutured into place and trach ties were applied. Dr. Mancini was present during the entire procedure.
Indication: Percutaneous Tracheostomy placement    History:  30 yo F extremely poor historian, PMHx HTN, HLD, ?paroxysmal afib (reports during pregnancy), congenital cardiac malformation s/p repair, ?MICHAEL, DVT in 2008 (s/p Coumadin last dose 2009), h/o UGIB s/p endoscopy, recent 2 day admission (Jan 2017) to [facility] for SOB/CP/n/v r/o ACS, ECHO (EF 60%, normal sized LA, severely asymmetric septal hypertrophy) admission c/b 10 minute unresponsiveness, resolving on its own with non contributory full w/u, presented to  ED (1/27 at night) again per the patient with similar symptoms that brought her to the hospital earlier this month, SOB/CP/n/v.   Preop medication: Nimbex, Propofol    Findings:  Bronchoscope inserted through ETT. Airway evaluation revealed [place]. MADELYN and LLL evaluated which appeared normal. Purulent plugs seen in the RLL. Therapeutic aspiration performed in RML and RLL. Under bronchoscopic visualization, percutaneous tracheostomy performed. Finder needle, followed by dilator seen entering trachea without puncturing posterior wall. Once tracheostomy placed, bronchoscope passed through tracheostomy and confirmed proper placement, minimal bleeding seen.     Specimens: Micro Wash from RLL    EBL: Minimal
Indication: Respiratory failure and white out of the right hemithorax    HPI:  32 yo F extremely poor historian, PMHx HTN, HLD, ?paroxysmal afib (reports during pregnancy), congenital cardiac malformation s/p repair, ?MICHAEL, DVT in 2008 (s/p Coumadin last dose 2009), h/o UGIB s/p endoscopy, recent 2 day admission (Jan 2017) to [facility] for SOB/CP/n/v r/o ACS, ECHO (EF 60%, normal sized LA, severely asymmetric septal hypertrophy) admission c/b 10 minute unresponsiveness, resolving on its own with non contributory full w/u, presented to  ED (1/27 at night) again per the patient with similar symptoms that brought her to the hospital earlier this month, SOB/CP/n/v. Patient was intubated for hypercarbic respiratory failure.    Preop medication: Versed, Fentanyl gtt    Findings:  Bronchoscope inserted through ETT. Airway evaluation revealed [place]. MADELYN and LLL was evaluated which appeared normal. RUL/RML appeared to have purulent secretions present. No evidence of bleeding, endobronchial lesions. Mucosa appeared normal.  ETT noted to be in good position. Bronchoscope then withdrawn from ETT.    Specimens: Micro studies    EBL: 0cc

## 2017-02-11 LAB
ANION GAP SERPL CALC-SCNC: 14 MMOL/L — SIGNIFICANT CHANGE UP (ref 9–16)
BASOPHILS NFR BLD AUTO: 0.7 % — SIGNIFICANT CHANGE UP (ref 0–2)
BUN SERPL-MCNC: 10 MG/DL — SIGNIFICANT CHANGE UP (ref 7–23)
CALCIUM SERPL-MCNC: 6.6 MG/DL — LOW (ref 8.5–10.5)
CHLORIDE SERPL-SCNC: 112 MMOL/L — HIGH (ref 96–108)
CO2 SERPL-SCNC: 19 MMOL/L — LOW (ref 22–31)
CREAT SERPL-MCNC: 0.59 MG/DL — SIGNIFICANT CHANGE UP (ref 0.5–1.3)
CULTURE RESULTS: SIGNIFICANT CHANGE UP
CULTURE RESULTS: SIGNIFICANT CHANGE UP
EOSINOPHIL NFR BLD AUTO: 6.7 % — HIGH (ref 0–6)
GLUCOSE SERPL-MCNC: 231 MG/DL — HIGH (ref 70–99)
GRAM STN FLD: SIGNIFICANT CHANGE UP
HCT VFR BLD CALC: 35.9 % — SIGNIFICANT CHANGE UP (ref 34.5–45)
HGB BLD-MCNC: 10.3 G/DL — LOW (ref 11.5–15.5)
LACTATE SERPL-SCNC: 1 MMOL/L — SIGNIFICANT CHANGE UP (ref 0.5–2)
LYMPHOCYTES # BLD AUTO: 18.2 % — SIGNIFICANT CHANGE UP (ref 13–44)
MCHC RBC-ENTMCNC: 21.8 PG — LOW (ref 27–34)
MCHC RBC-ENTMCNC: 28.7 G/DL — LOW (ref 32–36)
MCV RBC AUTO: 76.1 FL — LOW (ref 80–100)
MONOCYTES NFR BLD AUTO: 9.9 % — SIGNIFICANT CHANGE UP (ref 2–14)
NEUTROPHILS NFR BLD AUTO: 64.5 % — SIGNIFICANT CHANGE UP (ref 43–77)
NIGHT BLUE STAIN TISS: SIGNIFICANT CHANGE UP
PLATELET # BLD AUTO: 264 K/UL — SIGNIFICANT CHANGE UP (ref 150–400)
POTASSIUM SERPL-MCNC: 3.1 MMOL/L — LOW (ref 3.5–5.3)
POTASSIUM SERPL-SCNC: 3.1 MMOL/L — LOW (ref 3.5–5.3)
RBC # BLD: 4.72 M/UL — SIGNIFICANT CHANGE UP (ref 3.8–5.2)
RBC # FLD: 23.2 % — HIGH (ref 10.3–16.9)
SODIUM SERPL-SCNC: 145 MMOL/L — SIGNIFICANT CHANGE UP (ref 135–145)
SPECIMEN SOURCE: SIGNIFICANT CHANGE UP
WBC # BLD: 6.1 K/UL — SIGNIFICANT CHANGE UP (ref 3.8–10.5)
WBC # FLD AUTO: 6.1 K/UL — SIGNIFICANT CHANGE UP (ref 3.8–10.5)

## 2017-02-11 PROCEDURE — 99233 SBSQ HOSP IP/OBS HIGH 50: CPT | Mod: GC

## 2017-02-11 RX ORDER — VANCOMYCIN HCL 1 G
1500 VIAL (EA) INTRAVENOUS ONCE
Qty: 0 | Refills: 0 | Status: COMPLETED | OUTPATIENT
Start: 2017-02-11 | End: 2017-02-11

## 2017-02-11 RX ORDER — VANCOMYCIN HCL 1 G
1500 VIAL (EA) INTRAVENOUS EVERY 12 HOURS
Qty: 0 | Refills: 0 | Status: DISCONTINUED | OUTPATIENT
Start: 2017-02-11 | End: 2017-02-12

## 2017-02-11 RX ORDER — POTASSIUM CHLORIDE 20 MEQ
40 PACKET (EA) ORAL EVERY 4 HOURS
Qty: 0 | Refills: 0 | Status: DISCONTINUED | OUTPATIENT
Start: 2017-02-11 | End: 2017-02-11

## 2017-02-11 RX ORDER — POTASSIUM CHLORIDE 20 MEQ
40 PACKET (EA) ORAL ONCE
Qty: 0 | Refills: 0 | Status: COMPLETED | OUTPATIENT
Start: 2017-02-11 | End: 2017-02-11

## 2017-02-11 RX ORDER — VANCOMYCIN HCL 1 G
VIAL (EA) INTRAVENOUS
Qty: 0 | Refills: 0 | Status: DISCONTINUED | OUTPATIENT
Start: 2017-02-11 | End: 2017-02-12

## 2017-02-11 RX ADMIN — HEPARIN SODIUM 7500 UNIT(S): 5000 INJECTION INTRAVENOUS; SUBCUTANEOUS at 22:45

## 2017-02-11 RX ADMIN — Medication 650 MILLIGRAM(S): at 18:19

## 2017-02-11 RX ADMIN — Medication 650 MILLIGRAM(S): at 12:22

## 2017-02-11 RX ADMIN — PIPERACILLIN AND TAZOBACTAM 200 GRAM(S): 4; .5 INJECTION, POWDER, LYOPHILIZED, FOR SOLUTION INTRAVENOUS at 12:21

## 2017-02-11 RX ADMIN — QUETIAPINE FUMARATE 100 MILLIGRAM(S): 200 TABLET, FILM COATED ORAL at 22:45

## 2017-02-11 RX ADMIN — PIPERACILLIN AND TAZOBACTAM 200 GRAM(S): 4; .5 INJECTION, POWDER, LYOPHILIZED, FOR SOLUTION INTRAVENOUS at 00:43

## 2017-02-11 RX ADMIN — Medication 250 MILLIGRAM(S): at 07:03

## 2017-02-11 RX ADMIN — DEXMEDETOMIDINE HYDROCHLORIDE IN 0.9% SODIUM CHLORIDE 13.61 MICROGRAM(S)/KG/HR: 4 INJECTION INTRAVENOUS at 04:56

## 2017-02-11 RX ADMIN — DEXMEDETOMIDINE HYDROCHLORIDE IN 0.9% SODIUM CHLORIDE 13.61 MICROGRAM(S)/KG/HR: 4 INJECTION INTRAVENOUS at 09:42

## 2017-02-11 RX ADMIN — Medication 650 MILLIGRAM(S): at 02:21

## 2017-02-11 RX ADMIN — PANTOPRAZOLE SODIUM 40 MILLIGRAM(S): 20 TABLET, DELAYED RELEASE ORAL at 12:21

## 2017-02-11 RX ADMIN — MIDAZOLAM HYDROCHLORIDE 2 MG/HR: 1 INJECTION, SOLUTION INTRAMUSCULAR; INTRAVENOUS at 01:10

## 2017-02-11 RX ADMIN — DEXMEDETOMIDINE HYDROCHLORIDE IN 0.9% SODIUM CHLORIDE 13.61 MICROGRAM(S)/KG/HR: 4 INJECTION INTRAVENOUS at 07:02

## 2017-02-11 RX ADMIN — Medication 250 MILLIGRAM(S): at 19:03

## 2017-02-11 RX ADMIN — Medication 40 MILLIEQUIVALENT(S): at 14:48

## 2017-02-11 RX ADMIN — CHLORHEXIDINE GLUCONATE 15 MILLILITER(S): 213 SOLUTION TOPICAL at 07:02

## 2017-02-11 RX ADMIN — DEXMEDETOMIDINE HYDROCHLORIDE IN 0.9% SODIUM CHLORIDE 13.61 MICROGRAM(S)/KG/HR: 4 INJECTION INTRAVENOUS at 03:36

## 2017-02-11 RX ADMIN — DEXMEDETOMIDINE HYDROCHLORIDE IN 0.9% SODIUM CHLORIDE 13.61 MICROGRAM(S)/KG/HR: 4 INJECTION INTRAVENOUS at 20:27

## 2017-02-11 RX ADMIN — DEXMEDETOMIDINE HYDROCHLORIDE IN 0.9% SODIUM CHLORIDE 13.61 MICROGRAM(S)/KG/HR: 4 INJECTION INTRAVENOUS at 01:45

## 2017-02-11 RX ADMIN — FENTANYL CITRATE 5.45 MICROGRAM(S)/KG/HR: 50 INJECTION INTRAVENOUS at 22:45

## 2017-02-11 RX ADMIN — FENTANYL CITRATE 5.45 MICROGRAM(S)/KG/HR: 50 INJECTION INTRAVENOUS at 08:35

## 2017-02-11 RX ADMIN — Medication 300 MILLIGRAM(S): at 14:50

## 2017-02-11 RX ADMIN — DEXMEDETOMIDINE HYDROCHLORIDE IN 0.9% SODIUM CHLORIDE 13.61 MICROGRAM(S)/KG/HR: 4 INJECTION INTRAVENOUS at 18:47

## 2017-02-11 RX ADMIN — PIPERACILLIN AND TAZOBACTAM 200 GRAM(S): 4; .5 INJECTION, POWDER, LYOPHILIZED, FOR SOLUTION INTRAVENOUS at 17:37

## 2017-02-11 RX ADMIN — DEXMEDETOMIDINE HYDROCHLORIDE IN 0.9% SODIUM CHLORIDE 13.61 MICROGRAM(S)/KG/HR: 4 INJECTION INTRAVENOUS at 12:50

## 2017-02-11 RX ADMIN — AMLODIPINE BESYLATE 5 MILLIGRAM(S): 2.5 TABLET ORAL at 07:04

## 2017-02-11 RX ADMIN — Medication 75 MILLIGRAM(S): at 22:45

## 2017-02-11 RX ADMIN — HEPARIN SODIUM 7500 UNIT(S): 5000 INJECTION INTRAVENOUS; SUBCUTANEOUS at 07:02

## 2017-02-11 RX ADMIN — PROPOFOL 5 MICROGRAM(S)/KG/MIN: 10 INJECTION, EMULSION INTRAVENOUS at 11:43

## 2017-02-11 RX ADMIN — CHLORHEXIDINE GLUCONATE 15 MILLILITER(S): 213 SOLUTION TOPICAL at 17:39

## 2017-02-11 RX ADMIN — DEXMEDETOMIDINE HYDROCHLORIDE IN 0.9% SODIUM CHLORIDE 13.61 MICROGRAM(S)/KG/HR: 4 INJECTION INTRAVENOUS at 00:43

## 2017-02-11 RX ADMIN — Medication 75 MILLIGRAM(S): at 10:46

## 2017-02-11 RX ADMIN — PIPERACILLIN AND TAZOBACTAM 200 GRAM(S): 4; .5 INJECTION, POWDER, LYOPHILIZED, FOR SOLUTION INTRAVENOUS at 07:03

## 2017-02-11 RX ADMIN — DEXMEDETOMIDINE HYDROCHLORIDE IN 0.9% SODIUM CHLORIDE 13.61 MICROGRAM(S)/KG/HR: 4 INJECTION INTRAVENOUS at 22:49

## 2017-02-11 RX ADMIN — QUETIAPINE FUMARATE 100 MILLIGRAM(S): 200 TABLET, FILM COATED ORAL at 10:45

## 2017-02-11 RX ADMIN — HEPARIN SODIUM 7500 UNIT(S): 5000 INJECTION INTRAVENOUS; SUBCUTANEOUS at 14:48

## 2017-02-11 RX ADMIN — PROPOFOL 5 MICROGRAM(S)/KG/MIN: 10 INJECTION, EMULSION INTRAVENOUS at 04:57

## 2017-02-11 NOTE — PROGRESS NOTE ADULT - SUBJECTIVE AND OBJECTIVE BOX
Interval Events: reviewed  Patient seen and examined at bedside.  Patient awake and alert on the ventilator following commands with no acute events overnight.  The patient was extubated during the day then became hypercapneic and more somnolent with intermittent hypoxia.  The patient was reintubated.  She had tarch and is sedated    she is sedated secrtions are moderate.  She is awake agitated and delerious    MEDICATIONS:  Pulmonary:    Antimicrobials:  oxacillin IVPB 2Gram(s) IV Intermittent every 4 hours  oxacillin IVPB  IV Intermittent     Anticoagulants:  heparin  Injectable 7500Unit(s) SubCutaneous every 8 hours    Cardiac:      Allergies    No Known Drug Allergies  Seafood (Rash)    Intolerances        Vital Signs Last 24 Hrs  T(C): 37, Max: 38.1 ( @ 23:11)  T(F): 98.6, Max: 100.6 ( @ 23:11)  HR: 94 (72 - 128)  BP: 125/77 (97/51 - 183/90)  BP(mean): 96 (69 - 139)  RR: 14 (10 - 31)  SpO2: 96% (82% - 100%)  I & Os for 24h ending  @ 07:00  =============================================  IN: 2115.5 ml / OUT: 3655 ml / NET: -1539.5 ml    I & Os for current day (as of  @ 21:30)  =============================================  IN: 566 ml / OUT: 865 ml / NET: -299 ml    Mode: AC/ CMV (Assist Control/ Continuous Mandatory Ventilation)  RR (machine): 14  TV (machine): 390  FiO2: 40  PEEP: 5  ITime: 0.8  MAP: 8.1  PIP: 21      PHYSICAL EXAM:    General: Well developed; well nourished; in no acute distress  Neck: Supple; non tender; no masses  Respiratory: Clear to auscultation bilaterally without wheezing, rhonchi or rales  Cardiovascular: tachycardic but regular  Gastrointestinal: Soft non-tender non-distended; Normal bowel sounds  Extremities: Normal range of motion, No clubbing, cyanosis or edema  Neurological: Alert and oriented x3  Skin: Warm and dry. No obvious rash    LABS:  ABG - ( 2017 17:05 )  pH: 7.37  /  pCO2: 52    /  pO2: 169   / HCO3: 29    / Base Excess: 2.7   /  SaO2: 99                  CBC Full  -  ( 2017 07:34 )  WBC Count : 6.2 K/uL  Hemoglobin : 14.6 g/dL  Hematocrit : 47.3 %  Platelet Count - Automated : 124 K/uL  Mean Cell Volume : 73.9 fL  Mean Cell Hemoglobin : 22.8 pg  Mean Cell Hemoglobin Concentration : 30.9 g/dL  Auto Neutrophil # : x  Auto Lymphocyte # : x  Auto Monocyte # : x  Auto Eosinophil # : x  Auto Basophil # : x  Auto Neutrophil % : 63.0 %  Auto Lymphocyte % : 19.0 %  Auto Monocyte % : 9.0 %  Auto Eosinophil % : 4.0 %  Auto Basophil % : x    2017 07:34    141    |  105    |  12     ----------------------------<  76     4.0     |  29     |  0.78     Ca    8.3        2017 07:34  Phos  4.4       2017 07:34  Mg     2.2       2017 07:34      PT/INR - ( 2017 13:14 )   PT: 13.2 sec;   INR: 1.19          PTT - ( 2017 13:14 )  PTT:36.7 sec      Urinalysis Basic - ( 2017 08:10 )    Color: Yellow / Appearance: Clear / S.010 / pH: x  Gluc: x / Ketone: NEGATIVE  / Bili: NEGATIVE / Urobili: >=8.0 E.U./dL   Blood: x / Protein: 30 mg/dL / Nitrite: NEGATIVE   Leuk Esterase: NEGATIVE / RBC: 5-10 /HPF / WBC 5-10 /HPF   Sq Epi: x / Non Sq Epi: Few /HPF / Bacteria: Present /HPF    Culture - Sputum . (17 @ 18:50)    Gram Stain:   Numerous white blood cells  Rare epithelial cells  Numerous Gram Positive Cocci in Clusters  Few Gram Positive Rods  Rare Gram Negative Rods    Specimen Source: .Sputum Sputum    Culture Results:   Culture in progress      EXAM:  XR CHEST 1 VIEW PORT IMMEDIATE                           PROCEDURE DATE:  02/10/2017                 INTERPRETATION:  XR CHEST 1 VIEW PORT IMMEDIATE DATED 2/10/2017 11:03 AM    INDICATION: 31 years-old Female with s/p tracheostomy placement.    PRIOR STUDIES: Chest x-ray from earlier the same day    FINDINGS: AP view of the chest demonstrates placement of a tracheostomy   tube in appropriate position. Enteric tube again present. Slightly   improving left basilar opacity. No pleural effusion or pneumothorax.    IMPRESSION:  Tracheostomy tube in appropriate position.    PRIOR STUDIES: Portable CXR on 2017    FINDINGS:                                      RADIOLOGY & ADDITIONAL STUDIES (The following images were personally reviewed):

## 2017-02-11 NOTE — PROGRESS NOTE ADULT - PROBLEM SELECTOR PLAN 1
Patient with hypercapneic respiratory failure possibly due to central hypoventilation syndrome.  The patient was diagnosed with "sleep apnea" when she was 5 years old and has had to use CPAP at night ever since.  Her father has central hypoventilation syndrome with a +PHOX2B gene and has diagraphmatic pacemaker.  -The patient was reintubated today for hypercapneic respiratory failure.  Check PHOX2B gene and will discuss tracheostomy with father and patient.  Conitnue on the Provigil.  Continue on MV and evaluate for decrease sedation and start the weaning process as the patient had the trach

## 2017-02-11 NOTE — PROGRESS NOTE ADULT - PROBLEM SELECTOR PLAN 3
she developed nosocomial pneumonia and will follow on cultures.  he antibitoics broadened.  Follow cultures from bronchoscopy

## 2017-02-11 NOTE — PROGRESS NOTE ADULT - SUBJECTIVE AND OBJECTIVE BOX
OVERNIGHT: No overnight events.  SUBJECTIVE: Patient seen and examined at bedside. Unable to perform subjective examination.     OBJECTIVE:    VITAL SIGNS:  ICU Vital Signs Last 24 Hrs  T(C): 38.4, Max: 38.9 ( @ 02:12)  T(F): 101.1, Max: 102 ( @ 02:12)  HR: 78 (74 - 98)  BP: 138/89 (98/53 - 212/123)  BP(mean): 107 (65 - 156)  ABP: --  ABP(mean): --  RR: 24 (10 - 24)  SpO2: 98% (92% - 100%)    Mode: AC/ CMV (Assist Control/ Continuous Mandatory Ventilation), RR (machine): 12, TV (machine): 500, FiO2: 50, PEEP: 10, ITime: 0.85, MAP: 16, PIP: 30  I & Os for 24h ending  @ 07:00  =============================================  IN: 3594.2 ml / OUT: 1275 ml / NET: 2319.2 ml    I & Os for current day (as of  @ 07:53)  =============================================  IN: 139.5 ml / OUT: 0 ml / NET: 139.5 ml    CAPILLARY BLOOD GLUCOSE  144 (2017 06:00)    PHYSICAL EXAM:    Constitutional: NAD.    Eyes: No scleral icterus. No Conj Pallor.   ENMT: MMM. No thrush  Neck: No JVD. Trach in place.   Respiratory: Some ronchorous sounds on the Left. No wheezes, no crackles.   Cardiovascular: Normal S1 and S2 no MRG   Gastrointestinal: BS normoactive. S/NT/ND.  : miner in place   Extremities: WWP. DP 2 plus.   Neurological: AAOX3. CN II- XII intact. Reflexes 3 plus. Strength 5/5 upper and lower. No Dysmetria.  MSK: no joint swelling   Vasc: 2+ pulses throughout      MEDICATIONS:  MEDICATIONS  (STANDING):  propofol Infusion 7.652MICROgram(s)/kG/Min IV Continuous <Continuous>  chlorhexidine 0.12% Liquid 15milliLiter(s) Swish and Spit two times a day  heparin  Injectable 7500Unit(s) SubCutaneous every 8 hours  dexmedetomidine Infusion 0.5MICROgram(s)/kG/Hr IV Continuous <Continuous>  fentaNYL   Infusion 0.5MICROgram(s)/kG/Hr IV Continuous <Continuous>  pantoprazole   Suspension 40milliGRAM(s) Oral daily  midazolam Infusion 2mG/Hr IV Continuous <Continuous>  sodium chloride 0.9% Bolus 1000milliLiter(s) IV Bolus once  erythromycin   IVPB 250milliGRAM(s) IV Intermittent every 12 hours  piperacillin/tazobactam IVPB. 4.5Gram(s) IV Intermittent every 6 hours  venlafaxine 75milliGRAM(s) Oral every 12 hours  QUEtiapine 100milliGRAM(s) Oral every 12 hours  amLODIPine   Tablet 5milliGRAM(s) Oral daily    MEDICATIONS  (PRN):  acetaminophen    Suspension 650milliGRAM(s) Oral every 6 hours PRN For Temp greater than 38 C (100.4 F)      ALLERGIES:  Allergies    No Known Drug Allergies  Seafood (Rash)    Intolerances        LABS:                        13.0   6.9   )-----------( 255      ( 10 Feb 2017 05:36 )             42.5           PT/INR - ( 10 Feb 2017 05:37 )   PT: 14.6 sec;   INR: 1.31          PTT - ( 10 Feb 2017 05:37 )  PTT:31.7 sec  Urinalysis Basic - ( 2017 16:22 )    Color: Yellow / Appearance: Clear / S.020 / pH: x  Gluc: x / Ketone: NEGATIVE  / Bili: Small / Urobili: 1.0 E.U./dL   Blood: x / Protein: NEGATIVE mg/dL / Nitrite: NEGATIVE   Leuk Esterase: NEGATIVE / RBC: x / WBC x   Sq Epi: x / Non Sq Epi: x / Bacteria: x        RADIOLOGY & ADDITIONAL TESTS: Reviewed.      30 y/o F with PMHx HTN, HLD, congenital ASD s/p repair, MICHAEL, HOCM, family h/o of neuromuscular hypoventilation syndrome, DVT in  (s/p Coumadin last dose ) admitted to MICU after hypercapnic respiratory failure 2/2 MSSA PNA, was doing well after 10 day course of oxacillin now with persistent fevers and started on Zosyn for additional coverage for likely new pneumonia and now on trach for respiratory status.    Pulm  #Hypercapnic respiratory failure 2/2 MSSA PNA complicated by potential diaphragmatic issue. Pt with like central apnea which is in her family history and without ventilation goes apneic immediately.  b/l opacities on CXR, small L pleural effusion on bedside US on .   - c/w mechanical ventilation trach in place  - Provigil 200mg daily   - FU serum genetic tests for diaphragmatic issue   - discussed EMG of diaphragm with neurology and  feeling is that this would not be helpful in elucidating diagnosis which is most likely central in origin    ID  #HAP vs. VAP: Initially treated for MSSA PNA, completing 10-day course of oxacillin for MSSA grown from bronchial wash. Now with persistent fevers -starting with 102.2 on  but downtrending fever curve on . Gram  stain now has likely staphylcoccus with gram negative rods. Zosyn coverage appropriate at this time. If clinically worsens or clear rise inf ever curve will begin MRSA coverage.   - c/w Zosyn (day 5, begun on )  - f/u bcx - NGTD     CV  #HD. Patient with two episodes of hypotension on  and 02/10 responsive to fluids, now hypertensive.   - Will hold home anti-hypertensive medications for now    #Pump. EF 60%. No diastolic HF. Echo finding of asymmetric septal wall hypertrophy. Cardiology consulted and dx HCOM but low suspicion for causing resp failure and also low likelihood of arrhythmogenic. Cardiology recommends care in not reducing preload and also starting beta blocker when clinically appropriate.     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 CT head shows no intracranial hemorrhage or acute transcortical infarct.   - c/w modafinil daily  - possible diaphragmatic pacer placement  eventually    Psych  Patient has a history of bipolar disorder on effoxor/risperidone. She currently seems to have an element of ICU delirium and was treated on Seroquel but this was DC'd in favor of the risperidone.   - Currently still on home regimen - psych recommends to inititate seroquel and titrate as needed      FEN:  tube feeds with Promote  PPX: HSQ, SCDs, PPI  Sedation: c/w precedex  Access: peripheral.   miner for urinary retention  DISPO: To remain in MICU for further stabilization  Full CODE OVERNIGHT: No overnight events.  SUBJECTIVE: Patient seen and examined at bedside. Unable to perform subjective examination.     OBJECTIVE:    VITAL SIGNS:  ICU Vital Signs Last 24 Hrs  T(C): 38.4, Max: 38.9 ( @ 02:12)  T(F): 101.1, Max: 102 ( @ 02:12)  HR: 78 (74 - 98)  BP: 138/89 (98/53 - 212/123)  BP(mean): 107 (65 - 156)  ABP: --  ABP(mean): --  RR: 24 (10 - 24)  SpO2: 98% (92% - 100%)    Mode: AC/ CMV (Assist Control/ Continuous Mandatory Ventilation), RR (machine): 12, TV (machine): 500, FiO2: 50, PEEP: 10, ITime: 0.85, MAP: 16, PIP: 30  I & Os for 24h ending  @ 07:00  =============================================  IN: 3594.2 ml / OUT: 1275 ml / NET: 2319.2 ml    I & Os for current day (as of  @ 07:53)  =============================================  IN: 139.5 ml / OUT: 0 ml / NET: 139.5 ml    CAPILLARY BLOOD GLUCOSE  144 (2017 06:00)    PHYSICAL EXAM:    Constitutional: NAD.    Eyes: No scleral icterus. No Conj Pallor.   ENMT: MMM. No thrush  Neck: No JVD. Trach in place.   Respiratory: Some ronchorous sounds on the Left. No wheezes, no crackles.   Cardiovascular: Normal S1 and S2 no MRG   Gastrointestinal: BS normoactive. S/NT/ND.  : miner in place   Extremities: WWP. DP 2 plus.   Neurological: AAOX3. CN II- XII intact. Reflexes 3 plus. Strength 5/5 upper and lower. No Dysmetria.  MSK: no joint swelling   Vasc: 2+ pulses throughout      MEDICATIONS:  MEDICATIONS  (STANDING):  propofol Infusion 7.652MICROgram(s)/kG/Min IV Continuous <Continuous>  chlorhexidine 0.12% Liquid 15milliLiter(s) Swish and Spit two times a day  heparin  Injectable 7500Unit(s) SubCutaneous every 8 hours  dexmedetomidine Infusion 0.5MICROgram(s)/kG/Hr IV Continuous <Continuous>  fentaNYL   Infusion 0.5MICROgram(s)/kG/Hr IV Continuous <Continuous>  pantoprazole   Suspension 40milliGRAM(s) Oral daily  midazolam Infusion 2mG/Hr IV Continuous <Continuous>  sodium chloride 0.9% Bolus 1000milliLiter(s) IV Bolus once  erythromycin   IVPB 250milliGRAM(s) IV Intermittent every 12 hours  piperacillin/tazobactam IVPB. 4.5Gram(s) IV Intermittent every 6 hours  venlafaxine 75milliGRAM(s) Oral every 12 hours  QUEtiapine 100milliGRAM(s) Oral every 12 hours  amLODIPine   Tablet 5milliGRAM(s) Oral daily    MEDICATIONS  (PRN):  acetaminophen    Suspension 650milliGRAM(s) Oral every 6 hours PRN For Temp greater than 38 C (100.4 F)      ALLERGIES:  Allergies    No Known Drug Allergies  Seafood (Rash)    Intolerances        LABS:                        13.0   6.9   )-----------( 255      ( 10 Feb 2017 05:36 )             42.5           PT/INR - ( 10 Feb 2017 05:37 )   PT: 14.6 sec;   INR: 1.31          PTT - ( 10 Feb 2017 05:37 )  PTT:31.7 sec  Urinalysis Basic - ( 2017 16:22 )    Color: Yellow / Appearance: Clear / S.020 / pH: x  Gluc: x / Ketone: NEGATIVE  / Bili: Small / Urobili: 1.0 E.U./dL   Blood: x / Protein: NEGATIVE mg/dL / Nitrite: NEGATIVE   Leuk Esterase: NEGATIVE / RBC: x / WBC x   Sq Epi: x / Non Sq Epi: x / Bacteria: x        RADIOLOGY & ADDITIONAL TESTS: Reviewed.      32 y/o F with PMHx HTN, HLD, congenital ASD s/p repair, MICHAEL, HOCM, family h/o of neuromuscular hypoventilation syndrome, DVT in  (s/p Coumadin last dose ) admitted to MICU after hypercapnic respiratory failure 2/2 MSSA PNA, was doing well after 10 day course of oxacillin now with persistent fevers and started on Zosyn for additional coverage for likely new pneumonia and now on trach for respiratory status.    Pulm  #Hypercapnic respiratory failure 2/2 MSSA PNA complicated by potential diaphragmatic issue. Pt with like central apnea which is in her family history and without ventilation goes apneic immediately.  b/l opacities on CXR, small L pleural effusion on bedside US on .   - c/w mechanical ventilation trach in place  - Provigil 200mg daily   - FU serum genetic tests for diaphragmatic issue   - discussed EMG of diaphragm with neurology and  feeling is that this would not be helpful in elucidating diagnosis which is most likely central in origin    ID  #HAP vs. VAP: Initially treated for MSSA PNA, completing 10-day course of oxacillin for MSSA grown from bronchial wash. Now with persistent fevers. Lavage from trach has staphylcoccus aureus in it.   - c/w Zosyn (day 6, begun on ) , and being Vancomycin for empiric coverage()  - f/u bcx drawn   - Pending sensistivity on the S. Aureus     CV  #HD. Patient with two episodes of hypotension on  and 02/10 responsive to fluids, now hypertensive.   - Will hold home anti-hypertensive medications for now    #Pump. EF 60%. No diastolic HF. Echo finding of asymmetric septal wall hypertrophy. Cardiology consulted and dx HCOM but low suspicion for causing resp failure and also low likelihood of arrhythmogenic. Cardiology recommends care in not reducing preload and also starting beta blocker when clinically appropriate.     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 CT head shows no intracranial hemorrhage or acute transcortical infarct.   - c/w modafinil daily  - possible diaphragmatic pacer placement  eventually    Psych  Patient has a history of bipolar disorder on effoxor/risperidone. She currently seems to have an element of ICU delirium and was treated on Seroquel but this was DC'd in favor of the risperidone.   - On seroquel     FEN:  tube feeds with Promote  PPX: HSQ, SCDs, PPI  Sedation: c/w precedex  Access: peripheral.   miner for urinary retention  DISPO: To remain in MICU for further stabilization  Full CODE OVERNIGHT: No overnight events.  SUBJECTIVE: Patient seen and examined at bedside. Unable to perform subjective examination for ROS.     OBJECTIVE:    VITAL SIGNS:  ICU Vital Signs Last 24 Hrs  T(C): 38.4, Max: 38.9 ( @ 02:12)  T(F): 101.1, Max: 102 ( @ 02:12)  HR: 78 (74 - 98)  BP: 138/89 (98/53 - 212/123)  BP(mean): 107 (65 - 156)  ABP: --  ABP(mean): --  RR: 24 (10 - 24)  SpO2: 98% (92% - 100%)    Mode: AC/ CMV (Assist Control/ Continuous Mandatory Ventilation), RR (machine): 12, TV (machine): 500, FiO2: 50, PEEP: 10, ITime: 0.85, MAP: 16, PIP: 30  I & Os for 24h ending  @ 07:00  =============================================  IN: 3594.2 ml / OUT: 1275 ml / NET: 2319.2 ml    I & Os for current day (as of  @ 07:53)  =============================================  IN: 139.5 ml / OUT: 0 ml / NET: 139.5 ml    CAPILLARY BLOOD GLUCOSE  144 (2017 06:00)    PHYSICAL EXAM:    Constitutional: NAD.    Eyes: No scleral icterus. No Conj Pallor.   ENMT: MMM. No thrush  Neck: No JVD. Trach in place.   Respiratory: Some ronchorous sounds on the Left. No wheezes, no crackles.   Cardiovascular: Normal S1 and S2 no MRG   Gastrointestinal: BS normoactive. S/NT/ND.  : miner in place   Extremities: WWP. DP 2 plus.   Neurological: AAOX3. CN II- XII intact. Reflexes 3 plus. Strength 5/5 upper and lower. No Dysmetria.  MSK: no joint swelling   Vasc: 2+ pulses throughout      MEDICATIONS:  MEDICATIONS  (STANDING):  propofol Infusion 7.652MICROgram(s)/kG/Min IV Continuous <Continuous>  chlorhexidine 0.12% Liquid 15milliLiter(s) Swish and Spit two times a day  heparin  Injectable 7500Unit(s) SubCutaneous every 8 hours  dexmedetomidine Infusion 0.5MICROgram(s)/kG/Hr IV Continuous <Continuous>  fentaNYL   Infusion 0.5MICROgram(s)/kG/Hr IV Continuous <Continuous>  pantoprazole   Suspension 40milliGRAM(s) Oral daily  midazolam Infusion 2mG/Hr IV Continuous <Continuous>  sodium chloride 0.9% Bolus 1000milliLiter(s) IV Bolus once  erythromycin   IVPB 250milliGRAM(s) IV Intermittent every 12 hours  piperacillin/tazobactam IVPB. 4.5Gram(s) IV Intermittent every 6 hours  venlafaxine 75milliGRAM(s) Oral every 12 hours  QUEtiapine 100milliGRAM(s) Oral every 12 hours  amLODIPine   Tablet 5milliGRAM(s) Oral daily    MEDICATIONS  (PRN):  acetaminophen    Suspension 650milliGRAM(s) Oral every 6 hours PRN For Temp greater than 38 C (100.4 F)      ALLERGIES:  Allergies    No Known Drug Allergies  Seafood (Rash)    Intolerances        LABS:                        13.0   6.9   )-----------( 255      ( 10 Feb 2017 05:36 )             42.5           PT/INR - ( 10 Feb 2017 05:37 )   PT: 14.6 sec;   INR: 1.31          PTT - ( 10 Feb 2017 05:37 )  PTT:31.7 sec  Urinalysis Basic - ( 2017 16:22 )    Color: Yellow / Appearance: Clear / S.020 / pH: x  Gluc: x / Ketone: NEGATIVE  / Bili: Small / Urobili: 1.0 E.U./dL   Blood: x / Protein: NEGATIVE mg/dL / Nitrite: NEGATIVE   Leuk Esterase: NEGATIVE / RBC: x / WBC x   Sq Epi: x / Non Sq Epi: x / Bacteria: x        RADIOLOGY & ADDITIONAL TESTS: Reviewed.      32 y/o F with PMHx HTN, HLD, congenital ASD s/p repair, MICHAEL, HOCM, family h/o of neuromuscular hypoventilation syndrome, DVT in  (s/p Coumadin last dose ) admitted to MICU after hypercapnic respiratory failure 2/2 MSSA PNA, was doing well after 10 day course of oxacillin now with persistent fevers and started on Zosyn for additional coverage for likely new pneumonia and now s/p trach for respiratory status.    Pulm  #Hypercapnic respiratory failure 2/2 MSSA PNA complicated by potential diaphragmatic issue. Pt with like central apnea which is in her family history and without ventilation goes apneic immediately.  b/l opacities on CXR, small L pleural effusion on bedside US on .   - c/w mechanical ventilation trach in place  - Provigil 200mg daily   - FU serum genetic tests for diaphragmatic issue       ID  #HAP vs. VAP: Initially treated for MSSA PNA, completing 10-day course of oxacillin for MSSA grown from bronchial wash. Now with persistent fevers. Lavage from trach has staphylcoccus aureus in it.   - c/w Zosyn (day 6, begun on ) , and being Vancomycin for empiric coverage()  - f/u bcx drawn   - Pending sensistivity on the S. Aureus     CV  #HD. Patient with two episodes of hypotension on  and 02/10 responsive to fluids, now hypertensive.   - Will hold home anti-hypertensive medications for now    #Pump. EF 60%. No diastolic HF. Echo finding of asymmetric septal wall hypertrophy. Cardiology consulted and dx HCOM but low suspicion for causing resp failure and also low likelihood of arrhythmogenic. Cardiology recommends care in not reducing preload and also starting beta blocker when clinically appropriate.     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 CT head shows no intracranial hemorrhage or acute transcortical infarct.   - c/w modafinil daily  - possible diaphragmatic pacer placement  eventually    Psych  Patient has a history of bipolar disorder on effoxor/risperidone. She currently seems to have an element of ICU delirium and was treated on Seroquel but this was DC'd in favor of the risperidone. Discussed with psychiatry and have restarted seroquel, stopped risperidone for delirium and continuing venlaxafine.  - On seroquel 150 mg BID, trying to taper propofol, then versed and fentanyl, continuing precedex     FEN:  tube feeds with Promote  PPX: HSQ, SCDs, PPI  Access: peripheral.   miner for urinary retention  DISPO: To remain in MICU for further stabilization  Full CODE OVERNIGHT: No overnight events.  SUBJECTIVE: Patient seen and examined at bedside. Unable to perform subjective examination for ROS.     OBJECTIVE:    VITAL SIGNS:  ICU Vital Signs Last 24 Hrs  T(C): 38.4, Max: 38.9 ( @ 02:12)  T(F): 101.1, Max: 102 ( @ 02:12)  HR: 78 (74 - 98)  BP: 138/89 (98/53 - 212/123)  BP(mean): 107 (65 - 156)  ABP: --  ABP(mean): --  RR: 24 (10 - 24)  SpO2: 98% (92% - 100%)    Mode: AC/ CMV (Assist Control/ Continuous Mandatory Ventilation), RR (machine): 12, TV (machine): 500, FiO2: 50, PEEP: 10, ITime: 0.85, MAP: 16, PIP: 30  I & Os for 24h ending  @ 07:00  =============================================  IN: 3594.2 ml / OUT: 1275 ml / NET: 2319.2 ml    I & Os for current day (as of  @ 07:53)  =============================================  IN: 139.5 ml / OUT: 0 ml / NET: 139.5 ml    CAPILLARY BLOOD GLUCOSE  144 (2017 06:00)    PHYSICAL EXAM:    Constitutional: NAD.    Eyes: No scleral icterus. No Conj Pallor.   ENMT: MMM. No thrush  Neck: No JVD. Trach in place.   Respiratory: Some ronchorous sounds on the Left. No wheezes, no crackles.   Cardiovascular: Normal S1 and S2 no MRG   Gastrointestinal: BS normoactive. S/NT/ND.  : miner in place   Extremities: WWP. DP 2 plus.   Neurological: AAOX3. CN II- XII intact. Reflexes 3 plus. Strength 5/5 upper and lower. No Dysmetria.  MSK: no joint swelling   Vasc: 2+ pulses throughout      MEDICATIONS:  MEDICATIONS  (STANDING):  propofol Infusion 7.652MICROgram(s)/kG/Min IV Continuous <Continuous>  chlorhexidine 0.12% Liquid 15milliLiter(s) Swish and Spit two times a day  heparin  Injectable 7500Unit(s) SubCutaneous every 8 hours  dexmedetomidine Infusion 0.5MICROgram(s)/kG/Hr IV Continuous <Continuous>  fentaNYL   Infusion 0.5MICROgram(s)/kG/Hr IV Continuous <Continuous>  pantoprazole   Suspension 40milliGRAM(s) Oral daily  midazolam Infusion 2mG/Hr IV Continuous <Continuous>  sodium chloride 0.9% Bolus 1000milliLiter(s) IV Bolus once  erythromycin   IVPB 250milliGRAM(s) IV Intermittent every 12 hours  piperacillin/tazobactam IVPB. 4.5Gram(s) IV Intermittent every 6 hours  venlafaxine 75milliGRAM(s) Oral every 12 hours  QUEtiapine 100milliGRAM(s) Oral every 12 hours  amLODIPine   Tablet 5milliGRAM(s) Oral daily    MEDICATIONS  (PRN):  acetaminophen    Suspension 650milliGRAM(s) Oral every 6 hours PRN For Temp greater than 38 C (100.4 F)      ALLERGIES:  Allergies    No Known Drug Allergies  Seafood (Rash)    Intolerances        LABS:                        13.0   6.9   )-----------( 255      ( 10 Feb 2017 05:36 )             42.5           PT/INR - ( 10 Feb 2017 05:37 )   PT: 14.6 sec;   INR: 1.31          PTT - ( 10 Feb 2017 05:37 )  PTT:31.7 sec  Urinalysis Basic - ( 2017 16:22 )    Color: Yellow / Appearance: Clear / S.020 / pH: x  Gluc: x / Ketone: NEGATIVE  / Bili: Small / Urobili: 1.0 E.U./dL   Blood: x / Protein: NEGATIVE mg/dL / Nitrite: NEGATIVE   Leuk Esterase: NEGATIVE / RBC: x / WBC x   Sq Epi: x / Non Sq Epi: x / Bacteria: x        RADIOLOGY & ADDITIONAL TESTS: Reviewed.      32 y/o F with PMHx HTN, HLD, congenital ASD s/p repair, MICHAEL, HOCM, family h/o of neuromuscular hypoventilation syndrome, DVT in  (s/p Coumadin last dose ) admitted to MICU after hypercapnic respiratory failure 2/2 MSSA PNA, was doing well after 10 day course of oxacillin now with persistent fevers and started on Zosyn for additional coverage for likely new pneumonia and now s/p trach for respiratory status.    Pulm  #Hypercapnic respiratory failure 2/2 MSSA PNA complicated by potential diaphragmatic issue. Pt with like central apnea which is in her family history and without ventilation goes apneic immediately.  b/l opacities on CXR, small L pleural effusion on bedside US on .   - c/w mechanical ventilation trach in place  - Provigil 200mg daily   - FU serum genetic tests for diaphragmatic issue       ID  #HAP vs. VAP: Initially treated for MSSA PNA, completing 10-day course of oxacillin for MSSA grown from bronchial wash. Now with persistent fevers. Lavage from trach has staphylcoccus aureus in it.   - c/w Zosyn (day 6, begun on ) , and d/c vanco given known will culture with MSSA  - f/u bcx drawn   - Pending sensistivity on the S. Aureus     CV  #HD. Patient with two episodes of hypotension on  and 02/10 responsive to fluids, now hypertensive.   - Will hold home anti-hypertensive medications for now    #Pump. EF 60%. No diastolic HF. Echo finding of asymmetric septal wall hypertrophy. Cardiology consulted and dx HCOM but low suspicion for causing resp failure and also low likelihood of arrhythmogenic. Cardiology recommends care in not reducing preload and also starting beta blocker when clinically appropriate.     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 CT head shows no intracranial hemorrhage or acute transcortical infarct.   - c/w modafinil daily  - possible diaphragmatic pacer placement  eventually    Psych  Patient has a history of bipolar disorder on effoxor/risperidone. She currently seems to have an element of ICU delirium and was treated on Seroquel but this was DC'd in favor of the risperidone. Discussed with psychiatry and have restarted seroquel, stopped risperidone for delirium and continuing venlaxafine.  - On seroquel 150 mg BID, trying to taper propofol, then versed and fentanyl, continuing precedex     FEN:  tube feeds with Promote  PPX: HSQ, SCDs, PPI  Access: peripheral.   miner for urinary retention  DISPO: To remain in MICU for further stabilization  Full CODE OVERNIGHT: No overnight events.  SUBJECTIVE: Patient seen and examined at bedside. Unable to perform subjective examination for ROS.     OBJECTIVE:    VITAL SIGNS:  ICU Vital Signs Last 24 Hrs  T(C): 38.4, Max: 38.9 ( @ 02:12)  T(F): 101.1, Max: 102 ( @ 02:12)  HR: 78 (74 - 98)  BP: 138/89 (98/53 - 212/123)  BP(mean): 107 (65 - 156)  ABP: --  ABP(mean): --  RR: 24 (10 - 24)  SpO2: 98% (92% - 100%)    Mode: AC/ CMV (Assist Control/ Continuous Mandatory Ventilation), RR (machine): 12, TV (machine): 500, FiO2: 50, PEEP: 10, ITime: 0.85, MAP: 16, PIP: 30  I & Os for 24h ending  @ 07:00  =============================================  IN: 3594.2 ml / OUT: 1275 ml / NET: 2319.2 ml    I & Os for current day (as of  @ 07:53)  =============================================  IN: 139.5 ml / OUT: 0 ml / NET: 139.5 ml    CAPILLARY BLOOD GLUCOSE  144 (2017 06:00)    PHYSICAL EXAM:    Constitutional: NAD.    Eyes: No scleral icterus. No Conj Pallor.   ENMT: MMM. No thrush  Neck: No JVD. Trach in place.   Respiratory: Some ronchorous sounds on the Left. No wheezes, no crackles.   Cardiovascular: Normal S1 and S2 no MRG   Gastrointestinal: BS normoactive. S/NT/ND.  : miner in place   Extremities: WWP. DP 2 plus.   Neurological: AAOX3. CN II- XII intact. Reflexes 3 plus. Strength 5/5 upper and lower. No Dysmetria.  MSK: no joint swelling   Vasc: 2+ pulses throughout      MEDICATIONS:  MEDICATIONS  (STANDING):  propofol Infusion 7.652MICROgram(s)/kG/Min IV Continuous <Continuous>  chlorhexidine 0.12% Liquid 15milliLiter(s) Swish and Spit two times a day  heparin  Injectable 7500Unit(s) SubCutaneous every 8 hours  dexmedetomidine Infusion 0.5MICROgram(s)/kG/Hr IV Continuous <Continuous>  fentaNYL   Infusion 0.5MICROgram(s)/kG/Hr IV Continuous <Continuous>  pantoprazole   Suspension 40milliGRAM(s) Oral daily  midazolam Infusion 2mG/Hr IV Continuous <Continuous>  sodium chloride 0.9% Bolus 1000milliLiter(s) IV Bolus once  erythromycin   IVPB 250milliGRAM(s) IV Intermittent every 12 hours  piperacillin/tazobactam IVPB. 4.5Gram(s) IV Intermittent every 6 hours  venlafaxine 75milliGRAM(s) Oral every 12 hours  QUEtiapine 100milliGRAM(s) Oral every 12 hours  amLODIPine   Tablet 5milliGRAM(s) Oral daily    MEDICATIONS  (PRN):  acetaminophen    Suspension 650milliGRAM(s) Oral every 6 hours PRN For Temp greater than 38 C (100.4 F)      ALLERGIES:  Allergies    No Known Drug Allergies  Seafood (Rash)    Intolerances        LABS:                        13.0   6.9   )-----------( 255      ( 10 Feb 2017 05:36 )             42.5           PT/INR - ( 10 Feb 2017 05:37 )   PT: 14.6 sec;   INR: 1.31          PTT - ( 10 Feb 2017 05:37 )  PTT:31.7 sec  Urinalysis Basic - ( 2017 16:22 )    Color: Yellow / Appearance: Clear / S.020 / pH: x  Gluc: x / Ketone: NEGATIVE  / Bili: Small / Urobili: 1.0 E.U./dL   Blood: x / Protein: NEGATIVE mg/dL / Nitrite: NEGATIVE   Leuk Esterase: NEGATIVE / RBC: x / WBC x   Sq Epi: x / Non Sq Epi: x / Bacteria: x        RADIOLOGY & ADDITIONAL TESTS: Reviewed.      32 y/o F with PMHx HTN, HLD, congenital ASD s/p repair, MICHAEL, HOCM, family h/o of neuromuscular hypoventilation syndrome, DVT in  (s/p Coumadin last dose ) admitted to MICU after hypercapnic respiratory failure 2/2 MSSA PNA, was doing well after 10 day course of oxacillin now with persistent fevers and started on Zosyn for additional coverage for likely new pneumonia and now s/p trach for respiratory status.    Pulm  #Hypercapnic respiratory failure 2/2 MSSA PNA complicated by potential diaphragmatic issue. Pt with like central apnea which is in her family history and without ventilation goes apneic immediately.  b/l opacities on CXR, small L pleural effusion on bedside US on .   - c/w mechanical ventilation trach in place  - Provigil 200mg daily   - FU serum genetic tests for diaphragmatic issue       ID  #HAP vs. VAP: Initially treated for MSSA PNA, completing 10-day course of oxacillin for MSSA grown from bronchial wash. Now with persistent fevers. Lavage from trach has staphylcoccus aureus in it.   - c/w Zosyn (begun on ) , and d/c vanco given known will culture with MSSA  - f/u bcx drawn   - Pending sensistivity on the S. Aureus     CV  #HD. Patient with two episodes of hypotension on  and 02/10 responsive to fluids, now hypertensive.   - Will hold home anti-hypertensive medications for now    #Pump. EF 60%. No diastolic HF. Echo finding of asymmetric septal wall hypertrophy. Cardiology consulted and dx HCOM but low suspicion for causing resp failure and also low likelihood of arrhythmogenic. Cardiology recommends care in not reducing preload and also starting beta blocker when clinically appropriate.     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 CT head shows no intracranial hemorrhage or acute transcortical infarct.   - c/w modafinil daily  - possible diaphragmatic pacer placement  eventually    Psych  Patient has a history of bipolar disorder on effoxor/risperidone. She currently seems to have an element of ICU delirium and was treated on Seroquel but this was DC'd in favor of the risperidone. Discussed with psychiatry and have restarted seroquel, stopped risperidone for delirium and continuing venlaxafine.  - On seroquel 150 mg BID, trying to taper propofol, then versed and fentanyl, continuing precedex     FEN:  tube feeds with Promote  PPX: HSQ, SCDs, PPI  Access: peripheral.   miner for urinary retention  DISPO: To remain in MICU for further stabilization  Full CODE

## 2017-02-12 LAB
-  CEFAZOLIN: SIGNIFICANT CHANGE UP
-  CEFAZOLIN: SIGNIFICANT CHANGE UP
-  CLINDAMYCIN: SIGNIFICANT CHANGE UP
-  CLINDAMYCIN: SIGNIFICANT CHANGE UP
-  ERYTHROMYCIN: SIGNIFICANT CHANGE UP
-  ERYTHROMYCIN: SIGNIFICANT CHANGE UP
-  LINEZOLID: SIGNIFICANT CHANGE UP
-  LINEZOLID: SIGNIFICANT CHANGE UP
-  OXACILLIN: SIGNIFICANT CHANGE UP
-  OXACILLIN: SIGNIFICANT CHANGE UP
-  PENICILLIN: SIGNIFICANT CHANGE UP
-  PENICILLIN: SIGNIFICANT CHANGE UP
-  RIFAMPIN: SIGNIFICANT CHANGE UP
-  RIFAMPIN: SIGNIFICANT CHANGE UP
-  TRIMETHOPRIM/SULFAMETHOXAZOLE: SIGNIFICANT CHANGE UP
-  TRIMETHOPRIM/SULFAMETHOXAZOLE: SIGNIFICANT CHANGE UP
-  VANCOMYCIN: SIGNIFICANT CHANGE UP
-  VANCOMYCIN: SIGNIFICANT CHANGE UP
ANION GAP SERPL CALC-SCNC: 7 MMOL/L — LOW (ref 9–16)
BUN SERPL-MCNC: 13 MG/DL — SIGNIFICANT CHANGE UP (ref 7–23)
CALCIUM SERPL-MCNC: 9.1 MG/DL — SIGNIFICANT CHANGE UP (ref 8.5–10.5)
CHLORIDE SERPL-SCNC: 107 MMOL/L — SIGNIFICANT CHANGE UP (ref 96–108)
CO2 SERPL-SCNC: 25 MMOL/L — SIGNIFICANT CHANGE UP (ref 22–31)
CREAT SERPL-MCNC: 0.71 MG/DL — SIGNIFICANT CHANGE UP (ref 0.5–1.3)
CULTURE RESULTS: SIGNIFICANT CHANGE UP
CULTURE RESULTS: SIGNIFICANT CHANGE UP
GLUCOSE SERPL-MCNC: 123 MG/DL — HIGH (ref 70–99)
HCT VFR BLD CALC: 41.1 % — SIGNIFICANT CHANGE UP (ref 34.5–45)
HGB BLD-MCNC: 12.6 G/DL — SIGNIFICANT CHANGE UP (ref 11.5–15.5)
MCHC RBC-ENTMCNC: 22.7 PG — LOW (ref 27–34)
MCHC RBC-ENTMCNC: 30.7 G/DL — LOW (ref 32–36)
MCV RBC AUTO: 74.1 FL — LOW (ref 80–100)
METHOD TYPE: SIGNIFICANT CHANGE UP
METHOD TYPE: SIGNIFICANT CHANGE UP
ORGANISM # SPEC MICROSCOPIC CNT: SIGNIFICANT CHANGE UP
PLATELET # BLD AUTO: 290 K/UL — SIGNIFICANT CHANGE UP (ref 150–400)
POTASSIUM SERPL-MCNC: 3.8 MMOL/L — SIGNIFICANT CHANGE UP (ref 3.5–5.3)
POTASSIUM SERPL-SCNC: 3.8 MMOL/L — SIGNIFICANT CHANGE UP (ref 3.5–5.3)
RBC # BLD: 5.55 M/UL — HIGH (ref 3.8–5.2)
RBC # FLD: 22.8 % — HIGH (ref 10.3–16.9)
SODIUM SERPL-SCNC: 139 MMOL/L — SIGNIFICANT CHANGE UP (ref 135–145)
SPECIMEN SOURCE: SIGNIFICANT CHANGE UP
SPECIMEN SOURCE: SIGNIFICANT CHANGE UP
WBC # BLD: 7.4 K/UL — SIGNIFICANT CHANGE UP (ref 3.8–10.5)
WBC # FLD AUTO: 7.4 K/UL — SIGNIFICANT CHANGE UP (ref 3.8–10.5)

## 2017-02-12 PROCEDURE — 93010 ELECTROCARDIOGRAM REPORT: CPT

## 2017-02-12 PROCEDURE — 71010: CPT | Mod: 26

## 2017-02-12 PROCEDURE — 99233 SBSQ HOSP IP/OBS HIGH 50: CPT | Mod: GC

## 2017-02-12 RX ORDER — SODIUM CHLORIDE 9 MG/ML
500 INJECTION INTRAMUSCULAR; INTRAVENOUS; SUBCUTANEOUS ONCE
Qty: 0 | Refills: 0 | Status: COMPLETED | OUTPATIENT
Start: 2017-02-12 | End: 2017-02-12

## 2017-02-12 RX ORDER — SODIUM CHLORIDE 9 MG/ML
500 INJECTION INTRAMUSCULAR; INTRAVENOUS; SUBCUTANEOUS ONCE
Qty: 0 | Refills: 0 | Status: DISCONTINUED | OUTPATIENT
Start: 2017-02-12 | End: 2017-02-13

## 2017-02-12 RX ORDER — QUETIAPINE FUMARATE 200 MG/1
150 TABLET, FILM COATED ORAL EVERY 12 HOURS
Qty: 0 | Refills: 0 | Status: DISCONTINUED | OUTPATIENT
Start: 2017-02-12 | End: 2017-02-15

## 2017-02-12 RX ADMIN — Medication 650 MILLIGRAM(S): at 19:15

## 2017-02-12 RX ADMIN — PIPERACILLIN AND TAZOBACTAM 200 GRAM(S): 4; .5 INJECTION, POWDER, LYOPHILIZED, FOR SOLUTION INTRAVENOUS at 11:51

## 2017-02-12 RX ADMIN — DEXMEDETOMIDINE HYDROCHLORIDE IN 0.9% SODIUM CHLORIDE 13.61 MICROGRAM(S)/KG/HR: 4 INJECTION INTRAVENOUS at 12:06

## 2017-02-12 RX ADMIN — DEXMEDETOMIDINE HYDROCHLORIDE IN 0.9% SODIUM CHLORIDE 13.61 MICROGRAM(S)/KG/HR: 4 INJECTION INTRAVENOUS at 06:10

## 2017-02-12 RX ADMIN — SODIUM CHLORIDE 2000 MILLILITER(S): 9 INJECTION INTRAMUSCULAR; INTRAVENOUS; SUBCUTANEOUS at 08:31

## 2017-02-12 RX ADMIN — PROPOFOL 5 MICROGRAM(S)/KG/MIN: 10 INJECTION, EMULSION INTRAVENOUS at 00:11

## 2017-02-12 RX ADMIN — Medication 250 MILLIGRAM(S): at 06:26

## 2017-02-12 RX ADMIN — HEPARIN SODIUM 7500 UNIT(S): 5000 INJECTION INTRAVENOUS; SUBCUTANEOUS at 05:51

## 2017-02-12 RX ADMIN — PANTOPRAZOLE SODIUM 40 MILLIGRAM(S): 20 TABLET, DELAYED RELEASE ORAL at 11:52

## 2017-02-12 RX ADMIN — QUETIAPINE FUMARATE 100 MILLIGRAM(S): 200 TABLET, FILM COATED ORAL at 09:53

## 2017-02-12 RX ADMIN — CHLORHEXIDINE GLUCONATE 15 MILLILITER(S): 213 SOLUTION TOPICAL at 17:56

## 2017-02-12 RX ADMIN — PROPOFOL 5 MICROGRAM(S)/KG/MIN: 10 INJECTION, EMULSION INTRAVENOUS at 17:56

## 2017-02-12 RX ADMIN — HEPARIN SODIUM 7500 UNIT(S): 5000 INJECTION INTRAVENOUS; SUBCUTANEOUS at 13:57

## 2017-02-12 RX ADMIN — Medication 300 MILLIGRAM(S): at 00:35

## 2017-02-12 RX ADMIN — MIDAZOLAM HYDROCHLORIDE 2 MG/HR: 1 INJECTION, SOLUTION INTRAMUSCULAR; INTRAVENOUS at 04:34

## 2017-02-12 RX ADMIN — DEXMEDETOMIDINE HYDROCHLORIDE IN 0.9% SODIUM CHLORIDE 13.61 MICROGRAM(S)/KG/HR: 4 INJECTION INTRAVENOUS at 17:55

## 2017-02-12 RX ADMIN — PIPERACILLIN AND TAZOBACTAM 200 GRAM(S): 4; .5 INJECTION, POWDER, LYOPHILIZED, FOR SOLUTION INTRAVENOUS at 17:55

## 2017-02-12 RX ADMIN — QUETIAPINE FUMARATE 150 MILLIGRAM(S): 200 TABLET, FILM COATED ORAL at 22:16

## 2017-02-12 RX ADMIN — Medication 250 MILLIGRAM(S): at 17:56

## 2017-02-12 RX ADMIN — CHLORHEXIDINE GLUCONATE 15 MILLILITER(S): 213 SOLUTION TOPICAL at 05:51

## 2017-02-12 RX ADMIN — DEXMEDETOMIDINE HYDROCHLORIDE IN 0.9% SODIUM CHLORIDE 13.61 MICROGRAM(S)/KG/HR: 4 INJECTION INTRAVENOUS at 08:32

## 2017-02-12 RX ADMIN — PROPOFOL 5 MICROGRAM(S)/KG/MIN: 10 INJECTION, EMULSION INTRAVENOUS at 21:00

## 2017-02-12 RX ADMIN — HEPARIN SODIUM 7500 UNIT(S): 5000 INJECTION INTRAVENOUS; SUBCUTANEOUS at 21:10

## 2017-02-12 RX ADMIN — Medication 75 MILLIGRAM(S): at 22:16

## 2017-02-12 RX ADMIN — PIPERACILLIN AND TAZOBACTAM 200 GRAM(S): 4; .5 INJECTION, POWDER, LYOPHILIZED, FOR SOLUTION INTRAVENOUS at 00:00

## 2017-02-12 RX ADMIN — DEXMEDETOMIDINE HYDROCHLORIDE IN 0.9% SODIUM CHLORIDE 13.61 MICROGRAM(S)/KG/HR: 4 INJECTION INTRAVENOUS at 00:35

## 2017-02-12 RX ADMIN — PIPERACILLIN AND TAZOBACTAM 200 GRAM(S): 4; .5 INJECTION, POWDER, LYOPHILIZED, FOR SOLUTION INTRAVENOUS at 05:51

## 2017-02-12 RX ADMIN — PROPOFOL 5 MICROGRAM(S)/KG/MIN: 10 INJECTION, EMULSION INTRAVENOUS at 04:29

## 2017-02-12 RX ADMIN — DEXMEDETOMIDINE HYDROCHLORIDE IN 0.9% SODIUM CHLORIDE 13.61 MICROGRAM(S)/KG/HR: 4 INJECTION INTRAVENOUS at 04:29

## 2017-02-12 RX ADMIN — Medication 75 MILLIGRAM(S): at 09:51

## 2017-02-12 RX ADMIN — DEXMEDETOMIDINE HYDROCHLORIDE IN 0.9% SODIUM CHLORIDE 13.61 MICROGRAM(S)/KG/HR: 4 INJECTION INTRAVENOUS at 22:14

## 2017-02-12 NOTE — PROGRESS NOTE ADULT - SUBJECTIVE AND OBJECTIVE BOX
Interval Events: reviewed  Patient seen and examined at bedside.  Patient awake and alert on the ventilator following commands with no acute events overnight.  The patient was extubated during the day then became hypercapneic and more somnolent with intermittent hypoxia.  The patient was reintubated.  She had tarch and is sedated    she is sedated secrtions are moderate.  She is awake agitated and delerious    MEDICATIONS:  Pulmonary:    Antimicrobials:  oxacillin IVPB 2Gram(s) IV Intermittent every 4 hours  oxacillin IVPB  IV Intermittent     Anticoagulants:  heparin  Injectable 7500Unit(s) SubCutaneous every 8 hours    Cardiac:      Allergies    No Known Drug Allergies  Seafood (Rash)    Intolerances        Vital Signs Last 24 Hrs  T(C): 37, Max: 38.1 ( @ 23:11)  T(F): 98.6, Max: 100.6 ( @ 23:11)  HR: 94 (72 - 128)  BP: 125/77 (97/51 - 183/90)  BP(mean): 96 (69 - 139)  RR: 14 (10 - 31)  SpO2: 96% (82% - 100%)  I & Os for 24h ending  @ 07:00  =============================================  IN: 2115.5 ml / OUT: 3655 ml / NET: -1539.5 ml    I & Os for current day (as of  @ 21:30)  =============================================  IN: 566 ml / OUT: 865 ml / NET: -299 ml    Mode: AC/ CMV (Assist Control/ Continuous Mandatory Ventilation)  RR (machine): 14  TV (machine): 390  FiO2: 40  PEEP: 5  ITime: 0.8  MAP: 8.1  PIP: 21      PHYSICAL EXAM:    General: Well developed; well nourished; in no acute distress  Neck: Supple; non tender; no masses  Respiratory: Clear to auscultation bilaterally without wheezing, rhonchi or rales  Cardiovascular: tachycardic but regular  Gastrointestinal: Soft non-tender non-distended; Normal bowel sounds  Extremities: Normal range of motion, No clubbing, cyanosis or edema  Neurological: Alert and oriented x3  Skin: Warm and dry. No obvious rash    LABS:  ABG - ( 2017 17:05 )  pH: 7.37  /  pCO2: 52    /  pO2: 169   / HCO3: 29    / Base Excess: 2.7   /  SaO2: 99                  CBC Full  -  ( 2017 07:34 )  WBC Count : 6.2 K/uL  Hemoglobin : 14.6 g/dL  Hematocrit : 47.3 %  Platelet Count - Automated : 124 K/uL  Mean Cell Volume : 73.9 fL  Mean Cell Hemoglobin : 22.8 pg  Mean Cell Hemoglobin Concentration : 30.9 g/dL  Auto Neutrophil # : x  Auto Lymphocyte # : x  Auto Monocyte # : x  Auto Eosinophil # : x  Auto Basophil # : x  Auto Neutrophil % : 63.0 %  Auto Lymphocyte % : 19.0 %  Auto Monocyte % : 9.0 %  Auto Eosinophil % : 4.0 %  Auto Basophil % : x    2017 07:34    141    |  105    |  12     ----------------------------<  76     4.0     |  29     |  0.78     Ca    8.3        2017 07:34  Phos  4.4       2017 07:34  Mg     2.2       2017 07:34      PT/INR - ( 2017 13:14 )   PT: 13.2 sec;   INR: 1.19          PTT - ( 2017 13:14 )  PTT:36.7 sec      Urinalysis Basic - ( 2017 08:10 )    Color: Yellow / Appearance: Clear / S.010 / pH: x  Gluc: x / Ketone: NEGATIVE  / Bili: NEGATIVE / Urobili: >=8.0 E.U./dL   Blood: x / Protein: 30 mg/dL / Nitrite: NEGATIVE   Leuk Esterase: NEGATIVE / RBC: 5-10 /HPF / WBC 5-10 /HPF   Sq Epi: x / Non Sq Epi: Few /HPF / Bacteria: Present /HPF    Culture - Sputum . (17 @ 18:50)    Gram Stain:   Numerous white blood cells  Rare epithelial cells  Numerous Gram Positive Cocci in Clusters  Few Gram Positive Rods  Rare Gram Negative Rods    Specimen Source: .Sputum Sputum    Culture Results:   Culture in progress      he following images were personally reviewed):  XAM:  XR CHEST 1 VIEW PORT IMMEDIATE                           PROCEDURE DATE:  02/10/2017                 INTERPRETATION:  XR CHEST 1 VIEW PORT IMMEDIATE DATED 2/10/2017 11:03 AM    INDICATION: 31 years-old Female with s/p tracheostomy placement.    PRIOR STUDIES: Chest x-ray from earlier the same day    FINDINGS: AP view of the chest demonstrates placement of a tracheostomy   tube in appropriate position. Enteric tube again present. Slightly   improving left basilar opacity. No pleural effusion or pneumothorax.    IMPRESSION:  Tracheostomy tube in appropriate position.

## 2017-02-12 NOTE — PROGRESS NOTE ADULT - PROBLEM SELECTOR PLAN 1
Patient with hypercapneic respiratory failure possibly due to central hypoventilation syndrome.  The patient was diagnosed with "sleep apnea" when she was 5 years old and has had to use CPAP at night ever since.  Her father has central hypoventilation syndrome with a +PHOX2B gene and has diagraphmatic pacemaker.  -The patient was reintubated today for hypercapneic respiratory failure.  Check PHOX2B gene and will discuss tracheostomy with father and patient.  Conitnue on the Provigil.  Continue on MV and evaluate for decrease sedation and start the weaning process as the patient had the trach.  The patietn failed decraese level of sedation and became tachypneic

## 2017-02-12 NOTE — PROGRESS NOTE ADULT - SUBJECTIVE AND OBJECTIVE BOX
INTERVAL HPI/OVERNIGHT EVENTS:    OVERNIGHT: No overnight events.  SUBJECTIVE: Patient seen and examined at bedside.     ROS:  CV: Denies chest pain  Resp: Denies SOB  GI: Denies abdominal pain, constipation, diarrhea, nausea, vomiting  : Denies dysuria  ID: Denies fevers, chills  MSK: Denies joint pain     OBJECTIVE:    VITAL SIGNS:  ICU Vital Signs Last 24 Hrs  T(C): 37.4, Max: 38.6 (02-11 @ 15:52)  T(F): 99.3, Max: 101.4 (02-11 @ 15:52)  HR: 86 (70 - 98)  BP: 77/52 (77/52 - 180/115)  BP(mean): 62 (60 - 155)  ABP: --  ABP(mean): --  RR: 17 (0 - 30)  SpO2: 96% (96% - 100%)    Mode: AC/ CMV (Assist Control/ Continuous Mandatory Ventilation), RR (machine): 12, TV (machine): 500, FiO2: 50, PEEP: 10, ITime: 0.85, MAP: 15, PIP: 33  I & Os for 24h ending 02-12 @ 07:00  =============================================  IN: 3985.5 ml / OUT: 0 ml / NET: 3985.5 ml    I & Os for current day (as of 02-12 @ 08:48)  =============================================  IN: 95.3 ml / OUT: 0 ml / NET: 95.3 ml    CAPILLARY BLOOD GLUCOSE  98 (12 Feb 2017 06:40)      PHYSICAL EXAM:    Constitutional: NAD, intubated and sedated     Eyes: No scleral icterus. No Conj Pallor.   ENMT: MMM. No thrush  Neck: No JVD. Trach in place.   Respiratory: CTA b/l. No wheezes, no crackles.   Cardiovascular: Normal S1 and S2 no MRG   Gastrointestinal: BS normoactive. S/NT/ND.  : miner in place   Extremities: WWP. DP 2 plus.   Neurological: sedated, no focal deficits appreciated  MSK: no joint swelling   Vasc: 2+ pulses throughout    MEDICATIONS:  MEDICATIONS  (STANDING):  propofol Infusion 7.652MICROgram(s)/kG/Min IV Continuous <Continuous>  chlorhexidine 0.12% Liquid 15milliLiter(s) Swish and Spit two times a day  heparin  Injectable 7500Unit(s) SubCutaneous every 8 hours  dexmedetomidine Infusion 0.5MICROgram(s)/kG/Hr IV Continuous <Continuous>  fentaNYL   Infusion 0.5MICROgram(s)/kG/Hr IV Continuous <Continuous>  pantoprazole   Suspension 40milliGRAM(s) Oral daily  midazolam Infusion 2mG/Hr IV Continuous <Continuous>  erythromycin   IVPB 250milliGRAM(s) IV Intermittent every 12 hours  piperacillin/tazobactam IVPB. 4.5Gram(s) IV Intermittent every 6 hours  venlafaxine 75milliGRAM(s) Oral every 12 hours  QUEtiapine 100milliGRAM(s) Oral every 12 hours  vancomycin  IVPB  IV Intermittent   vancomycin  IVPB 1500milliGRAM(s) IV Intermittent every 12 hours    MEDICATIONS  (PRN):  acetaminophen    Suspension 650milliGRAM(s) Oral every 6 hours PRN For Temp greater than 38 C (100.4 F)      ALLERGIES:  Allergies    No Known Drug Allergies  Seafood (Rash)    Intolerances        LABS:                        12.6   7.4   )-----------( 290      ( 12 Feb 2017 01:33 )             41.1     12 Feb 2017 01:34    139    |  107    |  13     ----------------------------<  123    3.8     |  25     |  0.71     Ca    9.1        12 Feb 2017 01:34      RADIOLOGY & ADDITIONAL TESTS: Reviewed.    A/P    32 y/o F with PMHx HTN, HLD, congenital ASD s/p repair, MICHAEL, HOCM, family h/o of neuromuscular hypoventilation syndrome, DVT in 2008 (s/p Coumadin last dose 2009) admitted to MICU after hypercapnic respiratory failure 2/2 MSSA PNA, was doing well after 10 day course of oxacillin now with persistent fevers and started on Zosyn for additional coverage for likely new pneumonia and now s/p trach for respiratory status.    Pulm  #Hypercapnic respiratory failure 2/2 MSSA PNA complicated by potential diaphragmatic issue. Pt with like central apnea which is in her family history and without ventilation goes apneic immediately.  b/l opacities on CXR, small L pleural effusion on bedside US on 2/6.   - c/w mechanical ventilation trach in place  - Provigil 200mg daily   - FU serum genetic tests for diaphragmatic issue       ID  #HAP vs. VAP: Initially treated for MSSA PNA, completing 10-day course of oxacillin for MSSA grown from bronchial wash. Now with persistent fevers. Lavage from trach has staphylcoccus aureus in it.   - c/w Zosyn (day 6, begun on 2/7) , and being Vancomycin for empiric coverage(02/11)  - f/u bcx drawn 02/11  - Pending sensistivity on the S. Aureus     CV  #HD. Patient with two episodes of hypotension on 02/09 and 02/10 responsive to fluids, now hypertensive.   - Will hold home anti-hypertensive medications for now    #Pump. EF 60%. No diastolic HF. Echo finding of asymmetric septal wall hypertrophy. Cardiology consulted and dx HCOM but low suspicion for causing resp failure and also low likelihood of arrhythmogenic. Cardiology recommends care in not reducing preload and also starting beta blocker when clinically appropriate.     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 CT head shows no intracranial hemorrhage or acute transcortical infarct.   - c/w modafinil daily  - possible diaphragmatic pacer placement  eventually    Psych  Patient has a history of bipolar disorder on effoxor/risperidone. She currently seems to have an element of ICU delirium and was treated on Seroquel but this was DC'd in favor of the risperidone. Discussed with psychiatry and have restarted seroquel, stopped risperidone for delirium and continuing venlaxafine.  - On seroquel 150 mg BID, trying to taper propofol, then versed and fentanyl, continuing precedex     FEN:  tube feeds with Promote  PPX: HSQ, SCDs, PPI  Access: peripheral.   miner for urinary retention  DISPO: To remain in MICU for further stabilization  Full CODE INTERVAL HPI/OVERNIGHT EVENTS:    OVERNIGHT: No overnight events.  SUBJECTIVE: Patient seen and examined at bedside.     ROS:  CV: Denies chest pain  Resp: Denies SOB  GI: Denies abdominal pain, constipation, diarrhea, nausea, vomiting  : Denies dysuria  ID: Denies fevers, chills  MSK: Denies joint pain     OBJECTIVE:    VITAL SIGNS:  ICU Vital Signs Last 24 Hrs  T(C): 37.4, Max: 38.6 (02-11 @ 15:52)  T(F): 99.3, Max: 101.4 (02-11 @ 15:52)  HR: 86 (70 - 98)  BP: 77/52 (77/52 - 180/115)  BP(mean): 62 (60 - 155)  ABP: --  ABP(mean): --  RR: 17 (0 - 30)  SpO2: 96% (96% - 100%)    Mode: AC/ CMV (Assist Control/ Continuous Mandatory Ventilation), RR (machine): 12, TV (machine): 500, FiO2: 50, PEEP: 10, ITime: 0.85, MAP: 15, PIP: 33  I & Os for 24h ending 02-12 @ 07:00  =============================================  IN: 3985.5 ml / OUT: 0 ml / NET: 3985.5 ml    I & Os for current day (as of 02-12 @ 08:48)  =============================================  IN: 95.3 ml / OUT: 0 ml / NET: 95.3 ml    CAPILLARY BLOOD GLUCOSE  98 (12 Feb 2017 06:40)      PHYSICAL EXAM:    Constitutional: NAD, intubated and sedated     Eyes: No scleral icterus. No Conj Pallor.   ENMT: MMM. No thrush  Neck: No JVD. Trach in place.   Respiratory: CTA b/l. No wheezes, no crackles.   Cardiovascular: Normal S1 and S2 no MRG   Gastrointestinal: BS normoactive. S/NT/ND.  : miner in place   Extremities: WWP. DP 2 plus.   Neurological: sedated, no focal deficits appreciated  MSK: no joint swelling   Vasc: 2+ pulses throughout    MEDICATIONS:  MEDICATIONS  (STANDING):  propofol Infusion 7.652MICROgram(s)/kG/Min IV Continuous <Continuous>  chlorhexidine 0.12% Liquid 15milliLiter(s) Swish and Spit two times a day  heparin  Injectable 7500Unit(s) SubCutaneous every 8 hours  dexmedetomidine Infusion 0.5MICROgram(s)/kG/Hr IV Continuous <Continuous>  fentaNYL   Infusion 0.5MICROgram(s)/kG/Hr IV Continuous <Continuous>  pantoprazole   Suspension 40milliGRAM(s) Oral daily  midazolam Infusion 2mG/Hr IV Continuous <Continuous>  erythromycin   IVPB 250milliGRAM(s) IV Intermittent every 12 hours  piperacillin/tazobactam IVPB. 4.5Gram(s) IV Intermittent every 6 hours  venlafaxine 75milliGRAM(s) Oral every 12 hours  QUEtiapine 100milliGRAM(s) Oral every 12 hours  vancomycin  IVPB  IV Intermittent   vancomycin  IVPB 1500milliGRAM(s) IV Intermittent every 12 hours    MEDICATIONS  (PRN):  acetaminophen    Suspension 650milliGRAM(s) Oral every 6 hours PRN For Temp greater than 38 C (100.4 F)      ALLERGIES:  Allergies    No Known Drug Allergies  Seafood (Rash)    Intolerances        LABS:                        12.6   7.4   )-----------( 290      ( 12 Feb 2017 01:33 )             41.1     12 Feb 2017 01:34    139    |  107    |  13     ----------------------------<  123    3.8     |  25     |  0.71     Ca    9.1        12 Feb 2017 01:34      RADIOLOGY & ADDITIONAL TESTS: Reviewed.    A/P    32 y/o F with PMHx HTN, HLD, congenital ASD s/p repair, MICHAEL, HOCM, family h/o of neuromuscular hypoventilation syndrome, DVT in 2008 (s/p Coumadin last dose 2009) admitted to MICU after hypercapnic respiratory failure 2/2 MSSA PNA, was doing well after 10 day course of oxacillin now with persistent fevers and started on Zosyn for additional coverage for likely new pneumonia and now s/p trach for respiratory status. Now off zosyn given known s aureus sensitive     Pulm  #Hypercapnic respiratory failure 2/2 MSSA PNA complicated by potential diaphragmatic issue. Pt with like central apnea which is in her family history and without ventilation goes apneic immediately.  b/l opacities on CXR, small L pleural effusion on bedside US on 2/6.   - c/w mechanical ventilation trach in place  - Provigil 200mg daily   - FU serum genetic tests for diaphragmatic issue     ID  #HAP vs. VAP: Initially treated for MSSA PNA, completing 10-day course of oxacillin for MSSA grown from bronchial wash. Now with persistent fevers. Lavage from trach has staphylcoccus aureus in it.   - c/w Zosyn (day 6, begun on 2/7) , and d/c vanco given known MSSA in respiratory will   - f/u bcx drawn 02/11    CV  #HD. Patient with two episodes of hypotension on 02/09 and 02/10 responsive to fluids, now hypertensive.   - Will hold home anti-hypertensive medications for now    #Pump. EF 60%. No diastolic HF. Echo finding of asymmetric septal wall hypertrophy. Cardiology consulted and dx HCOM but low suspicion for causing resp failure and also low likelihood of arrhythmogenic. Cardiology recommends care in not reducing preload and also starting beta blocker when clinically appropriate.     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 CT head shows no intracranial hemorrhage or acute transcortical infarct.   - c/w modafinil daily  - possible diaphragmatic pacer placement  eventually    Psych  Patient has a history of bipolar disorder on effoxor/risperidone. She currently seems to have an element of ICU delirium and was treated on Seroquel but this was DC'd in favor of the risperidone. Discussed with psychiatry and have restarted seroquel, stopped risperidone for delirium and continuing venlaxafine.  - On seroquel 150 mg BID, trying to taper propofol, then versed, continuing precedex, d/c fentanyl      FEN:  tube feeds with Promote  PPX: HSQ, SCDs, PPI  Access: peripheral.   miner for urinary retention  DISPO: To remain in MICU for further stabilization  Full CODE INTERVAL HPI/OVERNIGHT EVENTS:    OVERNIGHT: No overnight events.  SUBJECTIVE: Patient seen and examined at bedside.     ROS:  CV: Denies chest pain  Resp: Denies SOB  GI: Denies abdominal pain, constipation, diarrhea, nausea, vomiting  : Denies dysuria  ID: Denies fevers, chills  MSK: Denies joint pain     OBJECTIVE:    VITAL SIGNS:  ICU Vital Signs Last 24 Hrs  T(C): 37.4, Max: 38.6 (02-11 @ 15:52)  T(F): 99.3, Max: 101.4 (02-11 @ 15:52)  HR: 86 (70 - 98)  BP: 77/52 (77/52 - 180/115)  BP(mean): 62 (60 - 155)  ABP: --  ABP(mean): --  RR: 17 (0 - 30)  SpO2: 96% (96% - 100%)    Mode: AC/ CMV (Assist Control/ Continuous Mandatory Ventilation), RR (machine): 12, TV (machine): 500, FiO2: 50, PEEP: 10, ITime: 0.85, MAP: 15, PIP: 33  I & Os for 24h ending 02-12 @ 07:00  =============================================  IN: 3985.5 ml / OUT: 0 ml / NET: 3985.5 ml    I & Os for current day (as of 02-12 @ 08:48)  =============================================  IN: 95.3 ml / OUT: 0 ml / NET: 95.3 ml    CAPILLARY BLOOD GLUCOSE  98 (12 Feb 2017 06:40)      PHYSICAL EXAM:    Constitutional: NAD, intubated and sedated     Eyes: No scleral icterus. No Conj Pallor.   ENMT: MMM. No thrush  Neck: No JVD. Trach in place.   Respiratory: CTA b/l. No wheezes, no crackles.   Cardiovascular: Normal S1 and S2 no MRG   Gastrointestinal: BS normoactive. S/NT/ND.  : miner in place   Extremities: WWP. DP 2 plus.   Neurological: sedated, no focal deficits appreciated  MSK: no joint swelling   Vasc: 2+ pulses throughout    MEDICATIONS:  MEDICATIONS  (STANDING):  propofol Infusion 7.652MICROgram(s)/kG/Min IV Continuous <Continuous>  chlorhexidine 0.12% Liquid 15milliLiter(s) Swish and Spit two times a day  heparin  Injectable 7500Unit(s) SubCutaneous every 8 hours  dexmedetomidine Infusion 0.5MICROgram(s)/kG/Hr IV Continuous <Continuous>  fentaNYL   Infusion 0.5MICROgram(s)/kG/Hr IV Continuous <Continuous>  pantoprazole   Suspension 40milliGRAM(s) Oral daily  midazolam Infusion 2mG/Hr IV Continuous <Continuous>  erythromycin   IVPB 250milliGRAM(s) IV Intermittent every 12 hours  piperacillin/tazobactam IVPB. 4.5Gram(s) IV Intermittent every 6 hours  venlafaxine 75milliGRAM(s) Oral every 12 hours  QUEtiapine 100milliGRAM(s) Oral every 12 hours  vancomycin  IVPB  IV Intermittent   vancomycin  IVPB 1500milliGRAM(s) IV Intermittent every 12 hours    MEDICATIONS  (PRN):  acetaminophen    Suspension 650milliGRAM(s) Oral every 6 hours PRN For Temp greater than 38 C (100.4 F)      ALLERGIES:  Allergies    No Known Drug Allergies  Seafood (Rash)    Intolerances        LABS:                        12.6   7.4   )-----------( 290      ( 12 Feb 2017 01:33 )             41.1     12 Feb 2017 01:34    139    |  107    |  13     ----------------------------<  123    3.8     |  25     |  0.71     Ca    9.1        12 Feb 2017 01:34      RADIOLOGY & ADDITIONAL TESTS: Reviewed.    A/P    30 y/o F with PMHx HTN, HLD, congenital ASD s/p repair, MICHAEL, HOCM, family h/o of neuromuscular hypoventilation syndrome, DVT in 2008 (s/p Coumadin last dose 2009) admitted to MICU after hypercapnic respiratory failure 2/2 MSSA PNA, was doing well after 10 day course of oxacillin now with persistent fevers and started on Zosyn for additional coverage for likely new pneumonia and now s/p trach for respiratory status. Now off vancomycin given known s aureus sensitive     Pulm  #Hypercapnic respiratory failure 2/2 MSSA PNA complicated by potential diaphragmatic issue. Pt with like central apnea which is in her family history and without ventilation goes apneic immediately.  b/l opacities on CXR, small L pleural effusion on bedside US on 2/6.   - c/w mechanical ventilation trach in place  - Provigil 200mg daily   - FU serum genetic tests for diaphragmatic issue     ID  #HAP vs. VAP: Initially treated for MSSA PNA, completing 10-day course of oxacillin for MSSA grown from bronchial wash. Now with persistent fevers. Lavage from trach has staphylcoccus aureus in it.   - c/w Zosyn (day 6, begun on 2/7) , and d/c vanco given known MSSA in respiratory will   - f/u bcx drawn 02/11    CV  #HD. Patient with two episodes of hypotension on 02/09 and 02/10 responsive to fluids, now hypertensive.   - Will hold home anti-hypertensive medications for now    #Pump. EF 60%. No diastolic HF. Echo finding of asymmetric septal wall hypertrophy. Cardiology consulted and dx HCOM but low suspicion for causing resp failure and also low likelihood of arrhythmogenic. Cardiology recommends care in not reducing preload and also starting beta blocker when clinically appropriate.     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 CT head shows no intracranial hemorrhage or acute transcortical infarct.   - c/w modafinil daily  - possible diaphragmatic pacer placement  eventually    Psych  Patient has a history of bipolar disorder on effoxor/risperidone. She currently seems to have an element of ICU delirium and was treated on Seroquel but this was DC'd in favor of the risperidone. Discussed with psychiatry and have restarted seroquel, stopped risperidone for delirium and continuing venlaxafine.  - On seroquel 150 mg BID, trying to taper propofol, then versed, continuing precedex, d/c fentanyl      FEN:  tube feeds with Promote  PPX: HSQ, SCDs, PPI  Access: peripheral.   miner for urinary retention  DISPO: To remain in MICU for further stabilization  Full CODE

## 2017-02-12 NOTE — PROGRESS NOTE ADULT - RHONCHI
diffuse
lower L/lower R
lower L/upper L/upper R/middle R/lower R
upper L/lower R/lower L/upper R/middle R
diffuse

## 2017-02-13 LAB
ANION GAP SERPL CALC-SCNC: 8 MMOL/L — LOW (ref 9–16)
APTT BLD: 31.7 SEC — SIGNIFICANT CHANGE UP (ref 27.5–37.4)
BUN SERPL-MCNC: 12 MG/DL — SIGNIFICANT CHANGE UP (ref 7–23)
CALCIUM SERPL-MCNC: 8.9 MG/DL — SIGNIFICANT CHANGE UP (ref 8.5–10.5)
CHLORIDE SERPL-SCNC: 108 MMOL/L — SIGNIFICANT CHANGE UP (ref 96–108)
CO2 SERPL-SCNC: 26 MMOL/L — SIGNIFICANT CHANGE UP (ref 22–31)
CREAT SERPL-MCNC: 0.64 MG/DL — SIGNIFICANT CHANGE UP (ref 0.5–1.3)
GLUCOSE SERPL-MCNC: 121 MG/DL — HIGH (ref 70–99)
HCT VFR BLD CALC: 40.4 % — SIGNIFICANT CHANGE UP (ref 34.5–45)
HGB BLD-MCNC: 12.4 G/DL — SIGNIFICANT CHANGE UP (ref 11.5–15.5)
INR BLD: 1.26 — HIGH (ref 0.88–1.16)
MAGNESIUM SERPL-MCNC: 2 MG/DL — SIGNIFICANT CHANGE UP (ref 1.6–2.4)
MCHC RBC-ENTMCNC: 22.6 PG — LOW (ref 27–34)
MCHC RBC-ENTMCNC: 30.7 G/DL — LOW (ref 32–36)
MCV RBC AUTO: 73.7 FL — LOW (ref 80–100)
PHOSPHATE SERPL-MCNC: 4 MG/DL — SIGNIFICANT CHANGE UP (ref 2.5–4.5)
PLATELET # BLD AUTO: 336 K/UL — SIGNIFICANT CHANGE UP (ref 150–400)
POTASSIUM SERPL-MCNC: 4 MMOL/L — SIGNIFICANT CHANGE UP (ref 3.5–5.3)
POTASSIUM SERPL-SCNC: 4 MMOL/L — SIGNIFICANT CHANGE UP (ref 3.5–5.3)
PROTHROM AB SERPL-ACNC: 14 SEC — HIGH (ref 10–13.1)
RBC # BLD: 5.48 M/UL — HIGH (ref 3.8–5.2)
RBC # FLD: 23.2 % — HIGH (ref 10.3–16.9)
SODIUM SERPL-SCNC: 142 MMOL/L — SIGNIFICANT CHANGE UP (ref 135–145)
WBC # BLD: 6.1 K/UL — SIGNIFICANT CHANGE UP (ref 3.8–10.5)
WBC # FLD AUTO: 6.1 K/UL — SIGNIFICANT CHANGE UP (ref 3.8–10.5)

## 2017-02-13 PROCEDURE — 71010: CPT | Mod: 26

## 2017-02-13 PROCEDURE — 99233 SBSQ HOSP IP/OBS HIGH 50: CPT | Mod: GC

## 2017-02-13 PROCEDURE — 93010 ELECTROCARDIOGRAM REPORT: CPT

## 2017-02-13 RX ORDER — LABETALOL HCL 100 MG
10 TABLET ORAL ONCE
Qty: 0 | Refills: 0 | Status: COMPLETED | OUTPATIENT
Start: 2017-02-13 | End: 2017-02-13

## 2017-02-13 RX ORDER — DEXMEDETOMIDINE HYDROCHLORIDE IN 0.9% SODIUM CHLORIDE 4 UG/ML
1.2 INJECTION INTRAVENOUS
Qty: 200 | Refills: 0 | Status: DISCONTINUED | OUTPATIENT
Start: 2017-02-13 | End: 2017-02-14

## 2017-02-13 RX ORDER — DEXMEDETOMIDINE HYDROCHLORIDE IN 0.9% SODIUM CHLORIDE 4 UG/ML
1.2 INJECTION INTRAVENOUS
Qty: 200 | Refills: 0 | Status: DISCONTINUED | OUTPATIENT
Start: 2017-02-13 | End: 2017-02-13

## 2017-02-13 RX ORDER — PROPOFOL 10 MG/ML
7.65 INJECTION, EMULSION INTRAVENOUS
Qty: 1000 | Refills: 0 | Status: DISCONTINUED | OUTPATIENT
Start: 2017-02-13 | End: 2017-02-15

## 2017-02-13 RX ORDER — MIDAZOLAM HYDROCHLORIDE 1 MG/ML
2 INJECTION, SOLUTION INTRAMUSCULAR; INTRAVENOUS
Qty: 100 | Refills: 0 | Status: DISCONTINUED | OUTPATIENT
Start: 2017-02-13 | End: 2017-02-17

## 2017-02-13 RX ADMIN — PROPOFOL 5 MICROGRAM(S)/KG/MIN: 10 INJECTION, EMULSION INTRAVENOUS at 00:21

## 2017-02-13 RX ADMIN — DEXMEDETOMIDINE HYDROCHLORIDE IN 0.9% SODIUM CHLORIDE 32.67 MICROGRAM(S)/KG/HR: 4 INJECTION INTRAVENOUS at 12:00

## 2017-02-13 RX ADMIN — PROPOFOL 5 MICROGRAM(S)/KG/MIN: 10 INJECTION, EMULSION INTRAVENOUS at 14:45

## 2017-02-13 RX ADMIN — PANTOPRAZOLE SODIUM 40 MILLIGRAM(S): 20 TABLET, DELAYED RELEASE ORAL at 11:51

## 2017-02-13 RX ADMIN — HEPARIN SODIUM 7500 UNIT(S): 5000 INJECTION INTRAVENOUS; SUBCUTANEOUS at 05:16

## 2017-02-13 RX ADMIN — PROPOFOL 5 MICROGRAM(S)/KG/MIN: 10 INJECTION, EMULSION INTRAVENOUS at 08:40

## 2017-02-13 RX ADMIN — HEPARIN SODIUM 7500 UNIT(S): 5000 INJECTION INTRAVENOUS; SUBCUTANEOUS at 15:01

## 2017-02-13 RX ADMIN — QUETIAPINE FUMARATE 150 MILLIGRAM(S): 200 TABLET, FILM COATED ORAL at 23:23

## 2017-02-13 RX ADMIN — DEXMEDETOMIDINE HYDROCHLORIDE IN 0.9% SODIUM CHLORIDE 32.67 MICROGRAM(S)/KG/HR: 4 INJECTION INTRAVENOUS at 09:18

## 2017-02-13 RX ADMIN — Medication 75 MILLIGRAM(S): at 09:18

## 2017-02-13 RX ADMIN — DEXMEDETOMIDINE HYDROCHLORIDE IN 0.9% SODIUM CHLORIDE 32.67 MICROGRAM(S)/KG/HR: 4 INJECTION INTRAVENOUS at 10:55

## 2017-02-13 RX ADMIN — HEPARIN SODIUM 7500 UNIT(S): 5000 INJECTION INTRAVENOUS; SUBCUTANEOUS at 23:22

## 2017-02-13 RX ADMIN — PIPERACILLIN AND TAZOBACTAM 200 GRAM(S): 4; .5 INJECTION, POWDER, LYOPHILIZED, FOR SOLUTION INTRAVENOUS at 18:23

## 2017-02-13 RX ADMIN — PROPOFOL 5 MICROGRAM(S)/KG/MIN: 10 INJECTION, EMULSION INTRAVENOUS at 04:45

## 2017-02-13 RX ADMIN — DEXMEDETOMIDINE HYDROCHLORIDE IN 0.9% SODIUM CHLORIDE 13.61 MICROGRAM(S)/KG/HR: 4 INJECTION INTRAVENOUS at 00:21

## 2017-02-13 RX ADMIN — DEXMEDETOMIDINE HYDROCHLORIDE IN 0.9% SODIUM CHLORIDE 13.61 MICROGRAM(S)/KG/HR: 4 INJECTION INTRAVENOUS at 07:00

## 2017-02-13 RX ADMIN — Medication 10 MILLIGRAM(S): at 11:20

## 2017-02-13 RX ADMIN — CHLORHEXIDINE GLUCONATE 15 MILLILITER(S): 213 SOLUTION TOPICAL at 05:11

## 2017-02-13 RX ADMIN — DEXMEDETOMIDINE HYDROCHLORIDE IN 0.9% SODIUM CHLORIDE 32.67 MICROGRAM(S)/KG/HR: 4 INJECTION INTRAVENOUS at 15:03

## 2017-02-13 RX ADMIN — DEXMEDETOMIDINE HYDROCHLORIDE IN 0.9% SODIUM CHLORIDE 13.61 MICROGRAM(S)/KG/HR: 4 INJECTION INTRAVENOUS at 03:51

## 2017-02-13 RX ADMIN — QUETIAPINE FUMARATE 150 MILLIGRAM(S): 200 TABLET, FILM COATED ORAL at 09:18

## 2017-02-13 RX ADMIN — Medication 75 MILLIGRAM(S): at 23:22

## 2017-02-13 RX ADMIN — DEXMEDETOMIDINE HYDROCHLORIDE IN 0.9% SODIUM CHLORIDE 32.67 MICROGRAM(S)/KG/HR: 4 INJECTION INTRAVENOUS at 18:23

## 2017-02-13 RX ADMIN — PIPERACILLIN AND TAZOBACTAM 200 GRAM(S): 4; .5 INJECTION, POWDER, LYOPHILIZED, FOR SOLUTION INTRAVENOUS at 11:51

## 2017-02-13 RX ADMIN — MIDAZOLAM HYDROCHLORIDE 2 MG/HR: 1 INJECTION, SOLUTION INTRAMUSCULAR; INTRAVENOUS at 05:11

## 2017-02-13 RX ADMIN — PIPERACILLIN AND TAZOBACTAM 200 GRAM(S): 4; .5 INJECTION, POWDER, LYOPHILIZED, FOR SOLUTION INTRAVENOUS at 05:11

## 2017-02-13 RX ADMIN — CHLORHEXIDINE GLUCONATE 15 MILLILITER(S): 213 SOLUTION TOPICAL at 18:23

## 2017-02-13 RX ADMIN — DEXMEDETOMIDINE HYDROCHLORIDE IN 0.9% SODIUM CHLORIDE 32.67 MICROGRAM(S)/KG/HR: 4 INJECTION INTRAVENOUS at 20:34

## 2017-02-13 RX ADMIN — PIPERACILLIN AND TAZOBACTAM 200 GRAM(S): 4; .5 INJECTION, POWDER, LYOPHILIZED, FOR SOLUTION INTRAVENOUS at 00:21

## 2017-02-13 RX ADMIN — Medication 250 MILLIGRAM(S): at 07:00

## 2017-02-13 NOTE — PROGRESS NOTE ADULT - SUBJECTIVE AND OBJECTIVE BOX
Interval Events:  Patient seen and examined at bedside.    Patient is a 31y old  Female who presents with a chief complaint of   she is awake and agitated.  Secrtions are moerate  PAST MEDICAL & SURGICAL HISTORY:  DVT (deep venous thrombosis)  GIB (gastrointestinal bleeding)  Afib  HTN (hypertension)  Chronic systolic congestive heart failure  HLD (hyperlipidemia)  Myocardial infarction  Congenital heart disease      MEDICATIONS:  Pulmonary:    Antimicrobials:    Anticoagulants:  heparin  Injectable 7500Unit(s) SubCutaneous every 8 hours    Cardiac:      Allergies    No Known Drug Allergies  Seafood (Rash)    Intolerances        Vital Signs Last 24 Hrs  T(C): 36.9, Max: 38 (02-13 @ 18:00)  T(F): 98.4, Max: 100.4 (02-13 @ 18:00)  HR: 80 (60 - 92)  BP: 182/112 (80/42 - 186/111)  BP(mean): 141 (54 - 149)  RR: 17 (11 - 30)  SpO2: 100% (88% - 100%)  I & Os for 24h ending 02-13 @ 07:00  =============================================  IN: 8359.9 ml / OUT: 0 ml / NET: 8359.9 ml    I & Os for current day (as of 02-13 @ 22:12)  =============================================  IN: 754.4 ml / OUT: 0 ml / NET: 754.4 ml    Mode: AC/ CMV (Assist Control/ Continuous Mandatory Ventilation)  RR (machine): 12  TV (machine): 500  FiO2: 50  PEEP: 8  MAP: 13  PIP: 30      LABS:      CBC Full  -  ( 13 Feb 2017 06:03 )  WBC Count : 6.1 K/uL  Hemoglobin : 12.4 g/dL  Hematocrit : 40.4 %  Platelet Count - Automated : 336 K/uL  Mean Cell Volume : 73.7 fL  Mean Cell Hemoglobin : 22.6 pg  Mean Cell Hemoglobin Concentration : 30.7 g/dL  Auto Neutrophil # : x  Auto Lymphocyte # : x  Auto Monocyte # : x  Auto Eosinophil # : x  Auto Basophil # : x  Auto Neutrophil % : x  Auto Lymphocyte % : x  Auto Monocyte % : x  Auto Eosinophil % : x  Auto Basophil % : x    13 Feb 2017 06:02    142    |  108    |  12     ----------------------------<  121    4.0     |  26     |  0.64     Ca    8.9        13 Feb 2017 06:02  Phos  4.0       13 Feb 2017 06:02  Mg     2.0       13 Feb 2017 06:02      PT/INR - ( 13 Feb 2017 06:04 )   PT: 14.0 sec;   INR: 1.26          PTT - ( 13 Feb 2017 06:04 )  PTT:31.7 sec    EXAM:  XR CHEST 1 VIEW PORT ROUTINE                           PROCEDURE DATE:  02/13/2017                 INTERPRETATION:  AP portable chest    History: Follow-up abnormal chest x-ray    Hypoinflation. Minimal probable focal atelectasis in the lungbases.   Nasogastric tube is in region of stomach. Tracheostomy again noted.    Impressions:    No acute infiltrates.                  RADIOLOGY & ADDITIONAL STUDIES (The following images were personally reviewed):  Lobato:                            Yes       Urine output:               Yes         DVT prophylaxis:         Yes         Flattus:                          Yes        Bowel movement:            No

## 2017-02-13 NOTE — PROGRESS NOTE ADULT - PROBLEM SELECTOR PLAN 3
she developed nosocomial pneumonia and will follow on cultures.  The bronchial wash grew staph.  She is off antibiotics at this point

## 2017-02-13 NOTE — PROCEDURE NOTE - ADDITIONAL PROCEDURE DETAILS
edematous pylorus, intubated with gastroscope. large amount of feeds in stomach,  gastritis seen  Plan: may use peg for meds today, feeds tomorrow and if positive for residual use for decompression, continue protonix

## 2017-02-13 NOTE — PROGRESS NOTE ADULT - SUBJECTIVE AND OBJECTIVE BOX
OVERNIGHT: No overnight events.  SUBJECTIVE: Patient seen and examined at bedside.   OBJECTIVE:    VITAL SIGNS:  ICU Vital Signs Last 24 Hrs  T(C): 37.3, Max: 38.1 (02-12 @ 19:15)  T(F): 99.2, Max: 100.5 (02-12 @ 19:15)  HR: 74 (60 - 116)  BP: 112/90 (77/52 - 184/150)  BP(mean): 99 (61 - 148)  ABP: --  ABP(mean): --  RR: 12 (11 - 30)  SpO2: 100% (96% - 100%)    Mode: AC/ CMV (Assist Control/ Continuous Mandatory Ventilation), RR (machine): 12, TV (machine): 500, FiO2: 50, PEEP: 10, MAP: 13, PIP: 28  I & Os for 24h ending 02-12 @ 07:00  =============================================  IN: 3985.5 ml / OUT: 0 ml / NET: 3985.5 ml    I & Os for current day (as of 02-13 @ 06:25)  =============================================  IN: 7820.7 ml / OUT: 0 ml / NET: 7820.7 ml    CAPILLARY BLOOD GLUCOSE  98 (12 Feb 2017 06:40)      PHYSICAL EXAM:    Eyes: No scleral icterus. No Conj Pallor.   ENMT: MMM. No thrush  Neck: No JVD. Trach in place.   Respiratory: CTA b/l. No wheezes, no crackles.   Cardiovascular: Normal S1 and S2 no MRG   Gastrointestinal: BS normoactive. S/NT/ND.  : miner in place   Extremities: WWP. DP 2 plus.   Neurological: sedated, no focal deficits appreciated  MSK: no joint swelling   Vasc: 2+ pulses throughout    MEDICATIONS:  MEDICATIONS  (STANDING):  propofol Infusion 7.652MICROgram(s)/kG/Min IV Continuous <Continuous>  chlorhexidine 0.12% Liquid 15milliLiter(s) Swish and Spit two times a day  heparin  Injectable 7500Unit(s) SubCutaneous every 8 hours  dexmedetomidine Infusion 0.5MICROgram(s)/kG/Hr IV Continuous <Continuous>  pantoprazole   Suspension 40milliGRAM(s) Oral daily  midazolam Infusion 2mG/Hr IV Continuous <Continuous>  erythromycin   IVPB 250milliGRAM(s) IV Intermittent every 12 hours  piperacillin/tazobactam IVPB. 4.5Gram(s) IV Intermittent every 6 hours  venlafaxine 75milliGRAM(s) Oral every 12 hours  sodium chloride 0.9% Bolus 500milliLiter(s) IV Bolus once  QUEtiapine 150milliGRAM(s) Oral every 12 hours    MEDICATIONS  (PRN):  acetaminophen    Suspension 650milliGRAM(s) Oral every 6 hours PRN For Temp greater than 38 C (100.4 F)      ALLERGIES:  Allergies    No Known Drug Allergies  Seafood (Rash)    Intolerances        LABS:                        12.4   6.1   )-----------( 336      ( 13 Feb 2017 06:03 )             40.4     12 Feb 2017 01:34    139    |  107    |  13     ----------------------------<  123    3.8     |  25     |  0.71     Ca    9.1        12 Feb 2017 01:34          32 y/o F with PMHx HTN, HLD, congenital ASD s/p repair, MICHAEL, HOCM, family h/o of neuromuscular hypoventilation syndrome, DVT in 2008 (s/p Coumadin last dose 2009) admitted to MICU after hypercapnic respiratory failure 2/2 MSSA PNA, was doing well after 10 day course of oxacillin now with persistent fevers and started on Zosyn for additional coverage for likely new pneumonia and now s/p trach for respiratory status.   Pulm  #Hypercapnic respiratory failure 2/2 MSSA PNA complicated by potential diaphragmatic issue. Pt with like central apnea which is in her family history and without ventilation goes apneic immediately.  b/l opacities on CXR, small L pleural effusion on bedside US on 2/6.   - c/w mechanical ventilation trach in place  - Provigil 200mg daily   - FU serum genetic tests for diaphragmatic issue     ID  #HAP vs. VAP: Initially treated for MSSA PNA, completing 10-day course of oxacillin for MSSA grown from bronchial wash. Now with persistent fevers. Lavage from trach has staphylcoccus aureus in it.   - c/w Zosyn (day 6, begun on 2/7) , and d/c vanco given known MSSA in respiratory will   - f/u bcx drawn 02/11    CV  #HD. Patient with two episodes of hypotension on 02/09 and 02/10 responsive to fluids, now hypertensive.   - Will hold home anti-hypertensive medications for now    #Pump. EF 60%. No diastolic HF. Echo finding of asymmetric septal wall hypertrophy. Cardiology consulted and dx HCOM but low suspicion for causing resp failure and also low likelihood of arrhythmogenic. Cardiology recommends care in not reducing preload and also starting beta blocker when clinically appropriate.     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 CT head shows no intracranial hemorrhage or acute transcortical infarct.   - c/w modafinil daily  - possible diaphragmatic pacer placement  eventually    Psych  Patient has a history of bipolar disorder on effoxor/risperidone. She currently seems to have an element of ICU delirium and was treated on Seroquel but this was DC'd in favor of the risperidone. Discussed with psychiatry and have restarted seroquel, stopped risperidone for delirium and continuing venlaxafine.  - On seroquel 150 mg BID, trying to taper propofol, then versed, continuing precedex, d/c fentanyl      FEN:  tube feeds with Promote  PPX: HSQ, SCDs, PPI  Access: peripheral.   miner for urinary retention  DISPO: To remain in MICU for further stabilization  Full CODE              RADIOLOGY & ADDITIONAL TESTS: Reviewed. OVERNIGHT: No overnight events.  SUBJECTIVE: Patient seen and examined at bedside.   OBJECTIVE:    VITAL SIGNS:  ICU Vital Signs Last 24 Hrs  T(C): 37.3, Max: 38.1 (02-12 @ 19:15)  T(F): 99.2, Max: 100.5 (02-12 @ 19:15)  HR: 74 (60 - 116)  BP: 112/90 (77/52 - 184/150)  BP(mean): 99 (61 - 148)  ABP: --  ABP(mean): --  RR: 12 (11 - 30)  SpO2: 100% (96% - 100%)    Mode: AC/ CMV (Assist Control/ Continuous Mandatory Ventilation), RR (machine): 12, TV (machine): 500, FiO2: 50, PEEP: 10, MAP: 13, PIP: 28  I & Os for 24h ending 02-12 @ 07:00  =============================================  IN: 3985.5 ml / OUT: 0 ml / NET: 3985.5 ml    I & Os for current day (as of 02-13 @ 06:25)  =============================================  IN: 7820.7 ml / OUT: 0 ml / NET: 7820.7 ml    CAPILLARY BLOOD GLUCOSE  98 (12 Feb 2017 06:40)      PHYSICAL EXAM:  General; NAD, resting comfortably.   Eyes: No scleral icterus. No Conj Pallor.   ENMT: MMM. No thrush  Neck: No JVD. Trach in place.   Respiratory: CTA b/l. No wheezes, no crackles.   Cardiovascular: Normal S1 and S2 no MRG   Gastrointestinal: BS normoactive. S/NT/ND.  Extremities: WWP. DP 2 plus.   Neurological: sedated, no focal deficits appreciated  MSK: no joint swelling   Vasc: 2+ pulses throughout    MEDICATIONS:  MEDICATIONS  (STANDING):  propofol Infusion 7.652MICROgram(s)/kG/Min IV Continuous <Continuous>  chlorhexidine 0.12% Liquid 15milliLiter(s) Swish and Spit two times a day  heparin  Injectable 7500Unit(s) SubCutaneous every 8 hours  dexmedetomidine Infusion 0.5MICROgram(s)/kG/Hr IV Continuous <Continuous>  pantoprazole   Suspension 40milliGRAM(s) Oral daily  midazolam Infusion 2mG/Hr IV Continuous <Continuous>  erythromycin   IVPB 250milliGRAM(s) IV Intermittent every 12 hours  piperacillin/tazobactam IVPB. 4.5Gram(s) IV Intermittent every 6 hours  venlafaxine 75milliGRAM(s) Oral every 12 hours  sodium chloride 0.9% Bolus 500milliLiter(s) IV Bolus once  QUEtiapine 150milliGRAM(s) Oral every 12 hours    MEDICATIONS  (PRN):  acetaminophen    Suspension 650milliGRAM(s) Oral every 6 hours PRN For Temp greater than 38 C (100.4 F)      ALLERGIES:  Allergies    No Known Drug Allergies  Seafood (Rash)    Intolerances        LABS:                        12.4   6.1   )-----------( 336      ( 13 Feb 2017 06:03 )             40.4     12 Feb 2017 01:34    139    |  107    |  13     ----------------------------<  123    3.8     |  25     |  0.71     Ca    9.1        12 Feb 2017 01:34          30 y/o F with PMHx HTN, HLD, congenital ASD s/p repair, MICHAEL, HOCM, family h/o of neuromuscular hypoventilation syndrome, DVT in 2008 (s/p Coumadin last dose 2009) admitted to MICU after hypercapnic respiratory failure 2/2 MSSA PNA, was doing well after 10 day course of oxacillin now with persistent fevers and started on Zosyn for additional coverage for likely new pneumonia and now s/p trach for respiratory status.   Pulm  #Hypercapnic respiratory failure from congential central apnea.  Pt with like central apnea which is in her family history and without ventilation goes apneic immediately.     - c/w mechanical ventilation trach in place  - Provigil 200mg daily   - FU serum genetic tests for diaphragmatic issue   -Obtain Diaphraghm US  - Likely refer for Pacemaker of Diaphragm    ID  #HAP vs. VAP: Initially treated for MSSA PNA and was found to have B/L opacities on CXR., She completed 0-day course of oxacillin for MSSA grown from bronchial wash but then was having recurrent fevers respoonsding to zosyn and a  Lavage from trach has  MSSA  in it.   - c/w Zosyn (day 7, begun on 2/7) for 14 days as her fever curve is going down and she is having clinical improvement  - f/u bcx drawn 02/11 which are NGTD     CV  #HD. Patient with two episodes of hypotension on 02/09 and 02/10 responsive to fluids, now hypertensive.   - Will hold home anti-hypertensive medications for now    #Pump. EF 60%. No diastolic HF. Echo finding of asymmetric septal wall hypertrophy. Cardiology consulted and dx HCOM but low suspicion for causing resp failure and also low likelihood of arrhythmogenic. Cardiology recommends care in not reducing preload and also starting beta blocker when clinically appropriate.     GI: Patient has a PEG in place.   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 CT head shows no intracranial hemorrhage or acute transcortical infarct.   - c/w modafinil daily  - possible diaphragmatic pacer placement  eventually    Psych  Patient has a history of bipolar disorder on effoxor/risperidone. She currently seems to have an element of ICU delirium and was treated on Seroquel but this was DC'd in favor of the risperidone. Discussed with psychiatry and have restarted seroquel, stopped risperidone for delirium and continuing venlaxafine. Some clinical improvement in her delerium.   - On seroquel 150 mg BID, trying to taper propofol, then versed, continuing precedex, d/c fentanyl      FEN:  tube feeds with Promote  PPX: HSQ, SCDs, PPI  Access: peripheral.   miner for urinary retention  DISPO: To remain in MICU for further stabilization  Full CODE OVERNIGHT: No overnight events.  SUBJECTIVE: Patient seen and examined at bedside. Not cooperative for ROS.  OBJECTIVE:    VITAL SIGNS:  ICU Vital Signs Last 24 Hrs  T(C): 37.3, Max: 38.1 (02-12 @ 19:15)  T(F): 99.2, Max: 100.5 (02-12 @ 19:15)  HR: 74 (60 - 116)  BP: 112/90 (77/52 - 184/150)  BP(mean): 99 (61 - 148)  ABP: --  ABP(mean): --  RR: 12 (11 - 30)  SpO2: 100% (96% - 100%)    Mode: AC/ CMV (Assist Control/ Continuous Mandatory Ventilation), RR (machine): 12, TV (machine): 500, FiO2: 50, PEEP: 10, MAP: 13, PIP: 28  I & Os for 24h ending 02-12 @ 07:00  =============================================  IN: 3985.5 ml / OUT: 0 ml / NET: 3985.5 ml    I & Os for current day (as of 02-13 @ 06:25)  =============================================  IN: 7820.7 ml / OUT: 0 ml / NET: 7820.7 ml    CAPILLARY BLOOD GLUCOSE  98 (12 Feb 2017 06:40)      PHYSICAL EXAM:  General; NAD, resting comfortably.   Eyes: No scleral icterus. No Conj Pallor.   ENMT: MMM. No thrush  Neck: No JVD. Trach in place.   Respiratory: CTA b/l. No wheezes, no crackles.   Cardiovascular: Normal S1 and S2 no MRG   Gastrointestinal: BS normoactive. S/NT/ND.  Extremities: WWP. DP 2 plus.   Neurological: sedated, no focal deficits appreciated  MSK: no joint swelling   Vasc: 2+ pulses throughout    MEDICATIONS:  MEDICATIONS  (STANDING):  propofol Infusion 7.652MICROgram(s)/kG/Min IV Continuous <Continuous>  chlorhexidine 0.12% Liquid 15milliLiter(s) Swish and Spit two times a day  heparin  Injectable 7500Unit(s) SubCutaneous every 8 hours  dexmedetomidine Infusion 0.5MICROgram(s)/kG/Hr IV Continuous <Continuous>  pantoprazole   Suspension 40milliGRAM(s) Oral daily  midazolam Infusion 2mG/Hr IV Continuous <Continuous>  erythromycin   IVPB 250milliGRAM(s) IV Intermittent every 12 hours  piperacillin/tazobactam IVPB. 4.5Gram(s) IV Intermittent every 6 hours  venlafaxine 75milliGRAM(s) Oral every 12 hours  sodium chloride 0.9% Bolus 500milliLiter(s) IV Bolus once  QUEtiapine 150milliGRAM(s) Oral every 12 hours    MEDICATIONS  (PRN):  acetaminophen    Suspension 650milliGRAM(s) Oral every 6 hours PRN For Temp greater than 38 C (100.4 F)      ALLERGIES:  Allergies    No Known Drug Allergies  Seafood (Rash)    Intolerances        LABS:                        12.4   6.1   )-----------( 336      ( 13 Feb 2017 06:03 )             40.4     12 Feb 2017 01:34    139    |  107    |  13     ----------------------------<  123    3.8     |  25     |  0.71     Ca    9.1        12 Feb 2017 01:34          32 y/o F with PMHx HTN, HLD, congenital ASD s/p repair, MICHAEL, HOCM, family h/o of neuromuscular hypoventilation syndrome, DVT in 2008 (s/p Coumadin last dose 2009) admitted to MICU after hypercapnic respiratory failure 2/2 MSSA PNA, was doing well after 10 day course of oxacillin now with persistent fevers and started on Zosyn for additional coverage for likely new pneumonia and now s/p trach for respiratory status.   Pulm  #Hypercapnic respiratory failure from congential central apnea.  Pt with likely central apnea which is in her family history and without ventilation goes apneic immediately.     - c/w mechanical ventilation trach in place  - Provigil 200mg daily   - FU serum genetic tests for diaphragmatic issue   -Obtain Diaphraghm US  - Likely refer for Pacemaker of Diaphragm once clear diagnosis of central apnea made.    ID  #HAP vs. VAP: Initially treated for MSSA PNA and was found to have B/L opacities on CXR., She completed 0-day course of oxacillin for MSSA grown from bronchial wash but then was having recurrent fevers respoonsding to zosyn and a  Lavage from trach has  MSSA  in it.   - c/w Zosyn (day 7, begun on 2/7) for 14 days as her fever curve is going down and she is having clinical improvement  - f/u bcx drawn 02/11 which are NGTD     CV  #HD. Patient with two episodes of hypotension on 02/09 and 02/10 responsive to fluids, now hypertensive.   - Will hold home anti-hypertensive medications for now    #Pump. EF 60%. No diastolic HF. Echo finding of asymmetric septal wall hypertrophy. Cardiology consulted and dx HCOM but low suspicion for causing resp failure and also low likelihood of arrhythmogenic. Cardiology recommends care in not reducing preload and also starting beta blocker when clinically appropriate.     GI: Patient has a PEG in place.   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 CT head shows no intracranial hemorrhage or acute transcortical infarct.   - c/w modafinil daily  - possible diaphragmatic pacer placement  eventually    Psych  Patient has a history of bipolar disorder on effoxor/risperidone. She currently seems to have an element of ICU delirium and was treated on Seroquel but this was DC'd in favor of the risperidone. Discussed with psychiatry and have restarted seroquel, stopped risperidone for delirium and continuing venlaxafine. Some clinical improvement in her delerium.   - On seroquel 150 mg BID, trying to taper propofol, then versed, continuing precedex, d/c fentanyl      FEN:  tube feeds with Promote  PPX: HSQ, SCDs, PPI  Access: peripheral.   miner for urinary retention  DISPO: To remain in MICU for further stabilization  Full CODE

## 2017-02-13 NOTE — PROGRESS NOTE ADULT - PROBLEM SELECTOR PLAN 1
Patient with hypercapneic respiratory failure possibly due to central hypoventilation syndrome.  The patient was diagnosed with "sleep apnea" when she was 5 years old and has had to use CPAP at night ever since.  Her father has central hypoventilation syndrome with a +PHOX2B gene and has diagraphmatic pacemaker.  -The patient was reintubated today for hypercapneic respiratory failure.  Check PHOX2B gene and will discuss tracheostomy with father and patient.  Conitnue on the Provigil.  Continue on MV and evaluate for decrease sedation and start the weaning process as the patient had the trach.  The patient failed decrease level of sedation and became tachypneic.  Continue daily weaning trials.  Await the gene analysis

## 2017-02-14 LAB
ANION GAP SERPL CALC-SCNC: 8 MMOL/L — LOW (ref 9–16)
BUN SERPL-MCNC: 10 MG/DL — SIGNIFICANT CHANGE UP (ref 7–23)
CALCIUM SERPL-MCNC: 8.2 MG/DL — LOW (ref 8.5–10.5)
CHLORIDE SERPL-SCNC: 104 MMOL/L — SIGNIFICANT CHANGE UP (ref 96–108)
CK MB CFR SERPL CALC: <1 NG/ML — SIGNIFICANT CHANGE UP (ref 0.5–3.6)
CK SERPL-CCNC: 51 U/L — SIGNIFICANT CHANGE UP (ref 26–192)
CO2 SERPL-SCNC: 23 MMOL/L — SIGNIFICANT CHANGE UP (ref 22–31)
CREAT SERPL-MCNC: 0.52 MG/DL — SIGNIFICANT CHANGE UP (ref 0.5–1.3)
CULTURE RESULTS: SIGNIFICANT CHANGE UP
CULTURE RESULTS: SIGNIFICANT CHANGE UP
GLUCOSE SERPL-MCNC: 133 MG/DL — HIGH (ref 70–99)
HCT VFR BLD CALC: 38.5 % — SIGNIFICANT CHANGE UP (ref 34.5–45)
HGB BLD-MCNC: 13 G/DL — SIGNIFICANT CHANGE UP (ref 11.5–15.5)
MAGNESIUM SERPL-MCNC: 1.8 MG/DL — SIGNIFICANT CHANGE UP (ref 1.6–2.4)
MCHC RBC-ENTMCNC: 23.8 PG — LOW (ref 27–34)
MCHC RBC-ENTMCNC: 33.8 G/DL — SIGNIFICANT CHANGE UP (ref 32–36)
MCV RBC AUTO: 70.4 FL — LOW (ref 80–100)
NT-PROBNP SERPL-SCNC: 4113 PG/ML — HIGH
PLATELET # BLD AUTO: 401 K/UL — HIGH (ref 150–400)
POTASSIUM SERPL-MCNC: 4.6 MMOL/L — SIGNIFICANT CHANGE UP (ref 3.5–5.3)
POTASSIUM SERPL-SCNC: 4.6 MMOL/L — SIGNIFICANT CHANGE UP (ref 3.5–5.3)
RBC # BLD: 5.47 M/UL — HIGH (ref 3.8–5.2)
RBC # FLD: 24.2 % — HIGH (ref 10.3–16.9)
SODIUM SERPL-SCNC: 135 MMOL/L — SIGNIFICANT CHANGE UP (ref 135–145)
SPECIMEN SOURCE: SIGNIFICANT CHANGE UP
SPECIMEN SOURCE: SIGNIFICANT CHANGE UP
TROPONIN I SERPL-MCNC: <0.015 NG/ML — SIGNIFICANT CHANGE UP (ref 0.01–0.04)
WBC # BLD: 4.4 K/UL — SIGNIFICANT CHANGE UP (ref 3.8–10.5)
WBC # FLD AUTO: 4.4 K/UL — SIGNIFICANT CHANGE UP (ref 3.8–10.5)

## 2017-02-14 PROCEDURE — 76705 ECHO EXAM OF ABDOMEN: CPT | Mod: 26

## 2017-02-14 PROCEDURE — 71275 CT ANGIOGRAPHY CHEST: CPT | Mod: 26

## 2017-02-14 PROCEDURE — 99232 SBSQ HOSP IP/OBS MODERATE 35: CPT | Mod: GC

## 2017-02-14 PROCEDURE — 93010 ELECTROCARDIOGRAM REPORT: CPT

## 2017-02-14 PROCEDURE — 71010: CPT | Mod: 26

## 2017-02-14 PROCEDURE — 99233 SBSQ HOSP IP/OBS HIGH 50: CPT | Mod: GC

## 2017-02-14 RX ORDER — FENTANYL CITRATE 50 UG/ML
25 INJECTION INTRAVENOUS ONCE
Qty: 0 | Refills: 0 | Status: DISCONTINUED | OUTPATIENT
Start: 2017-02-14 | End: 2017-02-14

## 2017-02-14 RX ORDER — MAGNESIUM SULFATE 500 MG/ML
1 VIAL (ML) INJECTION ONCE
Qty: 0 | Refills: 0 | Status: COMPLETED | OUTPATIENT
Start: 2017-02-14 | End: 2017-02-14

## 2017-02-14 RX ADMIN — Medication 2 MILLIGRAM(S): at 16:00

## 2017-02-14 RX ADMIN — CHLORHEXIDINE GLUCONATE 15 MILLILITER(S): 213 SOLUTION TOPICAL at 06:42

## 2017-02-14 RX ADMIN — CHLORHEXIDINE GLUCONATE 15 MILLILITER(S): 213 SOLUTION TOPICAL at 17:45

## 2017-02-14 RX ADMIN — FENTANYL CITRATE 25 MICROGRAM(S): 50 INJECTION INTRAVENOUS at 15:00

## 2017-02-14 RX ADMIN — Medication 75 MILLIGRAM(S): at 10:58

## 2017-02-14 RX ADMIN — QUETIAPINE FUMARATE 150 MILLIGRAM(S): 200 TABLET, FILM COATED ORAL at 22:49

## 2017-02-14 RX ADMIN — PROPOFOL 5 MICROGRAM(S)/KG/MIN: 10 INJECTION, EMULSION INTRAVENOUS at 10:57

## 2017-02-14 RX ADMIN — Medication 75 MILLIGRAM(S): at 22:49

## 2017-02-14 RX ADMIN — FENTANYL CITRATE 25 MICROGRAM(S): 50 INJECTION INTRAVENOUS at 14:30

## 2017-02-14 RX ADMIN — PANTOPRAZOLE SODIUM 40 MILLIGRAM(S): 20 TABLET, DELAYED RELEASE ORAL at 11:11

## 2017-02-14 RX ADMIN — QUETIAPINE FUMARATE 150 MILLIGRAM(S): 200 TABLET, FILM COATED ORAL at 10:57

## 2017-02-14 RX ADMIN — HEPARIN SODIUM 7500 UNIT(S): 5000 INJECTION INTRAVENOUS; SUBCUTANEOUS at 14:04

## 2017-02-14 RX ADMIN — Medication 100 GRAM(S): at 10:57

## 2017-02-14 RX ADMIN — HEPARIN SODIUM 7500 UNIT(S): 5000 INJECTION INTRAVENOUS; SUBCUTANEOUS at 22:48

## 2017-02-14 RX ADMIN — MIDAZOLAM HYDROCHLORIDE 2 MG/HR: 1 INJECTION, SOLUTION INTRAMUSCULAR; INTRAVENOUS at 10:57

## 2017-02-14 RX ADMIN — HEPARIN SODIUM 7500 UNIT(S): 5000 INJECTION INTRAVENOUS; SUBCUTANEOUS at 06:42

## 2017-02-14 RX ADMIN — PROPOFOL 5 MICROGRAM(S)/KG/MIN: 10 INJECTION, EMULSION INTRAVENOUS at 16:32

## 2017-02-14 NOTE — PROGRESS NOTE ADULT - SUBJECTIVE AND OBJECTIVE BOX
Pt seen and examined at bedside. No acute overnight events. S/p  EGD with PEG tube placement.        MEDICATIONS:  MEDICATIONS  (STANDING):  chlorhexidine 0.12% Liquid 15milliLiter(s) Swish and Spit two times a day  heparin  Injectable 7500Unit(s) SubCutaneous every 8 hours  pantoprazole   Suspension 40milliGRAM(s) Oral daily  venlafaxine 75milliGRAM(s) Oral every 12 hours  QUEtiapine 150milliGRAM(s) Oral every 12 hours  propofol Infusion 7.652MICROgram(s)/kG/Min IV Continuous <Continuous>  midazolam Infusion 2mG/Hr IV Continuous <Continuous>  dexmedetomidine Infusion 1.2MICROgram(s)/kG/Hr IV Continuous <Continuous>  magnesium sulfate  IVPB 1Gram(s) IV Intermittent once    MEDICATIONS  (PRN):  acetaminophen    Suspension 650milliGRAM(s) Oral every 6 hours PRN For Temp greater than 38 C (100.4 F)      Allergies    No Known Drug Allergies  Seafood (Rash)    Intolerances        Vital Signs Last 24 Hrs  T(C): 36.2, Max: 38 (02-13 @ 18:00)  T(F): 97.1, Max: 100.4 (02-13 @ 18:00)  HR: 80 (72 - 86)  BP: 150/106 (80/42 - 187/126)  BP(mean): 124 (54 - 163)  RR: 13 (11 - 25)  SpO2: 100% (88% - 100%)    I & Os for current day (as of 02-14 @ 09:49)  =============================================  IN: 1390.4 ml / OUT: 0 ml / NET: 1390.4 ml      PHYSICAL EXAM:    General: trach in place, NAD, responds to verbal stimuli   HEENT: trach site c/d/oi  Gastrointestinal: Soft, nd, bs+, PEG site c/d/i    LABS:      CBC Full  -  ( 14 Feb 2017 06:27 )  WBC Count : 4.4 K/uL  Hemoglobin : 13.0 g/dL  Hematocrit : 38.5 %  Platelet Count - Automated : 401 K/uL  Mean Cell Volume : 70.4 fL  Mean Cell Hemoglobin : 23.8 pg  Mean Cell Hemoglobin Concentration : 33.8 g/dL      14 Feb 2017 06:27    135    |  104    |  10     ----------------------------<  133    4.6     |  23     |  0.52     Ca    8.2        14 Feb 2017 06:27  Phos  4.0       13 Feb 2017 06:02  Mg     1.8       14 Feb 2017 06:27      PT/INR - ( 13 Feb 2017 06:04 )   PT: 14.0 sec;   INR: 1.26          PTT - ( 13 Feb 2017 06:04 )  PTT:31.7 sec                  RADIOLOGY & ADDITIONAL STUDIES (The following images were personally reviewed):

## 2017-02-14 NOTE — PROGRESS NOTE ADULT - SUBJECTIVE AND OBJECTIVE BOX
Interval Events: reviewed  Patient seen and examined at bedside.    Patient is a 31y old  Female who presents with a chief complaint of she is sedated    PAST MEDICAL & SURGICAL HISTORY:  DVT (deep venous thrombosis)  GIB (gastrointestinal bleeding)  Afib  HTN (hypertension)  Chronic systolic congestive heart failure  HLD (hyperlipidemia)  Myocardial infarction  Congenital heart disease      MEDICATIONS:  Pulmonary:    Antimicrobials:    Anticoagulants:  heparin  Injectable 7500Unit(s) SubCutaneous every 8 hours    Cardiac:      Allergies    No Known Drug Allergies  Seafood (Rash)    Intolerances        Vital Signs Last 24 Hrs  T(C): 36.7, Max: 37.8 (02-14 @ 10:00)  T(F): 98.1, Max: 100 (02-14 @ 10:00)  HR: 122 (74 - 158)  BP: 104/58 (95/60 - 187/126)  BP(mean): 73 (73 - 163)  RR: 15 (5 - 25)  SpO2: 99% (90% - 100%)  I & Os for 24h ending 02-14 @ 07:00  =============================================  IN: 1390.4 ml / OUT: 0 ml / NET: 1390.4 ml    I & Os for current day (as of 02-14 @ 22:42)  =============================================  IN: 481.3 ml / OUT: 0 ml / NET: 481.3 ml    Mode: AC/ CMV (Assist Control/ Continuous Mandatory Ventilation)  RR (machine): 12  TV (machine): 500  FiO2: 50  PEEP: 5  ITime: 0.85  MAP: 11  PIP: 25      LABS:      CBC Full  -  ( 14 Feb 2017 06:27 )  WBC Count : 4.4 K/uL  Hemoglobin : 13.0 g/dL  Hematocrit : 38.5 %  Platelet Count - Automated : 401 K/uL  Mean Cell Volume : 70.4 fL  Mean Cell Hemoglobin : 23.8 pg  Mean Cell Hemoglobin Concentration : 33.8 g/dL  Auto Neutrophil # : x  Auto Lymphocyte # : x  Auto Monocyte # : x  Auto Eosinophil # : x  Auto Basophil # : x  Auto Neutrophil % : x  Auto Lymphocyte % : x  Auto Monocyte % : x  Auto Eosinophil % : x  Auto Basophil % : x    14 Feb 2017 06:27    135    |  104    |  10     ----------------------------<  133    4.6     |  23     |  0.52     Ca    8.2        14 Feb 2017 06:27  Phos  4.0       13 Feb 2017 06:02  Mg     1.8       14 Feb 2017 06:27      PT/INR - ( 13 Feb 2017 06:04 )   PT: 14.0 sec;   INR: 1.26          PTT - ( 13 Feb 2017 06:04 )  PTT:31.7 sec                    RADIOLOGY & ADDITIONAL STUDIES (The following images were personally reviewed):  Lobato:                            Yes        No  Urine output:               Yes        No  DVT prophylaxis:         Yes        No  Flattus:                          Yes        No  Bowel movement:       Yes        No

## 2017-02-14 NOTE — PROGRESS NOTE ADULT - PROBLEM SELECTOR PLAN 1
Patient with hypercapneic respiratory failure possibly due to central hypoventilation syndrome.  The patient was diagnosed with "sleep apnea" when she was 5 years old and has had to use CPAP at night ever since.  Her father has central hypoventilation syndrome with a +PHOX2B gene and has diagraphmatic pacemaker.  -The patient was reintubated today for hypercapneic respiratory failure.  Check PHOX2B gene and will discuss tracheostomy with father and patient.  Conitnue on the Provigil.  Continue on MV and evaluate for decrease sedation and start the weaning process as the patient had the trach.  The patient failed decrease level of sedation and became tachypneic.  Continue daily weaning trials.  Await the gene analysis,  Still requires sedation and pulmonary toilet

## 2017-02-14 NOTE — PROGRESS NOTE ADULT - SUBJECTIVE AND OBJECTIVE BOX
OVERNIGHT: No overnight events.  SUBJECTIVE: Patient seen and examined at bedside.     OBJECTIVE:    VITAL SIGNS:  ICU Vital Signs Last 24 Hrs  T(C): 36.2, Max: 38 (02-13 @ 18:00)  T(F): 97.2, Max: 100.4 (02-13 @ 18:00)  HR: 75 (70 - 92)  BP: 149/109 (80/42 - 187/126)  BP(mean): 130 (54 - 163)  ABP: --  ABP(mean): --  RR: 13 (11 - 25)  SpO2: 100% (88% - 100%)    Mode: AC/ CMV (Assist Control/ Continuous Mandatory Ventilation), RR (machine): 12, TV (machine): 500, FiO2: 50, PEEP: 8, ITime: 0.85, MAP: 11, PIP: 26  I & Os for 24h ending 02-13 @ 07:00  =============================================  IN: 8359.9 ml / OUT: 0 ml / NET: 8359.9 ml    I & Os for current day (as of 02-14 @ 06:49)  =============================================  IN: 1390.4 ml / OUT: 0 ml / NET: 1390.4 ml    CAPILLARY BLOOD GLUCOSE  105 (13 Feb 2017 14:00)      PHYSICAL EXAM:    PHYSICAL EXAM:  General; NAD, resting comfortably.   Neuro: Obeys commands. Alert.   Eyes: No scleral icterus. No Conj Pallor.   ENMT: MMM. No thrush  Neck: No JVD. Trach in place.   Respiratory: CTA b/l. No wheezes, no crackles.   Cardiovascular: Normal S1 and S2 no MRG   Gastrointestinal: BS normoactive. S/NT/ND. PEG in place.   Extremities: WWP. DP 2 plus.   MSK: no joint swelling   Vasc: 2+ pulses throughout      MEDICATIONS:  MEDICATIONS  (STANDING):  chlorhexidine 0.12% Liquid 15milliLiter(s) Swish and Spit two times a day  heparin  Injectable 7500Unit(s) SubCutaneous every 8 hours  pantoprazole   Suspension 40milliGRAM(s) Oral daily  venlafaxine 75milliGRAM(s) Oral every 12 hours  QUEtiapine 150milliGRAM(s) Oral every 12 hours  propofol Infusion 7.652MICROgram(s)/kG/Min IV Continuous <Continuous>  midazolam Infusion 2mG/Hr IV Continuous <Continuous>  dexmedetomidine Infusion 1.2MICROgram(s)/kG/Hr IV Continuous <Continuous>    MEDICATIONS  (PRN):  acetaminophen    Suspension 650milliGRAM(s) Oral every 6 hours PRN For Temp greater than 38 C (100.4 F)      ALLERGIES:  Allergies    No Known Drug Allergies  Seafood (Rash)    Intolerances        LABS:                        13.0   4.4   )-----------( 401      ( 14 Feb 2017 06:27 )             38.5     13 Feb 2017 06:02    142    |  108    |  12     ----------------------------<  121    4.0     |  26     |  0.64     Ca    8.9        13 Feb 2017 06:02  Phos  4.0       13 Feb 2017 06:02  Mg     2.0       13 Feb 2017 06:02      PT/INR - ( 13 Feb 2017 06:04 )   PT: 14.0 sec;   INR: 1.26          PTT - ( 13 Feb 2017 06:04 )  PTT:31.7 sec  RADIOLOGY & ADDITIONAL TESTS: Reviewed.    32 y/o F with PMHx HTN, HLD, congenital ASD s/p repair, MICHAEL, HOCM, family h/o of neuromuscular hypoventilation syndrome, DVT in 2008 (s/p Coumadin last dose 2009) admitted to MICU after hypercapnic respiratory failure 2/2 MSSA PNA, was doing well after 10 day course of oxacillin now with persistent fevers and started on Zosyn for additional coverage for new pneumonia and now s/p trach for respiratory status.     Pulm  #Hypercapnic respiratory failure from congential central apnea.  Pt with likely central apnea which is in her family history and without ventilation goes apneic immediately.     - c/w mechanical ventilation trach in place  - Provigil 200mg daily   - FU serum genetic tests for diaphragmatic issue   -Obtain Diaphraghm US  - Likely refer for Pacemaker of Diaphragm once clear diagnosis of central apnea made.    ID  #HAP vs. VAP: Initially treated for MSSA PNA and was found to have B/L opacities on CXR., She completed1 0-day course of oxacillin for MSSA grown from bronchial wash but then was having recurrent fevers responding to zosyn and a  Lavage from trach has  MSSA  in it.   - c/w Zosyn (day 7, begun on 2/7) for 14 days as her fever curve is going down and she is having clinical improvement  - f/u bcx drawn 02/11 which are NGTD     CV  #HD. Patient with episodes of hypotension on 02/09 and 02/10 , 02/13  responsive to fluids, now hypertensive.   - Will hold home anti-hypertensive medications for now    #Pump. EF 60%. No diastolic HF. Echo finding of asymmetric septal wall hypertrophy. Cardiology consulted and dx HCOM but low suspicion for causing resp failure and also low likelihood of arrhythmogenic. Cardiology recommends care in not reducing preload and also starting beta blocker when clinically appropriate.     GI: Patient has a PEG in place.   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 CT head shows no intracranial hemorrhage or acute transcortical infarct.   - c/w modafinil daily  - possible diaphragmatic pacer placement  eventually    Psych  Patient has a history of bipolar disorder on effoxor/risperidone. She currently seems to have an element of ICU delirium and was treated on Seroquel but this was DC'd in favor of the risperidone. Discussed with psychiatry and have restarted seroquel, stopped risperidone for delirium and continuing venlaxafine. Some clinical improvement in her delerium.   - On seroquel 150 mg BID, trying to taper propofol, then versed, continuing precedex, d/c fentanyl      FEN:  tube feeds with Promote  PPX: HSQ, SCDs, PPI  Access: peripheral.   miner for urinary retention  DISPO: To remain in MICU for further stabilization  Full CODE OVERNIGHT: No overnight events.  SUBJECTIVE: Patient seen and examined at bedside.     OBJECTIVE:    VITAL SIGNS:  ICU Vital Signs Last 24 Hrs  T(C): 36.2, Max: 38 (02-13 @ 18:00)  T(F): 97.2, Max: 100.4 (02-13 @ 18:00)  HR: 75 (70 - 92)  BP: 149/109 (80/42 - 187/126)  BP(mean): 130 (54 - 163)  ABP: --  ABP(mean): --  RR: 13 (11 - 25)  SpO2: 100% (88% - 100%)    Mode: AC/ CMV (Assist Control/ Continuous Mandatory Ventilation), RR (machine): 12, TV (machine): 500, FiO2: 50, PEEP: 8, ITime: 0.85, MAP: 11, PIP: 26  I & Os for 24h ending 02-13 @ 07:00  =============================================  IN: 8359.9 ml / OUT: 0 ml / NET: 8359.9 ml    I & Os for current day (as of 02-14 @ 06:49)  =============================================  IN: 1390.4 ml / OUT: 0 ml / NET: 1390.4 ml    CAPILLARY BLOOD GLUCOSE  105 (13 Feb 2017 14:00)      PHYSICAL EXAM:    PHYSICAL EXAM:  General; NAD, resting comfortably.   Neuro: Obeys commands. Alert.   Eyes: No scleral icterus. No Conj Pallor.   ENMT: MMM. No thrush  Neck: No JVD. Trach in place.   Respiratory: Coarse BS R>L. Some ronchorous Anterior BS.   Cardiovascular: Normal S1 and S2 no MRG   Gastrointestinal: BS normoactive. S/NT/ND. PEG in place.   Extremities: WWP. DP 2 plus.   MSK: no joint swelling   Vasc: 2+ pulses throughout      MEDICATIONS:  MEDICATIONS  (STANDING):  chlorhexidine 0.12% Liquid 15milliLiter(s) Swish and Spit two times a day  heparin  Injectable 7500Unit(s) SubCutaneous every 8 hours  pantoprazole   Suspension 40milliGRAM(s) Oral daily  venlafaxine 75milliGRAM(s) Oral every 12 hours  QUEtiapine 150milliGRAM(s) Oral every 12 hours  propofol Infusion 7.652MICROgram(s)/kG/Min IV Continuous <Continuous>  midazolam Infusion 2mG/Hr IV Continuous <Continuous>  dexmedetomidine Infusion 1.2MICROgram(s)/kG/Hr IV Continuous <Continuous>    MEDICATIONS  (PRN):  acetaminophen    Suspension 650milliGRAM(s) Oral every 6 hours PRN For Temp greater than 38 C (100.4 F)      ALLERGIES:  Allergies    No Known Drug Allergies  Seafood (Rash)    Intolerances        LABS:                        13.0   4.4   )-----------( 401      ( 14 Feb 2017 06:27 )             38.5     13 Feb 2017 06:02    142    |  108    |  12     ----------------------------<  121    4.0     |  26     |  0.64     Ca    8.9        13 Feb 2017 06:02  Phos  4.0       13 Feb 2017 06:02  Mg     2.0       13 Feb 2017 06:02      PT/INR - ( 13 Feb 2017 06:04 )   PT: 14.0 sec;   INR: 1.26          PTT - ( 13 Feb 2017 06:04 )  PTT:31.7 sec    RADIOLOGY & ADDITIONAL TESTS: Reviewed. B/L opacities and atelectasis present     32 y/o F with PMHx HTN, HLD, congenital ASD s/p repair, MICHAEL, HOCM, family h/o of neuromuscular hypoventilation syndrome, DVT in 2008 (s/p Coumadin last dose 2009) admitted to MICU after hypercapnic respiratory failure 2/2 MSSA PNA, was doing well after 10 day course of oxacillin now with persistent fevers and started on Zosyn for additional coverage for new pneumonia and now s/p trach/ PEG for respiratory status.     Pulm  #Hypercapnic respiratory failure from congential central apnea.  Pt with likely central apnea which is in her family history and without ventilation goes apneic immediately.     - c/w mechanical ventilation trach in place  - FU serum genetic tests for diaphragmatic issue   -Obtain Diaphraghm US  - Likely refer for Pacemaker of Diaphragm once clear diagnosis of central apnea made.    ID  #HAP vs. VAP: Initially treated for MSSA PNA and was found to have B/L opacities on CXR., She completed 10-day course of oxacillin for MSSA grown from bronchial wash but then was having recurrent fevers responding to zosyn and a  Lavage from trach has  MSSA  in it.   - c/w Zosyn (day 8, begun on 2/7) for 14 days as her fever curve is going down and she is having clinical improvement  - f/u bcx drawn 02/11 which are NGTD     CV  #HD. Patient with episodes of hypotension on 02/09 and 02/10 , 02/13  responsive to fluids, now hypertensive.     #Pump. EF 60%. No diastolic HF. Echo finding of asymmetric septal wall hypertrophy. Cardiology consulted and dx HCOM but low suspicion for causing resp failure and also low likelihood of arrhythmogenic. Cardiology recommends care in not reducing preload and also starting beta blocker when clinically appropriate.     GI: Patient has a PEG in place.   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 CT head shows no intracranial hemorrhage or acute transcortical infarct.   - possible diaphragmatic pacer placement  eventually    Psych  Patient has a history of bipolar disorder on effoxor/risperidone. She currently seems to have an element of ICU delirium and was treated on Seroquel but this was DC'd in favor of the risperidone. Discussed with psychiatry and have restarted seroquel, stopped risperidone for delirium and continuing venlaxafine. Some clinical improvement in her delerium.   - On seroquel 150 mg BID, trying to taper propofol, then versed, continuing precedex, d/c fentanyl      FEN:  tube feeds with Promote  PPX: HSQ, SCDs, PPI  Access: peripheral.   DISPO: To remain in MICU for further stabilization  Full CODE OVERNIGHT: No overnight events.  SUBJECTIVE: Patient seen and examined at bedside. No SOB or CP. No N/V.    OBJECTIVE:    VITAL SIGNS:  ICU Vital Signs Last 24 Hrs  T(C): 36.2, Max: 38 (02-13 @ 18:00)  T(F): 97.2, Max: 100.4 (02-13 @ 18:00)  HR: 75 (70 - 92)  BP: 149/109 (80/42 - 187/126)  BP(mean): 130 (54 - 163)  ABP: --  ABP(mean): --  RR: 13 (11 - 25)  SpO2: 100% (88% - 100%)    Mode: AC/ CMV (Assist Control/ Continuous Mandatory Ventilation), RR (machine): 12, TV (machine): 500, FiO2: 50, PEEP: 8, ITime: 0.85, MAP: 11, PIP: 26  I & Os for 24h ending 02-13 @ 07:00  =============================================  IN: 8359.9 ml / OUT: 0 ml / NET: 8359.9 ml    I & Os for current day (as of 02-14 @ 06:49)  =============================================  IN: 1390.4 ml / OUT: 0 ml / NET: 1390.4 ml    CAPILLARY BLOOD GLUCOSE  105 (13 Feb 2017 14:00)      PHYSICAL EXAM:    PHYSICAL EXAM:  General; NAD, resting comfortably.   Neuro: Obeys commands. Alert.   Eyes: No scleral icterus. No Conj Pallor.   ENMT: MMM. No thrush  Neck: No JVD. Trach in place.   Respiratory: Coarse BS R>L. Some ronchorous Anterior BS.   Cardiovascular: Normal S1 and S2 no MRG   Gastrointestinal: BS normoactive. S/NT/ND. PEG in place.   Extremities: WWP. DP 2 plus.   MSK: no joint swelling   Vasc: 2+ pulses throughout      MEDICATIONS:  MEDICATIONS  (STANDING):  chlorhexidine 0.12% Liquid 15milliLiter(s) Swish and Spit two times a day  heparin  Injectable 7500Unit(s) SubCutaneous every 8 hours  pantoprazole   Suspension 40milliGRAM(s) Oral daily  venlafaxine 75milliGRAM(s) Oral every 12 hours  QUEtiapine 150milliGRAM(s) Oral every 12 hours  propofol Infusion 7.652MICROgram(s)/kG/Min IV Continuous <Continuous>  midazolam Infusion 2mG/Hr IV Continuous <Continuous>  dexmedetomidine Infusion 1.2MICROgram(s)/kG/Hr IV Continuous <Continuous>    MEDICATIONS  (PRN):  acetaminophen    Suspension 650milliGRAM(s) Oral every 6 hours PRN For Temp greater than 38 C (100.4 F)      ALLERGIES:  Allergies    No Known Drug Allergies  Seafood (Rash)    Intolerances        LABS:                        13.0   4.4   )-----------( 401      ( 14 Feb 2017 06:27 )             38.5     13 Feb 2017 06:02    142    |  108    |  12     ----------------------------<  121    4.0     |  26     |  0.64     Ca    8.9        13 Feb 2017 06:02  Phos  4.0       13 Feb 2017 06:02  Mg     2.0       13 Feb 2017 06:02      PT/INR - ( 13 Feb 2017 06:04 )   PT: 14.0 sec;   INR: 1.26          PTT - ( 13 Feb 2017 06:04 )  PTT:31.7 sec    RADIOLOGY & ADDITIONAL TESTS: Reviewed. B/L opacities and atelectasis present     32 y/o F with PMHx HTN, HLD, congenital ASD s/p repair, MICHAEL, HOCM, family h/o of neuromuscular hypoventilation syndrome, DVT in 2008 (s/p Coumadin last dose 2009) admitted to MICU after hypercapnic respiratory failure 2/2 MSSA PNA, was doing well after 10 day course of oxacillin now with persistent fevers and started on Zosyn for additional coverage for new pneumonia and now s/p trach/ PEG for respiratory status. Intermittent confusion likely from toxic metabolic encephalopathy.    Pulm  #Hypercapnic respiratory failure from congential central apnea.  Pt with likely central apnea which is in her family history and without ventilation goes apneic immediately.     - c/w mechanical ventilation trach in place  - FU serum genetic tests for diaphragmatic issue   -Obtain Diaphraghm US  - Likely refer for Pacemaker of Diaphragm once clear diagnosis of central apnea made.    ID  #HAP vs. VAP: Initially treated for MSSA PNA and was found to have B/L opacities on CXR., She completed 10-day course of oxacillin for MSSA grown from bronchial wash but then was having recurrent fevers responding to zosyn and a  Lavage from trach has  MSSA  in it.   - c/w Zosyn (day 8, begun on 2/7) for 14 days as her fever curve is going down and she is having clinical improvement  - f/u bcx drawn 02/11 which are NGTD     CV  #HD. Patient with episodes of hypotension on 02/09 and 02/10 , 02/13  responsive to fluids, now hypertensive.     #Pump. EF 60%. No diastolic HF. Echo finding of asymmetric septal wall hypertrophy. Cardiology consulted and dx HCOM but low suspicion for causing resp failure and also low likelihood of arrhythmogenic. Cardiology recommends care in not reducing preload and also starting beta blocker when clinically appropriate.     GI: Patient has a PEG in place.   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 CT head shows no intracranial hemorrhage or acute transcortical infarct.   - possible diaphragmatic pacer placement  eventually    Psych  Patient has a history of bipolar disorder on effoxor/risperidone. She currently seems to have an element of ICU delirium/toxic metabolic encephalopathy and was treated on Seroquel but this was DC'd in favor of the risperidone. Discussed with psychiatry and have restarted seroquel, stopped risperidone for delirium and continuing venlaxafine. Some clinical improvement in her delerium.   - On seroquel 150 mg BID, trying to taper propofol, then versed, continuing precedex, d/c fentanyl      FEN:  tube feeds with Promote  PPX: HSQ, SCDs, PPI  Access: peripheral.   DISPO: To remain in MICU for further stabilization  Full CODE

## 2017-02-15 LAB
ANION GAP SERPL CALC-SCNC: 10 MMOL/L — SIGNIFICANT CHANGE UP (ref 9–16)
BUN SERPL-MCNC: 14 MG/DL — SIGNIFICANT CHANGE UP (ref 7–23)
CALCIUM SERPL-MCNC: 9.1 MG/DL — SIGNIFICANT CHANGE UP (ref 8.5–10.5)
CHLORIDE SERPL-SCNC: 106 MMOL/L — SIGNIFICANT CHANGE UP (ref 96–108)
CO2 SERPL-SCNC: 22 MMOL/L — SIGNIFICANT CHANGE UP (ref 22–31)
CREAT SERPL-MCNC: 0.6 MG/DL — SIGNIFICANT CHANGE UP (ref 0.5–1.3)
GLUCOSE SERPL-MCNC: 79 MG/DL — SIGNIFICANT CHANGE UP (ref 70–99)
HCT VFR BLD CALC: 37.5 % — SIGNIFICANT CHANGE UP (ref 34.5–45)
HGB BLD-MCNC: 11.6 G/DL — SIGNIFICANT CHANGE UP (ref 11.5–15.5)
MAGNESIUM SERPL-MCNC: 2.1 MG/DL — SIGNIFICANT CHANGE UP (ref 1.6–2.4)
MCHC RBC-ENTMCNC: 22.4 PG — LOW (ref 27–34)
MCHC RBC-ENTMCNC: 30.9 G/DL — LOW (ref 32–36)
MCV RBC AUTO: 72.5 FL — LOW (ref 80–100)
PLATELET # BLD AUTO: 385 K/UL — SIGNIFICANT CHANGE UP (ref 150–400)
POTASSIUM SERPL-MCNC: 3.6 MMOL/L — SIGNIFICANT CHANGE UP (ref 3.5–5.3)
POTASSIUM SERPL-SCNC: 3.6 MMOL/L — SIGNIFICANT CHANGE UP (ref 3.5–5.3)
RBC # BLD: 5.17 M/UL — SIGNIFICANT CHANGE UP (ref 3.8–5.2)
RBC # FLD: 23.2 % — HIGH (ref 10.3–16.9)
SODIUM SERPL-SCNC: 138 MMOL/L — SIGNIFICANT CHANGE UP (ref 135–145)
WBC # BLD: 6.2 K/UL — SIGNIFICANT CHANGE UP (ref 3.8–10.5)
WBC # FLD AUTO: 6.2 K/UL — SIGNIFICANT CHANGE UP (ref 3.8–10.5)

## 2017-02-15 PROCEDURE — 99232 SBSQ HOSP IP/OBS MODERATE 35: CPT | Mod: GC

## 2017-02-15 PROCEDURE — 99233 SBSQ HOSP IP/OBS HIGH 50: CPT | Mod: GC

## 2017-02-15 PROCEDURE — 93010 ELECTROCARDIOGRAM REPORT: CPT

## 2017-02-15 RX ORDER — AMLODIPINE BESYLATE 2.5 MG/1
5 TABLET ORAL DAILY
Qty: 0 | Refills: 0 | Status: DISCONTINUED | OUTPATIENT
Start: 2017-02-15 | End: 2017-02-17

## 2017-02-15 RX ORDER — QUETIAPINE FUMARATE 200 MG/1
200 TABLET, FILM COATED ORAL EVERY 12 HOURS
Qty: 0 | Refills: 0 | Status: DISCONTINUED | OUTPATIENT
Start: 2017-02-15 | End: 2017-02-21

## 2017-02-15 RX ORDER — POTASSIUM CHLORIDE 20 MEQ
40 PACKET (EA) ORAL ONCE
Qty: 0 | Refills: 0 | Status: COMPLETED | OUTPATIENT
Start: 2017-02-15 | End: 2017-02-15

## 2017-02-15 RX ORDER — CARVEDILOL PHOSPHATE 80 MG/1
6.25 CAPSULE, EXTENDED RELEASE ORAL EVERY 12 HOURS
Qty: 0 | Refills: 0 | Status: DISCONTINUED | OUTPATIENT
Start: 2017-02-15 | End: 2017-02-19

## 2017-02-15 RX ORDER — DEXMEDETOMIDINE HYDROCHLORIDE IN 0.9% SODIUM CHLORIDE 4 UG/ML
1 INJECTION INTRAVENOUS
Qty: 200 | Refills: 0 | Status: DISCONTINUED | OUTPATIENT
Start: 2017-02-15 | End: 2017-02-19

## 2017-02-15 RX ADMIN — Medication 75 MILLIGRAM(S): at 21:32

## 2017-02-15 RX ADMIN — HEPARIN SODIUM 7500 UNIT(S): 5000 INJECTION INTRAVENOUS; SUBCUTANEOUS at 21:31

## 2017-02-15 RX ADMIN — QUETIAPINE FUMARATE 200 MILLIGRAM(S): 200 TABLET, FILM COATED ORAL at 21:31

## 2017-02-15 RX ADMIN — QUETIAPINE FUMARATE 150 MILLIGRAM(S): 200 TABLET, FILM COATED ORAL at 10:11

## 2017-02-15 RX ADMIN — CARVEDILOL PHOSPHATE 6.25 MILLIGRAM(S): 80 CAPSULE, EXTENDED RELEASE ORAL at 13:37

## 2017-02-15 RX ADMIN — HEPARIN SODIUM 7500 UNIT(S): 5000 INJECTION INTRAVENOUS; SUBCUTANEOUS at 13:36

## 2017-02-15 RX ADMIN — DEXMEDETOMIDINE HYDROCHLORIDE IN 0.9% SODIUM CHLORIDE 27.23 MICROGRAM(S)/KG/HR: 4 INJECTION INTRAVENOUS at 23:42

## 2017-02-15 RX ADMIN — MIDAZOLAM HYDROCHLORIDE 2 MG/HR: 1 INJECTION, SOLUTION INTRAMUSCULAR; INTRAVENOUS at 11:12

## 2017-02-15 RX ADMIN — PANTOPRAZOLE SODIUM 40 MILLIGRAM(S): 20 TABLET, DELAYED RELEASE ORAL at 10:12

## 2017-02-15 RX ADMIN — DEXMEDETOMIDINE HYDROCHLORIDE IN 0.9% SODIUM CHLORIDE 27.23 MICROGRAM(S)/KG/HR: 4 INJECTION INTRAVENOUS at 16:31

## 2017-02-15 RX ADMIN — AMLODIPINE BESYLATE 5 MILLIGRAM(S): 2.5 TABLET ORAL at 21:31

## 2017-02-15 RX ADMIN — Medication 75 MILLIGRAM(S): at 10:11

## 2017-02-15 RX ADMIN — PROPOFOL 5 MICROGRAM(S)/KG/MIN: 10 INJECTION, EMULSION INTRAVENOUS at 07:42

## 2017-02-15 RX ADMIN — DEXMEDETOMIDINE HYDROCHLORIDE IN 0.9% SODIUM CHLORIDE 27.23 MICROGRAM(S)/KG/HR: 4 INJECTION INTRAVENOUS at 21:38

## 2017-02-15 RX ADMIN — HEPARIN SODIUM 7500 UNIT(S): 5000 INJECTION INTRAVENOUS; SUBCUTANEOUS at 07:16

## 2017-02-15 RX ADMIN — PROPOFOL 5 MICROGRAM(S)/KG/MIN: 10 INJECTION, EMULSION INTRAVENOUS at 10:42

## 2017-02-15 RX ADMIN — Medication 40 MILLIEQUIVALENT(S): at 10:11

## 2017-02-15 RX ADMIN — CHLORHEXIDINE GLUCONATE 15 MILLILITER(S): 213 SOLUTION TOPICAL at 07:15

## 2017-02-15 NOTE — PROGRESS NOTE ADULT - SKIN
No lesions; no rash

## 2017-02-15 NOTE — PROGRESS NOTE ADULT - BREASTS
not examined

## 2017-02-15 NOTE — PROGRESS NOTE ADULT - VASCULAR
Equal and normal pulses (carotid, femoral, dorsalis pedis)

## 2017-02-15 NOTE — PROGRESS NOTE ADULT - BACK
No deformity or limitation of movement

## 2017-02-15 NOTE — PROGRESS NOTE ADULT - LYMPH NODES
No lymphadedenopathy

## 2017-02-15 NOTE — PROGRESS NOTE ADULT - NS ABD PE RECTAL EXAM
not examined

## 2017-02-15 NOTE — PROGRESS NOTE ADULT - NSHPATTENDINGPLANDISCUSS_GEN_ALL_CORE
Dr. Fishman
Dr. Fishman
as above
Dr. Fishman
as above
CCM
as above
fellow

## 2017-02-15 NOTE — PROGRESS NOTE ADULT - SUBJECTIVE AND OBJECTIVE BOX
Interval Events: reviewed  Patient seen and examined at bedside.    Patient is a 31y old  Female who presents with a chief complaint of she is sedated  she is awake and less agitated  PAST MEDICAL & SURGICAL HISTORY:  DVT (deep venous thrombosis)  GIB (gastrointestinal bleeding)  Afib  HTN (hypertension)  Chronic systolic congestive heart failure  HLD (hyperlipidemia)  Myocardial infarction  Congenital heart disease      MEDICATIONS:  Pulmonary:    Antimicrobials:    Anticoagulants:  heparin  Injectable 7500Unit(s) SubCutaneous every 8 hours    Cardiac:      Allergies    No Known Drug Allergies  Seafood (Rash)    Intolerances        Vital Signs Last 24 Hrs  T(C): 36.7, Max: 37.8 (02-14 @ 10:00)  T(F): 98.1, Max: 100 (02-14 @ 10:00)  HR: 122 (74 - 158)  BP: 104/58 (95/60 - 187/126)  BP(mean): 73 (73 - 163)  RR: 15 (5 - 25)  SpO2: 99% (90% - 100%)  I & Os for 24h ending 02-14 @ 07:00  =============================================  IN: 1390.4 ml / OUT: 0 ml / NET: 1390.4 ml    I & Os for current day (as of 02-14 @ 22:42)  =============================================  IN: 481.3 ml / OUT: 0 ml / NET: 481.3 ml    Mode: AC/ CMV (Assist Control/ Continuous Mandatory Ventilation)  RR (machine): 12  TV (machine): 500  FiO2: 50  PEEP: 5  ITime: 0.85  MAP: 11  PIP: 25      LABS:      CBC Full  -  ( 14 Feb 2017 06:27 )  WBC Count : 4.4 K/uL  Hemoglobin : 13.0 g/dL  Hematocrit : 38.5 %  Platelet Count - Automated : 401 K/uL  Mean Cell Volume : 70.4 fL  Mean Cell Hemoglobin : 23.8 pg  Mean Cell Hemoglobin Concentration : 33.8 g/dL  Auto Neutrophil # : x  Auto Lymphocyte # : x  Auto Monocyte # : x  Auto Eosinophil # : x  Auto Basophil # : x  Auto Neutrophil % : x  Auto Lymphocyte % : x  Auto Monocyte % : x  Auto Eosinophil % : x  Auto Basophil % : x    14 Feb 2017 06:27    135    |  104    |  10     ----------------------------<  133    4.6     |  23     |  0.52     Ca    8.2        14 Feb 2017 06:27  Phos  4.0       13 Feb 2017 06:02  Mg     1.8       14 Feb 2017 06:27      PT/INR - ( 13 Feb 2017 06:04 )   PT: 14.0 sec;   INR: 1.26          PTT - ( 13 Feb 2017 06:04 )  PTT:31.7 sec                    RADIOLOGY & ADDITIONAL STUDIES (The following images were personally reviewed):  Lobato:                            Yes         Urine output:               Yes         DVT prophylaxis:         Yes          Flattus:                          Yes         Bowel movement:             No

## 2017-02-15 NOTE — PROGRESS NOTE ADULT - SUBJECTIVE AND OBJECTIVE BOX
INTERVAL HPI/OVERNIGHT EVENTS:    OVERNIGHT: No overnight events.  SUBJECTIVE: Patient seen and examined at bedside.     ROS:  CV: Denies chest pain  Resp: Denies SOB  GI: Denies abdominal pain, constipation, diarrhea, nausea, vomiting  : Denies dysuria  ID: Denies fevers, chills  MSK: Denies joint pain     OBJECTIVE:    VITAL SIGNS:  ICU Vital Signs Last 24 Hrs  T(C): 37.2, Max: 42.2 (02-15 @ 05:00)  T(F): 99, Max: 108 (02-15 @ 05:00)  HR: 112 (80 - 158)  BP: 96/72 (95/60 - 150/106)  BP(mean): 77 (63 - 124)  ABP: --  ABP(mean): --  RR: 13 (5 - 29)  SpO2: 98% (90% - 100%)    Mode: AC/ CMV (Assist Control/ Continuous Mandatory Ventilation), RR (machine): 12, TV (machine): 500, FiO2: 50, PEEP: 8, ITime: 0.85, MAP: 14, PIP: 24  I & Os for 24h ending 02-14 @ 07:00  =============================================  IN: 1390.4 ml / OUT: 0 ml / NET: 1390.4 ml    I & Os for current day (as of 02-15 @ 06:55)  =============================================  IN: 1074.1 ml / OUT: 0 ml / NET: 1074.1 ml    CAPILLARY BLOOD GLUCOSE  91 (14 Feb 2017 12:00)      PHYSICAL EXAM:    General; NAD, resting comfortably.   Neuro: Obeys commands. Alert.   Eyes: No scleral icterus. No Conj Pallor.   ENMT: MMM. No thrush  Neck: No JVD. Trach in place.   Respiratory: Coarse BS R>L. Some ronchorous Anterior BS.   Cardiovascular: Normal S1 and S2 no MRG   Gastrointestinal: BS normoactive. S/NT/ND. PEG in place.   Extremities: WWP. DP 2 plus.   MSK: no joint swelling     MEDICATIONS:  MEDICATIONS  (STANDING):  chlorhexidine 0.12% Liquid 15milliLiter(s) Swish and Spit two times a day  heparin  Injectable 7500Unit(s) SubCutaneous every 8 hours  pantoprazole   Suspension 40milliGRAM(s) Oral daily  venlafaxine 75milliGRAM(s) Oral every 12 hours  QUEtiapine 150milliGRAM(s) Oral every 12 hours  propofol Infusion 7.652MICROgram(s)/kG/Min IV Continuous <Continuous>  midazolam Infusion 2mG/Hr IV Continuous <Continuous>    MEDICATIONS  (PRN):  acetaminophen    Suspension 650milliGRAM(s) Oral every 6 hours PRN For Temp greater than 38 C (100.4 F)      ALLERGIES:  Allergies    No Known Drug Allergies  Seafood (Rash)    Intolerances        LABS:                        13.0   4.4   )-----------( 401      ( 14 Feb 2017 06:27 )             38.5     14 Feb 2017 06:27    135    |  104    |  10     ----------------------------<  133    4.6     |  23     |  0.52     Ca    8.2        14 Feb 2017 06:27  Mg     1.8       14 Feb 2017 06:27            RADIOLOGY & ADDITIONAL TESTS: Reviewed.      32 y/o F with PMHx HTN, HLD, congenital ASD s/p repair, MICHAEL, HOCM, family h/o of neuromuscular hypoventilation syndrome, DVT in 2008 (s/p Coumadin last dose 2009) admitted to MICU after hypercapnic respiratory failure 2/2 MSSA PNA, was doing well after 10 day course of oxacillin now with persistent fevers and started on Zosyn for additional coverage for new pneumonia and now s/p trach/ PEG for respiratory status. Intermittent confusion likely from toxic metabolic encephalopathy.    Pulm  #Hypercapnic respiratory failure from congential central apnea.  Pt with likely central apnea which is in her family history and without ventilation goes apneic immediately.     - c/w mechanical ventilation trach in place  - FU serum genetic tests for diaphragmatic issue   -Obtain Diaphraghm US  - Likely refer for Pacemaker of Diaphragm once clear diagnosis of central apnea made.    ID  #HAP vs. VAP: Initially treated for MSSA PNA and was found to have B/L opacities on CXR., She completed 10-day course of oxacillin for MSSA grown from bronchial wash but then was having recurrent fevers responding to zosyn and a  Lavage from trach has  MSSA  in it.   - c/w Zosyn (day 8, begun on 2/7) for 14 days as her fever curve is going down and she is having clinical improvement  - f/u bcx drawn 02/11 which are NGTD     CV  #HD. Patient with episodes of hypotension on 02/09 and 02/10 , 02/13  responsive to fluids, now hypertensive.     #Pump. EF 60%. No diastolic HF. Echo finding of asymmetric septal wall hypertrophy. Cardiology consulted and dx HCOM but low suspicion for causing resp failure and also low likelihood of arrhythmogenic. Cardiology recommends care in not reducing preload and also starting beta blocker when clinically appropriate.     GI: Patient has a PEG in place.   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 CT head shows no intracranial hemorrhage or acute transcortical infarct.   - possible diaphragmatic pacer placement  eventually    Psych  Patient has a history of bipolar disorder on effoxor/risperidone. She currently seems to have an element of ICU delirium/toxic metabolic encephalopathy and was treated on Seroquel but this was DC'd in favor of the risperidone. Discussed with psychiatry and have restarted seroquel, stopped risperidone for delirium and continuing venlaxafine. Some clinical improvement in her delerium.   - On seroquel 150 mg BID, trying to taper propofol, then versed, continuing precedex, d/c fentanyl      FEN:  tube feeds with Promote  PPX: HSQ, SCDs, PPI  Access: peripheral.   DISPO: To remain in MICU for further stabilization  Full CODE OVERNIGHT: No overnight events.  SUBJECTIVE: Patient seen and examined at bedside.     ROS:  CV: Denies chest pain  Resp: Denies SOB  GI: Denies abdominal pain, constipation, diarrhea, nausea, vomiting  : Denies dysuria  ID: Denies fevers, chills  MSK: Denies joint pain     OBJECTIVE:    VITAL SIGNS:  ICU Vital Signs Last 24 Hrs  T(C): 37.2, Max: 42.2 (02-15 @ 05:00)  T(F): 99, Max: 108 (02-15 @ 05:00)  HR: 112 (80 - 158)  BP: 96/72 (95/60 - 150/106)  BP(mean): 77 (63 - 124)  ABP: --  ABP(mean): --  RR: 13 (5 - 29)  SpO2: 98% (90% - 100%)    Mode: AC/ CMV (Assist Control/ Continuous Mandatory Ventilation), RR (machine): 12, TV (machine): 500, FiO2: 50, PEEP: 8, ITime: 0.85, MAP: 14, PIP: 24  I & Os for 24h ending 02-14 @ 07:00  =============================================  IN: 1390.4 ml / OUT: 0 ml / NET: 1390.4 ml    I & Os for current day (as of 02-15 @ 06:55)  =============================================  IN: 1074.1 ml / OUT: 0 ml / NET: 1074.1 ml    CAPILLARY BLOOD GLUCOSE  91 (14 Feb 2017 12:00)      PHYSICAL EXAM:    General; NAD, resting comfortably.   Neuro: Obeys commands. Alert.   Eyes: No scleral icterus. No Conj Pallor.   ENMT: MMM. No thrush  Neck: No JVD. Trach in place.   Respiratory: Coarse BS R>L although improved  Cardiovascular: Normal S1 and S2 no MRG   Gastrointestinal: BS normoactive. S/NT/ND. PEG in place.   Extremities: WWP. DP 2 plus.   MSK: no joint swelling     MEDICATIONS:  MEDICATIONS  (STANDING):  chlorhexidine 0.12% Liquid 15milliLiter(s) Swish and Spit two times a day  heparin  Injectable 7500Unit(s) SubCutaneous every 8 hours  pantoprazole   Suspension 40milliGRAM(s) Oral daily  venlafaxine 75milliGRAM(s) Oral every 12 hours  QUEtiapine 150milliGRAM(s) Oral every 12 hours  propofol Infusion 7.652MICROgram(s)/kG/Min IV Continuous <Continuous>  midazolam Infusion 2mG/Hr IV Continuous <Continuous>    MEDICATIONS  (PRN):  acetaminophen    Suspension 650milliGRAM(s) Oral every 6 hours PRN For Temp greater than 38 C (100.4 F)      ALLERGIES:  Allergies    No Known Drug Allergies  Seafood (Rash)    Intolerances        LABS:                        13.0   4.4   )-----------( 401      ( 14 Feb 2017 06:27 )             38.5     14 Feb 2017 06:27    135    |  104    |  10     ----------------------------<  133    4.6     |  23     |  0.52     Ca    8.2        14 Feb 2017 06:27  Mg     1.8       14 Feb 2017 06:27            RADIOLOGY & ADDITIONAL TESTS: Reviewed.      32 y/o F with PMHx HTN, HLD, congenital ASD s/p repair, MICHAEL, HOCM, family h/o of neuromuscular hypoventilation syndrome, DVT in 2008 (s/p Coumadin last dose 2009) admitted to MICU after hypercapnic respiratory failure 2/2 MSSA PNA, was doing well after 10 day course of oxacillin now with persistent fevers and started on Zosyn for additional coverage for new pneumonia and now s/p trach/ PEG for respiratory status. Intermittent confusion likely from toxic metabolic encephalopathy.    Pulm  #Hypercapnic respiratory failure from congential central apnea.  Pt with likely central apnea which is in her family history and without ventilation goes apneic immediately.  Diaphragm US reveals  Minimal to no movement of the right hemidiaphragm upon quiet   breathing and sniffing. No paradoxical motion identified. Findings indicate at least weakness of the right hemidiaphragm, possibly paralysis.  - c/w mechanical ventilation trach in place  - FU serum genetic tests for diaphragmatic issue   - Likely refer for Pacemaker of Diaphragm once clear diagnosis of central apnea made.    ID  #HAP vs. VAP: Initially treated for MSSA PNA and was found to have B/L opacities on CXR. She completed 10-day course of oxacillin for MSSA grown from bronchial wash but then was having recurrent fevers  and a  lavage from trach had  MSSA  in it. She  s/p 8 days of zosyn for the sputum growth and has been afebrile  and no leukocytosis.       CV  #HD. Patient with episodes of hypotension on 02/09 and 02/10 , 02/13  responsive to fluids, now hypertensive.     #Pump. EF 60%. No diastolic HF. Echo finding of asymmetric septal wall hypertrophy. Cardiology consulted and dx HCOM but low suspicion for causing resp failure and also low likelihood of arrhythmogenic. Given tachycardia will initiate BB.     - Coreg 6.25 BID will titrate as needed     #Rhythm:     Patient in sinus tachycardia with some q waves new in II,III,AVF. PE  concern but CTA negative and troponins negative with some resolution in changes.    - Patient on coreg for rhythm.     GI: Patient has a PEG in place. Needed erythromycin for gastroparesis.     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 CT head shows no intracranial hemorrhage or acute transcortical infarct.   - possible diaphragmatic pacer placement  eventually    Psych  Patient has a history of bipolar disorder on effoxor/risperidone. She currently seems to have an element of ICU delirium/toxic metabolic encephalopathy and is on seroquel, and venlaxafine. Some clinical improvement in her delerium.   - On seroquel 150 mg BID, trying to taper propofol, then versed, continuing precedex, d/c fentanyl   - PT on baord for reorientation  FEN:  tube feeds with Promote  PPX: HSQ, SCDs, PPI , PT on board  Access: peripheral.   DISPO: To remain in MICU for further stabilization  Full CODE OVERNIGHT: No overnight events.  SUBJECTIVE: Patient seen and examined at bedside.     ROS:  CV: Denies chest pain  Resp: Denies SOB  GI: Denies abdominal pain, constipation, diarrhea, nausea, vomiting  : Denies dysuria  ID: Denies fevers, chills  MSK: Denies joint pain     OBJECTIVE:    VITAL SIGNS:  ICU Vital Signs Last 24 Hrs  T(C): 37.2, Max: 42.2 (02-15 @ 05:00)  T(F): 99, Max: 108 (02-15 @ 05:00)  HR: 112 (80 - 158)  BP: 96/72 (95/60 - 150/106)  BP(mean): 77 (63 - 124)  ABP: --  ABP(mean): --  RR: 13 (5 - 29)  SpO2: 98% (90% - 100%)    Mode: AC/ CMV (Assist Control/ Continuous Mandatory Ventilation), RR (machine): 12, TV (machine): 500, FiO2: 50, PEEP: 8, ITime: 0.85, MAP: 14, PIP: 24  I & Os for 24h ending 02-14 @ 07:00  =============================================  IN: 1390.4 ml / OUT: 0 ml / NET: 1390.4 ml    I & Os for current day (as of 02-15 @ 06:55)  =============================================  IN: 1074.1 ml / OUT: 0 ml / NET: 1074.1 ml    CAPILLARY BLOOD GLUCOSE  91 (14 Feb 2017 12:00)      PHYSICAL EXAM:    General; NAD, resting comfortably.   Neuro: Obeys commands. Alert.   Eyes: No scleral icterus. No Conj Pallor.   ENMT: MMM. No thrush  Neck: No JVD. Trach in place.   Respiratory: Coarse BS R>L although improved  Cardiovascular: Normal S1 and S2 no MRG   Gastrointestinal: BS normoactive. S/NT/ND. PEG in place.   Extremities: WWP. DP 2 plus.   MSK: no joint swelling     MEDICATIONS:  MEDICATIONS  (STANDING):  chlorhexidine 0.12% Liquid 15milliLiter(s) Swish and Spit two times a day  heparin  Injectable 7500Unit(s) SubCutaneous every 8 hours  pantoprazole   Suspension 40milliGRAM(s) Oral daily  venlafaxine 75milliGRAM(s) Oral every 12 hours  QUEtiapine 150milliGRAM(s) Oral every 12 hours  propofol Infusion 7.652MICROgram(s)/kG/Min IV Continuous <Continuous>  midazolam Infusion 2mG/Hr IV Continuous <Continuous>    MEDICATIONS  (PRN):  acetaminophen    Suspension 650milliGRAM(s) Oral every 6 hours PRN For Temp greater than 38 C (100.4 F)      ALLERGIES:  Allergies    No Known Drug Allergies  Seafood (Rash)    Intolerances        LABS:                        13.0   4.4   )-----------( 401      ( 14 Feb 2017 06:27 )             38.5     14 Feb 2017 06:27    135    |  104    |  10     ----------------------------<  133    4.6     |  23     |  0.52     Ca    8.2        14 Feb 2017 06:27  Mg     1.8       14 Feb 2017 06:27            RADIOLOGY & ADDITIONAL TESTS: Reviewed.      30 y/o F with PMHx HTN, HLD, congenital ASD s/p repair, MICHAEL, HOCM, family h/o of neuromuscular hypoventilation syndrome, DVT in 2008 (s/p Coumadin last dose 2009) admitted to MICU after hypercapnic respiratory failure 2/2 MSSA PNA, was doing well after 10 day course of oxacillin now with persistent fevers and started on Zosyn for additional coverage for new pneumonia and now s/p trach/ PEG for respiratory status. Intermittent confusion likely from toxic metabolic encephalopathy.    Pulm  #Hypercapnic respiratory failure from congential central apnea.  Pt with likely central apnea which is in her family history and without ventilation goes apneic immediately.  Diaphragm US reveals  Minimal to no movement of the right hemidiaphragm upon quiet   breathing and sniffing. No paradoxical motion identified. Findings indicate at least weakness of the right hemidiaphragm, possibly paralysis.  - c/w mechanical ventilation trach in place  - FU serum genetic tests for diaphragmatic issue   - Likely refer for Pacemaker of Diaphragm once clear diagnosis of central apnea made.    ID  #HAP vs. VAP: Initially treated for MSSA PNA and was found to have B/L opacities on CXR. She completed 10-day course of oxacillin for MSSA grown from bronchial wash but then was having recurrent fevers  and a  lavage from trach had  MSSA  in it. She  s/p 8 days of zosyn for the sputum growth and has been afebrile  and no leukocytosis.       CV  #HD. Patient with episodes of hypotension on 02/09 and 02/10 , 02/13  responsive to fluids, now hypertensive.     #Pump. EF 60%. No diastolic HF. Echo finding of asymmetric septal wall hypertrophy. Cardiology consulted and dx HCOM but low suspicion for causing resp failure and also low likelihood of arrhythmogenic. Given tachycardia will initiate BB.     - Coreg 6.25 BID will titrate as needed     #Rhythm:     Patient in sinus tachycardia with some q waves new in II,III,AVF. PE  concern but CTA negative and troponins negative with some resolution in changes.    - Patient on coreg for rhythm.     GI: Patient has a PEG in place. Needed erythromycin for gastroparesis.     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 CT head shows no intracranial hemorrhage or acute transcortical infarct.   - possible diaphragmatic pacer placement  eventually    Psych  Patient has a history of bipolar disorder on effoxor/risperidone. She currently seems to have an element of ICU delirium/toxic metabolic encephalopathy and is on seroquel, and venlaxafine. Some clinical improvement in her delerium.   - On seroquel 150 mg BID, trying to taper propofol, then versed, continuing precedex, d/c fentanyl   - PT   FEN:  tube feeds with Promote  PPX: HSQ, SCDs, PPI , PT on board  Access: peripheral.   DISPO: To remain in MICU for further stabilization  Full CODE

## 2017-02-15 NOTE — PROGRESS NOTE ADULT - HEMATOLOGY/LYMPHATICS
not applicable

## 2017-02-16 LAB
CULTURE RESULTS: SIGNIFICANT CHANGE UP
CULTURE RESULTS: SIGNIFICANT CHANGE UP
SPECIMEN SOURCE: SIGNIFICANT CHANGE UP
SPECIMEN SOURCE: SIGNIFICANT CHANGE UP

## 2017-02-16 PROCEDURE — 99233 SBSQ HOSP IP/OBS HIGH 50: CPT | Mod: GC

## 2017-02-16 PROCEDURE — 99232 SBSQ HOSP IP/OBS MODERATE 35: CPT | Mod: GC

## 2017-02-16 RX ORDER — CLONAZEPAM 1 MG
1 TABLET ORAL EVERY 12 HOURS
Qty: 0 | Refills: 0 | Status: DISCONTINUED | OUTPATIENT
Start: 2017-02-16 | End: 2017-02-17

## 2017-02-16 RX ADMIN — HEPARIN SODIUM 7500 UNIT(S): 5000 INJECTION INTRAVENOUS; SUBCUTANEOUS at 13:00

## 2017-02-16 RX ADMIN — DEXMEDETOMIDINE HYDROCHLORIDE IN 0.9% SODIUM CHLORIDE 27.23 MICROGRAM(S)/KG/HR: 4 INJECTION INTRAVENOUS at 09:37

## 2017-02-16 RX ADMIN — DEXMEDETOMIDINE HYDROCHLORIDE IN 0.9% SODIUM CHLORIDE 27.23 MICROGRAM(S)/KG/HR: 4 INJECTION INTRAVENOUS at 13:02

## 2017-02-16 RX ADMIN — HEPARIN SODIUM 7500 UNIT(S): 5000 INJECTION INTRAVENOUS; SUBCUTANEOUS at 23:44

## 2017-02-16 RX ADMIN — QUETIAPINE FUMARATE 200 MILLIGRAM(S): 200 TABLET, FILM COATED ORAL at 10:33

## 2017-02-16 RX ADMIN — AMLODIPINE BESYLATE 5 MILLIGRAM(S): 2.5 TABLET ORAL at 07:26

## 2017-02-16 RX ADMIN — Medication 75 MILLIGRAM(S): at 23:43

## 2017-02-16 RX ADMIN — DEXMEDETOMIDINE HYDROCHLORIDE IN 0.9% SODIUM CHLORIDE 27.23 MICROGRAM(S)/KG/HR: 4 INJECTION INTRAVENOUS at 01:01

## 2017-02-16 RX ADMIN — Medication 75 MILLIGRAM(S): at 10:32

## 2017-02-16 RX ADMIN — QUETIAPINE FUMARATE 200 MILLIGRAM(S): 200 TABLET, FILM COATED ORAL at 23:44

## 2017-02-16 RX ADMIN — CARVEDILOL PHOSPHATE 6.25 MILLIGRAM(S): 80 CAPSULE, EXTENDED RELEASE ORAL at 03:25

## 2017-02-16 RX ADMIN — DEXMEDETOMIDINE HYDROCHLORIDE IN 0.9% SODIUM CHLORIDE 27.23 MICROGRAM(S)/KG/HR: 4 INJECTION INTRAVENOUS at 03:25

## 2017-02-16 RX ADMIN — PANTOPRAZOLE SODIUM 40 MILLIGRAM(S): 20 TABLET, DELAYED RELEASE ORAL at 10:33

## 2017-02-16 RX ADMIN — HEPARIN SODIUM 7500 UNIT(S): 5000 INJECTION INTRAVENOUS; SUBCUTANEOUS at 07:26

## 2017-02-16 RX ADMIN — DEXMEDETOMIDINE HYDROCHLORIDE IN 0.9% SODIUM CHLORIDE 27.23 MICROGRAM(S)/KG/HR: 4 INJECTION INTRAVENOUS at 16:12

## 2017-02-16 RX ADMIN — MIDAZOLAM HYDROCHLORIDE 2 MG/HR: 1 INJECTION, SOLUTION INTRAMUSCULAR; INTRAVENOUS at 10:26

## 2017-02-16 RX ADMIN — DEXMEDETOMIDINE HYDROCHLORIDE IN 0.9% SODIUM CHLORIDE 27.23 MICROGRAM(S)/KG/HR: 4 INJECTION INTRAVENOUS at 19:41

## 2017-02-16 RX ADMIN — CARVEDILOL PHOSPHATE 6.25 MILLIGRAM(S): 80 CAPSULE, EXTENDED RELEASE ORAL at 13:00

## 2017-02-16 RX ADMIN — DEXMEDETOMIDINE HYDROCHLORIDE IN 0.9% SODIUM CHLORIDE 27.23 MICROGRAM(S)/KG/HR: 4 INJECTION INTRAVENOUS at 22:08

## 2017-02-16 NOTE — PHYSICAL THERAPY INITIAL EVALUATION ADULT - GENERAL OBSERVATIONS, REHAB EVAL
Pt. was received supine : + trach to vent at CPAP FiO2=50%, PEEP=10. Psupp=10, + PEG, + IV, + SCDs, NAD.

## 2017-02-16 NOTE — PHYSICAL THERAPY INITIAL EVALUATION ADULT - IMPAIRMENTS FOUND, PT EVAL
aerobic capacity/endurance/arousal, attention, and cognition/posture/gait, locomotion, and balance/muscle strength

## 2017-02-16 NOTE — PHYSICAL THERAPY INITIAL EVALUATION ADULT - ADDITIONAL COMMENTS
Pt. lives in a private house with three steps to enter, has not been using an assistive device for ambulation.

## 2017-02-16 NOTE — PHYSICAL THERAPY INITIAL EVALUATION ADULT - PERTINENT HX OF CURRENT PROBLEM, REHAB EVAL
Pt. is a 31 y.o female initially presenting with chest pain, SOB, found to be in hypercapnic resp. distress, required intubation and subsequent trach colar.

## 2017-02-16 NOTE — PHYSICAL THERAPY INITIAL EVALUATION ADULT - PREDICTED DURATION OF THERAPY (DAYS/WKS), PT EVAL
Pt. will benefit from further PT follow up to improve strength, balance, endurance, functional mobility; prevent further deconditioning.

## 2017-02-16 NOTE — PHYSICAL THERAPY INITIAL EVALUATION ADULT - DIAGNOSIS, PT EVAL
6B: Impaired Aerobic Capacity/Endurance Associated with Deconditioning ; 6E: Impaired Ventilation and Respiration/Gas Exchange Associated with Ventilatory Pump Dysfunction or Failure

## 2017-02-16 NOTE — PROGRESS NOTE ADULT - SUBJECTIVE AND OBJECTIVE BOX
Interval Events:  Patient seen and examined at bedside.    Patient is a 31y old  Female who presents with a chief complaint of     PAST MEDICAL & SURGICAL HISTORY:  DVT (deep venous thrombosis)  GIB (gastrointestinal bleeding)  Afib  HTN (hypertension)  Chronic systolic congestive heart failure  HLD (hyperlipidemia)  Myocardial infarction  Congenital heart disease      MEDICATIONS:  Pulmonary:    Antimicrobials:    Anticoagulants:  heparin  Injectable 7500Unit(s) SubCutaneous every 8 hours    Cardiac:  carvedilol 6.25milliGRAM(s) Oral every 12 hours  amLODIPine   Tablet 5milliGRAM(s) Oral daily      Allergies    No Known Drug Allergies  Seafood (Rash)    Intolerances        Vital Signs Last 24 Hrs  T(C): 37.3, Max: 37.3 (02-16 @ 22:00)  T(F): 99.2, Max: 99.2 (02-16 @ 22:00)  HR: 96 (56 - 110)  BP: 144/92 (102/65 - 159/99)  BP(mean): 102 (79 - 127)  RR: 26 (11 - 37)  SpO2: 100% (95% - 100%)  I & Os for 24h ending 02-16 @ 07:00  =============================================  IN: 1977.9 ml / OUT: 0 ml / NET: 1977.9 ml    I & Os for current day (as of 02-16 @ 23:52)  =============================================  IN: 1108.5 ml / OUT: 0 ml / NET: 1108.5 ml    Mode: AC/ CMV (Assist Control/ Continuous Mandatory Ventilation)  RR (machine): 12  TV (machine): 500  FiO2: 50  PEEP: 10  ITime: 0.85  MAP: 16  PIP: 21      LABS:      CBC Full  -  ( 15 Feb 2017 07:35 )  WBC Count : 6.2 K/uL  Hemoglobin : 11.6 g/dL  Hematocrit : 37.5 %  Platelet Count - Automated : 385 K/uL  Mean Cell Volume : 72.5 fL  Mean Cell Hemoglobin : 22.4 pg  Mean Cell Hemoglobin Concentration : 30.9 g/dL  Auto Neutrophil # : x  Auto Lymphocyte # : x  Auto Monocyte # : x  Auto Eosinophil # : x  Auto Basophil # : x  Auto Neutrophil % : x  Auto Lymphocyte % : x  Auto Monocyte % : x  Auto Eosinophil % : x  Auto Basophil % : x    15 Feb 2017 07:35    138    |  106    |  14     ----------------------------<  79     3.6     |  22     |  0.60     Ca    9.1        15 Feb 2017 07:35  Mg     2.1       15 Feb 2017 07:35                          RADIOLOGY & ADDITIONAL STUDIES (The following images were personally reviewed):  Lobato:                            Yes        No  Urine output:               Yes        No  DVT prophylaxis:         Yes        No  Flattus:                          Yes        No  Bowel movement:       Yes        No

## 2017-02-16 NOTE — PROGRESS NOTE ADULT - SUBJECTIVE AND OBJECTIVE BOX
OVERNIGHT: No overnight events.  SUBJECTIVE: Patient seen and examined at bedside.     ROS:  CV: Denies chest pain  Resp: Denies SOB  GI: Denies abdominal pain, constipation, diarrhea, nausea, vomiting  : Denies dysuria  ID: Denies fevers, chills  MSK: Denies joint pain     OBJECTIVE:    VITAL SIGNS:  ICU Vital Signs Last 24 Hrs  T(C): 36.3, Max: 37.8 (02-15 @ 18:30)  T(F): 97.4, Max: 100 (02-15 @ 18:30)  HR: 58 (58 - 134)  BP: 117/78 (102/80 - 199/125)  BP(mean): 88 (78 - 167)  ABP: --  ABP(mean): --  RR: 12 (11 - 34)  SpO2: 100% (95% - 100%)    Mode: AC/ CMV (Assist Control/ Continuous Mandatory Ventilation), RR (machine): 12, TV (machine): 500, FiO2: 50, PEEP: 8, ITime: 0.85, MAP: 11, PIP: 24  I & Os for 24h ending 02-15 @ 07:00  =============================================  IN: 1074.1 ml / OUT: 0 ml / NET: 1074.1 ml    I & Os for current day (as of 02-16 @ 06:32)  =============================================  IN: 1449.9 ml / OUT: 0 ml / NET: 1449.9 ml    CAPILLARY BLOOD GLUCOSE  91 (14 Feb 2017 12:00)      PHYSICAL EXAM:    General: NAD, comfortable  HEENT: NCAT, PERRL, clear conjunctiva, no scleral icterus  Neck: supple, no JVD  Respiratory: CTA b/l, no wheezing, rhonchi, rales  Cardiovascular: RRR, normal S1S2, no M/R/G  Abdomen: soft, NT/ND, bowel sounds in all four quadrants, no palpable masses  Extremities: WWP, no clubbing, cyanosis, or edema  Neuro:     MEDICATIONS:  MEDICATIONS  (STANDING):  heparin  Injectable 7500Unit(s) SubCutaneous every 8 hours  pantoprazole   Suspension 40milliGRAM(s) Oral daily  venlafaxine 75milliGRAM(s) Oral every 12 hours  midazolam Infusion 2mG/Hr IV Continuous <Continuous>  carvedilol 6.25milliGRAM(s) Oral every 12 hours  dexmedetomidine Infusion 1MICROgram(s)/kG/Hr IV Continuous <Continuous>  QUEtiapine 200milliGRAM(s) Oral every 12 hours  amLODIPine   Tablet 5milliGRAM(s) Oral daily    MEDICATIONS  (PRN):  acetaminophen    Suspension 650milliGRAM(s) Oral every 6 hours PRN For Temp greater than 38 C (100.4 F)      ALLERGIES:  Allergies    No Known Drug Allergies  Seafood (Rash)    Intolerances        LABS:                        11.6   6.2   )-----------( 385      ( 15 Feb 2017 07:35 )             37.5     15 Feb 2017 07:35    138    |  106    |  14     ----------------------------<  79     3.6     |  22     |  0.60     Ca    9.1        15 Feb 2017 07:35  Mg     2.1       15 Feb 2017 07:35            RADIOLOGY & ADDITIONAL TESTS: Reviewed.      30 y/o F with PMHx HTN, HLD, congenital ASD s/p repair, MICHAEL, HOCM, family h/o of neuromuscular hypoventilation syndrome, DVT in 2008 (s/p Coumadin last dose 2009) admitted to MICU after hypercapnic respiratory failure 2/2 MSSA PNA, was doing well after 10 day course of oxacillin now with persistent fevers and started on Zosyn for additional coverage for new pneumonia and now s/p trach/ PEG for respiratory status. Intermittent confusion likely from toxic metabolic encephalopathy.    Pulm  #Hypercapnic respiratory failure from congential central apnea.  Pt with likely central apnea which is in her family history and without ventilation goes apneic immediately.  Diaphragm US reveals  Minimal to no movement of the right hemidiaphragm upon quiet   breathing and sniffing. No paradoxical motion identified. Findings indicate at least weakness of the right hemidiaphragm, possibly paralysis.  - c/w mechanical ventilation trach in place  - FU serum genetic tests for diaphragmatic issue   - Likely refer for Pacemaker of Diaphragm once clear diagnosis of central apnea made.    ID  #HAP vs. VAP: Initially treated for MSSA PNA and was found to have B/L opacities on CXR. She completed 10-day course of oxacillin for MSSA grown from bronchial wash but then was having recurrent fevers  and a  lavage from trach had  MSSA  in it. She  s/p 8 days of zosyn for the sputum growth and has been afebrile  and no leukocytosis.       CV  #HD. Patient with episodes of hypotension on 02/09 and 02/10 , 02/13  responsive to fluids, now hypertensive.     #Pump. EF 60%. No diastolic HF. Echo finding of asymmetric septal wall hypertrophy. Cardiology consulted and dx HCOM but low suspicion for causing resp failure and also low likelihood of arrhythmogenic. Given tachycardia will initiate BB.     - Coreg 6.25 BID will titrate as needed     #Rhythm:     Patient in sinus tachycardia with some q waves new in II,III,AVF. PE  concern but CTA negative and troponins negative with some resolution in changes.    - Patient on coreg for rhythm.     GI: Patient has a PEG in place. Needed erythromycin for gastroparesis.     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 CT head shows no intracranial hemorrhage or acute transcortical infarct.   - possible diaphragmatic pacer placement  eventually    Psych  Patient has a history of bipolar disorder on effoxor/risperidone. She currently seems to have an element of ICU delirium/toxic metabolic encephalopathy and is on seroquel, and venlaxafine. Some clinical improvement in her delerium.   - On seroquel 150 mg BID, trying to taper propofol, then versed, continuing precedex, d/c fentanyl   - PT   FEN:  tube feeds with Promote  PPX: HSQ, SCDs, PPI , PT on board  Access: peripheral.   DISPO: To remain in MICU for further stabilization  Full CODE OVERNIGHT: No overnight events. Intermittent agitation continues.  SUBJECTIVE: Patient seen and examined at bedside.     ROS:  CV: Denies chest pain  Resp: Denies SOB  GI: Denies abdominal pain, constipation, diarrhea, nausea, vomiting  : Denies dysuria  ID: Denies fevers, chills  MSK: Denies joint pain     OBJECTIVE:    VITAL SIGNS:  ICU Vital Signs Last 24 Hrs  T(C): 36.3, Max: 37.8 (02-15 @ 18:30)  T(F): 97.4, Max: 100 (02-15 @ 18:30)  HR: 58 (58 - 134)  BP: 117/78 (102/80 - 199/125)  BP(mean): 88 (78 - 167)  ABP: --  ABP(mean): --  RR: 12 (11 - 34)  SpO2: 100% (95% - 100%)    Mode: AC/ CMV (Assist Control/ Continuous Mandatory Ventilation), RR (machine): 12, TV (machine): 500, FiO2: 50, PEEP: 8, ITime: 0.85, MAP: 11, PIP: 24  I & Os for 24h ending 02-15 @ 07:00  =============================================  IN: 1074.1 ml / OUT: 0 ml / NET: 1074.1 ml    I & Os for current day (as of 02-16 @ 06:32)  =============================================  IN: 1449.9 ml / OUT: 0 ml / NET: 1449.9 ml    CAPILLARY BLOOD GLUCOSE  91 (14 Feb 2017 12:00)      PHYSICAL EXAM:    General: NAD, comfortable  HEENT: NCAT, PERRL, clear conjunctiva, no scleral icterus, MMM  Neck: supple, no JVD  Respiratory: CTA b/l, no wheezing, rhonchi, rales  Cardiovascular: RRR, normal S1S2, no M/R/G  Abdomen: soft, NT/ND, bowel sounds in all four quadrants, no palpable masses  Extremities: WWP, no clubbing, cyanosis, or edema  Neuro: moves extremities  MSK: no joint swelling  Vasc 2+ radial and DP pulses    MEDICATIONS:  MEDICATIONS  (STANDING):  heparin  Injectable 7500Unit(s) SubCutaneous every 8 hours  pantoprazole   Suspension 40milliGRAM(s) Oral daily  venlafaxine 75milliGRAM(s) Oral every 12 hours  midazolam Infusion 2mG/Hr IV Continuous <Continuous>  carvedilol 6.25milliGRAM(s) Oral every 12 hours  dexmedetomidine Infusion 1MICROgram(s)/kG/Hr IV Continuous <Continuous>  QUEtiapine 200milliGRAM(s) Oral every 12 hours  amLODIPine   Tablet 5milliGRAM(s) Oral daily    MEDICATIONS  (PRN):  acetaminophen    Suspension 650milliGRAM(s) Oral every 6 hours PRN For Temp greater than 38 C (100.4 F)      ALLERGIES:  Allergies    No Known Drug Allergies  Seafood (Rash)    Intolerances        LABS:                        11.6   6.2   )-----------( 385      ( 15 Feb 2017 07:35 )             37.5     15 Feb 2017 07:35    138    |  106    |  14     ----------------------------<  79     3.6     |  22     |  0.60     Ca    9.1        15 Feb 2017 07:35  Mg     2.1       15 Feb 2017 07:35            RADIOLOGY & ADDITIONAL TESTS: Reviewed.      30 y/o F with PMHx HTN, HLD, congenital ASD s/p repair, MICHAEL, HOCM, family h/o of neuromuscular hypoventilation syndrome, DVT in 2008 (s/p Coumadin last dose 2009) admitted to MICU after hypercapnic respiratory failure 2/2 MSSA PNA, was doing well after 10 day course of oxacillin now with persistent fevers and started on Zosyn for additional coverage for new pneumonia and now s/p trach/ PEG for respiratory status. Intermittent confusion likely from toxic metabolic encephalopathy.    Pulm  #Hypercapnic respiratory failure from congential central apnea.  Pt with likely central apnea which is in her family history and without ventilation goes apneic immediately.  Diaphragm US reveals  Minimal to no movement of the right hemidiaphragm upon quiet   breathing and sniffing. No paradoxical motion identified. Findings indicate at least weakness of the right hemidiaphragm, possibly paralysis.  - c/w mechanical ventilation trach in place, will try PSV wean  - FU serum genetic tests for diaphragmatic issue   - Likely refer for Pacemaker of Diaphragm once clear diagnosis of central apnea made.    ID  #HAP vs. VAP: Initially treated for MSSA PNA and was found to have B/L opacities on CXR. She completed 10-day course of oxacillin for MSSA grown from bronchial wash but then was having recurrent fevers  and a  lavage from trach had  MSSA  in it. She  s/p 8 days of zosyn for the sputum growth and has been afebrile  and no leukocytosis.       CV  #HD. Patient with episodes of hypotension on 02/09 and 02/10 , 02/13  responsive to fluids, now hypertensive.     #Pump. EF 60%. No diastolic HF. Echo finding of asymmetric septal wall hypertrophy. Cardiology consulted and dx HCOM but low suspicion for causing resp failure and also low likelihood of arrhythmogenic. Given tachycardia have initiated BB.     - Coreg 6.25 BID will titrate as needed     #Rhythm:     Patient in sinus tachycardia with some q waves new in II,III,AVF. PE  concern but CTA negative and troponins negative with some resolution in changes.    - Patient on coreg for rhythm.     GI: Patient has a PEG in place. Off erythromycin originally needed for high residuals.     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 CT head shows no intracranial hemorrhage or acute transcortical infarct.   - possible diaphragmatic pacer placement  eventually    Psych  Patient has a history of bipolar disorder on effoxor/risperidone. She currently seems to have an element of ICU delirium/toxic metabolic encephalopathy and is on seroquel, and venlaxafine. Some clinical improvement in her delerium.   - On seroquel 200 mg BID, off propofol, tapering versed, continuing precedex, d/cd fentanyl   - PT to try to mobilize   FEN:  tube feeds with Promote  PPX: HSQ, SCDs, PPI , PT on board  Access: peripheral.   DISPO: To remain in MICU for further stabilization  Full CODE OVERNIGHT: No overnight events. Intermittent agitation continues.  SUBJECTIVE: Patient seen and examined at bedside.     ROS:  CV: Denies chest pain  Resp: Denies SOB  GI: Denies abdominal pain, constipation, diarrhea, nausea, vomiting  : Denies dysuria  ID: Denies fevers, chills  MSK: Denies joint pain     OBJECTIVE:    VITAL SIGNS:  ICU Vital Signs Last 24 Hrs  T(C): 36.3, Max: 37.8 (02-15 @ 18:30)  T(F): 97.4, Max: 100 (02-15 @ 18:30)  HR: 58 (58 - 134)  BP: 117/78 (102/80 - 199/125)  BP(mean): 88 (78 - 167)  ABP: --  ABP(mean): --  RR: 12 (11 - 34)  SpO2: 100% (95% - 100%)    Mode: AC/ CMV (Assist Control/ Continuous Mandatory Ventilation), RR (machine): 12, TV (machine): 500, FiO2: 50, PEEP: 8, ITime: 0.85, MAP: 11, PIP: 24  I & Os for 24h ending 02-15 @ 07:00  =============================================  IN: 1074.1 ml / OUT: 0 ml / NET: 1074.1 ml    I & Os for current day (as of 02-16 @ 06:32)  =============================================  IN: 1449.9 ml / OUT: 0 ml / NET: 1449.9 ml    CAPILLARY BLOOD GLUCOSE  91 (14 Feb 2017 12:00)      PHYSICAL EXAM:    General: NAD, comfortable  HEENT: NCAT, PERRL, clear conjunctiva, no scleral icterus, MMM  Neck: supple, no JVD  Respiratory: CTA b/l, no wheezing, rhonchi, rales  Cardiovascular: RRR, normal S1S2, no M/R/G  Abdomen: soft, NT/ND, bowel sounds in all four quadrants, no palpable masses  Extremities: WWP, no clubbing, cyanosis, or edema  Neuro: moves extremities  MSK: no joint swelling  Vasc 2+ radial and DP pulses    MEDICATIONS:  MEDICATIONS  (STANDING):  heparin  Injectable 7500Unit(s) SubCutaneous every 8 hours  pantoprazole   Suspension 40milliGRAM(s) Oral daily  venlafaxine 75milliGRAM(s) Oral every 12 hours  midazolam Infusion 2mG/Hr IV Continuous <Continuous>  carvedilol 6.25milliGRAM(s) Oral every 12 hours  dexmedetomidine Infusion 1MICROgram(s)/kG/Hr IV Continuous <Continuous>  QUEtiapine 200milliGRAM(s) Oral every 12 hours  amLODIPine   Tablet 5milliGRAM(s) Oral daily    MEDICATIONS  (PRN):  acetaminophen    Suspension 650milliGRAM(s) Oral every 6 hours PRN For Temp greater than 38 C (100.4 F)      ALLERGIES:  Allergies    No Known Drug Allergies  Seafood (Rash)    Intolerances        LABS:                        11.6   6.2   )-----------( 385      ( 15 Feb 2017 07:35 )             37.5     15 Feb 2017 07:35    138    |  106    |  14     ----------------------------<  79     3.6     |  22     |  0.60     Ca    9.1        15 Feb 2017 07:35  Mg     2.1       15 Feb 2017 07:35            RADIOLOGY & ADDITIONAL TESTS: Reviewed.      30 y/o F with PMHx HTN, HLD, congenital ASD s/p repair, MICHAEL, HOCM, family h/o of neuromuscular hypoventilation syndrome, DVT in 2008 (s/p Coumadin last dose 2009) admitted to MICU after hypercapnic respiratory failure 2/2 MSSA PNA, was doing well after 10 day course of oxacillin now with persistent fevers and started on Zosyn for additional coverage for new pneumonia and now s/p trach/ PEG for respiratory status. Intermittent confusion likely from toxic metabolic encephalopathy.    Pulm  #Hypercapnic respiratory failure from congential central apnea.  Pt with likely central apnea which is in her family history and without ventilation goes apneic immediately.  Diaphragm US reveals  Minimal to no movement of the right hemidiaphragm upon quiet   breathing and sniffing. No paradoxical motion identified. Findings indicate at least weakness of the right hemidiaphragm, possibly paralysis.  - c/w mechanical ventilation trach in place, will try PSV wean  - FU serum genetic tests for diaphragmatic issue   - Likely refer for Pacemaker of Diaphragm once clear diagnosis of central apnea made.    ID  #HAP vs. VAP: Initially treated for MSSA PNA and was found to have B/L opacities on CXR. She completed 10-day course of oxacillin for MSSA grown from bronchial wash but then was having recurrent fevers  and a  lavage from trach had  MSSA  in it. She  s/p 8 days of zosyn for the sputum growth and has been afebrile  and no leukocytosis.       CV  #HD. Patient with episodes of hypotension on 02/09 and 02/10 , 02/13  responsive to fluids, now hypertensive.     #Pump. EF 60%. No diastolic HF. Echo finding of asymmetric septal wall hypertrophy. Cardiology consulted and dx HCOM but low suspicion for causing resp failure and also low likelihood of arrhythmogenic. Given tachycardia have initiated BB.     - Coreg 6.25 BID will titrate as needed     #Rhythm:     Patient in sinus tachycardia with some q waves new in II,III,AVF. PE  concern but CTA negative and troponins negative with some resolution in changes.    - Patient on coreg for rhythm.     GI: Patient has a PEG in place. Off erythromycin originally needed for high residuals.     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 CT head shows no intracranial hemorrhage or acute transcortical infarct.   - possible diaphragmatic pacer placement  eventually    Psych  Patient has a history of bipolar disorder on effoxor/risperidone. She currently seems to have an element of ICU delirium/toxic metabolic encephalopathy and is on seroquel, and venlaxafine. Some clinical improvement in her delerium.   - On seroquel 200 mg BID, off propofol, tapering versed, continuing precedex, d/cd fentanyl   - PT to try to mobilize   FEN:  tube feeds with Jevity  PPX: HSQ, SCDs, PPI , PT on board  Access: peripheral.   DISPO: To remain in MICU for further stabilization  Full CODE

## 2017-02-17 LAB
ALBUMIN SERPL ELPH-MCNC: 2.4 G/DL — LOW (ref 3.4–5)
ALP SERPL-CCNC: 79 U/L — SIGNIFICANT CHANGE UP (ref 40–120)
ALT FLD-CCNC: 47 U/L — HIGH (ref 12–42)
ANION GAP SERPL CALC-SCNC: 9 MMOL/L — SIGNIFICANT CHANGE UP (ref 9–16)
AST SERPL-CCNC: 28 U/L — SIGNIFICANT CHANGE UP (ref 15–37)
BUN SERPL-MCNC: 18 MG/DL — SIGNIFICANT CHANGE UP (ref 7–23)
CALCIUM SERPL-MCNC: 8.8 MG/DL — SIGNIFICANT CHANGE UP (ref 8.5–10.5)
CHLORIDE SERPL-SCNC: 110 MMOL/L — HIGH (ref 96–108)
CO2 SERPL-SCNC: 23 MMOL/L — SIGNIFICANT CHANGE UP (ref 22–31)
CREAT SERPL-MCNC: 0.52 MG/DL — SIGNIFICANT CHANGE UP (ref 0.5–1.3)
GLUCOSE SERPL-MCNC: 120 MG/DL — HIGH (ref 70–99)
HCT VFR BLD CALC: 37.8 % — SIGNIFICANT CHANGE UP (ref 34.5–45)
HGB BLD-MCNC: 11.4 G/DL — LOW (ref 11.5–15.5)
MAGNESIUM SERPL-MCNC: 1.9 MG/DL — SIGNIFICANT CHANGE UP (ref 1.6–2.4)
MCHC RBC-ENTMCNC: 22.1 PG — LOW (ref 27–34)
MCHC RBC-ENTMCNC: 30.2 G/DL — LOW (ref 32–36)
MCV RBC AUTO: 73.1 FL — LOW (ref 80–100)
PLATELET # BLD AUTO: 343 K/UL — SIGNIFICANT CHANGE UP (ref 150–400)
POTASSIUM SERPL-MCNC: 3.5 MMOL/L — SIGNIFICANT CHANGE UP (ref 3.5–5.3)
POTASSIUM SERPL-SCNC: 3.5 MMOL/L — SIGNIFICANT CHANGE UP (ref 3.5–5.3)
RBC # BLD: 5.17 M/UL — SIGNIFICANT CHANGE UP (ref 3.8–5.2)
RBC # FLD: 23.2 % — HIGH (ref 10.3–16.9)
SODIUM SERPL-SCNC: 142 MMOL/L — SIGNIFICANT CHANGE UP (ref 135–145)
WBC # BLD: 6.7 K/UL — SIGNIFICANT CHANGE UP (ref 3.8–10.5)
WBC # FLD AUTO: 6.7 K/UL — SIGNIFICANT CHANGE UP (ref 3.8–10.5)

## 2017-02-17 PROCEDURE — 93010 ELECTROCARDIOGRAM REPORT: CPT

## 2017-02-17 PROCEDURE — 99291 CRITICAL CARE FIRST HOUR: CPT

## 2017-02-17 PROCEDURE — 99232 SBSQ HOSP IP/OBS MODERATE 35: CPT | Mod: GC

## 2017-02-17 RX ORDER — SODIUM CHLORIDE 9 MG/ML
1000 INJECTION INTRAMUSCULAR; INTRAVENOUS; SUBCUTANEOUS ONCE
Qty: 0 | Refills: 0 | Status: COMPLETED | OUTPATIENT
Start: 2017-02-17 | End: 2017-02-17

## 2017-02-17 RX ORDER — POTASSIUM CHLORIDE 20 MEQ
40 PACKET (EA) ORAL ONCE
Qty: 0 | Refills: 0 | Status: COMPLETED | OUTPATIENT
Start: 2017-02-17 | End: 2017-02-17

## 2017-02-17 RX ORDER — MAGNESIUM SULFATE 500 MG/ML
1 VIAL (ML) INJECTION ONCE
Qty: 0 | Refills: 0 | Status: COMPLETED | OUTPATIENT
Start: 2017-02-17 | End: 2017-02-17

## 2017-02-17 RX ORDER — AMLODIPINE BESYLATE 2.5 MG/1
10 TABLET ORAL DAILY
Qty: 0 | Refills: 0 | Status: DISCONTINUED | OUTPATIENT
Start: 2017-02-18 | End: 2017-02-19

## 2017-02-17 RX ORDER — AMLODIPINE BESYLATE 2.5 MG/1
5 TABLET ORAL ONCE
Qty: 0 | Refills: 0 | Status: COMPLETED | OUTPATIENT
Start: 2017-02-17 | End: 2017-02-17

## 2017-02-17 RX ORDER — POTASSIUM CHLORIDE 20 MEQ
10 PACKET (EA) ORAL ONCE
Qty: 0 | Refills: 0 | Status: COMPLETED | OUTPATIENT
Start: 2017-02-17 | End: 2017-02-17

## 2017-02-17 RX ORDER — CLONAZEPAM 1 MG
1 TABLET ORAL THREE TIMES A DAY
Qty: 0 | Refills: 0 | Status: DISCONTINUED | OUTPATIENT
Start: 2017-02-17 | End: 2017-02-18

## 2017-02-17 RX ADMIN — HEPARIN SODIUM 7500 UNIT(S): 5000 INJECTION INTRAVENOUS; SUBCUTANEOUS at 22:54

## 2017-02-17 RX ADMIN — QUETIAPINE FUMARATE 200 MILLIGRAM(S): 200 TABLET, FILM COATED ORAL at 09:15

## 2017-02-17 RX ADMIN — HEPARIN SODIUM 7500 UNIT(S): 5000 INJECTION INTRAVENOUS; SUBCUTANEOUS at 06:00

## 2017-02-17 RX ADMIN — Medication 1 MILLIGRAM(S): at 22:55

## 2017-02-17 RX ADMIN — Medication 1 MILLIGRAM(S): at 17:23

## 2017-02-17 RX ADMIN — Medication 1 MILLIGRAM(S): at 06:00

## 2017-02-17 RX ADMIN — AMLODIPINE BESYLATE 5 MILLIGRAM(S): 2.5 TABLET ORAL at 15:55

## 2017-02-17 RX ADMIN — QUETIAPINE FUMARATE 200 MILLIGRAM(S): 200 TABLET, FILM COATED ORAL at 22:55

## 2017-02-17 RX ADMIN — SODIUM CHLORIDE 2000 MILLILITER(S): 9 INJECTION INTRAMUSCULAR; INTRAVENOUS; SUBCUTANEOUS at 22:55

## 2017-02-17 RX ADMIN — DEXMEDETOMIDINE HYDROCHLORIDE IN 0.9% SODIUM CHLORIDE 27.23 MICROGRAM(S)/KG/HR: 4 INJECTION INTRAVENOUS at 12:09

## 2017-02-17 RX ADMIN — Medication 1 MILLIGRAM(S): at 00:10

## 2017-02-17 RX ADMIN — DEXMEDETOMIDINE HYDROCHLORIDE IN 0.9% SODIUM CHLORIDE 27.23 MICROGRAM(S)/KG/HR: 4 INJECTION INTRAVENOUS at 08:11

## 2017-02-17 RX ADMIN — Medication 2 MILLIGRAM(S): at 13:43

## 2017-02-17 RX ADMIN — CARVEDILOL PHOSPHATE 6.25 MILLIGRAM(S): 80 CAPSULE, EXTENDED RELEASE ORAL at 00:10

## 2017-02-17 RX ADMIN — HEPARIN SODIUM 7500 UNIT(S): 5000 INJECTION INTRAVENOUS; SUBCUTANEOUS at 13:42

## 2017-02-17 RX ADMIN — AMLODIPINE BESYLATE 5 MILLIGRAM(S): 2.5 TABLET ORAL at 06:00

## 2017-02-17 RX ADMIN — Medication 40 MILLIEQUIVALENT(S): at 08:11

## 2017-02-17 RX ADMIN — Medication 100 MILLIEQUIVALENT(S): at 09:14

## 2017-02-17 RX ADMIN — PANTOPRAZOLE SODIUM 40 MILLIGRAM(S): 20 TABLET, DELAYED RELEASE ORAL at 09:15

## 2017-02-17 RX ADMIN — Medication 100 GRAM(S): at 09:14

## 2017-02-17 RX ADMIN — CARVEDILOL PHOSPHATE 6.25 MILLIGRAM(S): 80 CAPSULE, EXTENDED RELEASE ORAL at 12:09

## 2017-02-17 RX ADMIN — Medication 75 MILLIGRAM(S): at 09:15

## 2017-02-17 RX ADMIN — Medication 75 MILLIGRAM(S): at 22:55

## 2017-02-17 NOTE — PROGRESS NOTE ADULT - SUBJECTIVE AND OBJECTIVE BOX
INTERVAL HPI/OVERNIGHT EVENTS:    OVERNIGHT: No overnight events.  SUBJECTIVE: Patient seen and examined at bedside.     ROS:  CV: Denies chest pain  Resp: Denies SOB  GI: Denies abdominal pain, constipation, diarrhea, nausea, vomiting  : Denies dysuria  ID: Denies fevers, chills  MSK: Denies joint pain     OBJECTIVE:    VITAL SIGNS:  ICU Vital Signs Last 24 Hrs  T(C): 36.4, Max: 37.7 ( @ 22:00)  T(F): 97.6, Max: 99.9 ( @ 22:00)  HR: 92 (60 - 112)  BP: 122/68 (112/66 - 156/79)  BP(mean): 88 (70 - 126)  ABP: --  ABP(mean): --  RR: 14 (14 - 33)  SpO2: 100% (97% - 100%)    Mode: Spontaneous/ CPAP (Continuous Positive Airway Pressure), FiO2: 40, PEEP: 5, PS: 7, MAP: 7, PIP: 13  I & Os for 24h ending  @ 07:00  =============================================  IN: 3172.8 ml / OUT: 1558 ml / NET: 1614.8 ml    I & Os for current day (as of  @ 06:38)  =============================================  IN: 1521 ml / OUT: 1688 ml / NET: -167 ml    CAPILLARY BLOOD GLUCOSE  209 (2017 06:00)      PHYSICAL EXAM:    General: NAD, comfortable, obeying commands   Neuro: Obeying commands.   HEENT: NCAT, PERRL, clear conjunctiva, no scleral icterus, MMM   Neck: supple, no JVD  Respiratory: CTA b/l, no wheezing, rhonchi, rales  Cardiovascular: RRR, normal S1S2, no M/R/G  Abdomen: soft, NT/ND, bowel sounds in all four quadrants, no palpable masses  Extremities: WWP, no clubbing, cyanosis, or edema  Vascular: 2 plus peripheral pulses     MEDICATIONS:  MEDICATIONS  (STANDING):  heparin  Injectable 5000Unit(s) SubCutaneous every 8 hours  insulin lispro (HumaLOG) corrective regimen sliding scale  SubCutaneous Before meals and at bedtime  cefTRIAXone   IVPB 2Gram(s) IV Intermittent every 24 hours  QUEtiapine 25milliGRAM(s) Oral every 12 hours  insulin glargine Injectable (LANTUS) 30Unit(s) SubCutaneous every morning    MEDICATIONS  (PRN):      ALLERGIES:  Allergies    No Known Allergies    Intolerances        LABS:                        10.0   8.6   )-----------( 268      ( 2017 05:53 )             30.5     2017 05:53    145    |  110    |  12     ----------------------------<  229    4.5     |  28     |  0.32     Ca    8.0        2017 05:53  Phos  3.6       2017 18:58  Mg     1.9       2017 05:53        Urinalysis Basic - ( 2017 05:28 )    Color: Yellow / Appearance: Clear / S.020 / pH: x  Gluc: x / Ketone: Trace mg/dL  / Bili: NEGATIVE / Urobili: 0.2 E.U./dL   Blood: x / Protein: NEGATIVE mg/dL / Nitrite: NEGATIVE   Leuk Esterase: NEGATIVE / RBC: < 5 /HPF / WBC 5-10 /HPF   Sq Epi: x / Non Sq Epi: Few /HPF / Bacteria: x    RADIOLOGY & ADDITIONAL TESTS: Reviewed.    30 y/o F with PMHx HTN, HLD, congenital ASD s/p repair, MICHAEL, HOCM, family h/o of neuromuscular hypoventilation syndrome, DVT in  (s/p Coumadin last dose ) admitted to MICU after hypercapnic respiratory failure 2/2 MSSA PNA, was doing well after 10 day course of oxacillin now with persistent fevers and started on Zosyn for additional coverage for new pneumonia and now s/p trach/ PEG for respiratory status. Intermittent confusion likely from toxic metabolic encephalopathy.    Pulm  #Hypercapnic respiratory failure from congential central apnea.  Pt with likely central apnea which is in her family history and without ventilation goes apneic immediately.  Diaphragm US reveals  Minimal to no movement of the right hemidiaphragm upon quiet breathing and sniffing. No paradoxical motion identified. Findings indicate at least weakness of the right hemidiaphragm, possibly paralysis.  - c/w mechanical ventilation trach in place, will try PSV wean  - FU serum genetic tests for diaphragmatic issue   - Likely refer for Pacemaker of Diaphragm once clear diagnosis of central apnea made.    ID  #HAP vs. VAP: Initially treated for MSSA PNA and was found to have B/L opacities on CXR. She completed 10-day course of oxacillin for MSSA grown from bronchial wash but then was having recurrent fevers  and a  lavage from trach had  MSSA  in it. She s/p 8 days of zosyn for the sputum growth and has been afebrile  and no leukocytosis.       CV  #HD. Patient with episodes of hypotension on  and 02/10 ,   responsive to fluids, now hypertensive.     #Pump. EF 60%. No diastolic HF. Echo finding of asymmetric septal wall hypertrophy. Cardiology consulted and dx HCOM but low suspicion for causing resp failure and also low likelihood of arrhythmogenic. Given tachycardia have initiated BB.     - Coreg 6.25 BID will titrate as needed     #Rhythm:     Patient in sinus tachycardia with some q waves new in II,III,AVF. PE  concern but CTA negative and troponins negative with some resolution in changes.    - Patient on coreg for rhythm.     GI: Patient has a PEG in place. Off erythromycin originally needed for high residuals.     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 CT head shows no intracranial hemorrhage or acute transcortical infarct.   - possible diaphragmatic pacer placement  eventually    Psych  Patient has a history of bipolar disorder on effoxor/risperidone. She currently seems to have an element of ICU delirium/toxic metabolic encephalopathy and is on seroquel, and venlaxafine. Some clinical improvement in her delerium.   - On seroquel 200 mg BID, off propofol, tapering versed, continuing precedex, d/cd fentanyl   - PT to try to mobilize   FEN:  tube feeds with Jevity  PPX: HSQ, SCDs, PPI , PT on board  Access: peripheral.   DISPO: To remain in MICU for further stabilization  Full CODE INTERVAL HPI/OVERNIGHT EVENTS:    OVERNIGHT: No overnight events.  SUBJECTIVE: Patient seen and examined at bedside.     ROS:  CV: Denies chest pain  Resp: Denies SOB  GI: Denies abdominal pain, constipation, diarrhea, nausea, vomiting  : Denies dysuria  ID: Denies fevers, chills  MSK: Denies joint pain     OBJECTIVE:    VITAL SIGNS:  ICU Vital Signs Last 24 Hrs  T(C): 36.4, Max: 37.7 ( @ 22:00)  T(F): 97.6, Max: 99.9 ( @ 22:00)  HR: 92 (60 - 112)  BP: 122/68 (112/66 - 156/79)  BP(mean): 88 (70 - 126)  ABP: --  ABP(mean): --  RR: 14 (14 - 33)  SpO2: 100% (97% - 100%)    Mode: Spontaneous/ CPAP (Continuous Positive Airway Pressure), FiO2: 40, PEEP: 5, PS: 7, MAP: 7, PIP: 13  I & Os for 24h ending  @ 07:00  =============================================  IN: 3172.8 ml / OUT: 1558 ml / NET: 1614.8 ml    I & Os for current day (as of  @ 06:38)  =============================================  IN: 1521 ml / OUT: 1688 ml / NET: -167 ml    CAPILLARY BLOOD GLUCOSE  209 (2017 06:00)      PHYSICAL EXAM:    General: NAD, comfortable, obeying commands   Neuro: Obeying commands.   HEENT: NCAT, PERRL, clear conjunctiva, no scleral icterus, MMM   Neck: supple, no JVD  Respiratory: CTA b/l, no wheezing, rhonchi, rales  Cardiovascular: RRR, normal S1S2, no M/R/G  Abdomen: soft, NT/ND, bowel sounds in all four quadrants, no palpable masses  Extremities: WWP, no clubbing, cyanosis, or edema  Vascular: 2 plus peripheral pulses     MEDICATIONS:  MEDICATIONS  (STANDING):  heparin  Injectable 5000Unit(s) SubCutaneous every 8 hours  insulin lispro (HumaLOG) corrective regimen sliding scale  SubCutaneous Before meals and at bedtime  cefTRIAXone   IVPB 2Gram(s) IV Intermittent every 24 hours  QUEtiapine 25milliGRAM(s) Oral every 12 hours  insulin glargine Injectable (LANTUS) 30Unit(s) SubCutaneous every morning    MEDICATIONS  (PRN):      ALLERGIES:  Allergies    No Known Allergies    Intolerances        LABS:                        10.0   8.6   )-----------( 268      ( 2017 05:53 )             30.5     2017 05:53    145    |  110    |  12     ----------------------------<  229    4.5     |  28     |  0.32     Ca    8.0        2017 05:53  Phos  3.6       2017 18:58  Mg     1.9       2017 05:53        Urinalysis Basic - ( 2017 05:28 )    Color: Yellow / Appearance: Clear / S.020 / pH: x  Gluc: x / Ketone: Trace mg/dL  / Bili: NEGATIVE / Urobili: 0.2 E.U./dL   Blood: x / Protein: NEGATIVE mg/dL / Nitrite: NEGATIVE   Leuk Esterase: NEGATIVE / RBC: < 5 /HPF / WBC 5-10 /HPF   Sq Epi: x / Non Sq Epi: Few /HPF / Bacteria: x    RADIOLOGY & ADDITIONAL TESTS: Reviewed.    30 y/o F with PMHx HTN, HLD, congenital ASD s/p repair, MICHAEL, HOCM, family h/o of neuromuscular hypoventilation syndrome, DVT in  (s/p Coumadin last dose ) admitted to MICU after hypercapnic respiratory failure 2/2 MSSA PNA, was doing well after 10 day course of oxacillin now with persistent fevers and started on Zosyn for additional coverage for new pneumonia and now s/p trach/ PEG for respiratory status. Intermittent confusion likely from toxic metabolic encephalopathy.    Pulm  #Hypercapnic respiratory failure from congential central apnea.  Pt with likely central apnea which is in her family history and without ventilation goes apneic immediately.  Diaphragm US reveals  Minimal to no movement of the right hemidiaphragm upon quiet breathing and sniffing. No paradoxical motion identified. Findings indicate at least weakness of the right hemidiaphragm, possibly paralysis.  - c/w mechanical ventilation trach in place, will try PSV wean  - FU serum genetic tests for diaphragmatic issue   - Likely refer for Pacemaker of Diaphragm once clear diagnosis of central apnea made.    ID  #HAP vs. VAP: Initially treated for MSSA PNA and was found to have B/L opacities on CXR. She completed 10-day course of oxacillin for MSSA grown from bronchial wash but then was having recurrent fevers  and a  lavage from trach had  MSSA  in it. She s/p 8 days of zosyn for the sputum growth and has been afebrile  and no leukocytosis.       CV  #HD. Patient with episodes of hypotension on  and 02/10 ,   responsive to fluids, now hypertensive.     #Pump. EF 60%. No diastolic HF. Echo finding of asymmetric septal wall hypertrophy. Cardiology consulted and dx HCOM but low suspicion for causing resp failure and also low likelihood of arrhythmogenic. Given tachycardia have initiated BB.     - Coreg 6.25 BID will titrate as needed     #Rhythm:     Patient in sinus tachycardia with some q waves new in II,III,AVF. PE  concern but CTA negative and troponins negative with some resolution in changes.    - Patient on coreg for rhythm.     GI: Patient has a PEG in place. Off erythromycin originally needed for high residuals.     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 CT head shows no intracranial hemorrhage or acute transcortical infarct.   - possible diaphragmatic pacer placement  eventually    Psych  Patient has a history of bipolar disorder on effoxor/risperidone. She currently seems to have an element of ICU delirium/toxic metabolic encephalopathy and is on seroquel, and venlaxafine. Some clinical improvement in her delerium.   - On seroquel 200 mg BID, tapering precadex for sedation   - PT to try to mobilize   FEN:  tube feeds with Jevity  PPX: HSQ, SCDs, PPI , PT on board  Access: peripheral.   DISPO: To remain in MICU for further stabilization  Full CODE

## 2017-02-17 NOTE — PROGRESS NOTE ADULT - SUBJECTIVE AND OBJECTIVE BOX
Interval Events:  Patient seen and examined at bedside.    Patient is a 31y old  Female who presents with a chief complaint of     PAST MEDICAL & SURGICAL HISTORY:  DVT (deep venous thrombosis)  GIB (gastrointestinal bleeding)  Afib  HTN (hypertension)  Chronic systolic congestive heart failure  HLD (hyperlipidemia)  Myocardial infarction  Congenital heart disease      MEDICATIONS:  Pulmonary:    Antimicrobials:    Anticoagulants:  heparin  Injectable 7500Unit(s) SubCutaneous every 8 hours    Cardiac:  carvedilol 6.25milliGRAM(s) Oral every 12 hours      Allergies    No Known Drug Allergies  Seafood (Rash)    Intolerances        Vital Signs Last 24 Hrs  T(C): 36.7, Max: 37.7 (02-17 @ 01:05)  T(F): 98.1, Max: 99.8 (02-17 @ 01:05)  HR: 86 (54 - 106)  BP: 144/96 (108/75 - 175/112)  BP(mean): 113 (90 - 139)  RR: 22 (11 - 28)  SpO2: 100% (98% - 100%)  I & Os for 24h ending 02-17 @ 07:00  =============================================  IN: 1108.5 ml / OUT: 0 ml / NET: 1108.5 ml    I & Os for current day (as of 02-17 @ 23:28)  =============================================  IN: 1109.4 ml / OUT: 0 ml / NET: 1109.4 ml    Mode: CPAP with PS  FiO2: 50  PEEP: 7  PS: 8  MAP: 8.3  PIP: 15      LABS:      CBC Full  -  ( 17 Feb 2017 06:00 )  WBC Count : 6.7 K/uL  Hemoglobin : 11.4 g/dL  Hematocrit : 37.8 %  Platelet Count - Automated : 343 K/uL  Mean Cell Volume : 73.1 fL  Mean Cell Hemoglobin : 22.1 pg  Mean Cell Hemoglobin Concentration : 30.2 g/dL  Auto Neutrophil # : x  Auto Lymphocyte # : x  Auto Monocyte # : x  Auto Eosinophil # : x  Auto Basophil # : x  Auto Neutrophil % : x  Auto Lymphocyte % : x  Auto Monocyte % : x  Auto Eosinophil % : x  Auto Basophil % : x    17 Feb 2017 06:01    142    |  110    |  18     ----------------------------<  120    3.5     |  23     |  0.52     Ca    8.8        17 Feb 2017 06:01  Mg     1.9       17 Feb 2017 06:01    TPro  x      /  Alb  2.4    /  TBili  x      /  DBili  x      /  AST  28     /  ALT  47     /  AlkPhos  79     17 Feb 2017 06:01                        RADIOLOGY & ADDITIONAL STUDIES (The following images were personally reviewed):  Lobato:                            Yes        No  Urine output:               Yes        No  DVT prophylaxis:         Yes        No  Flattus:                          Yes        No  Bowel movement:       Yes        No

## 2017-02-18 LAB
ANION GAP SERPL CALC-SCNC: 9 MMOL/L — SIGNIFICANT CHANGE UP (ref 9–16)
BUN SERPL-MCNC: 15 MG/DL — SIGNIFICANT CHANGE UP (ref 7–23)
CALCIUM SERPL-MCNC: 9.1 MG/DL — SIGNIFICANT CHANGE UP (ref 8.5–10.5)
CHLORIDE SERPL-SCNC: 108 MMOL/L — SIGNIFICANT CHANGE UP (ref 96–108)
CO2 SERPL-SCNC: 22 MMOL/L — SIGNIFICANT CHANGE UP (ref 22–31)
CREAT SERPL-MCNC: 0.57 MG/DL — SIGNIFICANT CHANGE UP (ref 0.5–1.3)
GLUCOSE SERPL-MCNC: 111 MG/DL — HIGH (ref 70–99)
MAGNESIUM SERPL-MCNC: 2 MG/DL — SIGNIFICANT CHANGE UP (ref 1.6–2.4)
POTASSIUM SERPL-MCNC: 3.6 MMOL/L — SIGNIFICANT CHANGE UP (ref 3.5–5.3)
POTASSIUM SERPL-SCNC: 3.6 MMOL/L — SIGNIFICANT CHANGE UP (ref 3.5–5.3)
SODIUM SERPL-SCNC: 139 MMOL/L — SIGNIFICANT CHANGE UP (ref 135–145)

## 2017-02-18 PROCEDURE — 71010: CPT | Mod: 26

## 2017-02-18 PROCEDURE — 93010 ELECTROCARDIOGRAM REPORT: CPT

## 2017-02-18 PROCEDURE — 99233 SBSQ HOSP IP/OBS HIGH 50: CPT | Mod: GC

## 2017-02-18 RX ORDER — CLONAZEPAM 1 MG
2 TABLET ORAL EVERY 12 HOURS
Qty: 0 | Refills: 0 | Status: DISCONTINUED | OUTPATIENT
Start: 2017-02-18 | End: 2017-02-19

## 2017-02-18 RX ORDER — POTASSIUM CHLORIDE 20 MEQ
40 PACKET (EA) ORAL ONCE
Qty: 0 | Refills: 0 | Status: COMPLETED | OUTPATIENT
Start: 2017-02-18 | End: 2017-02-18

## 2017-02-18 RX ADMIN — Medication 2 MILLIGRAM(S): at 23:50

## 2017-02-18 RX ADMIN — HEPARIN SODIUM 7500 UNIT(S): 5000 INJECTION INTRAVENOUS; SUBCUTANEOUS at 23:51

## 2017-02-18 RX ADMIN — CARVEDILOL PHOSPHATE 6.25 MILLIGRAM(S): 80 CAPSULE, EXTENDED RELEASE ORAL at 02:08

## 2017-02-18 RX ADMIN — DEXMEDETOMIDINE HYDROCHLORIDE IN 0.9% SODIUM CHLORIDE 27.23 MICROGRAM(S)/KG/HR: 4 INJECTION INTRAVENOUS at 02:08

## 2017-02-18 RX ADMIN — QUETIAPINE FUMARATE 200 MILLIGRAM(S): 200 TABLET, FILM COATED ORAL at 23:50

## 2017-02-18 RX ADMIN — HEPARIN SODIUM 7500 UNIT(S): 5000 INJECTION INTRAVENOUS; SUBCUTANEOUS at 06:53

## 2017-02-18 RX ADMIN — CARVEDILOL PHOSPHATE 6.25 MILLIGRAM(S): 80 CAPSULE, EXTENDED RELEASE ORAL at 14:36

## 2017-02-18 RX ADMIN — AMLODIPINE BESYLATE 10 MILLIGRAM(S): 2.5 TABLET ORAL at 06:54

## 2017-02-18 RX ADMIN — Medication 75 MILLIGRAM(S): at 10:54

## 2017-02-18 RX ADMIN — Medication 40 MILLIEQUIVALENT(S): at 06:54

## 2017-02-18 RX ADMIN — PANTOPRAZOLE SODIUM 40 MILLIGRAM(S): 20 TABLET, DELAYED RELEASE ORAL at 10:54

## 2017-02-18 RX ADMIN — Medication 1 MILLIGRAM(S): at 14:36

## 2017-02-18 RX ADMIN — HEPARIN SODIUM 7500 UNIT(S): 5000 INJECTION INTRAVENOUS; SUBCUTANEOUS at 14:35

## 2017-02-18 RX ADMIN — Medication 75 MILLIGRAM(S): at 23:50

## 2017-02-18 RX ADMIN — QUETIAPINE FUMARATE 200 MILLIGRAM(S): 200 TABLET, FILM COATED ORAL at 10:54

## 2017-02-18 RX ADMIN — DEXMEDETOMIDINE HYDROCHLORIDE IN 0.9% SODIUM CHLORIDE 27.23 MICROGRAM(S)/KG/HR: 4 INJECTION INTRAVENOUS at 23:51

## 2017-02-18 RX ADMIN — Medication 1 MILLIGRAM(S): at 04:24

## 2017-02-18 NOTE — PROGRESS NOTE ADULT - SUBJECTIVE AND OBJECTIVE BOX
INTERVAL HPI/OVERNIGHT EVENTS:    OVERNIGHT: No overnight events.  SUBJECTIVE: Patient seen and examined at bedside.     ROS:  CV: Denies chest pain  Resp: Denies SOB  GI: Denies abdominal pain, constipation, diarrhea, nausea, vomiting  : Denies dysuria  ID: Denies fevers, chills  MSK: Denies joint pain     OBJECTIVE:    VITAL SIGNS:  ICU Vital Signs Last 24 Hrs  T(C): 37.2, Max: 37.2 (02-18 @ 01:00)  T(F): 98.9, Max: 98.9 (02-18 @ 01:00)  HR: 88 (72 - 106)  BP: 157/105 (92/54 - 175/112)  BP(mean): 126 (70 - 139)  ABP: --  ABP(mean): --  RR: 23 (11 - 33)  SpO2: 99% (98% - 100%)    Mode: AC/ CMV (Assist Control/ Continuous Mandatory Ventilation), RR (machine): 12, TV (machine): 500, FiO2: 50, PEEP: 7, ITime: 1, MAP: 15, PC: 11, PIP: 33  I & Os for 24h ending 02-17 @ 07:00  =============================================  IN: 1108.5 ml / OUT: 0 ml / NET: 1108.5 ml    I & Os for current day (as of 02-18 @ 06:24)  =============================================  IN: 2599.4 ml / OUT: 0 ml / NET: 2599.4 ml    CAPILLARY BLOOD GLUCOSE      PHYSICAL EXAM:    General: NAD, comfortable  HEENT: NCAT, PERRL, clear conjunctiva, no scleral icterus  Neck: supple, no JVD  Respiratory: CTA b/l, no wheezing, rhonchi, rales  Cardiovascular: RRR, normal S1S2, no M/R/G  Abdomen: soft, NT/ND, bowel sounds in all four quadrants, no palpable masses  Extremities: WWP, no clubbing, cyanosis, or edema  Neuro:     MEDICATIONS:  MEDICATIONS  (STANDING):  heparin  Injectable 7500Unit(s) SubCutaneous every 8 hours  pantoprazole   Suspension 40milliGRAM(s) Oral daily  venlafaxine 75milliGRAM(s) Oral every 12 hours  carvedilol 6.25milliGRAM(s) Oral every 12 hours  dexmedetomidine Infusion 1MICROgram(s)/kG/Hr IV Continuous <Continuous>  QUEtiapine 200milliGRAM(s) Oral every 12 hours  amLODIPine   Tablet 10milliGRAM(s) Oral daily  clonazePAM Tablet 1milliGRAM(s) Oral three times a day  potassium chloride   Powder 40milliEquivalent(s) Oral once    MEDICATIONS  (PRN):  acetaminophen    Suspension 650milliGRAM(s) Oral every 6 hours PRN For Temp greater than 38 C (100.4 F)      ALLERGIES:  Allergies    No Known Drug Allergies  Seafood (Rash)    Intolerances        LABS:                        11.4   6.7   )-----------( 343      ( 17 Feb 2017 06:00 )             37.8     18 Feb 2017 04:03    139    |  108    |  15     ----------------------------<  111    3.6     |  22     |  0.57     Ca    9.1        18 Feb 2017 04:03  Mg     2.0       18 Feb 2017 04:03    TPro  x      /  Alb  2.4    /  TBili  x      /  DBili  x      /  AST  28     /  ALT  47     /  AlkPhos  79     17 Feb 2017 06:01    RADIOLOGY & ADDITIONAL TESTS: Reviewed.    32 y/o F with PMHx HTN, HLD, congenital ASD s/p repair, MICHAEL, HOCM, family h/o of neuromuscular hypoventilation syndrome, DVT in 2008 (s/p Coumadin last dose 2009) admitted to MICU after hypercapnic respiratory failure 2/2 MSSA PNA, was doing well after 10 day course of oxacillin now with persistent fevers and started on Zosyn for additional coverage for new pneumonia and now s/p trach/ PEG for respiratory status. Intermittent confusion likely from toxic metabolic encephalopathy.    Pulm  #Hypercapnic respiratory failure from congential central apnea.  Pt with likely central apnea which is in her family history and without ventilation goes apneic immediately.  Diaphragm US reveals  Minimal to no movement of the right hemidiaphragm upon quiet breathing and sniffing. No paradoxical motion identified. Findings indicate at least weakness of the right hemidiaphragm, possibly paralysis.  - c/w mechanical ventilation trach in place, will try PSV wean  - FU serum genetic tests for diaphragmatic issue   - Likely refer for Pacemaker of Diaphragm once clear diagnosis of central apnea made.    ID  #HAP vs. VAP: Initially treated for MSSA PNA and was found to have B/L opacities on CXR. She completed 10-day course of oxacillin for MSSA grown from bronchial wash but then was having recurrent fevers  and a  lavage from trach had  MSSA  in it. She s/p 8 days of zosyn for the sputum growth and has been afebrile  and no leukocytosis.       CV  #HD. Patient with episodes of hypotension on 02/09 and 02/10 , 02/13  responsive to fluids, now hypertensive.     #Pump. EF 60%. No diastolic HF. Echo finding of asymmetric septal wall hypertrophy. Cardiology consulted and dx HCOM but low suspicion for causing resp failure and also low likelihood of arrhythmogenic. Given tachycardia have initiated BB.     - Coreg 6.25 BID will titrate as needed     #Rhythm:     Patient in sinus tachycardia with some q waves new in II,III,AVF. PE  concern but CTA negative and troponins negative with some resolution in changes.    - Patient on coreg for rhythm.     GI: Patient has a PEG in place. Off erythromycin originally needed for high residuals.     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 CT head shows no intracranial hemorrhage or acute transcortical infarct.   - possible diaphragmatic pacer placement  eventually    Psych  Patient has a history of bipolar disorder on effoxor/risperidone. She currently seems to have an element of ICU delirium/toxic metabolic encephalopathy and is on seroquel, and venlaxafine. Some clinical improvement in her delerium.   - On seroquel 200 mg BID, tapering precadex for sedation   - PT to try to mobilize   FEN:  tube feeds with Jevity  PPX: HSQ, SCDs, PPI , PT on board  Access: peripheral.   DISPO: To remain in MICU for further stabilization  Full CODE INTERVAL HPI/OVERNIGHT EVENTS:    OVERNIGHT: No overnight events.  SUBJECTIVE: Patient seen and examined at bedside.     ROS:  CV: Denies chest pain  Resp: Denies SOB  GI: Denies abdominal pain, constipation, diarrhea, nausea, vomiting  : Denies dysuria  ID: Denies fevers, chills  MSK: Denies joint pain     OBJECTIVE:    VITAL SIGNS:  ICU Vital Signs Last 24 Hrs  T(C): 37.2, Max: 37.2 (02-18 @ 01:00)  T(F): 98.9, Max: 98.9 (02-18 @ 01:00)  HR: 88 (72 - 106)  BP: 157/105 (92/54 - 175/112)  BP(mean): 126 (70 - 139)  ABP: --  ABP(mean): --  RR: 23 (11 - 33)  SpO2: 99% (98% - 100%)    Mode: AC/ CMV (Assist Control/ Continuous Mandatory Ventilation), RR (machine): 12, TV (machine): 500, FiO2: 50, PEEP: 7, ITime: 1, MAP: 15, PC: 11, PIP: 33  I & Os for 24h ending 02-17 @ 07:00  =============================================  IN: 1108.5 ml / OUT: 0 ml / NET: 1108.5 ml    I & Os for current day (as of 02-18 @ 06:24)  =============================================  IN: 2599.4 ml / OUT: 0 ml / NET: 2599.4 ml    CAPILLARY BLOOD GLUCOSE      PHYSICAL EXAM:    General: NAD, comfortable, obeying commands   Neuro: Obeying commands.   HEENT: NCAT, PERRL, clear conjunctiva, no scleral icterus, MMM   Neck: supple, no JVD  Respiratory: CTA b/l, no wheezing, rhonchi, rales  Cardiovascular: RRR, normal S1S2, no M/R/G  Abdomen: soft, NT/ND, bowel sounds in all four quadrants, no palpable masses  Extremities: WWP, no clubbing, cyanosis, or edema  Vascular: 2 plus peripheral pulses     MEDICATIONS:  MEDICATIONS  (STANDING):  heparin  Injectable 7500Unit(s) SubCutaneous every 8 hours  pantoprazole   Suspension 40milliGRAM(s) Oral daily  venlafaxine 75milliGRAM(s) Oral every 12 hours  carvedilol 6.25milliGRAM(s) Oral every 12 hours  dexmedetomidine Infusion 1MICROgram(s)/kG/Hr IV Continuous <Continuous>  QUEtiapine 200milliGRAM(s) Oral every 12 hours  amLODIPine   Tablet 10milliGRAM(s) Oral daily  clonazePAM Tablet 1milliGRAM(s) Oral three times a day  potassium chloride   Powder 40milliEquivalent(s) Oral once    MEDICATIONS  (PRN):  acetaminophen    Suspension 650milliGRAM(s) Oral every 6 hours PRN For Temp greater than 38 C (100.4 F)      ALLERGIES:  Allergies    No Known Drug Allergies  Seafood (Rash)    Intolerances        LABS:                        11.4   6.7   )-----------( 343      ( 17 Feb 2017 06:00 )             37.8     18 Feb 2017 04:03    139    |  108    |  15     ----------------------------<  111    3.6     |  22     |  0.57     Ca    9.1        18 Feb 2017 04:03  Mg     2.0       18 Feb 2017 04:03    TPro  x      /  Alb  2.4    /  TBili  x      /  DBili  x      /  AST  28     /  ALT  47     /  AlkPhos  79     17 Feb 2017 06:01    RADIOLOGY & ADDITIONAL TESTS: Reviewed.    32 y/o F with PMHx HTN, HLD, congenital ASD s/p repair, MICHAEL, HOCM, family h/o of neuromuscular hypoventilation syndrome, DVT in 2008 (s/p Coumadin last dose 2009) admitted to MICU after hypercapnic respiratory failure 2/2 MSSA PNA, was doing well after 10 day course of oxacillin now with persistent fevers and started on Zosyn for additional coverage for new pneumonia and now s/p trach/ PEG for respiratory status. Intermittent confusion likely from toxic metabolic encephalopathy.    Pulm  #Hypercapnic respiratory failure from congential central apnea.  Pt with likely central apnea which is in her family history and without ventilation goes apneic immediately.  Diaphragm US reveals  Minimal to no movement of the right hemidiaphragm upon quiet breathing and sniffing. No paradoxical motion identified. Findings indicate at least weakness of the right hemidiaphragm, possibly paralysis.  - c/w mechanical ventilation trach in place, will try PSV wean  - FU serum genetic tests for diaphragmatic issue   - Likely refer for Pacemaker of Diaphragm once clear diagnosis of central apnea made.    ID  #HAP vs. VAP: Initially treated for MSSA PNA and was found to have B/L opacities on CXR. She completed 10-day course of oxacillin for MSSA grown from bronchial wash but then was having recurrent fevers  and a  lavage from trach had  MSSA  in it. She s/p 8 days of zosyn for the sputum growth and has been afebrile  and no leukocytosis.       CV  #HD. Patient with episodes of hypotension on 02/09 and 02/10 , 02/13  responsive to fluids, now hypertensive.     #Pump. EF 60%. No diastolic HF. Echo finding of asymmetric septal wall hypertrophy. Cardiology consulted and dx HCOM but low suspicion for causing resp failure and also low likelihood of arrhythmogenic. Given tachycardia have initiated BB.     - Coreg 6.25 BID will titrate as needed     #Rhythm:     Patient in sinus tachycardia with some q waves new in II,III,AVF. PE  concern but CTA negative and troponins negative with some resolution in changes.    - Patient on coreg for rhythm.     GI: Patient has a PEG in place. Off erythromycin originally needed for high residuals.     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 CT head shows no intracranial hemorrhage or acute transcortical infarct.   - possible diaphragmatic pacer placement  eventually    Psych  Patient has a history of bipolar disorder on effoxor/risperidone. She currently seems to have an element of ICU delirium/toxic metabolic encephalopathy and is on seroquel, and venlaxafine. Some clinical improvement in her delerium.   - On seroquel 200 mg BID, tapering precadex for sedation   - PT to try to mobilize   FEN:  tube feeds with Jevity  PPX: HSQ, SCDs, PPI , PT on board  Access: peripheral.   DISPO: To remain in MICU for further stabilization  Full CODE INTERVAL HPI/OVERNIGHT EVENTS:    OVERNIGHT: No overnight events.  SUBJECTIVE: Patient seen and examined at bedside.     ROS:  CV: Denies chest pain  Resp: Denies SOB  GI: Denies abdominal pain, constipation, diarrhea, nausea, vomiting  : Denies dysuria  ID: Denies fevers, chills  MSK: Denies joint pain     OBJECTIVE:    VITAL SIGNS:  ICU Vital Signs Last 24 Hrs  T(C): 37.2, Max: 37.2 (02-18 @ 01:00)  T(F): 98.9, Max: 98.9 (02-18 @ 01:00)  HR: 88 (72 - 106)  BP: 157/105 (92/54 - 175/112)  BP(mean): 126 (70 - 139)  ABP: --  ABP(mean): --  RR: 23 (11 - 33)  SpO2: 99% (98% - 100%)    Mode: AC/ CMV (Assist Control/ Continuous Mandatory Ventilation), RR (machine): 12, TV (machine): 500, FiO2: 50, PEEP: 7, ITime: 1, MAP: 15, PC: 11, PIP: 33  I & Os for 24h ending 02-17 @ 07:00  =============================================  IN: 1108.5 ml / OUT: 0 ml / NET: 1108.5 ml    I & Os for current day (as of 02-18 @ 06:24)  =============================================  IN: 2599.4 ml / OUT: 0 ml / NET: 2599.4 ml    CAPILLARY BLOOD GLUCOSE      PHYSICAL EXAM:    General: NAD, comfortable, obeying commands   Neuro: Obeying commands.   HEENT: NCAT, PERRL, clear conjunctiva, no scleral icterus, MMM   Neck: supple, no JVD, trach in place  Respiratory: CTA b/l, no wheezing, rhonchi, rales  Cardiovascular: RRR, normal S1S2, no M/R/G  Abdomen: soft, NT/ND, bowel sounds in all four quadrants, no palpable masses, peg tube in place  Extremities: WWP, no clubbing, cyanosis, or edema  Vascular: 2 plus peripheral pulses     MEDICATIONS:  MEDICATIONS  (STANDING):  heparin  Injectable 7500Unit(s) SubCutaneous every 8 hours  pantoprazole   Suspension 40milliGRAM(s) Oral daily  venlafaxine 75milliGRAM(s) Oral every 12 hours  carvedilol 6.25milliGRAM(s) Oral every 12 hours  dexmedetomidine Infusion 1MICROgram(s)/kG/Hr IV Continuous <Continuous>  QUEtiapine 200milliGRAM(s) Oral every 12 hours  amLODIPine   Tablet 10milliGRAM(s) Oral daily  clonazePAM Tablet 1milliGRAM(s) Oral three times a day  potassium chloride   Powder 40milliEquivalent(s) Oral once    MEDICATIONS  (PRN):  acetaminophen    Suspension 650milliGRAM(s) Oral every 6 hours PRN For Temp greater than 38 C (100.4 F)      ALLERGIES:  Allergies    No Known Drug Allergies  Seafood (Rash)    Intolerances        LABS:                        11.4   6.7   )-----------( 343      ( 17 Feb 2017 06:00 )             37.8     18 Feb 2017 04:03    139    |  108    |  15     ----------------------------<  111    3.6     |  22     |  0.57     Ca    9.1        18 Feb 2017 04:03  Mg     2.0       18 Feb 2017 04:03    TPro  x      /  Alb  2.4    /  TBili  x      /  DBili  x      /  AST  28     /  ALT  47     /  AlkPhos  79     17 Feb 2017 06:01    RADIOLOGY & ADDITIONAL TESTS: Reviewed.    32 y/o F with PMHx HTN, HLD, congenital ASD s/p repair, MICHAEL, HOCM, family h/o of neuromuscular hypoventilation syndrome, DVT in 2008 (s/p Coumadin last dose 2009) admitted to MICU after hypercapnic respiratory failure 2/2 MSSA PNA, was doing well after 10 day course of oxacillin now with persistent fevers and started on Zosyn for additional coverage for new pneumonia and now s/p trach/ PEG for respiratory status. Intermittent confusion likely from toxic metabolic encephalopathy.    Pulm  #Hypercapnic respiratory failure from congential central apnea.  Pt with likely central apnea which is in her family history and without ventilation goes apneic immediately.  Diaphragm US reveals  Minimal to no movement of the right hemidiaphragm upon quiet breathing and sniffing. No paradoxical motion identified. Findings indicate at least weakness of the right hemidiaphragm, possibly paralysis.  - c/w mechanical ventilation trach in place, will try PSV wean  - FU serum genetic tests for diaphragmatic issue   - Likely refer for Pacemaker of Diaphragm once clear diagnosis of central apnea made.    ID  #HAP vs. VAP: Initially treated for MSSA PNA and was found to have B/L opacities on CXR. She completed 10-day course of oxacillin for MSSA grown from bronchial wash but then was having recurrent fevers  and a  lavage from trach had  MSSA  in it. She s/p 8 days of zosyn for the sputum growth and has been afebrile  and no leukocytosis.       CV  #HD. Patient with episodes of hypotension on 02/09 and 02/10 , 02/13  responsive to fluids, now hypertensive.     #Pump. EF 60%. No diastolic HF. Echo finding of asymmetric septal wall hypertrophy. Cardiology consulted and dx HCOM but low suspicion for causing resp failure and also low likelihood of arrhythmogenic. Given tachycardia have initiated BB.     - Coreg 6.25 BID will titrate as needed     #Rhythm:     Patient in sinus tachycardia with some q waves new in II,III,AVF. PE  concern but CTA negative and troponins negative with some resolution in changes.    - Patient on coreg for rhythm.     GI: Patient has a PEG in place. Off erythromycin originally needed for high residuals.     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 CT head shows no intracranial hemorrhage or acute transcortical infarct.   - possible diaphragmatic pacer placement  eventually    Psych  Patient has a history of bipolar disorder on effoxor/risperidone. She currently seems to have an element of ICU delirium/toxic metabolic encephalopathy and is on seroquel, and venlaxafine. Some clinical improvement in her delerium.   - On seroquel 200 mg BID, tapering precadex for sedation   - PT to try to mobilize   FEN:  tube feeds with Jevity  PPX: HSQ, SCDs, PPI , PT on board  Access: peripheral.   DISPO: To remain in MICU for further stabilization  Full CODE INTERVAL HPI/OVERNIGHT EVENTS:    OVERNIGHT: No overnight events.  SUBJECTIVE: Patient seen and examined at bedside.     ROS:  CV: Denies chest pain  Resp: Denies SOB  GI: Denies abdominal pain, constipation, diarrhea, nausea, vomiting  : Denies dysuria  ID: Denies fevers, chills  MSK: Denies joint pain     OBJECTIVE:    VITAL SIGNS:  ICU Vital Signs Last 24 Hrs  T(C): 37.2, Max: 37.2 (02-18 @ 01:00)  T(F): 98.9, Max: 98.9 (02-18 @ 01:00)  HR: 88 (72 - 106)  BP: 157/105 (92/54 - 175/112)  BP(mean): 126 (70 - 139)  ABP: --  ABP(mean): --  RR: 23 (11 - 33)  SpO2: 99% (98% - 100%)    Mode: AC/ CMV (Assist Control/ Continuous Mandatory Ventilation), RR (machine): 12, TV (machine): 500, FiO2: 50, PEEP: 7, ITime: 1, MAP: 15, PC: 11, PIP: 33  I & Os for 24h ending 02-17 @ 07:00  =============================================  IN: 1108.5 ml / OUT: 0 ml / NET: 1108.5 ml    I & Os for current day (as of 02-18 @ 06:24)  =============================================  IN: 2599.4 ml / OUT: 0 ml / NET: 2599.4 ml    CAPILLARY BLOOD GLUCOSE      PHYSICAL EXAM:    General: NAD, comfortable, obeying commands   Neuro: Obeying commands.   HEENT: NCAT, PERRL, clear conjunctiva, no scleral icterus, MMM   Neck: supple, no JVD, trach in place  Respiratory: CTA b/l, no wheezing, rhonchi, rales  Cardiovascular: RRR, normal S1S2, no M/R/G  Abdomen: soft, NT/ND, bowel sounds in all four quadrants, no palpable masses, peg tube in place  Extremities: WWP, no clubbing, cyanosis, or edema  Vascular: 2 plus peripheral pulses     MEDICATIONS:  MEDICATIONS  (STANDING):  heparin  Injectable 7500Unit(s) SubCutaneous every 8 hours  pantoprazole   Suspension 40milliGRAM(s) Oral daily  venlafaxine 75milliGRAM(s) Oral every 12 hours  carvedilol 6.25milliGRAM(s) Oral every 12 hours  dexmedetomidine Infusion 1MICROgram(s)/kG/Hr IV Continuous <Continuous>  QUEtiapine 200milliGRAM(s) Oral every 12 hours  amLODIPine   Tablet 10milliGRAM(s) Oral daily  clonazePAM Tablet 1milliGRAM(s) Oral three times a day  potassium chloride   Powder 40milliEquivalent(s) Oral once    MEDICATIONS  (PRN):  acetaminophen    Suspension 650milliGRAM(s) Oral every 6 hours PRN For Temp greater than 38 C (100.4 F)      ALLERGIES:  Allergies    No Known Drug Allergies  Seafood (Rash)    Intolerances        LABS:                        11.4   6.7   )-----------( 343      ( 17 Feb 2017 06:00 )             37.8     18 Feb 2017 04:03    139    |  108    |  15     ----------------------------<  111    3.6     |  22     |  0.57     Ca    9.1        18 Feb 2017 04:03  Mg     2.0       18 Feb 2017 04:03    TPro  x      /  Alb  2.4    /  TBili  x      /  DBili  x      /  AST  28     /  ALT  47     /  AlkPhos  79     17 Feb 2017 06:01    RADIOLOGY & ADDITIONAL TESTS: Reviewed.    30 y/o F with PMHx HTN, HLD, congenital ASD s/p repair, MICHAEL, HOCM, family h/o of neuromuscular hypoventilation syndrome, DVT in 2008 (s/p Coumadin last dose 2009) admitted to MICU after hypercapnic respiratory failure 2/2 MSSA PNA, was doing well after 10 day course of oxacillin now with persistent fevers and started on Zosyn for additional coverage for new pneumonia and now s/p trach/ PEG for respiratory status. Intermittent confusion likely from toxic metabolic encephalopathy. placed on trach collar     Pulm  #Hypercapnic respiratory failure from congential central apnea.  Pt with likely central apnea which is in her family history and without ventilation goes apneic immediately.  Diaphragm US reveals  Minimal to no movement of the right hemidiaphragm upon quiet breathing and sniffing. No paradoxical motion identified. Findings indicate at least weakness of the right hemidiaphragm, possibly paralysis.  - now on trach collar   - FU serum genetic tests for diaphragmatic issue   - Likely refer for Pacemaker of Diaphragm once clear diagnosis of central apnea made.    ID  #HAP vs. VAP: Initially treated for MSSA PNA and was found to have B/L opacities on CXR. She completed 10-day course of oxacillin for MSSA grown from bronchial wash but then was having recurrent fevers  and a  lavage from trach had  MSSA  in it. She s/p 8 days of zosyn for the sputum growth and has been afebrile  and no leukocytosis.       CV  #HD. Patient with episodes of hypotension on 02/09 and 02/10 , 02/13  responsive to fluids, now hypertensive.     #Pump. EF 60%. No diastolic HF. Echo finding of asymmetric septal wall hypertrophy. Cardiology consulted and dx HCOM but low suspicion for causing resp failure and also low likelihood of arrhythmogenic. Given tachycardia have initiated BB.     - Coreg 6.25 BID will titrate as needed     #Rhythm:     Patient in sinus tachycardia with some q waves new in II,III,AVF. PE  concern but CTA negative and troponins negative with some resolution in changes.    - Patient on coreg for rhythm.     GI: Patient has a PEG in place. Off erythromycin originally needed for high residuals.     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 CT head shows no intracranial hemorrhage or acute transcortical infarct.   - possible diaphragmatic pacer placement  eventually    Psych  Patient has a history of bipolar disorder on effoxor/risperidone. She currently seems to have an element of ICU delirium/toxic metabolic encephalopathy and is on seroquel, and venlaxafine. Some clinical improvement in her delerium.   - On seroquel 200 mg BID, tapering precadex for sedation   - PT to try to mobilize   FEN:  tube feeds with Jevity  PPX: HSQ, SCDs, PPI , PT on board  Access: peripheral.   DISPO: To remain in MICU for further stabilization  Full CODE

## 2017-02-19 DIAGNOSIS — F31.9 BIPOLAR DISORDER, UNSPECIFIED: ICD-10-CM

## 2017-02-19 DIAGNOSIS — R41.0 DISORIENTATION, UNSPECIFIED: ICD-10-CM

## 2017-02-19 DIAGNOSIS — I42.1 OBSTRUCTIVE HYPERTROPHIC CARDIOMYOPATHY: ICD-10-CM

## 2017-02-19 DIAGNOSIS — J95.851 VENTILATOR ASSOCIATED PNEUMONIA: ICD-10-CM

## 2017-02-19 DIAGNOSIS — Z29.9 ENCOUNTER FOR PROPHYLACTIC MEASURES, UNSPECIFIED: ICD-10-CM

## 2017-02-19 DIAGNOSIS — R63.8 OTHER SYMPTOMS AND SIGNS CONCERNING FOOD AND FLUID INTAKE: ICD-10-CM

## 2017-02-19 LAB
ANION GAP SERPL CALC-SCNC: 10 MMOL/L — SIGNIFICANT CHANGE UP (ref 9–16)
BUN SERPL-MCNC: 19 MG/DL — SIGNIFICANT CHANGE UP (ref 7–23)
CALCIUM SERPL-MCNC: 9.1 MG/DL — SIGNIFICANT CHANGE UP (ref 8.5–10.5)
CHLORIDE SERPL-SCNC: 105 MMOL/L — SIGNIFICANT CHANGE UP (ref 96–108)
CO2 SERPL-SCNC: 23 MMOL/L — SIGNIFICANT CHANGE UP (ref 22–31)
CREAT SERPL-MCNC: 0.68 MG/DL — SIGNIFICANT CHANGE UP (ref 0.5–1.3)
GLUCOSE SERPL-MCNC: 137 MG/DL — HIGH (ref 70–99)
HCT VFR BLD CALC: 36.1 % — SIGNIFICANT CHANGE UP (ref 34.5–45)
HGB BLD-MCNC: 11.1 G/DL — LOW (ref 11.5–15.5)
MAGNESIUM SERPL-MCNC: 2.1 MG/DL — SIGNIFICANT CHANGE UP (ref 1.6–2.4)
MCHC RBC-ENTMCNC: 22.3 PG — LOW (ref 27–34)
MCHC RBC-ENTMCNC: 30.7 G/DL — LOW (ref 32–36)
MCV RBC AUTO: 72.6 FL — LOW (ref 80–100)
PHOSPHATE SERPL-MCNC: 4.3 MG/DL — SIGNIFICANT CHANGE UP (ref 2.5–4.5)
PLATELET # BLD AUTO: 303 K/UL — SIGNIFICANT CHANGE UP (ref 150–400)
POTASSIUM SERPL-MCNC: 4.1 MMOL/L — SIGNIFICANT CHANGE UP (ref 3.5–5.3)
POTASSIUM SERPL-SCNC: 4.1 MMOL/L — SIGNIFICANT CHANGE UP (ref 3.5–5.3)
RBC # BLD: 4.97 M/UL — SIGNIFICANT CHANGE UP (ref 3.8–5.2)
RBC # FLD: 23.5 % — HIGH (ref 10.3–16.9)
SODIUM SERPL-SCNC: 138 MMOL/L — SIGNIFICANT CHANGE UP (ref 135–145)
WBC # BLD: 6.8 K/UL — SIGNIFICANT CHANGE UP (ref 3.8–10.5)
WBC # FLD AUTO: 6.8 K/UL — SIGNIFICANT CHANGE UP (ref 3.8–10.5)

## 2017-02-19 PROCEDURE — 99233 SBSQ HOSP IP/OBS HIGH 50: CPT | Mod: GC

## 2017-02-19 RX ORDER — CLONAZEPAM 1 MG
1 TABLET ORAL EVERY 8 HOURS
Qty: 0 | Refills: 0 | Status: DISCONTINUED | OUTPATIENT
Start: 2017-02-19 | End: 2017-02-20

## 2017-02-19 RX ORDER — ONDANSETRON 8 MG/1
4 TABLET, FILM COATED ORAL ONCE
Qty: 0 | Refills: 0 | Status: COMPLETED | OUTPATIENT
Start: 2017-02-19 | End: 2017-02-19

## 2017-02-19 RX ORDER — SODIUM CHLORIDE 9 MG/ML
250 INJECTION INTRAMUSCULAR; INTRAVENOUS; SUBCUTANEOUS ONCE
Qty: 0 | Refills: 0 | Status: COMPLETED | OUTPATIENT
Start: 2017-02-19 | End: 2017-02-19

## 2017-02-19 RX ORDER — CARVEDILOL PHOSPHATE 80 MG/1
6.25 CAPSULE, EXTENDED RELEASE ORAL EVERY 12 HOURS
Qty: 0 | Refills: 0 | Status: DISCONTINUED | OUTPATIENT
Start: 2017-02-19 | End: 2017-02-21

## 2017-02-19 RX ADMIN — ONDANSETRON 4 MILLIGRAM(S): 8 TABLET, FILM COATED ORAL at 20:24

## 2017-02-19 RX ADMIN — HEPARIN SODIUM 7500 UNIT(S): 5000 INJECTION INTRAVENOUS; SUBCUTANEOUS at 07:06

## 2017-02-19 RX ADMIN — Medication 75 MILLIGRAM(S): at 22:20

## 2017-02-19 RX ADMIN — SODIUM CHLORIDE 1000 MILLILITER(S): 9 INJECTION INTRAMUSCULAR; INTRAVENOUS; SUBCUTANEOUS at 23:08

## 2017-02-19 RX ADMIN — PANTOPRAZOLE SODIUM 40 MILLIGRAM(S): 20 TABLET, DELAYED RELEASE ORAL at 09:20

## 2017-02-19 RX ADMIN — Medication 1 MILLIGRAM(S): at 22:21

## 2017-02-19 RX ADMIN — QUETIAPINE FUMARATE 200 MILLIGRAM(S): 200 TABLET, FILM COATED ORAL at 22:20

## 2017-02-19 RX ADMIN — HEPARIN SODIUM 7500 UNIT(S): 5000 INJECTION INTRAVENOUS; SUBCUTANEOUS at 14:05

## 2017-02-19 RX ADMIN — Medication 75 MILLIGRAM(S): at 09:20

## 2017-02-19 RX ADMIN — QUETIAPINE FUMARATE 200 MILLIGRAM(S): 200 TABLET, FILM COATED ORAL at 09:20

## 2017-02-19 RX ADMIN — Medication 2 MILLIGRAM(S): at 07:05

## 2017-02-19 RX ADMIN — CARVEDILOL PHOSPHATE 6.25 MILLIGRAM(S): 80 CAPSULE, EXTENDED RELEASE ORAL at 09:20

## 2017-02-19 RX ADMIN — Medication 1 MILLIGRAM(S): at 14:04

## 2017-02-19 RX ADMIN — HEPARIN SODIUM 7500 UNIT(S): 5000 INJECTION INTRAVENOUS; SUBCUTANEOUS at 22:20

## 2017-02-19 NOTE — PROGRESS NOTE ADULT - PROBLEM SELECTOR PLAN 3
Patient has a history of bipolar disorder on effoxor/risperidone. She currently seems to have an element of ICU delirium/toxic metabolic encephalopathy and is on seroquel, and venlaxafine.   - On seroquel 200 mg BID - can uptitrate if continues   -C/w Venlafaxine 75 mg BID

## 2017-02-19 NOTE — PROGRESS NOTE ADULT - SUBJECTIVE AND OBJECTIVE BOX
INTERVAL HPI/OVERNIGHT EVENTS:    OVERNIGHT: No overnight events.  SUBJECTIVE: Patient seen and examined at bedside.     ROS:  CV: Denies chest pain  Resp: Denies SOB  GI: Denies abdominal pain, constipation, diarrhea, nausea, vomiting  : Denies dysuria  ID: Denies fevers, chills  MSK: Denies joint pain     OBJECTIVE:    VITAL SIGNS:  ICU Vital Signs Last 24 Hrs  T(C): 36.6, Max: 37.8 (02-18 @ 17:45)  T(F): 97.9, Max: 100.1 (02-18 @ 17:45)  HR: 82 (70 - 124)  BP: 90/45 (79/51 - 166/131)  BP(mean): 56 (56 - 151)  ABP: --  ABP(mean): --  RR: 12 (11 - 30)  SpO2: 100% (92% - 100%)    Mode: AC/ CMV (Assist Control/ Continuous Mandatory Ventilation), RR (machine): 12, TV (machine): 500, FiO2: 50, PEEP: 5, ITime: 1, MAP: 8.2, PIP: 25  I & Os for 24h ending 02-18 @ 07:00  =============================================  IN: 2983.4 ml / OUT: 0 ml / NET: 2983.4 ml    I & Os for current day (as of 02-19 @ 06:57)  =============================================  IN: 1439.2 ml / OUT: 0 ml / NET: 1439.2 ml    CAPILLARY BLOOD GLUCOSE      PHYSICAL EXAM:    General: NAD, comfortable  HEENT: NCAT, PERRL, clear conjunctiva, no scleral icterus  Neck: supple, no JVD  Respiratory: CTA b/l, no wheezing, rhonchi, rales  Cardiovascular: RRR, normal S1S2, no M/R/G  Abdomen: soft, NT/ND, bowel sounds in all four quadrants, no palpable masses  Extremities: WWP, no clubbing, cyanosis, or edema  Neuro:     MEDICATIONS:  MEDICATIONS  (STANDING):  heparin  Injectable 7500Unit(s) SubCutaneous every 8 hours  pantoprazole   Suspension 40milliGRAM(s) Oral daily  venlafaxine 75milliGRAM(s) Oral every 12 hours  carvedilol 6.25milliGRAM(s) Oral every 12 hours  dexmedetomidine Infusion 1MICROgram(s)/kG/Hr IV Continuous <Continuous>  QUEtiapine 200milliGRAM(s) Oral every 12 hours  amLODIPine   Tablet 10milliGRAM(s) Oral daily  clonazePAM Tablet 2milliGRAM(s) Oral every 12 hours    MEDICATIONS  (PRN):  acetaminophen    Suspension 650milliGRAM(s) Oral every 6 hours PRN For Temp greater than 38 C (100.4 F)      ALLERGIES:  Allergies    No Known Drug Allergies  Seafood (Rash)    Intolerances        LABS:    18 Feb 2017 04:03    139    |  108    |  15     ----------------------------<  111    3.6     |  22     |  0.57     Ca    9.1        18 Feb 2017 04:03  Mg     2.0       18 Feb 2017 04:03            RADIOLOGY & ADDITIONAL TESTS: Reviewed. PGY-1 TRANSFER OF CARE NOTE :       Hospital course: 30 y/o F poor historian, PMHx HTN, HLD, paroxysmal afib (reports during pregnancy), congenital cardiac malformation s/p repair, HOCM, MICHAEL, DVT in 2008 (s/p Coumadin last dose 2009), h/o UGIB s/p endoscopy, recent 2 day admission (Jan 2017) to [facility] for SOB/CP/n/v r/o ACS, ECHO (EF 60%, normal sized LA, severely asymmetric septal hypertrophy) admission c/b 10 minute unresponsiveness, resolving on its own with non contributory full w/u, presented to  ED (1/27 at night) again per the patient with similar symptoms that brought her to the hospital earlier this month, SOB/CP/n/v. Admitted to MICU for hypercapnic respiratory failure 2/2 MSSA PNA, emergently intubated, and  s/p 10-day course of oxacillin. With persistent fevers and started on Zosyn for additional coverage for likely new pneumonia (grown MSSA on BAL after trach)  which resolved well with tx.     Patient was showing signs of central apnea and going apneic very quickly on CPAP trial therefore she required a Trach/PEG. Her father had central apnea syndrome and required a pacemaker in his diaphragm which she will likely need. 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 US of Diaphragm showed poor movement in the Rt hemidiaphragm especially. Following Trach though patient began to improve significantly clinically and was able to tolerate CPAP trial well. Pulmonology needs to help with disposition for her pacemaker placement.     Cardiovascularly this patient has HOCM and Cardio evaluated her and recommended to start BB. At first ti was held in the setting of her MSSA sepsis but then was restarted.   Additionally, although the patient is usually hypertensive after she was cleared of her infection she had several times with transient drops in BP which resolves either spontaneously or with fluid boluses. With this in mind all anti-HTN meds are held.     Of note patient has had several episodes of ICU delerium and was on home risperidone and effexor. Psych was consulted and started her on seroquel which can be uptitrated as needed and her home effexor.   Patient has stable VSS with no active medical issues at this time and will be monitored on 7 LA.                 INTERVAL HPI/OVERNIGHT EVENTS:    OVERNIGHT: No overnight events.  SUBJECTIVE: Patient seen and examined at bedside.     ROS:  CV: Denies chest pain  Resp: Denies SOB  GI: Denies abdominal pain, constipation, diarrhea, nausea, vomiting  : Denies dysuria  ID: Denies fevers, chills  MSK: Denies joint pain     OBJECTIVE:    VITAL SIGNS:  ICU Vital Signs Last 24 Hrs  T(C): 36.6, Max: 37.8 (02-18 @ 17:45)  T(F): 97.9, Max: 100.1 (02-18 @ 17:45)  HR: 82 (70 - 124)  BP: 90/45 (79/51 - 166/131)  BP(mean): 56 (56 - 151)  ABP: --  ABP(mean): --  RR: 12 (11 - 30)  SpO2: 100% (92% - 100%)    Mode: AC/ CMV (Assist Control/ Continuous Mandatory Ventilation), RR (machine): 12, TV (machine): 500, FiO2: 50, PEEP: 5, ITime: 1, MAP: 8.2, PIP: 25  I & Os for 24h ending 02-18 @ 07:00  =============================================  IN: 2983.4 ml / OUT: 0 ml / NET: 2983.4 ml    I & Os for current day (as of 02-19 @ 06:57)  =============================================  IN: 1439.2 ml / OUT: 0 ml / NET: 1439.2 ml    CAPILLARY BLOOD GLUCOSE      PHYSICAL EXAM:    General: NAD, comfortable  HEENT: NCAT, PERRL, clear conjunctiva, no scleral icterus Trach in good position   Neck: supple, no JVD  Respiratory: R>L coarse BS which are improving.   Cardiovascular: RRR, normal S1S2, no M/R/G  Abdomen: soft, NT/ND, bowel sounds in all four quadrants, no palpable masses, PEG CDI   Extremities: WWP, no clubbing, cyanosis, or edema  Neuro: Obeys commands.     MEDICATIONS:  MEDICATIONS  (STANDING):  heparin  Injectable 7500Unit(s) SubCutaneous every 8 hours  pantoprazole   Suspension 40milliGRAM(s) Oral daily  venlafaxine 75milliGRAM(s) Oral every 12 hours  carvedilol 6.25milliGRAM(s) Oral every 12 hours  dexmedetomidine Infusion 1MICROgram(s)/kG/Hr IV Continuous <Continuous>  QUEtiapine 200milliGRAM(s) Oral every 12 hours  amLODIPine   Tablet 10milliGRAM(s) Oral daily  clonazePAM Tablet 2milliGRAM(s) Oral every 12 hours    MEDICATIONS  (PRN):  acetaminophen    Suspension 650milliGRAM(s) Oral every 6 hours PRN For Temp greater than 38 C (100.4 F)      ALLERGIES:  Allergies    No Known Drug Allergies  Seafood (Rash)    Intolerances        LABS:    18 Feb 2017 04:03    139    |  108    |  15     ----------------------------<  111    3.6     |  22     |  0.57     Ca    9.1        18 Feb 2017 04:03  Mg     2.0       18 Feb 2017 04:03            RADIOLOGY & ADDITIONAL TESTS: Reviewed.    30 y/o F with PMHx HTN, HLD, congenital ASD s/p repair, MICHAEL, HOCM, family h/o of neuromuscular hypoventilation syndrome, DVT in 2008 (s/p Coumadin last dose 2009) admitted to MICU after hypercapnic respiratory failure 2/2 MSSA PNA, was doing well after 10 day course of oxacillin now with persistent fevers and started on Zosyn for additional coverage for new pneumonia and now s/p trach/ PEG for respiratory status. Intermittent confusion likely from ICU delirium     Pulm  #Hypercapnic respiratory failure from congential central apnea.  Pt with likely central apnea which is in her family history and without ventilation goes apneic immediately.  Diaphragm US reveals  Minimal to no movement of the right hemidiaphragm upon quiet breathing and sniffing. No paradoxical motion identified. Findings indicate at least weakness of the right hemidiaphragm, possibly paralysis.  - c/w mechanical ventilation trach in place, toelrating CPAP well   - FU serum genetic tests for diaphragmatic issue   - Likely refer for Pacemaker of Diaphragm once clear diagnosis of central apnea made.    ID  #HAP vs. VAP: Resolved.  Initially treated for MSSA PNA and was found to have B/L opacities on CXR. She completed 10-day course of oxacillin for MSSA grown from bronchial wash but then was having recurrent fevers  and a  lavage from trach had  MSSA  in it. She s/p 8 days of zosyn for the sputum growth and has been afebrile  and no leukocytosis.       CV  #HD. Patient with episodes of hypotension on 02/09 and 02/10 , 02/13  responsive to fluids, now hypertensive.  - Hold home amlodipine in this setting - unclear etiology     #Pump. EF 60%. No diastolic HF. Echo finding of asymmetric septal wall hypertrophy. Cardiology consulted and dx HCOM but low suspicion for causing resp failure and also low likelihood of arrhythmogenic. Given tachycardia have initiated BB.     - Coreg 6.25 BID will titrate as needed     #Rhythm:     Patient in sinus tachycardia with some q waves new in II,III,AVF. PE  concern but CTA negative and troponins negative with some resolution in changes.    - Patient on coreg for rhythm.     GI: Patient has a PEG in place. Off erythromycin originally needed for high residuals.     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 CT head shows no intracranial hemorrhage or acute transcortical infarct.   - possible diaphragmatic pacer placement  eventually    Psych  Patient has a history of bipolar disorder on effoxor/risperidone. She currently seems to have an element of ICU delirium/toxic metabolic encephalopathy and is on seroquel, and venlaxafine. Some clinical improvement in her delirium   - On seroquel 200 mg BID - can uptitrate if continues   - PT to try to mobilize   FEN:  tube feeds with Jevity  PPX: HSQ, SCDs, PPI , PT on board  Access: peripheral.   DISPO: Stable for Step down to 7LA   Full CODE

## 2017-02-19 NOTE — PROGRESS NOTE ADULT - PROBLEM SELECTOR PLAN 1
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  Pt with likely central apnea which is in her family history and without ventilation becomes apneic immediately.  Diaphragm US revealed Minimal to no movement of the right hemidiaphragm upon quiet breathing and sniffing. No paradoxical motion identified. Findings indicate at least weakness of the right hemidiaphragm, possibly paralysis.  - currently tolerated TRACH collar well during the day; Will switch to AC-MV overnight   - F/U serum genetic tests for diaphragmatic issue   - Likely refer for Pacemaker of Diaphragm once clear diagnosis of central apnea made

## 2017-02-19 NOTE — PROGRESS NOTE ADULT - SUBJECTIVE AND OBJECTIVE BOX
PGY1 transfer acceptance Note:        Hospital Course: 30 y/o F poor historian, PMHx HTN, HLD, paroxysmal afib (reports during pregnancy), congenital cardiac malformation s/p repair, HOCM, MICHAEL, DVT in 2008 (s/p Coumadin last dose 2009), h/o UGIB s/p endoscopy, recent 2 day admission (Jan 2017) to [facility] for SOB/CP/n/v r/o ACS, ECHO (EF 60%, normal sized LA, severely asymmetric septal hypertrophy) admission c/b 10 minute unresponsiveness, resolving on its own with unremarkable w/u, presented to  ED (1/27 at night) again per the patient with similar symptoms that brought her to the hospital earlier this month, SOB/CP/n/v. Patient was admitted to MICU for hypercapnic respiratory failure 2/2 MSSA PNA and was emergently intubated  s/p 10-day course of oxacillin. With persistent fevers and started on Zosyn for additional coverage for likely new pneumonia (grown MSSA on BAL after trach)  which resolved well with tx (last day of ABX 2/13)    Patient, during stay, was showing signs of central apnea and becoming apneic very quickly on CPAP trial therefore she required a Trach/PEG. Her father had central apnea syndrome and required a pacemaker in his diaphragm which she will likely need in the upcoming future (Plans of it happening in [facility] where her father had it). 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 US of Diaphragm showed poor movement in the Rt hemidiaphragm especially. Following Trach, though patient began to improve significantly clinically and was able to tolerate CPAP trial well.     Cardiovascularly this patient has HOCM and Cardio evaluated her and recommended to start BB. At first ti was held in the setting of her MSSA sepsis but then was restarted Currently on carvedilol.   Additionally, although the patient is usually hypertensive after she was cleared of her infection she  has had several episodes of transient drops in BP which resolves either spontaneously or with fluid boluses. With this in mind all anti-HTN meds are currently held.     Finally, of note patient has had several episodes of ICU delirium and was on home risperidone and effexor for Bipolar disorder. Psych was consulted and started her on seroquel which can be uptitrated as needed and her home effexor.   Patient has stable VSS with no active medical issues at this time and will be monitored on 7 LA for eventual placement into LTAC and appt. for diaphragm pacemaker to be placed.       SUBJECTIVE: Patient seen and examined at bedside. Currently on Trach Collar. Patient tolerating well. Follows commands.        OBJECTIVE:    VITAL SIGNS:  ICU Vital Signs Last 24 Hrs  T(C): 36.7, Max: 37.5 (02-18 @ 22:03)  T(F): 98.1, Max: 99.5 (02-18 @ 22:03)  HR: 112 (70 - 120)  BP: 114/62 (79/51 - 157/106)  BP(mean): 74 (56 - 124)  ABP: --  ABP(mean): --  RR: 19 (11 - 27)  SpO2: 100% (89% - 100%)    Mode: standby  I & Os for 24h ending 02-19 @ 07:00  =============================================  IN: 1439.2 ml / OUT: 0 ml / NET: 1439.2 ml    I & Os for current day (as of 02-19 @ 18:31)  =============================================  IN: 480 ml / OUT: 0 ml / NET: 480 ml    CAPILLARY BLOOD GLUCOSE      PHYSICAL EXAM:      General: NAD, comfortable, pleasant appearing  HEENT: NCAT, PERRL, clear conjunctiva, no scleral icterus Trach site C/D/I, Dry MM  Neck: supple, no JVD  Respiratory: trace crackles heard at the base B/l  Cardiovascular: RRR, normal S1S2, no M/R/G  Abdomen: soft, NT/ND, bowel sounds in all four quadrants, no palpable masses, PEG CDI   Extremities: WWP, no clubbing, cyanosis, or edema  Neuro: Obeys commands.       MEDICATIONS:  MEDICATIONS  (STANDING):  heparin  Injectable 7500Unit(s) SubCutaneous every 8 hours  pantoprazole   Suspension 40milliGRAM(s) Oral daily  venlafaxine 75milliGRAM(s) Oral every 12 hours  carvedilol 6.25milliGRAM(s) Oral every 12 hours  QUEtiapine 200milliGRAM(s) Oral every 12 hours  clonazePAM Tablet 1milliGRAM(s) Oral every 8 hours    MEDICATIONS  (PRN):  acetaminophen    Suspension 650milliGRAM(s) Oral every 6 hours PRN For Temp greater than 38 C (100.4 F)      ALLERGIES:  Allergies    No Known Drug Allergies  Seafood (Rash)    Intolerances        LABS:                        11.1   6.8   )-----------( 303      ( 19 Feb 2017 07:06 )             36.1     19 Feb 2017 07:06    138    |  105    |  19     ----------------------------<  137    4.1     |  23     |  0.68     Ca    9.1        19 Feb 2017 07:06  Phos  4.3       19 Feb 2017 07:06  Mg     2.1       19 Feb 2017 07:06

## 2017-02-19 NOTE — PROGRESS NOTE ADULT - PROBLEM SELECTOR PLAN 2
EF 60%. No diastolic HF. Echo finding of asymmetric septal wall hypertrophy. Cardiology consulted and dx HCOM but low suspicion for causing resp failure and also low likelihood of arrhythmogenic.  - C/w Carvedilol 6.25 mg BID

## 2017-02-20 DIAGNOSIS — I95.9 HYPOTENSION, UNSPECIFIED: ICD-10-CM

## 2017-02-20 LAB
ALBUMIN SERPL ELPH-MCNC: 2.7 G/DL — LOW (ref 3.4–5)
ALP SERPL-CCNC: 76 U/L — SIGNIFICANT CHANGE UP (ref 40–120)
ALT FLD-CCNC: 25 U/L — SIGNIFICANT CHANGE UP (ref 12–42)
ANION GAP SERPL CALC-SCNC: 10 MMOL/L — SIGNIFICANT CHANGE UP (ref 9–16)
AST SERPL-CCNC: 19 U/L — SIGNIFICANT CHANGE UP (ref 15–37)
BASOPHILS NFR BLD AUTO: 1.4 % — SIGNIFICANT CHANGE UP (ref 0–2)
BILIRUB SERPL-MCNC: 0.6 MG/DL — SIGNIFICANT CHANGE UP (ref 0.2–1.2)
BUN SERPL-MCNC: 24 MG/DL — HIGH (ref 7–23)
CALCIUM SERPL-MCNC: 9.3 MG/DL — SIGNIFICANT CHANGE UP (ref 8.5–10.5)
CHLORIDE SERPL-SCNC: 101 MMOL/L — SIGNIFICANT CHANGE UP (ref 96–108)
CO2 SERPL-SCNC: 25 MMOL/L — SIGNIFICANT CHANGE UP (ref 22–31)
CREAT SERPL-MCNC: 0.66 MG/DL — SIGNIFICANT CHANGE UP (ref 0.5–1.3)
EOSINOPHIL NFR BLD AUTO: 7.9 % — HIGH (ref 0–6)
GLUCOSE SERPL-MCNC: 78 MG/DL — SIGNIFICANT CHANGE UP (ref 70–99)
HCT VFR BLD CALC: 34.8 % — SIGNIFICANT CHANGE UP (ref 34.5–45)
HGB BLD-MCNC: 10.8 G/DL — LOW (ref 11.5–15.5)
IRON SATN MFR SERPL: 93 UG/DL — SIGNIFICANT CHANGE UP (ref 50–170)
LYMPHOCYTES # BLD AUTO: 25.3 % — SIGNIFICANT CHANGE UP (ref 13–44)
MAGNESIUM SERPL-MCNC: 2.1 MG/DL — SIGNIFICANT CHANGE UP (ref 1.6–2.4)
MCHC RBC-ENTMCNC: 22.9 PG — LOW (ref 27–34)
MCHC RBC-ENTMCNC: 31 G/DL — LOW (ref 32–36)
MCV RBC AUTO: 73.9 FL — LOW (ref 80–100)
MONOCYTES NFR BLD AUTO: 10.6 % — SIGNIFICANT CHANGE UP (ref 2–14)
NEUTROPHILS NFR BLD AUTO: 54.8 % — SIGNIFICANT CHANGE UP (ref 43–77)
PHOSPHATE SERPL-MCNC: 3.8 MG/DL — SIGNIFICANT CHANGE UP (ref 2.5–4.5)
PLATELET # BLD AUTO: 270 K/UL — SIGNIFICANT CHANGE UP (ref 150–400)
POTASSIUM SERPL-MCNC: 4.1 MMOL/L — SIGNIFICANT CHANGE UP (ref 3.5–5.3)
POTASSIUM SERPL-SCNC: 4.1 MMOL/L — SIGNIFICANT CHANGE UP (ref 3.5–5.3)
PROT SERPL-MCNC: 7.9 G/DL — SIGNIFICANT CHANGE UP (ref 6.4–8.2)
RBC # BLD: 4.71 M/UL — SIGNIFICANT CHANGE UP (ref 3.8–5.2)
RBC # FLD: 23.3 % — HIGH (ref 10.3–16.9)
SODIUM SERPL-SCNC: 136 MMOL/L — SIGNIFICANT CHANGE UP (ref 135–145)
WBC # BLD: 7.2 K/UL — SIGNIFICANT CHANGE UP (ref 3.8–10.5)
WBC # FLD AUTO: 7.2 K/UL — SIGNIFICANT CHANGE UP (ref 3.8–10.5)

## 2017-02-20 PROCEDURE — 71010: CPT | Mod: 26

## 2017-02-20 PROCEDURE — 74000: CPT | Mod: 26

## 2017-02-20 PROCEDURE — 99233 SBSQ HOSP IP/OBS HIGH 50: CPT

## 2017-02-20 RX ORDER — INSULIN LISPRO 100/ML
VIAL (ML) SUBCUTANEOUS
Qty: 0 | Refills: 0 | Status: DISCONTINUED | OUTPATIENT
Start: 2017-02-20 | End: 2017-02-23

## 2017-02-20 RX ORDER — SENNA PLUS 8.6 MG/1
2 TABLET ORAL AT BEDTIME
Qty: 0 | Refills: 0 | Status: DISCONTINUED | OUTPATIENT
Start: 2017-02-20 | End: 2017-02-22

## 2017-02-20 RX ORDER — DOCUSATE SODIUM 100 MG
100 CAPSULE ORAL DAILY
Qty: 0 | Refills: 0 | Status: DISCONTINUED | OUTPATIENT
Start: 2017-02-20 | End: 2017-02-20

## 2017-02-20 RX ORDER — POLYETHYLENE GLYCOL 3350 17 G/17G
17 POWDER, FOR SOLUTION ORAL DAILY
Qty: 0 | Refills: 0 | Status: DISCONTINUED | OUTPATIENT
Start: 2017-02-20 | End: 2017-02-23

## 2017-02-20 RX ORDER — ONDANSETRON 8 MG/1
4 TABLET, FILM COATED ORAL ONCE
Qty: 0 | Refills: 0 | Status: COMPLETED | OUTPATIENT
Start: 2017-02-20 | End: 2017-02-20

## 2017-02-20 RX ORDER — CLONAZEPAM 1 MG
1 TABLET ORAL EVERY 12 HOURS
Qty: 0 | Refills: 0 | Status: DISCONTINUED | OUTPATIENT
Start: 2017-02-20 | End: 2017-02-23

## 2017-02-20 RX ADMIN — POLYETHYLENE GLYCOL 3350 17 GRAM(S): 17 POWDER, FOR SOLUTION ORAL at 11:50

## 2017-02-20 RX ADMIN — QUETIAPINE FUMARATE 200 MILLIGRAM(S): 200 TABLET, FILM COATED ORAL at 22:51

## 2017-02-20 RX ADMIN — SENNA PLUS 2 TABLET(S): 8.6 TABLET ORAL at 22:51

## 2017-02-20 RX ADMIN — Medication 1 MILLIGRAM(S): at 06:10

## 2017-02-20 RX ADMIN — Medication 75 MILLIGRAM(S): at 11:50

## 2017-02-20 RX ADMIN — PANTOPRAZOLE SODIUM 40 MILLIGRAM(S): 20 TABLET, DELAYED RELEASE ORAL at 11:50

## 2017-02-20 RX ADMIN — CARVEDILOL PHOSPHATE 6.25 MILLIGRAM(S): 80 CAPSULE, EXTENDED RELEASE ORAL at 19:12

## 2017-02-20 RX ADMIN — HEPARIN SODIUM 7500 UNIT(S): 5000 INJECTION INTRAVENOUS; SUBCUTANEOUS at 22:51

## 2017-02-20 RX ADMIN — ONDANSETRON 4 MILLIGRAM(S): 8 TABLET, FILM COATED ORAL at 06:50

## 2017-02-20 RX ADMIN — HEPARIN SODIUM 7500 UNIT(S): 5000 INJECTION INTRAVENOUS; SUBCUTANEOUS at 13:04

## 2017-02-20 RX ADMIN — HEPARIN SODIUM 7500 UNIT(S): 5000 INJECTION INTRAVENOUS; SUBCUTANEOUS at 06:10

## 2017-02-20 RX ADMIN — CARVEDILOL PHOSPHATE 6.25 MILLIGRAM(S): 80 CAPSULE, EXTENDED RELEASE ORAL at 06:10

## 2017-02-20 RX ADMIN — Medication 75 MILLIGRAM(S): at 22:51

## 2017-02-20 RX ADMIN — QUETIAPINE FUMARATE 200 MILLIGRAM(S): 200 TABLET, FILM COATED ORAL at 11:50

## 2017-02-20 RX ADMIN — Medication 1 MILLIGRAM(S): at 19:12

## 2017-02-20 NOTE — PROGRESS NOTE ADULT - PROBLEM SELECTOR PLAN 3
Patient with episode of hypotension that resolved with 250 cc NS. Unclear cause of hypotensive episode; PE shows dry MM, may be secondary to Patient with episode of hypotension that resolved with 250 cc NS. Unclear cause of hypotensive episode; PE shows dry MM, may be secondary to hypovolemia;   -will continue to monitor  -may have to decrease carvedilol if continues

## 2017-02-20 NOTE — PROGRESS NOTE ADULT - SUBJECTIVE AND OBJECTIVE BOX
INTERVAL HPI/OVERNIGHT EVENTS:    OVERNIGHT: No overnight events.  SUBJECTIVE: Patient seen and examined at bedside.     ROS:  CV: Denies chest pain  Resp: Denies SOB  GI: Denies abdominal pain, constipation, diarrhea, nausea, vomiting  : Denies dysuria  ID: Denies fevers, chills  MSK: Denies joint pain     OBJECTIVE:    VITAL SIGNS:  ICU Vital Signs Last 24 Hrs  T(C): 37.2, Max: 37.5 (02-19 @ 23:48)  T(F): 98.9, Max: 99.5 (02-19 @ 23:48)  HR: 95 (58 - 120)  BP: 107/66 (92/52 - 114/66)  BP(mean): 78 (68 - 87)  ABP: --  ABP(mean): --  RR: 12 (12 - 23)  SpO2: 96% (90% - 100%)    Mode: CPAP with PS, FiO2: 50, PEEP: 5, PS: 10, MAP: 7.6, PIP: 16    I & Os for current day (as of 02-20 @ 10:43)  =============================================  IN: 480 ml / OUT: 0 ml / NET: 480 ml    CAPILLARY BLOOD GLUCOSE      PHYSICAL EXAM:    General: NAD, comfortable  HEENT: NCAT, PERRL, clear conjunctiva, no scleral icterus  Neck: supple, no JVD  Respiratory: CTA b/l, no wheezing, rhonchi, rales  Cardiovascular: RRR, normal S1S2, no M/R/G  Abdomen: soft, NT/ND, bowel sounds in all four quadrants, no palpable masses  Extremities: WWP, no clubbing, cyanosis, or edema  Neuro:     MEDICATIONS:  MEDICATIONS  (STANDING):  heparin  Injectable 7500Unit(s) SubCutaneous every 8 hours  pantoprazole   Suspension 40milliGRAM(s) Oral daily  venlafaxine 75milliGRAM(s) Oral every 12 hours  QUEtiapine 200milliGRAM(s) Oral every 12 hours  carvedilol 6.25milliGRAM(s) Oral every 12 hours  senna 2Tablet(s) Oral at bedtime  polyethylene glycol 3350 17Gram(s) Oral daily  insulin lispro (HumaLOG) corrective regimen sliding scale  SubCutaneous Before meals and at bedtime  clonazePAM Tablet 1milliGRAM(s) Oral every 12 hours    MEDICATIONS  (PRN):  acetaminophen    Suspension 650milliGRAM(s) Oral every 6 hours PRN For Temp greater than 38 C (100.4 F)      ALLERGIES:  Allergies    No Known Drug Allergies  Seafood (Rash)    Intolerances        LABS:                        10.8   7.2   )-----------( 270      ( 20 Feb 2017 06:32 )             34.8     20 Feb 2017 06:32    136    |  101    |  24     ----------------------------<  78     4.1     |  25     |  0.66     Ca    9.3        20 Feb 2017 06:32  Phos  3.8       20 Feb 2017 06:32  Mg     2.1       20 Feb 2017 06:32    TPro  7.9    /  Alb  2.7    /  TBili  0.6    /  DBili  x      /  AST  19     /  ALT  25     /  AlkPhos  76     20 Feb 2017 06:32          RADIOLOGY & ADDITIONAL TESTS: Reviewed. INTERVAL HPI/OVERNIGHT EVENTS:    OVERNIGHT: Was placed on CPAP at time of signout ; later switched back to AC/CMV as pt was tachypneic on CPAP and tachy to 110s, which improved on AC. Bolused 250cc NSx1 for SBP that had dipped to 86-88, came back up to 94. Patient also with 1 epsidoe of vomiting that improved on ZoFran     SUBJECTIVE: Patient seen and examined at bedside. Patient reports some abdominal tenderness with nausea. Pain is localized to the LLQ but is present in the epigastric region. As per nurse, hasnt had a BM in days. Otherwise patient states her breathing s fine.     ROS:  CV: Denies chest pain  Resp: Denies worsening SOB  GI: + abdominal pain and  nausea,   : Denies dysuria  ID: Denies fevers, chills  MSK: Denies joint pain     OBJECTIVE:    VITAL SIGNS:  ICU Vital Signs Last 24 Hrs  T(C): 37.2, Max: 37.5 (02-19 @ 23:48)  T(F): 98.9, Max: 99.5 (02-19 @ 23:48)  HR: 95 (58 - 120)  BP: 107/66 (92/52 - 114/66)  BP(mean): 78 (68 - 87)  ABP: --  ABP(mean): --  RR: 12 (12 - 23)  SpO2: 96% (90% - 100%)    Mode: CPAP with PS, FiO2: 50, PEEP: 5, PS: 10, MAP: 7.6, PIP: 16    I & Os for current day (as of 02-20 @ 10:43)  =============================================  IN: 480 ml / OUT: 0 ml / NET: 480 ml    CAPILLARY BLOOD GLUCOSE      PHYSICAL EXAM:      General: NAD, comfortable, pleasant appearing, Obese, sweating   HEENT: NCAT, PERRL, clear conjunctiva, no scleral icterus Trach site C/D/I, Dry MM  Neck: supple, no JVD  Respiratory: trace crackles heard at the base B/l, no wheezes   Cardiovascular: RRR, normal S1S2, no M/R/G  Abdomen: soft, Patient belly distended with dulness on percussion, Masslike induration noted int he LLQ with tenderness to deep palpatio,   PEG CDI   Extremities: WWP, moist skin, pitting edema noted in the dependent regions and LLE;  no clubbing or cyanosis,   Neuro: Obeys command,     MEDICATIONS:  MEDICATIONS  (STANDING):  heparin  Injectable 7500Unit(s) SubCutaneous every 8 hours  pantoprazole   Suspension 40milliGRAM(s) Oral daily  venlafaxine 75milliGRAM(s) Oral every 12 hours  QUEtiapine 200milliGRAM(s) Oral every 12 hours  carvedilol 6.25milliGRAM(s) Oral every 12 hours  senna 2Tablet(s) Oral at bedtime  polyethylene glycol 3350 17Gram(s) Oral daily  insulin lispro (HumaLOG) corrective regimen sliding scale  SubCutaneous Before meals and at bedtime  clonazePAM Tablet 1milliGRAM(s) Oral every 12 hours    MEDICATIONS  (PRN):  acetaminophen    Suspension 650milliGRAM(s) Oral every 6 hours PRN For Temp greater than 38 C (100.4 F)      ALLERGIES:  Allergies    No Known Drug Allergies  Seafood (Rash)    Intolerances        LABS:                        10.8   7.2   )-----------( 270      ( 20 Feb 2017 06:32 )             34.8     20 Feb 2017 06:32    136    |  101    |  24     ----------------------------<  78     4.1     |  25     |  0.66     Ca    9.3        20 Feb 2017 06:32  Phos  3.8       20 Feb 2017 06:32  Mg     2.1       20 Feb 2017 06:32    TPro  7.9    /  Alb  2.7    /  TBili  0.6    /  DBili  x      /  AST  19     /  ALT  25     /  AlkPhos  76     20 Feb 2017 06:32          RADIOLOGY & ADDITIONAL TESTS: Reviewed.

## 2017-02-20 NOTE — PROGRESS NOTE ADULT - PROBLEM SELECTOR PLAN 1
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  Pt with likely central apnea which is in her family history and without ventilation becomes apneic immediately.  Diaphragm US revealed Minimal to no movement of the right hemidiaphragm upon quiet breathing and sniffing. No paradoxical motion identified. Findings indicate at least weakness of the right hemidiaphragm, possibly paralysis.  - will c/w TRACH collar (6 am-10 pm) during the day and switch to AC-MV overnight (10 pm-6 am)  - F/U serum genetic tests for diaphragmatic issue   - Likely refer for Pacemaker of Diaphragm once clear diagnosis of central apnea made

## 2017-02-20 NOTE — PROGRESS NOTE ADULT - SUBJECTIVE AND OBJECTIVE BOX
INTERVAL HPI/OVERNIGHT EVENTS: agitated last night placed back on cmv    SYMPTOMS sleepy but easily aroused, answers questions without complaints on vent    DRIPS none    Mode: CPAP with PS  FiO2: 50  PEEP: 5  PS: 10  MAP: 7.6  PIP: 16      ICU Vital Signs Last 24 Hrs  T(C): 37.2, Max: 37.5 (02-19 @ 23:48)  T(F): 98.9, Max: 99.5 (02-19 @ 23:48)  HR: 95 (58 - 120)  BP: 107/66 (92/52 - 114/66)  BP(mean): 78 (68 - 87)  ABP: --  ABP(mean): --  RR: 12 (12 - 23)  SpO2: 96% (90% - 100%)      I&O's Summary    I & Os for current day (as of 20 Feb 2017 10:39)  =============================================  IN: 480 ml / OUT: 0 ml / NET: 480 ml      EXAM    Chest few ronchi    Heart rr    Abdomen soft nontender, with bs    Extremities without edema    Neuro intact      LABS:  CAPILLARY BLOOD GLUCOSE                            10.8   7.2   )-----------( 270      ( 20 Feb 2017 06:32 )             34.8     20 Feb 2017 06:32    136    |  101    |  24     ----------------------------<  78     4.1     |  25     |  0.66     Ca    9.3        20 Feb 2017 06:32  Phos  3.8       20 Feb 2017 06:32  Mg     2.1       20 Feb 2017 06:32    TPro  7.9    /  Alb  2.7    /  TBili  0.6    /  DBili  x      /  AST  19     /  ALT  25     /  AlkPhos  76     20 Feb 2017 06:32    cxr - without change    A - respiratory failure/central hypoventilation syndrome/delerium/obstructive cardiomyopathy    Suggest:  CPAP then trach dollar  nightime CPAP  no changes in feeds  consdier psyche followup  decrease clonopin mobilize to chair              RADIOLOGY & ADDITIONAL STUDIES:    CRITICAL CARE TIME SPENT:

## 2017-02-21 LAB
ANION GAP SERPL CALC-SCNC: 9 MMOL/L — SIGNIFICANT CHANGE UP (ref 9–16)
BUN SERPL-MCNC: 22 MG/DL — SIGNIFICANT CHANGE UP (ref 7–23)
CALCIUM SERPL-MCNC: 10.2 MG/DL — SIGNIFICANT CHANGE UP (ref 8.5–10.5)
CHLORIDE SERPL-SCNC: 99 MMOL/L — SIGNIFICANT CHANGE UP (ref 96–108)
CO2 SERPL-SCNC: 26 MMOL/L — SIGNIFICANT CHANGE UP (ref 22–31)
CREAT SERPL-MCNC: 0.66 MG/DL — SIGNIFICANT CHANGE UP (ref 0.5–1.3)
FERRITIN SERPL-MCNC: 867.4 NG/ML — HIGH (ref 8–252)
GLUCOSE SERPL-MCNC: 91 MG/DL — SIGNIFICANT CHANGE UP (ref 70–99)
HCT VFR BLD CALC: 37.8 % — SIGNIFICANT CHANGE UP (ref 34.5–45)
HGB BLD-MCNC: 11.5 G/DL — SIGNIFICANT CHANGE UP (ref 11.5–15.5)
IRON SATN MFR SERPL: 108 UG/DL — SIGNIFICANT CHANGE UP (ref 50–170)
IRON SATN MFR SERPL: 50 % — HIGH (ref 20–38)
MAGNESIUM SERPL-MCNC: 1.7 MG/DL — SIGNIFICANT CHANGE UP (ref 1.6–2.4)
MCHC RBC-ENTMCNC: 22.5 PG — LOW (ref 27–34)
MCHC RBC-ENTMCNC: 30.4 G/DL — LOW (ref 32–36)
MCV RBC AUTO: 73.8 FL — LOW (ref 80–100)
PHOSPHATE SERPL-MCNC: 2.1 MG/DL — LOW (ref 2.5–4.5)
PLATELET # BLD AUTO: 292 K/UL — SIGNIFICANT CHANGE UP (ref 150–400)
POTASSIUM SERPL-MCNC: 3.9 MMOL/L — SIGNIFICANT CHANGE UP (ref 3.5–5.3)
POTASSIUM SERPL-SCNC: 3.9 MMOL/L — SIGNIFICANT CHANGE UP (ref 3.5–5.3)
RBC # BLD: 5.12 M/UL — SIGNIFICANT CHANGE UP (ref 3.8–5.2)
RBC # FLD: 23 % — HIGH (ref 10.3–16.9)
SODIUM SERPL-SCNC: 134 MMOL/L — LOW (ref 135–145)
TIBC SERPL-MCNC: 218 UG/DL — LOW (ref 250–450)
WBC # BLD: 6.1 K/UL — SIGNIFICANT CHANGE UP (ref 3.8–10.5)
WBC # FLD AUTO: 6.1 K/UL — SIGNIFICANT CHANGE UP (ref 3.8–10.5)

## 2017-02-21 PROCEDURE — 93010 ELECTROCARDIOGRAM REPORT: CPT

## 2017-02-21 PROCEDURE — 99254 IP/OBS CNSLTJ NEW/EST MOD 60: CPT

## 2017-02-21 PROCEDURE — 99233 SBSQ HOSP IP/OBS HIGH 50: CPT

## 2017-02-21 PROCEDURE — 74000: CPT | Mod: 26

## 2017-02-21 RX ORDER — METOPROLOL TARTRATE 50 MG
12.5 TABLET ORAL
Qty: 0 | Refills: 0 | Status: DISCONTINUED | OUTPATIENT
Start: 2017-02-21 | End: 2017-02-23

## 2017-02-21 RX ORDER — METOPROLOL TARTRATE 50 MG
2.5 TABLET ORAL ONCE
Qty: 0 | Refills: 0 | Status: DISCONTINUED | OUTPATIENT
Start: 2017-02-21 | End: 2017-02-21

## 2017-02-21 RX ORDER — SODIUM,POTASSIUM PHOSPHATES 278-250MG
1 POWDER IN PACKET (EA) ORAL
Qty: 0 | Refills: 0 | Status: COMPLETED | OUTPATIENT
Start: 2017-02-21 | End: 2017-02-22

## 2017-02-21 RX ORDER — MAGNESIUM SULFATE 500 MG/ML
1 VIAL (ML) INJECTION ONCE
Qty: 0 | Refills: 0 | Status: COMPLETED | OUTPATIENT
Start: 2017-02-21 | End: 2017-02-21

## 2017-02-21 RX ORDER — POTASSIUM CHLORIDE 20 MEQ
20 PACKET (EA) ORAL ONCE
Qty: 0 | Refills: 0 | Status: COMPLETED | OUTPATIENT
Start: 2017-02-21 | End: 2017-02-21

## 2017-02-21 RX ORDER — ONDANSETRON 8 MG/1
4 TABLET, FILM COATED ORAL ONCE
Qty: 0 | Refills: 0 | Status: COMPLETED | OUTPATIENT
Start: 2017-02-21 | End: 2017-02-21

## 2017-02-21 RX ORDER — QUETIAPINE FUMARATE 200 MG/1
100 TABLET, FILM COATED ORAL EVERY 12 HOURS
Qty: 0 | Refills: 0 | Status: DISCONTINUED | OUTPATIENT
Start: 2017-02-21 | End: 2017-02-23

## 2017-02-21 RX ADMIN — POLYETHYLENE GLYCOL 3350 17 GRAM(S): 17 POWDER, FOR SOLUTION ORAL at 11:26

## 2017-02-21 RX ADMIN — ONDANSETRON 4 MILLIGRAM(S): 8 TABLET, FILM COATED ORAL at 22:23

## 2017-02-21 RX ADMIN — Medication 75 MILLIGRAM(S): at 22:22

## 2017-02-21 RX ADMIN — HEPARIN SODIUM 7500 UNIT(S): 5000 INJECTION INTRAVENOUS; SUBCUTANEOUS at 13:51

## 2017-02-21 RX ADMIN — Medication 1 TABLET(S): at 16:43

## 2017-02-21 RX ADMIN — HEPARIN SODIUM 7500 UNIT(S): 5000 INJECTION INTRAVENOUS; SUBCUTANEOUS at 06:10

## 2017-02-21 RX ADMIN — SENNA PLUS 2 TABLET(S): 8.6 TABLET ORAL at 22:21

## 2017-02-21 RX ADMIN — Medication 12.5 MILLIGRAM(S): at 06:10

## 2017-02-21 RX ADMIN — Medication 1 MILLIGRAM(S): at 18:54

## 2017-02-21 RX ADMIN — Medication 1 MILLIGRAM(S): at 06:10

## 2017-02-21 RX ADMIN — HEPARIN SODIUM 7500 UNIT(S): 5000 INJECTION INTRAVENOUS; SUBCUTANEOUS at 22:20

## 2017-02-21 RX ADMIN — Medication 1 TABLET(S): at 11:26

## 2017-02-21 RX ADMIN — Medication 20 MILLIEQUIVALENT(S): at 08:01

## 2017-02-21 RX ADMIN — QUETIAPINE FUMARATE 200 MILLIGRAM(S): 200 TABLET, FILM COATED ORAL at 09:38

## 2017-02-21 RX ADMIN — Medication 12.5 MILLIGRAM(S): at 18:54

## 2017-02-21 RX ADMIN — Medication 100 GRAM(S): at 08:01

## 2017-02-21 RX ADMIN — Medication 75 MILLIGRAM(S): at 09:38

## 2017-02-21 RX ADMIN — PANTOPRAZOLE SODIUM 40 MILLIGRAM(S): 20 TABLET, DELAYED RELEASE ORAL at 09:38

## 2017-02-21 NOTE — PROGRESS NOTE ADULT - PROBLEM SELECTOR PLAN 3
Patient with episode of hypotension that resolved with 250 cc NS. Unclear cause of hypotensive episode; PE shows dry MM, may be secondary to hypovolemia;   -RESOLVED

## 2017-02-21 NOTE — PROGRESS NOTE ADULT - PROBLEM SELECTOR PLAN 1
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  Pt with likely central apnea which is in her family history and without ventilation becomes apneic immediately.  Diaphragm US revealed Minimal to no movement of the right hemidiaphragm upon quiet breathing and sniffing. No paradoxical motion identified. Findings indicate at least weakness of the right hemidiaphragm, possibly paralysis.  - will c/w TRACH collar (6 am-10 pm) during the day and switch to AC-MV overnight (10 pm-6 am)  - F/U serum genetic tests for diaphragmatic issue   - Likely refer for Pacemaker of Diaphragm once clear diagnosis of central apnea made  -OOB

## 2017-02-21 NOTE — PROGRESS NOTE ADULT - PROBLEM SELECTOR PLAN 7
PPX: HSQ, SCDs, PPI ,Peridex
FEN:  tube feeds with Jevity  Replete electrolytes as needed  no fluids needed
PPX: HSQ, SCDs, PPI ,Peridex

## 2017-02-21 NOTE — CONSULT NOTE ADULT - ASSESSMENT
30 yo F extremely poor historian, with bipolar disorder, HTN, ?paroxysmal afib (reports during pregnancy), congenital cardiac malformation s/p repair, DVT in 2008 (s/p Coumadin last dose 2009), h/o UGIB s/p endoscopy, asymmetric septal hypertrophy (basal anteroseptum 2cm), s/p intubated for central apnea, now Trached and Pegged. Tele event with sinus arrhythmia / sinus bradycardia prior to a 3.4 sec pause. It was noted that she was switched from CPAP to MV last night prior to event.    - Tele strips showed clear slowing down of sinus rhythm prior to the 3 sec sinus pause. This is more consistent with sleep apnea.  Will continue to monitor telemetry.

## 2017-02-21 NOTE — PROGRESS NOTE ADULT - PROBLEM SELECTOR PLAN 2
EF 60%. No diastolic HF. Echo finding of asymmetric septal wall hypertrophy. Cardiology consulted and dx HCOM but low suspicion for causing resp failure and also low likelihood of arrhythmogenic. Patient with 2 sinus pauses overnight  - C/w Metoprolol 12.5 BID  - will consult EP for sinus pauses with hx of HOCM

## 2017-02-21 NOTE — PROGRESS NOTE ADULT - SUBJECTIVE AND OBJECTIVE BOX
INTERVAL HPI/OVERNIGHT EVENTS: placed back on AC overnight, 2.6 sec pause documented    SYMPTOMS resting comfortably on trach collar, no complaints, no SOB ,     DRIPS None    Mode: standby      ICU Vital Signs Last 24 Hrs  T(C): 37.1, Max: 37.4 (02-21 @ 05:00)  T(F): 98.7, Max: 99.3 (02-21 @ 05:00)  HR: 108 (84 - 118)  BP: 124/66 (101/71 - 155/97)  BP(mean): 82 (75 - 94)  ABP: --  ABP(mean): --  RR: 20 (15 - 22)  SpO2: 100% (93% - 100%)      I&O's Summary    I & Os for current day (as of 21 Feb 2017 09:48)  =============================================  IN: 180 ml / OUT: 0 ml / NET: 180 ml      EXAM    Chest clear    Heart rr    Abdomen non tender with bs    Extremities without edema    Neuro awake, alert, less agitated      LABS:  CAPILLARY BLOOD GLUCOSE  131 (21 Feb 2017 09:04)  127 (21 Feb 2017 07:50)  103 (20 Feb 2017 21:00)  104 (20 Feb 2017 17:00)  98 (20 Feb 2017 11:12)                            11.5   6.1   )-----------( 292      ( 21 Feb 2017 06:29 )             37.8     21 Feb 2017 06:29    134    |  99     |  22     ----------------------------<  91     3.9     |  26     |  0.66     Ca    10.2       21 Feb 2017 06:29  Phos  2.1       21 Feb 2017 06:29  Mg     1.7       21 Feb 2017 06:29    TPro  7.9    /  Alb  2.7    /  TBili  0.6    /  DBili  x      /  AST  19     /  ALT  25     /  AlkPhos  76     20 Feb 2017 06:32              RADIOLOGY & ADDITIONAL STUDIES: none    A - respiratory failure/ central sleep apnea/ obstructive cardioyopathy/ delerium    Suggest:    daytime trach collar, night time BIPAP  pulmonary followup, i.e. next steps  Cardiology fu re -pauses  start placement for ltach  no change in feeds  no change in psyche meds  palce in L tach center      CRITICAL CARE TIME SPENT:

## 2017-02-21 NOTE — CONSULT NOTE ADULT - CONSULT REASON
sinus pauses
BRITTANI
Central hypoventilation syndrome.
PEG placement
Pt with hx of Bipolar D/O. Now intubated in ICU
Thrombocytopenia

## 2017-02-21 NOTE — CONSULT NOTE ADULT - CONSULT REQUESTED DATE/TIME
21-Feb-2017 16:38
09-Feb-2017 11:00
10-Feb-2017 14:47
10-Feb-2017 16:00
31-Jan-2017 13:44
31-Jan-2017 15:26

## 2017-02-21 NOTE — PROGRESS NOTE ADULT - SUBJECTIVE AND OBJECTIVE BOX
INTERVAL HPI/OVERNIGHT EVENTS:    OVERNIGHT: No overnight events.  SUBJECTIVE: Patient seen and examined at bedside.     ROS:  CV: Denies chest pain  Resp: Denies SOB  GI: Denies abdominal pain, constipation, diarrhea, nausea, vomiting  : Denies dysuria  ID: Denies fevers, chills  MSK: Denies joint pain     OBJECTIVE:    VITAL SIGNS:  ICU Vital Signs Last 24 Hrs  T(C): 37.4, Max: 37.4 (02-21 @ 05:00)  T(F): 99.3, Max: 99.3 (02-21 @ 05:00)  HR: 88 (84 - 118)  BP: 155/97 (101/71 - 155/97)  BP(mean): 82 (75 - 94)  ABP: --  ABP(mean): --  RR: 22 (12 - 22)  SpO2: 99% (93% - 100%)    Mode: CPAP with PS, FiO2: 40, PEEP: 5, PS: 12, MAP: 10, PIP: 20    CAPILLARY BLOOD GLUCOSE  103 (20 Feb 2017 21:00)      PHYSICAL EXAM:    General: NAD, comfortable  HEENT: NCAT, PERRL, clear conjunctiva, no scleral icterus  Neck: supple, no JVD  Respiratory: CTA b/l, no wheezing, rhonchi, rales  Cardiovascular: RRR, normal S1S2, no M/R/G  Abdomen: soft, NT/ND, bowel sounds in all four quadrants, no palpable masses  Extremities: WWP, no clubbing, cyanosis, or edema  Neuro:     MEDICATIONS:  MEDICATIONS  (STANDING):  heparin  Injectable 7500Unit(s) SubCutaneous every 8 hours  pantoprazole   Suspension 40milliGRAM(s) Oral daily  venlafaxine 75milliGRAM(s) Oral every 12 hours  QUEtiapine 200milliGRAM(s) Oral every 12 hours  senna 2Tablet(s) Oral at bedtime  polyethylene glycol 3350 17Gram(s) Oral daily  insulin lispro (HumaLOG) corrective regimen sliding scale  SubCutaneous Before meals and at bedtime  clonazePAM Tablet 1milliGRAM(s) Oral every 12 hours  metoprolol 12.5milliGRAM(s) Oral two times a day  magnesium sulfate  IVPB 1Gram(s) IV Intermittent once  potassium chloride   Powder 20milliEquivalent(s) Oral once    MEDICATIONS  (PRN):  acetaminophen    Suspension 650milliGRAM(s) Oral every 6 hours PRN For Temp greater than 38 C (100.4 F)      ALLERGIES:  Allergies    No Known Drug Allergies  Seafood (Rash)    Intolerances        LABS:                        11.5   6.1   )-----------( 292      ( 21 Feb 2017 06:29 )             37.8     21 Feb 2017 06:29    134    |  99     |  22     ----------------------------<  91     3.9     |  26     |  0.66     Ca    10.2       21 Feb 2017 06:29  Phos  2.1       21 Feb 2017 06:29  Mg     1.7       21 Feb 2017 06:29    TPro  7.9    /  Alb  2.7    /  TBili  0.6    /  DBili  x      /  AST  19     /  ALT  25     /  AlkPhos  76     20 Feb 2017 06:32          RADIOLOGY & ADDITIONAL TESTS: Reviewed. INTERVAL HPI/OVERNIGHT EVENTS:    OVERNIGHT:  Pt. was placed on CPAP @ 2115.  Pt. began requiring high delta-V and still was not pulling good TV (~230-290cc avg) and triggering apnea back up rate often so was placed back on AC-MV. Patient also had 2 pauses on telemetry within a few minutes of each other (first was for 2.6 seconds or so, second was for about 3.4 seconds) HR was in 90s at the time, SBP 100s and  pt was sleeping.     SUBJECTIVE: Patient seen and examined at bedside. reports no SOB currently on AC-mV. otherwise ROS documented below.     ROS:  CV: Denies chest pain  Resp: Denies SOB  GI: Denies abdominal pain, constipation, diarrhea, nausea, vomiting  ID: Denies fevers, chills    OBJECTIVE:    VITAL SIGNS:  ICU Vital Signs Last 24 Hrs  T(C): 37.4, Max: 37.4 (02-21 @ 05:00)  T(F): 99.3, Max: 99.3 (02-21 @ 05:00)  HR: 88 (84 - 118)  BP: 155/97 (101/71 - 155/97)  BP(mean): 82 (75 - 94)  ABP: --  ABP(mean): --  RR: 22 (12 - 22)  SpO2: 99% (93% - 100%)    Mode: CPAP with PS, FiO2: 40, PEEP: 5, PS: 12, MAP: 10, PIP: 20    CAPILLARY BLOOD GLUCOSE  103 (20 Feb 2017 21:00)      PHYSICAL EXAM:    General: NAD, comfortable, pleasant appearing, Obese, sweating   HEENT: NCAT, PERRL, clear conjunctiva, no scleral icterus Trach site C/D/I, Dry MM  Neck: supple, no JVD  Respiratory: trace crackles heard at the base B/l, no wheezes   Cardiovascular: RRR, normal S1S2, no M/R/G  Abdomen: soft, Patient belly distended with dulness on percussion, Masslike induration noted int he LLQ with tenderness to deep palpation   PEG CDI   Extremities: WWP, moist skin, pitting edema noted in the dependent regions and LLE;  no clubbing or cyanosis,   Neuro: Obeys command,     MEDICATIONS:  MEDICATIONS  (STANDING):  heparin  Injectable 7500Unit(s) SubCutaneous every 8 hours  pantoprazole   Suspension 40milliGRAM(s) Oral daily  venlafaxine 75milliGRAM(s) Oral every 12 hours  QUEtiapine 200milliGRAM(s) Oral every 12 hours  senna 2Tablet(s) Oral at bedtime  polyethylene glycol 3350 17Gram(s) Oral daily  insulin lispro (HumaLOG) corrective regimen sliding scale  SubCutaneous Before meals and at bedtime  clonazePAM Tablet 1milliGRAM(s) Oral every 12 hours  metoprolol 12.5milliGRAM(s) Oral two times a day  magnesium sulfate  IVPB 1Gram(s) IV Intermittent once  potassium chloride   Powder 20milliEquivalent(s) Oral once    MEDICATIONS  (PRN):  acetaminophen    Suspension 650milliGRAM(s) Oral every 6 hours PRN For Temp greater than 38 C (100.4 F)      ALLERGIES:  Allergies    No Known Drug Allergies  Seafood (Rash)    Intolerances        LABS:                        11.5   6.1   )-----------( 292      ( 21 Feb 2017 06:29 )             37.8     21 Feb 2017 06:29    134    |  99     |  22     ----------------------------<  91     3.9     |  26     |  0.66     Ca    10.2       21 Feb 2017 06:29  Phos  2.1       21 Feb 2017 06:29  Mg     1.7       21 Feb 2017 06:29    TPro  7.9    /  Alb  2.7    /  TBili  0.6    /  DBili  x      /  AST  19     /  ALT  25     /  AlkPhos  76     20 Feb 2017 06:32          RADIOLOGY & ADDITIONAL TESTS: Reviewed. TRANSFER OF CARE NOTE      Hospital Course: 32 y/o F poor historian, PMHx HTN, HLD, paroxysmal afib (reports during pregnancy), congenital cardiac malformation s/p repair, HOCM, MICHAEL, DVT in 2008 (s/p Coumadin last dose 2009), h/o UGIB s/p endoscopy, recent 2 day admission (Jan 2017) to [facility] for SOB/CP/n/v r/o ACS, ECHO (EF 60%, normal sized LA, severely asymmetric septal hypertrophy) admission c/b 10 minute unresponsiveness, resolving on its own with unremarkable w/u, presented to  ED (1/27 at night) again per the patient with similar symptoms that brought her to the hospital earlier this month, SOB/CP/n/v. Patient was admitted to MICU for hypercapnic respiratory failure 2/2 MSSA PNA and was emergently intubated  s/p 10-day course of oxacillin. With persistent fevers and started on Zosyn for additional coverage for likely new pneumonia (grown MSSA on BAL after trach)  which resolved well with tx (last day of ABX 2/13)    Patient, during stay, was showing signs of central apnea and becoming apneic very quickly on CPAP trial therefore she required a Trach/PEG. Her father had central apnea syndrome and required a pacemaker in his diaphragm which she will likely need in the upcoming future (Plans of it happening in [facility] where her father had it). 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 US of Diaphragm showed poor movement in the Rt hemidiaphragm especially. Following Trach, though patient began to improve significantly clinically and was able to tolerate CPAP trial well.     Cardiovascularly this patient has HOCM and Cardio evaluated her and recommended to start BB. At first t0 was held in the setting of her MSSA sepsis but then was restarted Currently oN metoprolol. Patient had 2 sinus pauses overnight and EP was consulted.   Additionally, although the patient is usually hypertensive after she was cleared of her infection she  has had several episodes of transient drops in BP which resolves either spontaneously or with fluid boluses. With this in mind all anti-HTN meds are currently held.     Finally, of note patient has had several episodes of ICU delirium and was on home risperidone and effexor for Bipolar disorder. Psych was consulted and started her on seroquel which we have begun to titrate and her home effexor.   Patient has stable VSS with no active medical issues at this time and will be monitored on 7 LA for eventual placement into LTAC and appt. for diaphragm pacemaker to be placed.                 OVERNIGHT:  Pt. was placed on CPAP @ 2115.  Pt. began requiring high delta-V and still was not pulling good TV (~230-290cc avg) and triggering apnea back up rate often so was placed back on AC-MV. Patient also had 2 pauses on telemetry within a few minutes of each other (first was for 2.6 seconds or so, second was for about 3.4 seconds) HR was in 90s at the time, SBP 100s and  pt was sleeping.     SUBJECTIVE: Patient seen and examined at bedside. reports no SOB currently on AC-mV. otherwise ROS documented below.     ROS:  CV: Denies chest pain  Resp: Denies SOB  GI: Denies abdominal pain, constipation, diarrhea, nausea, vomiting  ID: Denies fevers, chills    OBJECTIVE:    VITAL SIGNS:  ICU Vital Signs Last 24 Hrs  T(C): 37.4, Max: 37.4 (02-21 @ 05:00)  T(F): 99.3, Max: 99.3 (02-21 @ 05:00)  HR: 88 (84 - 118)  BP: 155/97 (101/71 - 155/97)  BP(mean): 82 (75 - 94)  ABP: --  ABP(mean): --  RR: 22 (12 - 22)  SpO2: 99% (93% - 100%)    Mode: CPAP with PS, FiO2: 40, PEEP: 5, PS: 12, MAP: 10, PIP: 20    CAPILLARY BLOOD GLUCOSE  103 (20 Feb 2017 21:00)      PHYSICAL EXAM:    General: NAD, comfortable, pleasant appearing, Obese, sweating   HEENT: NCAT, PERRL, clear conjunctiva, no scleral icterus Trach site C/D/I, Dry MM  Neck: supple, no JVD  Respiratory: trace crackles heard at the base B/l, no wheezes   Cardiovascular: RRR, normal S1S2, no M/R/G  Abdomen: soft, Patient belly distended with dulness on percussion, Masslike induration noted int he LLQ with tenderness to deep palpation   PEG CDI   Extremities: WWP, moist skin, pitting edema noted in the dependent regions and LLE;  no clubbing or cyanosis,   Neuro: Obeys command,     MEDICATIONS:  MEDICATIONS  (STANDING):  heparin  Injectable 7500Unit(s) SubCutaneous every 8 hours  pantoprazole   Suspension 40milliGRAM(s) Oral daily  venlafaxine 75milliGRAM(s) Oral every 12 hours  QUEtiapine 200milliGRAM(s) Oral every 12 hours  senna 2Tablet(s) Oral at bedtime  polyethylene glycol 3350 17Gram(s) Oral daily  insulin lispro (HumaLOG) corrective regimen sliding scale  SubCutaneous Before meals and at bedtime  clonazePAM Tablet 1milliGRAM(s) Oral every 12 hours  metoprolol 12.5milliGRAM(s) Oral two times a day  magnesium sulfate  IVPB 1Gram(s) IV Intermittent once  potassium chloride   Powder 20milliEquivalent(s) Oral once    MEDICATIONS  (PRN):  acetaminophen    Suspension 650milliGRAM(s) Oral every 6 hours PRN For Temp greater than 38 C (100.4 F)      ALLERGIES:  Allergies    No Known Drug Allergies  Seafood (Rash)    Intolerances        LABS:                        11.5   6.1   )-----------( 292      ( 21 Feb 2017 06:29 )             37.8     21 Feb 2017 06:29    134    |  99     |  22     ----------------------------<  91     3.9     |  26     |  0.66     Ca    10.2       21 Feb 2017 06:29  Phos  2.1       21 Feb 2017 06:29  Mg     1.7       21 Feb 2017 06:29    TPro  7.9    /  Alb  2.7    /  TBili  0.6    /  DBili  x      /  AST  19     /  ALT  25     /  AlkPhos  76     20 Feb 2017 06:32          RADIOLOGY & ADDITIONAL TESTS: Reviewed.

## 2017-02-21 NOTE — CONSULT NOTE ADULT - SUBJECTIVE AND OBJECTIVE BOX
HISTORY OF PRESENT ILLNESS:     32 yo F extremely poor historian, PMHx bipolar disorder, HTN, HLD, ?paroxysmal afib (reports during pregnancy), congenital cardiac malformation s/p repair, ?MICHAEL, DVT in  (s/p Coumadin last dose ), h/o UGIB s/p endoscopy, recent 2 day admission (2017) to [facility] for SOB/CP/n/v r/o ACS, ECHO (EF 60%, normal sized LA, severely asymmetric septal hypertrophy) admission c/b 10 minute unresponsiveness, resolving on its own with non contributory full w/u, presented to  ED (17 at night) again per the patient with similar symptoms that brought her to the hospital earlier this month, SOB/CP/n/v.   Hospital course was significant for urgent intubation due to CO2 retention and tachypnea.  She is now diagnosed with central apnea with right hemidiaphragm paralysis. (Her father has central apnea as well requiring pacemaker in his diaphragm) She is now trached and has PEG. Overnight 17, she was placed on CPAP at 21:15 but was switched back on AC-mechanical ventilation due to poor tidal volume. Tele is remarkable for sinus bradycardia at 21:30 and again at 23:09 with a ~ 3 sec pause. Both episodes showed gradual sinus julissa first before the pause.  No CHB.   Her Echo on 17 showed asymmetric septal hypertrophy with basal anteroseptum measures 2.0 cm. No gradient reported. On metoprolol 12.5 mg BID.    It is difficult to get information from her today as she falls asleep quickly during the interview.         PAST MEDICAL AND SURGICAL HISTORY:  DVT (deep venous thrombosis)  GIB (gastrointestinal bleeding)  Afib  HTN (hypertension)  Chronic systolic congestive heart failure  HLD (hyperlipidemia)  Myocardial infarction  Congenital heart disease      FAMILY HISTORY: Father with central apnea.     SOCIAL HISTORY: no tob/etoh/drug    REVIEW OF SYSTEM:   CONSTITUTIONAL:  fatigue  EYES: No eye pain, visual disturbances, or discharge  ENMT:  trached  RESPIRATORY: denies SOB  CARDIOVASCULAR: No chest pain, palpitations, dizziness  GASTROINTESTINAL: denies n/v/d  GENITOURINARY: No dysuria, frequency, hematuria, or incontinence  NEUROLOGICAL: No headaches, memory loss, loss of strength, numbness, or tremors  SKIN: No itching, burning, rashes, or lesions   LYMPH NODES: No enlarged glands  ENDOCRINE: No heat or cold intolerance; No hair loss  MUSCULOSKELETAL: No joint pain or swelling; No muscle, back, or extremity pain  PSYCHIATRIC: depressed.   HEME/LYMPH: No easy bruising, or bleeding gums      ALLERGY:  No Known Drug Allergies  Seafood (Rash)      INPATIENT MEDICATIONS:  heparin  Injectable 7500Unit(s) SubCutaneous every 8 hours  acetaminophen    Suspension 650milliGRAM(s) Oral every 6 hours PRN  pantoprazole   Suspension 40milliGRAM(s) Oral daily  venlafaxine 75milliGRAM(s) Oral every 12 hours  senna 2Tablet(s) Oral at bedtime  polyethylene glycol 3350 17Gram(s) Oral daily  insulin lispro (HumaLOG) corrective regimen sliding scale  SubCutaneous Before meals and at bedtime  clonazePAM Tablet 1milliGRAM(s) Oral every 12 hours  metoprolol 12.5milliGRAM(s) Oral two times a day  potassium acid phosphate/sodium acid phosphate tablet (K-PHOS No. 2) 1Tablet(s) Oral four times a day with meals  QUEtiapine 100milliGRAM(s) Oral every 12 hours      VITAL SIGNS:   T(C): 36.4, Max: 37.4 (- @ 05:00)  HR: 113 (84 - 113)  BP: 95/53 (95/53 - 155/97)  RR: 20 (17 - 22)  SpO2: 100% (94% - 100%)    PHYSICAL EXAM:   Appearance: sleepy. NAD  Neck: Trached.   CVS: S1/S2 normal, tachy, + murmur  Pulm: Clear anteriorly  Abd:  Soft, NT/ND, + BS	  Ext: No LE edema    LABS:                        11.5   6.1   )-----------( 292      ( 2017 06:29 )             37.8     2017 06:29    134    |  99     |  22     ----------------------------<  91     3.9     |  26     |  0.66     Ca    10.2       2017 06:29  Phos  2.1       2017 06:29  Mg     1.7       2017 06:29    TPro  7.9    /  Alb  2.7    /  TBili  0.6    /  DBili  x      /  AST  19     /  ALT  25     /  AlkPhos  76     2017 06:32      TSH: 1/62  Troponin: < 0.015   LIVER FUNCTIONS - ( 2017 06:32 )  Alb: 2.7 g/dL / Pro: 7.9 g/dL / ALK PHOS: 76 U/L / ALT: 25 U/L / AST: 19 U/L / GGT: x             EK17: sinus tachy at 112 bpm. LVH.  Narrow QRS. QTc 447ms     Telemetry: Sinus tachy 110s bpm.  17 (5:23 PM -- sinus arrhythmia / julissa).   17 at 21:30 with sinus julissa and again at 23:09 with sinus julissa before a ~ 3.4 sec pause.     ECHO: 17: Severe asymmetric septal hypertrophy. Basal anteroseptum measures 2.0 cm.The left ventricular wall motion is normal.The left ventricular ejection fraction is normal.The left ventricular ejection fraction is 60%.The right ventricle is normal in size and function. The left atrial size is normal.The right atrium is borderline dilated.The right atrial volume index is 34 cc/m2 (normal range 21+/-6 for women, 25 +/- 7 for men)  No evidence for any hemodynamically significant valvular disease.There was insufficient TR detected from which to calculate pulmonary artery systolic pressure.  There is no pericardial  effusion. The inferior vena cava is normal in size (<2.1 cm) with normal inspiratory collapse (>50%) consistent with normal right atrial pressure.

## 2017-02-21 NOTE — PROGRESS NOTE ADULT - PROBLEM SELECTOR PLAN 8
FEN:  tube feeds with Jevity  Replete electrolytes as needed  no fluids needed  DISPO: will begin paperwork for possible discharge to LTAC
FEN:  tube feeds with Jevity  Replete electrolytes as needed  no fluids needed

## 2017-02-22 LAB
BASE EXCESS BLDA CALC-SCNC: 6 MMOL/L — HIGH (ref -2–3)
GAS PNL BLDA: SIGNIFICANT CHANGE UP
HCO3 BLDA-SCNC: 33 MMOL/L — HIGH (ref 21–28)
PCO2 BLDA: 57 MMHG — HIGH (ref 32–45)
PH BLDA: 7.37 — SIGNIFICANT CHANGE UP (ref 7.35–7.45)
PO2 BLDA: 92 MMHG — SIGNIFICANT CHANGE UP (ref 83–108)
SAO2 % BLDA: 96 % — SIGNIFICANT CHANGE UP (ref 95–100)

## 2017-02-22 PROCEDURE — 99233 SBSQ HOSP IP/OBS HIGH 50: CPT | Mod: GC

## 2017-02-22 RX ORDER — CHLORHEXIDINE GLUCONATE 213 G/1000ML
15 SOLUTION TOPICAL
Qty: 0 | Refills: 0 | Status: DISCONTINUED | OUTPATIENT
Start: 2017-02-22 | End: 2017-02-23

## 2017-02-22 RX ORDER — ACETAMINOPHEN 500 MG
1000 TABLET ORAL ONCE
Qty: 0 | Refills: 0 | Status: DISCONTINUED | OUTPATIENT
Start: 2017-02-22 | End: 2017-02-22

## 2017-02-22 RX ADMIN — QUETIAPINE FUMARATE 100 MILLIGRAM(S): 200 TABLET, FILM COATED ORAL at 22:40

## 2017-02-22 RX ADMIN — HEPARIN SODIUM 7500 UNIT(S): 5000 INJECTION INTRAVENOUS; SUBCUTANEOUS at 06:07

## 2017-02-22 RX ADMIN — Medication 63.75 MILLIMOLE(S): at 15:08

## 2017-02-22 RX ADMIN — QUETIAPINE FUMARATE 100 MILLIGRAM(S): 200 TABLET, FILM COATED ORAL at 08:16

## 2017-02-22 RX ADMIN — POLYETHYLENE GLYCOL 3350 17 GRAM(S): 17 POWDER, FOR SOLUTION ORAL at 11:06

## 2017-02-22 RX ADMIN — Medication 75 MILLIGRAM(S): at 09:35

## 2017-02-22 RX ADMIN — PANTOPRAZOLE SODIUM 40 MILLIGRAM(S): 20 TABLET, DELAYED RELEASE ORAL at 08:16

## 2017-02-22 RX ADMIN — Medication 12.5 MILLIGRAM(S): at 17:58

## 2017-02-22 RX ADMIN — CHLORHEXIDINE GLUCONATE 15 MILLILITER(S): 213 SOLUTION TOPICAL at 17:58

## 2017-02-22 RX ADMIN — Medication 1 TABLET(S): at 08:16

## 2017-02-22 RX ADMIN — HEPARIN SODIUM 7500 UNIT(S): 5000 INJECTION INTRAVENOUS; SUBCUTANEOUS at 22:40

## 2017-02-22 RX ADMIN — Medication 1 MILLIGRAM(S): at 17:58

## 2017-02-22 RX ADMIN — Medication 1 MILLIGRAM(S): at 06:06

## 2017-02-22 RX ADMIN — HEPARIN SODIUM 7500 UNIT(S): 5000 INJECTION INTRAVENOUS; SUBCUTANEOUS at 13:34

## 2017-02-22 RX ADMIN — Medication 12.5 MILLIGRAM(S): at 06:06

## 2017-02-22 RX ADMIN — Medication 75 MILLIGRAM(S): at 22:40

## 2017-02-22 NOTE — PROGRESS NOTE ADULT - PROBLEM SELECTOR PROBLEM 6
Nutrition, metabolism, and development symptoms
Ventilator associated pneumonia
Ventilator associated pneumonia
Prophylactic measure

## 2017-02-22 NOTE — PROGRESS NOTE ADULT - SUBJECTIVE AND OBJECTIVE BOX
INTERVAL HPI/OVERNIGHT EVENTS:  - ON: patient was on CPAP and had an episode of non bloody vomit; however, patient's tach cuff was inflated, so there's low suspicion for aspiration, patient was then switched to assist control, and at around 6:30am patient back on CPAP (settings 12/5) which she used for about 2hrs and then transitioned to trach collar.  - S: patient said she was feeling "okay" and had no complaints.    VITAL SIGNS:  T(F): 99  HR: 107  BP: 109/55  RR: 19  SpO2: 97%  Wt(kg): --    PHYSICAL EXAM:    Constitutional: patient sitting up in bed comfortably, on trach collar in no distress  Eyes: PERRL  Neck: patient with trach collar with dressings around that are clean, dry, and intact  Respiratory: decreased breath sounds on L lung base, otherwise clear to auscultation bilaterally  Cardiovascular: RRR, normal S1 and S2, 2/6 systolic murmur at pulmonic focus, no rubs or gallops appreciated. + parasternal heave.   Gastrointestinal: normoactive bowel sounds, soft, with palpable, mobile mass that measures around 2cm in LLQ, PEG with dressing CDI, non tender, non distended  Extremities: symmetric, warm and well perfused, no lower extremity edema  Vascular: + DP and radial pulses   Neurological: A&Ox3    MEDICATIONS  (STANDING):  heparin  Injectable 7500Unit(s) SubCutaneous every 8 hours  pantoprazole   Suspension 40milliGRAM(s) Oral daily  venlafaxine 75milliGRAM(s) Oral every 12 hours  polyethylene glycol 3350 17Gram(s) Oral daily  insulin lispro (HumaLOG) corrective regimen sliding scale  SubCutaneous Before meals and at bedtime  clonazePAM Tablet 1milliGRAM(s) Oral every 12 hours  metoprolol 12.5milliGRAM(s) Oral two times a day  QUEtiapine 100milliGRAM(s) Oral every 12 hours  chlorhexidine 0.12% Liquid 15milliLiter(s) Swish and Spit two times a day    MEDICATIONS  (PRN):  acetaminophen    Suspension 650milliGRAM(s) Oral every 6 hours PRN For Temp greater than 38 C (100.4 F)      Allergies    No Known Drug Allergies  Seafood (Rash)    Intolerances        LABS:                        11.5   6.1   )-----------( 292      ( 21 Feb 2017 06:29 )             37.8     21 Feb 2017 06:29    134    |  99     |  22     ----------------------------<  91     3.9     |  26     |  0.66     Ca    10.2       21 Feb 2017 06:29  Phos  2.1       21 Feb 2017 06:29  Mg     1.7       21 Feb 2017 06:29 INTERVAL HPI/OVERNIGHT EVENTS:  - ON: patient was on CPAP and had an episode of non bloody vomit; however, patient's tach cuff was inflated, so there's low suspicion for aspiration, patient was then switched to assist control, and at around 6:30am patient back on CPAP (settings 12/5) which she used for about 2hrs and then transitioned to trach collar.  - S: patient said she was feeling "okay" and had no complaints. On ROS no HA, lightheadedness, SOB, N/V, Dysuria    VITAL SIGNS:  T(F): 99  HR: 107  BP: 109/55  RR: 19  SpO2: 97%  Wt(kg): --    PHYSICAL EXAM:    Constitutional: patient sitting up in bed comfortably, on trach collar in no distress  Eyes: PERRL  ENMT: tongue moist, now ear pain  Neck: patient with trach collar with dressings around that are clean, dry, and intact  Respiratory: decreased breath sounds on L lung base, otherwise clear to auscultation bilaterally  Cardiovascular: RRR, normal S1 and S2, 2/6 systolic murmur at pulmonic focus, no rubs or gallops appreciated. + parasternal heave.   Gastrointestinal: normoactive bowel sounds, soft, with palpable, mobile mass that measures around 2cm in LLQ, PEG with dressing CDI, non tender, non distended  Extremities: symmetric, warm and well perfused, no lower extremity edema  Vascular: 2+ DP and radial pulses   Neurological: A&Ox3  MSK: no joint swelling    MEDICATIONS  (STANDING):  heparin  Injectable 7500Unit(s) SubCutaneous every 8 hours  pantoprazole   Suspension 40milliGRAM(s) Oral daily  venlafaxine 75milliGRAM(s) Oral every 12 hours  polyethylene glycol 3350 17Gram(s) Oral daily  insulin lispro (HumaLOG) corrective regimen sliding scale  SubCutaneous Before meals and at bedtime  clonazePAM Tablet 1milliGRAM(s) Oral every 12 hours  metoprolol 12.5milliGRAM(s) Oral two times a day  QUEtiapine 100milliGRAM(s) Oral every 12 hours  chlorhexidine 0.12% Liquid 15milliLiter(s) Swish and Spit two times a day    MEDICATIONS  (PRN):  acetaminophen    Suspension 650milliGRAM(s) Oral every 6 hours PRN For Temp greater than 38 C (100.4 F)      Allergies    No Known Drug Allergies  Seafood (Rash)    Intolerances        LABS:                        11.5   6.1   )-----------( 292      ( 21 Feb 2017 06:29 )             37.8     21 Feb 2017 06:29    134    |  99     |  22     ----------------------------<  91     3.9     |  26     |  0.66     Ca    10.2       21 Feb 2017 06:29  Phos  2.1       21 Feb 2017 06:29  Mg     1.7       21 Feb 2017 06:29

## 2017-02-22 NOTE — PROGRESS NOTE ADULT - PROBLEM SELECTOR PLAN 1
Hypercapnic respiratory failure due to a combination of etiologies including congential central apnea, R hemidiaphragm weakness, and MSSA PNA.     - Will c/w trach collar (6 am-10 pm) during the day and switch to AC-MV overnight (10 pm-6 am). Patient to go to ventilator facility.   - F/U serum genetic tests (phox2b test) for diaphragmatic issue... Results will be issued by core lab by Friday 02/24  - Likely refer for Pacemaker of Diaphragm once clear diagnosis of central apnea made  - OOB  - ABG done today to assess patient's resp status now that she is on trach collar, f/u results. Hypercapnic respiratory failure due to a combination of etiologies including congential central apnea, R hemidiaphragm weakness, and MSSA PNA.     - Will c/w trach collar and try for 24 hours.  Patient to go to ventilator facility.   - F/U serum genetic tests (phox2b test) for diaphragmatic issue... Results will be issued by core lab by Friday 02/24  - Likely refer for Pacemaker of Diaphragm once clear diagnosis of central apnea made  - OOB  - ABG done today to assess patient's resp status now that she is on trach collar, f/u results.

## 2017-02-22 NOTE — PROGRESS NOTE ADULT - PROBLEM SELECTOR PLAN 6
FEN  F: no need for fluids.  E: replete lytes PRN to keep K >4, and Mg >2  N: tube feeds with Jevity    DISPO: patient was evaluated to go to LTAC but her insurance does not cover it. Now in the process of getting patient to a ventilation facility.
Resolved.  Initially treated for MSSA PNA and was found to have B/L opacities on CXR. She completed 10-day course of oxacillin for MSSA grown from bronchial wash but then was having recurrent fevers  and a  lavage from trach had  MSSA  in it. She s/p 8 days of zosyn for the sputum growth and has been afebrile  and no leukocytosis.
PPX: HSQ, SCDs, PPI ,
Resolved.  Initially treated for MSSA PNA and was found to have B/L opacities on CXR. She completed 10-day course of oxacillin for MSSA grown from bronchial wash but then was having recurrent fevers  and a  lavage from trach had  MSSA  in it. She s/p 8 days of zosyn for the sputum growth and has been afebrile  and no leukocytosis.

## 2017-02-22 NOTE — PROGRESS NOTE ADULT - PROBLEM SELECTOR PLAN 4
Patient has a history of bipolar disorder on effoxor/risperidone. She currently seems to have an element of ICU delirium/toxic metabolic encephalopathy and is on seroquel, and venlaxafine.   - On seroquel 200 mg BID - can uptitrate if continues   -C/w Venlafaxine 75 mg BID
Patient has a history of bipolar disorder on effoxor/risperidone. She currently seems to have an element of ICU delirium/toxic metabolic encephalopathy and is on seroquel, and venlaxafine.   - will decrease seroquel  to 100 mg BID as patient lethargic   -C/w Venlafaxine 75 mg BID
Patient with hyperactive delirium as per ICU team;   - Controlled currently   - c/w Klonopin to 1 mg BID  - Seroquel as specified above
Patient with hyperactive delirium as per ICU team  -c/w klonipin Q8 hrs

## 2017-02-22 NOTE — PROGRESS NOTE ADULT - PROBLEM SELECTOR PLAN 3
Patient has a history of bipolar disorder on effoxor/risperidone. Previously she seemed to have an element of ICU delirium/toxic metabolic encephalopathy and is on seroquel, and venlaxafine.   - Continue titrating down seroquel from 100mg BID to 50mg BID  - C/w Venlafaxine 75 mg BID

## 2017-02-22 NOTE — PROGRESS NOTE ADULT - PROBLEM SELECTOR PLAN 5
PPX: HSQ, SCDs, PPI, and Peridex
Patient with hyperactive delirium as per ICU team;   - Controlled currently   -c/w Klonopin to 1 mg BID  - PSYCH saw patient earlier during stay and recommended seroquel
Patient with hyperactive delirium as per ICU team; patient however seems more lethargic in the morning not giving max effort with CPAP  -will decrease Klonopin to 1 mg BID  - PSYCH saw patient earlier during stay and recommended seroquel
Resolved.  Initially treated for MSSA PNA and was found to have B/L opacities on CXR. She completed 10-day course of oxacillin for MSSA grown from bronchial wash but then was having recurrent fevers  and a  lavage from trach had  MSSA  in it. She s/p 8 days of zosyn for the sputum growth and has been afebrile  and no leukocytosis.

## 2017-02-22 NOTE — PROGRESS NOTE ADULT - PROBLEM SELECTOR PROBLEM 3
Pneumonia due to Staphylococcus aureus
Bipolar disorder
Hypotension, unspecified
Hypotension, unspecified
Pneumonia due to Staphylococcus aureus
Bipolar disorder
Pneumonia due to Staphylococcus aureus
MICHAEL (obstructive sleep apnea)

## 2017-02-22 NOTE — PROGRESS NOTE ADULT - MINUTES
45
50
25
35
40
45
50
40
45
55
35
40
35

## 2017-02-22 NOTE — PROGRESS NOTE ADULT - PROBLEM SELECTOR PROBLEM 2
DVT (deep venous thrombosis)
DVT (deep venous thrombosis)
HOCM (hypertrophic obstructive cardiomyopathy)
DVT (deep venous thrombosis)

## 2017-02-23 VITALS
RESPIRATION RATE: 20 BRPM | OXYGEN SATURATION: 100 % | HEART RATE: 108 BPM | DIASTOLIC BLOOD PRESSURE: 61 MMHG | SYSTOLIC BLOOD PRESSURE: 100 MMHG

## 2017-02-23 LAB
ANION GAP SERPL CALC-SCNC: 8 MMOL/L — LOW (ref 9–16)
BASE EXCESS BLDA CALC-SCNC: 6.3 MMOL/L — HIGH (ref -2–3)
BUN SERPL-MCNC: 18 MG/DL — SIGNIFICANT CHANGE UP (ref 7–23)
CALCIUM SERPL-MCNC: 9.9 MG/DL — SIGNIFICANT CHANGE UP (ref 8.5–10.5)
CHLORIDE SERPL-SCNC: 96 MMOL/L — SIGNIFICANT CHANGE UP (ref 96–108)
CO2 SERPL-SCNC: 31 MMOL/L — SIGNIFICANT CHANGE UP (ref 22–31)
CREAT SERPL-MCNC: 0.61 MG/DL — SIGNIFICANT CHANGE UP (ref 0.5–1.3)
GAS PNL BLDA: SIGNIFICANT CHANGE UP
GLUCOSE SERPL-MCNC: 121 MG/DL — HIGH (ref 70–99)
HCO3 BLDA-SCNC: 36 MMOL/L — HIGH (ref 21–28)
HCT VFR BLD CALC: 36.5 % — SIGNIFICANT CHANGE UP (ref 34.5–45)
HGB BLD-MCNC: 11.2 G/DL — LOW (ref 11.5–15.5)
MAGNESIUM SERPL-MCNC: 1.7 MG/DL — SIGNIFICANT CHANGE UP (ref 1.6–2.4)
MCHC RBC-ENTMCNC: 22.9 PG — LOW (ref 27–34)
MCHC RBC-ENTMCNC: 30.7 G/DL — LOW (ref 32–36)
MCV RBC AUTO: 74.5 FL — LOW (ref 80–100)
PCO2 BLDA: 81 MMHG — CRITICAL HIGH (ref 32–45)
PH BLDA: 7.27 — LOW (ref 7.35–7.45)
PHOSPHATE SERPL-MCNC: 3.3 MG/DL — SIGNIFICANT CHANGE UP (ref 2.5–4.5)
PLATELET # BLD AUTO: 258 K/UL — SIGNIFICANT CHANGE UP (ref 150–400)
PO2 BLDA: 62 MMHG — LOW (ref 83–108)
POTASSIUM SERPL-MCNC: 3.9 MMOL/L — SIGNIFICANT CHANGE UP (ref 3.5–5.3)
POTASSIUM SERPL-SCNC: 3.9 MMOL/L — SIGNIFICANT CHANGE UP (ref 3.5–5.3)
RBC # BLD: 4.9 M/UL — SIGNIFICANT CHANGE UP (ref 3.8–5.2)
RBC # FLD: 23.2 % — HIGH (ref 10.3–16.9)
SAO2 % BLDA: 88 % — LOW (ref 95–100)
SODIUM SERPL-SCNC: 135 MMOL/L — SIGNIFICANT CHANGE UP (ref 135–145)
WBC # BLD: 6.6 K/UL — SIGNIFICANT CHANGE UP (ref 3.8–10.5)
WBC # FLD AUTO: 6.6 K/UL — SIGNIFICANT CHANGE UP (ref 3.8–10.5)

## 2017-02-23 PROCEDURE — 36415 COLL VENOUS BLD VENIPUNCTURE: CPT

## 2017-02-23 PROCEDURE — 85610 PROTHROMBIN TIME: CPT

## 2017-02-23 PROCEDURE — 96374 THER/PROPH/DIAG INJ IV PUSH: CPT | Mod: XU

## 2017-02-23 PROCEDURE — 82330 ASSAY OF CALCIUM: CPT

## 2017-02-23 PROCEDURE — 85027 COMPLETE CBC AUTOMATED: CPT

## 2017-02-23 PROCEDURE — 87040 BLOOD CULTURE FOR BACTERIA: CPT

## 2017-02-23 PROCEDURE — 94002 VENT MGMT INPAT INIT DAY: CPT

## 2017-02-23 PROCEDURE — 84100 ASSAY OF PHOSPHORUS: CPT

## 2017-02-23 PROCEDURE — 71045 X-RAY EXAM CHEST 1 VIEW: CPT

## 2017-02-23 PROCEDURE — 86900 BLOOD TYPING SEROLOGIC ABO: CPT

## 2017-02-23 PROCEDURE — 87798 DETECT AGENT NOS DNA AMP: CPT

## 2017-02-23 PROCEDURE — 81003 URINALYSIS AUTO W/O SCOPE: CPT

## 2017-02-23 PROCEDURE — 82040 ASSAY OF SERUM ALBUMIN: CPT

## 2017-02-23 PROCEDURE — 70450 CT HEAD/BRAIN W/O DYE: CPT

## 2017-02-23 PROCEDURE — 86146 BETA-2 GLYCOPROTEIN ANTIBODY: CPT

## 2017-02-23 PROCEDURE — 87086 URINE CULTURE/COLONY COUNT: CPT

## 2017-02-23 PROCEDURE — 74018 RADEX ABDOMEN 1 VIEW: CPT

## 2017-02-23 PROCEDURE — 97161 PT EVAL LOW COMPLEX 20 MIN: CPT

## 2017-02-23 PROCEDURE — 87581 M.PNEUMON DNA AMP PROBE: CPT

## 2017-02-23 PROCEDURE — 83036 HEMOGLOBIN GLYCOSYLATED A1C: CPT

## 2017-02-23 PROCEDURE — 31500 INSERT EMERGENCY AIRWAY: CPT

## 2017-02-23 PROCEDURE — 83540 ASSAY OF IRON: CPT

## 2017-02-23 PROCEDURE — 82150 ASSAY OF AMYLASE: CPT

## 2017-02-23 PROCEDURE — 83690 ASSAY OF LIPASE: CPT

## 2017-02-23 PROCEDURE — 87102 FUNGUS ISOLATION CULTURE: CPT

## 2017-02-23 PROCEDURE — 82728 ASSAY OF FERRITIN: CPT

## 2017-02-23 PROCEDURE — 83735 ASSAY OF MAGNESIUM: CPT

## 2017-02-23 PROCEDURE — 87116 MYCOBACTERIA CULTURE: CPT

## 2017-02-23 PROCEDURE — 97110 THERAPEUTIC EXERCISES: CPT

## 2017-02-23 PROCEDURE — 82553 CREATINE MB FRACTION: CPT

## 2017-02-23 PROCEDURE — 94660 CPAP INITIATION&MGMT: CPT

## 2017-02-23 PROCEDURE — 83880 ASSAY OF NATRIURETIC PEPTIDE: CPT

## 2017-02-23 PROCEDURE — 99232 SBSQ HOSP IP/OBS MODERATE 35: CPT

## 2017-02-23 PROCEDURE — L8699: CPT

## 2017-02-23 PROCEDURE — 81001 URINALYSIS AUTO W/SCOPE: CPT

## 2017-02-23 PROCEDURE — 83550 IRON BINDING TEST: CPT

## 2017-02-23 PROCEDURE — 84075 ASSAY ALKALINE PHOSPHATASE: CPT

## 2017-02-23 PROCEDURE — 86901 BLOOD TYPING SEROLOGIC RH(D): CPT

## 2017-02-23 PROCEDURE — 87252 VIRUS INOCULATION TISSUE: CPT

## 2017-02-23 PROCEDURE — 82550 ASSAY OF CK (CPK): CPT

## 2017-02-23 PROCEDURE — 87206 SMEAR FLUORESCENT/ACID STAI: CPT

## 2017-02-23 PROCEDURE — 84702 CHORIONIC GONADOTROPIN TEST: CPT

## 2017-02-23 PROCEDURE — 80053 COMPREHEN METABOLIC PANEL: CPT

## 2017-02-23 PROCEDURE — 94003 VENT MGMT INPAT SUBQ DAY: CPT

## 2017-02-23 PROCEDURE — 83605 ASSAY OF LACTIC ACID: CPT

## 2017-02-23 PROCEDURE — 86850 RBC ANTIBODY SCREEN: CPT

## 2017-02-23 PROCEDURE — 80048 BASIC METABOLIC PNL TOTAL CA: CPT

## 2017-02-23 PROCEDURE — 97116 GAIT TRAINING THERAPY: CPT

## 2017-02-23 PROCEDURE — 85384 FIBRINOGEN ACTIVITY: CPT

## 2017-02-23 PROCEDURE — 87486 CHLMYD PNEUM DNA AMP PROBE: CPT

## 2017-02-23 PROCEDURE — 99291 CRITICAL CARE FIRST HOUR: CPT | Mod: 25

## 2017-02-23 PROCEDURE — 87015 SPECIMEN INFECT AGNT CONCNTJ: CPT

## 2017-02-23 PROCEDURE — 74176 CT ABD & PELVIS W/O CONTRAST: CPT

## 2017-02-23 PROCEDURE — 89051 BODY FLUID CELL COUNT: CPT

## 2017-02-23 PROCEDURE — 84450 TRANSFERASE (AST) (SGOT): CPT

## 2017-02-23 PROCEDURE — 93005 ELECTROCARDIOGRAM TRACING: CPT | Mod: XU

## 2017-02-23 PROCEDURE — 97162 PT EVAL MOD COMPLEX 30 MIN: CPT

## 2017-02-23 PROCEDURE — 84295 ASSAY OF SERUM SODIUM: CPT

## 2017-02-23 PROCEDURE — 87633 RESP VIRUS 12-25 TARGETS: CPT

## 2017-02-23 PROCEDURE — 93306 TTE W/DOPPLER COMPLETE: CPT

## 2017-02-23 PROCEDURE — 82803 BLOOD GASES ANY COMBINATION: CPT

## 2017-02-23 PROCEDURE — 87186 SC STD MICRODIL/AGAR DIL: CPT

## 2017-02-23 PROCEDURE — 71275 CT ANGIOGRAPHY CHEST: CPT

## 2017-02-23 PROCEDURE — 87070 CULTURE OTHR SPECIMN AEROBIC: CPT

## 2017-02-23 PROCEDURE — 85598 HEXAGNAL PHOSPH PLTLT NEUTRL: CPT

## 2017-02-23 PROCEDURE — 93970 EXTREMITY STUDY: CPT

## 2017-02-23 PROCEDURE — 99292 CRITICAL CARE ADDL 30 MIN: CPT | Mod: 25

## 2017-02-23 PROCEDURE — 96375 TX/PRO/DX INJ NEW DRUG ADDON: CPT | Mod: XU

## 2017-02-23 PROCEDURE — 84460 ALANINE AMINO (ALT) (SGPT): CPT

## 2017-02-23 PROCEDURE — 85730 THROMBOPLASTIN TIME PARTIAL: CPT

## 2017-02-23 PROCEDURE — 76705 ECHO EXAM OF ABDOMEN: CPT

## 2017-02-23 PROCEDURE — 84132 ASSAY OF SERUM POTASSIUM: CPT

## 2017-02-23 PROCEDURE — 85025 COMPLETE CBC W/AUTO DIFF WBC: CPT

## 2017-02-23 PROCEDURE — 84484 ASSAY OF TROPONIN QUANT: CPT

## 2017-02-23 PROCEDURE — 97530 THERAPEUTIC ACTIVITIES: CPT

## 2017-02-23 RX ORDER — POTASSIUM CHLORIDE 20 MEQ
20 PACKET (EA) ORAL ONCE
Qty: 0 | Refills: 0 | Status: COMPLETED | OUTPATIENT
Start: 2017-02-23 | End: 2017-02-23

## 2017-02-23 RX ORDER — POLYETHYLENE GLYCOL 3350 17 G/17G
17 POWDER, FOR SOLUTION ORAL
Qty: 0 | Refills: 0 | COMMUNITY
Start: 2017-02-23

## 2017-02-23 RX ORDER — CLONAZEPAM 1 MG
1 TABLET ORAL
Qty: 0 | Refills: 0 | COMMUNITY
Start: 2017-02-23

## 2017-02-23 RX ORDER — CHLORHEXIDINE GLUCONATE 213 G/1000ML
15 SOLUTION TOPICAL
Qty: 0 | Refills: 0 | COMMUNITY
Start: 2017-02-23

## 2017-02-23 RX ORDER — MAGNESIUM SULFATE 500 MG/ML
2 VIAL (ML) INJECTION ONCE
Qty: 0 | Refills: 0 | Status: COMPLETED | OUTPATIENT
Start: 2017-02-23 | End: 2017-02-23

## 2017-02-23 RX ORDER — POTASSIUM CHLORIDE 20 MEQ
1 PACKET (EA) ORAL
Qty: 0 | Refills: 0 | COMMUNITY

## 2017-02-23 RX ORDER — QUETIAPINE FUMARATE 200 MG/1
1 TABLET, FILM COATED ORAL
Qty: 0 | Refills: 0 | COMMUNITY
Start: 2017-02-23

## 2017-02-23 RX ORDER — QUETIAPINE FUMARATE 200 MG/1
50 TABLET, FILM COATED ORAL
Qty: 0 | Refills: 0 | Status: DISCONTINUED | OUTPATIENT
Start: 2017-02-23 | End: 2017-02-23

## 2017-02-23 RX ORDER — HEPARIN SODIUM 5000 [USP'U]/ML
7500 INJECTION INTRAVENOUS; SUBCUTANEOUS
Qty: 0 | Refills: 0 | COMMUNITY
Start: 2017-02-23

## 2017-02-23 RX ADMIN — POLYETHYLENE GLYCOL 3350 17 GRAM(S): 17 POWDER, FOR SOLUTION ORAL at 12:39

## 2017-02-23 RX ADMIN — QUETIAPINE FUMARATE 100 MILLIGRAM(S): 200 TABLET, FILM COATED ORAL at 12:39

## 2017-02-23 RX ADMIN — Medication 12.5 MILLIGRAM(S): at 06:06

## 2017-02-23 RX ADMIN — HEPARIN SODIUM 7500 UNIT(S): 5000 INJECTION INTRAVENOUS; SUBCUTANEOUS at 14:06

## 2017-02-23 RX ADMIN — CHLORHEXIDINE GLUCONATE 15 MILLILITER(S): 213 SOLUTION TOPICAL at 06:07

## 2017-02-23 RX ADMIN — Medication 20 MILLIEQUIVALENT(S): at 07:21

## 2017-02-23 RX ADMIN — Medication 75 MILLIGRAM(S): at 12:39

## 2017-02-23 RX ADMIN — HEPARIN SODIUM 7500 UNIT(S): 5000 INJECTION INTRAVENOUS; SUBCUTANEOUS at 06:06

## 2017-02-23 RX ADMIN — Medication 1 MILLIGRAM(S): at 06:06

## 2017-02-23 RX ADMIN — Medication 50 GRAM(S): at 07:21

## 2017-02-23 NOTE — PROGRESS NOTE ADULT - GENERAL
not applicable
negative

## 2017-02-23 NOTE — PROGRESS NOTE ADULT - RESPIRATORY
detailed exam
Breath Sounds equal & clear to percussion & auscultation, no accessory muscle use
detailed exam
Breath Sounds equal & clear to percussion & auscultation, no accessory muscle use
detailed exam

## 2017-02-23 NOTE — PROGRESS NOTE ADULT - PROBLEM SELECTOR PROBLEM 1
Acute on chronic respiratory failure with hypercapnia
Pneumonia due to Staphylococcus aureus

## 2017-02-23 NOTE — DISCHARGE NOTE ADULT - PLAN OF CARE
STAY ON VENTILATOR and eventual placement of diaphragmatic pacemaker You presented with respiratory failure likely triggered by a combination of pneumonia and the paralysis of your right hemidiaphragm. It is likely that you have central apnea which could be contributing to your respiratory status, however, genetic testing needs to be done in order to confirm or rule out the diagnosis of central apnea, and such test was ordered for you, however, they take longer than regular tests to be reported and we do not have the results back yet. As soon as we get the results, you will be called to be informed of the results. You completed a course of antibiotics for your pneumonia. Your respiration, however, is still dependent on the ventilator. It is important that once you get stronger you get evaluated for placement of a pacemaker for your diaphragm to help with your breathing. PLEASE KEEP PATIENT ON VENTILATOR SUPPORT WITH A SETTING OF FIO2 40%, PEEP OF 8, TIDAL VOLUME , AND RESPIRATORY RATE OF 12 AT NIGHT AND WHENEVER PATIENT IS SLEEPING AS PATIENT MAY HAVE CONGENITAL HYPOVENTILATION SYNDROME. Cardiology was consulted for your cardiomyopathy and for a small pause found on telemetry, however, the pause is believed to be related to your central apnea and that no further intervention is needed at this time considering that it was short pause. Please continue taking your medications as prescribed. Please continue taking your medications as prescribed While in the ICU, you were presenting with delirium and you were started on Seroquel which has been gradually titrated down, with the last change done today, decreasing it from 100mg BID, to 50mg BID. Please follow up with your PMD and continue titrating it down until discontinuing. Please continue your Jevity 1.2 Tim feeds via the PEG tube. Patient to receive a total volume of 1440 in 24hrs for a total of 6 cans per day, at a rate of 60mL per hour for 24hrs. You presented with respiratory failure likely triggered by a combination of pneumonia and the paralysis of your right hemidiaphragm. It is likely that you have central apnea which could be contributing to your respiratory status, however, genetic testing needs to be done in order to confirm or rule out the diagnosis of central apnea, and such test was ordered for you, however, they take longer than regular tests to be reported and we do not have the results back yet. As soon as we get the results, you will be called to be informed of the results. You completed a course of antibiotics for your pneumonia. Your respiration, however, is still dependent on the ventilator. It is important that once you get stronger you get evaluated for placement of a pacemaker for your diaphragm to help with your breathing. PLEASE KEEP PATIENT ON VENTILATOR SUPPORT WITH A SETTING OF FIO2 40%, PEEP OF 8, TIDAL VOLUME , AND RESPIRATORY RATE OF 12 AT NIGHT AND WHENEVER PATIENT IS SLEEPING AS PATIENT MAY HAVE CONGENITAL HYPOVENTILATION SYNDROME. Please follow up with Dr. Fishman. Please continue taking your medications as prescribed. You presented with respiratory failure likely triggered by a combination of pneumonia and the paralysis of your right hemidiaphragm. It is likely that you have central apnea which could be contributing to your respiratory status, however, genetic testing needs to be done in order to confirm or rule out the diagnosis of central apnea, and such test was ordered for you, however, they take longer than regular tests to be reported and we do not have the results back yet. As soon as we get the results, you will be called to be informed of the results. You completed a course of antibiotics for your pneumonia. Your respiration, however, is still dependent on the ventilator. It is important that once you get stronger you get evaluated for placement of a pacemaker for your diaphragm to help with your breathing. PLEASE KEEP PATIENT ON VENTILATOR SUPPORT WITH A SETTING OF FIO2 40%, PEEP OF 8, TIDAL VOLUME , AND RESPIRATORY RATE OF 12 AT NIGHT AND WHENEVER PATIENT IS SLEEPING AS PATIENT MAY HAVE CONGENITAL HYPOVENTILATION SYNDROME. Please follow up with Dr. Fishman in 1 week after discharge. You presented with respiratory failure likely triggered by a combination of pneumonia and the paralysis of your right hemidiaphragm. It is likely that you have central apnea which could be contributing to your respiratory status, however, genetic testing needs to be done in order to confirm or rule out the diagnosis of central apnea, and such test was ordered for you, however, they take longer than regular tests to be reported and we do not have the results back yet. As soon as we get the results, you will be called to be informed of the results. You completed a course of antibiotics for your pneumonia. Your respiration, however, is still dependent on the ventilator. It is important that once you get stronger you get evaluated for placement of a pacemaker for your diaphragm to help with your breathing. PLEASE KEEP PATIENT ON VENTILATOR SUPPORT WITH A SETTING OF FIO2 40%, PEEP OF 8, TIDAL VOLUME , AND RESPIRATORY RATE OF 12 AT NIGHT AND WHENEVER PATIENT IS SLEEPING AS PATIENT LIKELY HAVE CONGENITAL HYPOVENTILATION SYNDROME. Please follow up with Dr. Fishman in 1 week after discharge .

## 2017-02-23 NOTE — DISCHARGE NOTE ADULT - PATIENT PORTAL LINK FT
“You can access the [facility] Patient Portal, offered by [facility], by registering with the following website: [email or web address]”

## 2017-02-23 NOTE — PROGRESS NOTE ADULT - NS NEC GEN PE MLT EXAM PC
No bruits; no thyromegaly or nodules
detailed exam
No bruits; no thyromegaly or nodules

## 2017-02-23 NOTE — PROGRESS NOTE ADULT - EXTREMITIES
No cyanosis, clubbing or edema

## 2017-02-23 NOTE — DISCHARGE NOTE ADULT - HOSPITAL COURSE
30 y/o F poor historian, PMHx HTN, HLD, paroxysmal afib (reports during pregnancy), congenital cardiac malformation s/p repair, HOCM, MICHAEL, DVT in 2008 (s/p Coumadin last dose 2009), h/o UGIB s/p endoscopy, recent 2 day admission (Jan 2017) to [facility] for SOB/CP/n/v r/o ACS, ECHO (EF 60%, normal sized LA, severely asymmetric septal hypertrophy) admission c/b 10 minute unresponsiveness, resolving on its own with unremarkable w/u, presented to  ED (1/27 at night) again per the patient with similar symptoms that brought her to the hospital earlier this month, SOB/CP/n/v. Patient was admitted to MICU for hypercapnic respiratory failure 2/2 MSSA PNA and was emergently intubated  s/p 10-day course of oxacillin. With persistent fevers and started on Zosyn for additional coverage for likely new pneumonia (grown MSSA on BAL after trach)  which resolved well with tx (last day of ABX 2/13)    Patient, during stay, was showing signs of central apnea and becoming apneic very quickly on CPAP trial therefore she required a Trach/PEG. Her father had central apnea syndrome and required a pacemaker in his diaphragm which she will likely need in the upcoming future (Plans of it happening in [facility] where her father had it). 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 US of Diaphragm showed poor movement in the Rt hemidiaphragm especially. Following Trach, though patient began to improve significantly clinically and was able to tolerate CPAP trial well.     Cardiovascularly this patient has HOCM and Cardio evaluated her and recommended to start BB. At first t0 was held in the setting of her MSSA sepsis but then was restarted Currently oN metoprolol. Patient had 2 sinus pauses overnight and EP was consulted but believe that was 2/2 patient's central apnea. Additionally, although the patient is usually hypertensive after she was cleared of her infection she  has had several episodes of transient drops in BP which resolves either spontaneously or with fluid boluses. With this in mind all anti-HTN meds are currently held.     Finally, of note patient has had several episodes of ICU delirium and was on home risperidone and effexor for Bipolar disorder. Psych was consulted and started her on seroquel which we have begun to titrate and her home effexor.     Patient has stable VSS with no active medical issues at this time and will be monitored on 7 LA where she was stable and had a trial of trach collar (from [facility]) and patient tolerated it well but then patient became sleepy and ABG showed respiratory acidosis. Patient placed back on ventilator and was stable. Patient now can go to LTAC  for eventual appt. for diaphragm pacemaker to be placed.

## 2017-02-23 NOTE — DISCHARGE NOTE ADULT - SECONDARY DIAGNOSIS.
HOCM (hypertrophic obstructive cardiomyopathy) Bipolar affective disorder, remission status unspecified Delirium Nutrition, metabolism, and development symptoms

## 2017-02-23 NOTE — PROGRESS NOTE ADULT - ENDOCRINE
not applicable
negative
not applicable

## 2017-02-23 NOTE — PROGRESS NOTE ADULT - ASSESSMENT
30 y/o F with PMHx HTN, HLD, congenital ASD s/p repair, MICHAEL, HOCM, family h/o of neuromuscular hypoventilation syndrome, DVT in 2008 (s/p Coumadin last dose 2009) admitted to MICU after hypercapnic respiratory failure 2/2 MSSA PNA, was doing well after 10 day course of oxacillin. Then developed persistent fevers and started on Zosyn for additional coverage for new pneumonia (last day of abx 2/13) and now s/p trach/ PEG for respiratory status.
30 yo woman with hypercapneic respiratory failure likely secondary to congenital hypoventilation syndrome.
31 year old female with extensive medical history now on with trach placement. s/p PEG tube placement. PEG site c/d/i no erythema or discharge appreciated.      -Can start PEG feeds today   -Bacitracin around PEG site  -Monitor for erythema or discharge.   -Continue PPI     Thank you for your consult, please reconsult as needed.
31 year old woman with hypercapneic respiratory failure of uncertain etiology, possibly central hypoventilation syndrome.
31 year old woman with hypercapneic respiratory failure of uncertain etiology, possibly central hypoventilation syndrome. The patient was diagnosed with "sleep apnea" when she was 5 years old and has had to use CPAP at night ever since.  Her father has central hypoventilation syndrome with a +PHOX2B gene and has diagraphmatic pacemaker.    - Will d/w Attending
32 y/o F with PMHx HTN, HLD, congenital ASD s/p repair, MICHAEL, HOCM, family h/o of neuromuscular hypoventilation syndrome, DVT in 2008 (s/p Coumadin last dose 2009) admitted to MICU after hypercapnic respiratory failure 2/2 a combination of multiple etiologies including MSSA PNA, and weakness of R hemidiaphragm and central apnea. Patient now s/p trach/PEG for respiratory status.
31 year old woman with hypercapneic respiratory failure of uncertain etiology, possibly central hypoventilation syndrome.

## 2017-02-23 NOTE — PROGRESS NOTE ADULT - I WAS PHYSICALLY PRESENT FOR THE KEY PORTIONS OF THE EVALUATION AND MANAGEMENT (E/M) SERVICE PROVIDED.  I AGREE WITH THE ABOVE HISTORY, PHYSICAL, AND PLAN WHICH I HAVE REVIEWED AND EDITED WHERE APPROPRIATE

## 2017-02-23 NOTE — DISCHARGE NOTE ADULT - MEDICATION SUMMARY - MEDICATIONS TO TAKE
I will START or STAY ON the medications listed below when I get home from the hospital:    heparin  -- 7500 unit(s) subcutaneous every 8 hours  -- Indication: For DVT (deep venous thrombosis) Ppx    clonazePAM 1 mg oral tablet  -- 1 tab(s) by mouth every 12 hours  -- Indication: For Prophylactic measure    QUEtiapine 50 mg oral tablet  -- 1 tab(s) by mouth 2 times a day  -- Indication: For Prophylactic measure - agitation    chlorhexidine 0.12% mucous membrane liquid  -- 15 milliliter(s) mucous membrane 2 times a day  -- Indication: For Prophylactic measure - on ventillator     polyethylene glycol 3350 oral powder for reconstitution  -- 17 gram(s) by mouth once a day  -- Indication: For Prophylactic measure- constipation

## 2017-02-23 NOTE — PROGRESS NOTE ADULT - ATTENDING COMMENTS
Improved, now off precedex, still mildly agitated.  Placed back on vent for unclear reasons.    A- respiratory failure/ central hypoventilation/delerium    Suggest  trach collar  night time CPAP  continue psyche med seroquel  no change in feeds
the patient was seen and examined.  I discussed the case in details with the resident and fellow.  I reviewed labs, meds, CXR, and clinical status.  I rounded on the patient.  Plan is outlined.  the patient's x-ray revealed right upper lobe and right lower lobe pneumonia. Sputum culture grew staph. Vancomycin was added. The patient hemodynamically stable. The ventilator setting adjusted.
Seen and discussed on rounds.  Continued delerium, but less agitated, chest clear, cor rr, abdomen soft, ext without edema  labs , xray above    A respiratory failure/ central hyoventilation /deleruim    Suggest:  - try trach collar  -mobilize to chair  -feeds as ordered  - taper precedex  -continue psyche meds
this afternoon she is intubated and sedated but awake and responds to commands. When asked she is breathing with a tidal volume of about 300 mL, when she was left alone she became apneic.  her original arterial blood gases on admission are consistent with chronic hypoventilation, with an estimated baseline CO2 probably in the mid 50s to 60s. She should be ventilated additionally to maintain a normal pH, not pCO2 to avoid alkalosis. She will likely need long-term ventilatory support, at least at night.
The patient was seen and examined. I discussed the case with critical care and pulmonary fellow. Patient was stable and she was extubated. The patient deteriorated after that require repeat patient. Pace patient most likely would require tracheostomy. Continue catechetical relational weekend and reevaluate for extubation versus tracheostomy
I discussed the case with reticular medicine. The patient developed a nosocomial pneumonia. She still requires sedation for her agitation and to control her respiratory pattern. Continue current treatment and evaluate for possible tracheostomy and she is not extubated by Monday
The patient was seen and examined. I discussed the case with critical care and pulmonary fellow. Patient was stable and she was extubated. The patient deteriorated after that require repeat patient.
The patient was seen and examined. I discussed the case with critical care and pulmonary fellow. Patient was stable and she was extubated. The patient deteriorated after that require repeat patient.  Maintain negative fluid balance
The patient was seen and examined. I discussed the case with critical care and pulmonary fellow. Patient was stable and she was extubated. The patient deteriorated after that require repeat patient.  Maintain negative fluid balance.  Patient developed EKG changes consistent with PE and she is for CT scan of chest to ue out PE
The patient was seen and examined. I discussed the case with critical care and pulmonary fellow. Patient was stable and she was extubated. The patient deteriorated after that require repeat patient.  Maintain negative fluid balance.  Patient developed EKG changes consistent with PE and she is for CT scan of chest to ue out PE. NO change in exam.  She is on less sedative.  No weani g trial
The patient was seen and examined. I discussed the case with critical care and pulmonary fellow. Patient was stable and she was extubated. The patient deteriorated after that require repeat patient. Pa
The patient was seen and examined. I discussed the case with critical care and pulmonary fellow. Patient was stable and she was extubated. The patient deteriorated after that require repeat patient. Pace patient most likely would require tracheostomy. Continue catechetical relational weekend and reevaluate for extubation versus tracheostomy
patient was seen and examined.  The case was discussed with the fellow and plan outlined
Awaiting genetic analysis. May require diaphragmatic pacing if congenital alveolar hypoventilation is proven  Will check values of PImax and VC prior to trach. Will ask Dr. Fishman if Po.i was assessed  She is put on PSV 8 cm with CPAP of 5 cm at night  Remains on trach collar during the day  Will check ABG on above settings   The ABG during the day time was 7.37/57/92 on trach collar (?FIO2)

## 2017-02-23 NOTE — PROGRESS NOTE ADULT - SUBJECTIVE AND OBJECTIVE BOX
Interval Events:  Patient seen and examined at bedside.    MEDICATIONS:  Pulmonary:    Antimicrobials:    Anticoagulants:  heparin  Injectable 7500Unit(s) SubCutaneous every 8 hours    Cardiac:  metoprolol 12.5milliGRAM(s) Oral two times a day    Endocrine:  insulin lispro (HumaLOG) corrective regimen sliding scale  SubCutaneous Before meals and at bedtime    Allergies    No Known Drug Allergies  Seafood (Rash)    Intolerances        Vital Signs Last 24 Hrs  T(C): 37.2, Max: 37.2 (02-22 @ 09:35)  T(F): 98.9, Max: 99 (02-22 @ 09:35)  HR: 100 (100 - 118)  BP: 106/74 (103/60 - 124/69)  BP(mean): 86 (76 - 91)  RR: 18 (18 - 19)  SpO2: 100% (97% - 100%)    I & Os for current day (as of 02-23 @ 09:08)  =============================================  IN: 698.5 ml / OUT: 0 ml / NET: 698.5 ml    Mode: CPAP with PS  FiO2: 40  PEEP: 5  MAP: 5.1  PIP: 18      LABS:  ABG - ( 22 Feb 2017 13:45 )  pH: 7.37  /  pCO2: 57    /  pO2: 92    / HCO3: 33    / Base Excess: 6.0   /  SaO2: 96                  CBC Full  -  ( 23 Feb 2017 05:26 )  WBC Count : 6.6 K/uL  Hemoglobin : 11.2 g/dL  Hematocrit : 36.5 %  Platelet Count - Automated : 258 K/uL  Mean Cell Volume : 74.5 fL  Mean Cell Hemoglobin : 22.9 pg  Mean Cell Hemoglobin Concentration : 30.7 g/dL      23 Feb 2017 05:24    135    |  96     |  18     ----------------------------<  121    3.9     |  31     |  0.61     Ca    9.9        23 Feb 2017 05:24  Phos  3.3       23 Feb 2017 05:24  Mg     1.7       23 Feb 2017 05:24                        RADIOLOGY & ADDITIONAL STUDIES (The following images were personally reviewed): Interval Events:  Patient seen and examined at bedside. Appears comfortable    MEDICATIONS:  Pulmonary:    Antimicrobials:    Anticoagulants:  heparin  Injectable 7500Unit(s) SubCutaneous every 8 hours    Cardiac:  metoprolol 12.5milliGRAM(s) Oral two times a day    Endocrine:  insulin lispro (HumaLOG) corrective regimen sliding scale  SubCutaneous Before meals and at bedtime    Allergies    No Known Drug Allergies  Seafood (Rash)    Intolerances        Vital Signs Last 24 Hrs  T(C): 37.2, Max: 37.2 (02-22 @ 09:35)  T(F): 98.9, Max: 99 (02-22 @ 09:35)  HR: 100 (100 - 118)  BP: 106/74 (103/60 - 124/69)  BP(mean): 86 (76 - 91)  RR: 18 (18 - 19)  SpO2: 100% (97% - 100%)    I & Os for current day (as of 02-23 @ 09:08)  =============================================  IN: 698.5 ml / OUT: 0 ml / NET: 698.5 ml    Mode: CPAP with PS  FiO2: 40  PEEP: 5  MAP: 5.1  PIP: 18      LABS:  ABG - ( 22 Feb 2017 13:45 )  pH: 7.37  /  pCO2: 57    /  pO2: 92    / HCO3: 33    / Base Excess: 6.0   /  SaO2: 96                  CBC Full  -  ( 23 Feb 2017 05:26 )  WBC Count : 6.6 K/uL  Hemoglobin : 11.2 g/dL  Hematocrit : 36.5 %  Platelet Count - Automated : 258 K/uL  Mean Cell Volume : 74.5 fL  Mean Cell Hemoglobin : 22.9 pg  Mean Cell Hemoglobin Concentration : 30.7 g/dL      23 Feb 2017 05:24    135    |  96     |  18     ----------------------------<  121    3.9     |  31     |  0.61     Ca    9.9        23 Feb 2017 05:24  Phos  3.3       23 Feb 2017 05:24  Mg     1.7       23 Feb 2017 05:24                        RADIOLOGY & ADDITIONAL STUDIES (The following images were personally reviewed):

## 2017-02-23 NOTE — PROGRESS NOTE ADULT - PROBLEM SELECTOR PLAN 1
Patient with hypercapneic respiratory failure likely 2/2 to central hypoventilation syndrome.    -Patient is currently Trach/PEG tolerating Trach collar during the day, doing well  - Would c/w Trach collar during the day and on vent overnight  - Patient will need LTACH facility vs. Other facility with vent for monitoring of respiratory status until confirmation of PHOX2B gene and potential referral for diaphragmatic PM  - c/w PT  - Encourage OOB to chair, ambulation  - Pulmonary Toiletting

## 2017-02-23 NOTE — DISCHARGE NOTE ADULT - CARE PROVIDER_API CALL
Rissa Fishman), Critical Care Medicine; Pulmonary Disease  [address] [place] [address]  Phone: [phone]  Fax: [phone]

## 2017-02-23 NOTE — PROGRESS NOTE ADULT - CONSTITUTIONAL
Well-developed, well nourished
detailed exam
Well-developed, well nourished
detailed exam

## 2017-02-23 NOTE — DISCHARGE NOTE ADULT - MEDICATION SUMMARY - MEDICATIONS TO STOP TAKING
I will STOP taking the medications listed below when I get home from the hospital:    Klor-Con 10 mEq oral tablet, extended release  -- 1 tab(s) by mouth once a day

## 2017-02-23 NOTE — DISCHARGE NOTE ADULT - CARE PLAN
Principal Discharge DX:	Acute on chronic respiratory failure with hypercapnia  Goal:	STAY ON VENTILATOR and eventual placement of diaphragmatic pacemaker  Instructions for follow-up, activity and diet:	You presented with respiratory failure likely triggered by a combination of pneumonia and the paralysis of your right hemidiaphragm. It is likely that you have central apnea which could be contributing to your respiratory status, however, genetic testing needs to be done in order to confirm or rule out the diagnosis of central apnea, and such test was ordered for you, however, they take longer than regular tests to be reported and we do not have the results back yet. As soon as we get the results, you will be called to be informed of the results. You completed a course of antibiotics for your pneumonia. Your respiration, however, is still dependent on the ventilator. It is important that once you get stronger you get evaluated for placement of a pacemaker for your diaphragm to help with your breathing. PLEASE KEEP PATIENT ON VENTILATOR SUPPORT WITH A SETTING OF FIO2 40%, PEEP OF 8, TIDAL VOLUME , AND RESPIRATORY RATE OF 12 AT NIGHT AND WHENEVER PATIENT IS SLEEPING AS PATIENT MAY HAVE CONGENITAL HYPOVENTILATION SYNDROME.  Secondary Diagnosis:	HOCM (hypertrophic obstructive cardiomyopathy)  Instructions for follow-up, activity and diet:	Cardiology was consulted for your cardiomyopathy and for a small pause found on telemetry, however, the pause is believed to be related to your central apnea and that no further intervention is needed at this time considering that it was short pause. Please continue taking your medications as prescribed.  Secondary Diagnosis:	Bipolar affective disorder, remission status unspecified  Instructions for follow-up, activity and diet:	Please continue taking your medications as prescribed  Secondary Diagnosis:	Delirium  Instructions for follow-up, activity and diet:	While in the ICU, you were presenting with delirium and you were started on Seroquel which has been gradually titrated down, with the last change done today, decreasing it from 100mg BID, to 50mg BID. Please follow up with your PMD and continue titrating it down until discontinuing. Principal Discharge DX:	Acute on chronic respiratory failure with hypercapnia  Goal:	STAY ON VENTILATOR and eventual placement of diaphragmatic pacemaker  Instructions for follow-up, activity and diet:	You presented with respiratory failure likely triggered by a combination of pneumonia and the paralysis of your right hemidiaphragm. It is likely that you have central apnea which could be contributing to your respiratory status, however, genetic testing needs to be done in order to confirm or rule out the diagnosis of central apnea, and such test was ordered for you, however, they take longer than regular tests to be reported and we do not have the results back yet. As soon as we get the results, you will be called to be informed of the results. You completed a course of antibiotics for your pneumonia. Your respiration, however, is still dependent on the ventilator. It is important that once you get stronger you get evaluated for placement of a pacemaker for your diaphragm to help with your breathing. PLEASE KEEP PATIENT ON VENTILATOR SUPPORT WITH A SETTING OF FIO2 40%, PEEP OF 8, TIDAL VOLUME , AND RESPIRATORY RATE OF 12 AT NIGHT AND WHENEVER PATIENT IS SLEEPING AS PATIENT MAY HAVE CONGENITAL HYPOVENTILATION SYNDROME. Please follow up with Dr. Fishman.  Secondary Diagnosis:	HOCM (hypertrophic obstructive cardiomyopathy)  Instructions for follow-up, activity and diet:	Cardiology was consulted for your cardiomyopathy and for a small pause found on telemetry, however, the pause is believed to be related to your central apnea and that no further intervention is needed at this time considering that it was short pause. Please continue taking your medications as prescribed.  Secondary Diagnosis:	Bipolar affective disorder, remission status unspecified  Instructions for follow-up, activity and diet:	Please continue taking your medications as prescribed.  Secondary Diagnosis:	Delirium  Instructions for follow-up, activity and diet:	While in the ICU, you were presenting with delirium and you were started on Seroquel which has been gradually titrated down, with the last change done today, decreasing it from 100mg BID, to 50mg BID. Please follow up with your PMD and continue titrating it down until discontinuing.  Secondary Diagnosis:	Nutrition, metabolism, and development symptoms  Instructions for follow-up, activity and diet:	Please continue your Jevity 1.2 Tim feeds via the PEG tube. Patient to receive a total volume of 1440 in 24hrs for a total of 6 cans per day, at a rate of 60mL per hour for 24hrs. Principal Discharge DX:	Acute on chronic respiratory failure with hypercapnia  Goal:	STAY ON VENTILATOR and eventual placement of diaphragmatic pacemaker  Instructions for follow-up, activity and diet:	You presented with respiratory failure likely triggered by a combination of pneumonia and the paralysis of your right hemidiaphragm. It is likely that you have central apnea which could be contributing to your respiratory status, however, genetic testing needs to be done in order to confirm or rule out the diagnosis of central apnea, and such test was ordered for you, however, they take longer than regular tests to be reported and we do not have the results back yet. As soon as we get the results, you will be called to be informed of the results. You completed a course of antibiotics for your pneumonia. Your respiration, however, is still dependent on the ventilator. It is important that once you get stronger you get evaluated for placement of a pacemaker for your diaphragm to help with your breathing. PLEASE KEEP PATIENT ON VENTILATOR SUPPORT WITH A SETTING OF FIO2 40%, PEEP OF 8, TIDAL VOLUME , AND RESPIRATORY RATE OF 12 AT NIGHT AND WHENEVER PATIENT IS SLEEPING AS PATIENT MAY HAVE CONGENITAL HYPOVENTILATION SYNDROME. Please follow up with Dr. Fishman in 1 week after discharge.  Secondary Diagnosis:	HOCM (hypertrophic obstructive cardiomyopathy)  Instructions for follow-up, activity and diet:	Cardiology was consulted for your cardiomyopathy and for a small pause found on telemetry, however, the pause is believed to be related to your central apnea and that no further intervention is needed at this time considering that it was short pause. Please continue taking your medications as prescribed.  Secondary Diagnosis:	Bipolar affective disorder, remission status unspecified  Instructions for follow-up, activity and diet:	Please continue taking your medications as prescribed.  Secondary Diagnosis:	Delirium  Instructions for follow-up, activity and diet:	While in the ICU, you were presenting with delirium and you were started on Seroquel which has been gradually titrated down, with the last change done today, decreasing it from 100mg BID, to 50mg BID. Please follow up with your PMD and continue titrating it down until discontinuing.  Secondary Diagnosis:	Nutrition, metabolism, and development symptoms  Instructions for follow-up, activity and diet:	Please continue your Jevity 1.2 Tim feeds via the PEG tube. Patient to receive a total volume of 1440 in 24hrs for a total of 6 cans per day, at a rate of 60mL per hour for 24hrs. Principal Discharge DX:	Acute on chronic respiratory failure with hypercapnia  Goal:	STAY ON VENTILATOR and eventual placement of diaphragmatic pacemaker  Instructions for follow-up, activity and diet:	You presented with respiratory failure likely triggered by a combination of pneumonia and the paralysis of your right hemidiaphragm. It is likely that you have central apnea which could be contributing to your respiratory status, however, genetic testing needs to be done in order to confirm or rule out the diagnosis of central apnea, and such test was ordered for you, however, they take longer than regular tests to be reported and we do not have the results back yet. As soon as we get the results, you will be called to be informed of the results. You completed a course of antibiotics for your pneumonia. Your respiration, however, is still dependent on the ventilator. It is important that once you get stronger you get evaluated for placement of a pacemaker for your diaphragm to help with your breathing. PLEASE KEEP PATIENT ON VENTILATOR SUPPORT WITH A SETTING OF FIO2 40%, PEEP OF 8, TIDAL VOLUME , AND RESPIRATORY RATE OF 12 AT NIGHT AND WHENEVER PATIENT IS SLEEPING AS PATIENT LIKELY HAVE CONGENITAL HYPOVENTILATION SYNDROME. Please follow up with Dr. Fishman in 1 week after discharge .  Secondary Diagnosis:	HOCM (hypertrophic obstructive cardiomyopathy)  Instructions for follow-up, activity and diet:	Cardiology was consulted for your cardiomyopathy and for a small pause found on telemetry, however, the pause is believed to be related to your central apnea and that no further intervention is needed at this time considering that it was short pause. Please continue taking your medications as prescribed.  Secondary Diagnosis:	Bipolar affective disorder, remission status unspecified  Instructions for follow-up, activity and diet:	Please continue taking your medications as prescribed.  Secondary Diagnosis:	Delirium  Instructions for follow-up, activity and diet:	While in the ICU, you were presenting with delirium and you were started on Seroquel which has been gradually titrated down, with the last change done today, decreasing it from 100mg BID, to 50mg BID. Please follow up with your PMD and continue titrating it down until discontinuing.  Secondary Diagnosis:	Nutrition, metabolism, and development symptoms  Instructions for follow-up, activity and diet:	Please continue your Jevity 1.2 Tim feeds via the PEG tube. Patient to receive a total volume of 1440 in 24hrs for a total of 6 cans per day, at a rate of 60mL per hour for 24hrs.

## 2017-02-27 LAB — RESPIRATORY PATH PNL SPEC CULT: SIGNIFICANT CHANGE UP

## 2017-03-01 DIAGNOSIS — J90 PLEURAL EFFUSION, NOT ELSEWHERE CLASSIFIED: ICD-10-CM

## 2017-03-01 DIAGNOSIS — Z91.013 ALLERGY TO SEAFOOD: ICD-10-CM

## 2017-03-01 DIAGNOSIS — G47.35 CONGENITAL CENTRAL ALVEOLAR HYPOVENTILATION SYNDROME: ICD-10-CM

## 2017-03-01 DIAGNOSIS — J96.22 ACUTE AND CHRONIC RESPIRATORY FAILURE WITH HYPERCAPNIA: ICD-10-CM

## 2017-03-01 DIAGNOSIS — G92 TOXIC ENCEPHALOPATHY: ICD-10-CM

## 2017-03-01 DIAGNOSIS — J15.211 PNEUMONIA DUE TO METHICILLIN SUSCEPTIBLE STAPHYLOCOCCUS AUREUS: ICD-10-CM

## 2017-03-01 DIAGNOSIS — I11.0 HYPERTENSIVE HEART DISEASE WITH HEART FAILURE: ICD-10-CM

## 2017-03-01 DIAGNOSIS — G47.33 OBSTRUCTIVE SLEEP APNEA (ADULT) (PEDIATRIC): ICD-10-CM

## 2017-03-01 DIAGNOSIS — I50.22 CHRONIC SYSTOLIC (CONGESTIVE) HEART FAILURE: ICD-10-CM

## 2017-03-01 DIAGNOSIS — I25.2 OLD MYOCARDIAL INFARCTION: ICD-10-CM

## 2017-03-01 DIAGNOSIS — I95.9 HYPOTENSION, UNSPECIFIED: ICD-10-CM

## 2017-03-01 DIAGNOSIS — D69.6 THROMBOCYTOPENIA, UNSPECIFIED: ICD-10-CM

## 2017-03-01 DIAGNOSIS — E78.5 HYPERLIPIDEMIA, UNSPECIFIED: ICD-10-CM

## 2017-03-01 DIAGNOSIS — I48.91 UNSPECIFIED ATRIAL FIBRILLATION: ICD-10-CM

## 2017-03-01 DIAGNOSIS — E87.2 ACIDOSIS: ICD-10-CM

## 2017-03-01 DIAGNOSIS — E66.9 OBESITY, UNSPECIFIED: ICD-10-CM

## 2017-03-01 DIAGNOSIS — J18.9 PNEUMONIA, UNSPECIFIED ORGANISM: ICD-10-CM

## 2017-03-01 DIAGNOSIS — F31.9 BIPOLAR DISORDER, UNSPECIFIED: ICD-10-CM

## 2017-03-01 DIAGNOSIS — Z86.718 PERSONAL HISTORY OF OTHER VENOUS THROMBOSIS AND EMBOLISM: ICD-10-CM

## 2017-03-01 LAB
CULTURE RESULTS: SIGNIFICANT CHANGE UP
SPECIMEN SOURCE: SIGNIFICANT CHANGE UP

## 2017-03-11 LAB
CULTURE RESULTS: SIGNIFICANT CHANGE UP
SPECIMEN SOURCE: SIGNIFICANT CHANGE UP

## 2017-03-15 LAB
CULTURE RESULTS: SIGNIFICANT CHANGE UP
SPECIMEN SOURCE: SIGNIFICANT CHANGE UP

## 2017-03-23 LAB — MISCELLANEOUS TEST NAME: SIGNIFICANT CHANGE UP

## 2017-03-25 LAB
CULTURE RESULTS: SIGNIFICANT CHANGE UP
SPECIMEN SOURCE: SIGNIFICANT CHANGE UP

## 2017-07-09 ENCOUNTER — EMERGENCY (EMERGENCY)
Facility: HOSPITAL | Age: 32
LOS: 1 days | Discharge: HOME | End: 2017-07-09
Attending: EMERGENCY MEDICINE | Admitting: EMERGENCY MEDICINE
Payer: MEDICAID

## 2017-07-09 VITALS
DIASTOLIC BLOOD PRESSURE: 96 MMHG | TEMPERATURE: 99 F | SYSTOLIC BLOOD PRESSURE: 139 MMHG | HEART RATE: 107 BPM | OXYGEN SATURATION: 98 % | RESPIRATION RATE: 18 BRPM

## 2017-07-09 DIAGNOSIS — Z79.899 OTHER LONG TERM (CURRENT) DRUG THERAPY: ICD-10-CM

## 2017-07-09 DIAGNOSIS — Z91.013 ALLERGY TO SEAFOOD: ICD-10-CM

## 2017-07-09 DIAGNOSIS — R10.84 GENERALIZED ABDOMINAL PAIN: ICD-10-CM

## 2017-07-09 DIAGNOSIS — I10 ESSENTIAL (PRIMARY) HYPERTENSION: ICD-10-CM

## 2017-07-09 DIAGNOSIS — Q24.9 CONGENITAL MALFORMATION OF HEART, UNSPECIFIED: Chronic | ICD-10-CM

## 2017-07-09 DIAGNOSIS — E78.5 HYPERLIPIDEMIA, UNSPECIFIED: ICD-10-CM

## 2017-07-09 LAB
ALBUMIN SERPL ELPH-MCNC: 3.9 G/DL — SIGNIFICANT CHANGE UP (ref 3.3–5)
ALP SERPL-CCNC: 75 U/L — SIGNIFICANT CHANGE UP (ref 40–120)
ALT FLD-CCNC: 12 U/L — SIGNIFICANT CHANGE UP (ref 10–45)
ANION GAP SERPL CALC-SCNC: 12 MMOL/L — SIGNIFICANT CHANGE UP (ref 5–17)
APPEARANCE UR: SIGNIFICANT CHANGE UP
AST SERPL-CCNC: 17 U/L — SIGNIFICANT CHANGE UP (ref 10–40)
BILIRUB SERPL-MCNC: 0.3 MG/DL — SIGNIFICANT CHANGE UP (ref 0.2–1.2)
BILIRUB UR-MCNC: NEGATIVE — SIGNIFICANT CHANGE UP
BUN SERPL-MCNC: 16 MG/DL — SIGNIFICANT CHANGE UP (ref 7–23)
CALCIUM SERPL-MCNC: 9.3 MG/DL — SIGNIFICANT CHANGE UP (ref 8.4–10.5)
CHLORIDE SERPL-SCNC: 97 MMOL/L — SIGNIFICANT CHANGE UP (ref 96–108)
CO2 SERPL-SCNC: 32 MMOL/L — HIGH (ref 22–31)
COLOR SPEC: YELLOW — SIGNIFICANT CHANGE UP
CREAT SERPL-MCNC: 0.9 MG/DL — SIGNIFICANT CHANGE UP (ref 0.5–1.3)
DIFF PNL FLD: (no result)
GLUCOSE SERPL-MCNC: 85 MG/DL — SIGNIFICANT CHANGE UP (ref 70–99)
GLUCOSE UR QL: NEGATIVE — SIGNIFICANT CHANGE UP
HCG SERPL-ACNC: <.1 MIU/ML — SIGNIFICANT CHANGE UP
HCT VFR BLD CALC: 40.3 % — SIGNIFICANT CHANGE UP (ref 34.5–45)
HGB BLD-MCNC: 13.1 G/DL — SIGNIFICANT CHANGE UP (ref 11.5–15.5)
KETONES UR-MCNC: NEGATIVE — SIGNIFICANT CHANGE UP
LEUKOCYTE ESTERASE UR-ACNC: NEGATIVE — SIGNIFICANT CHANGE UP
LIDOCAIN IGE QN: 18 U/L — SIGNIFICANT CHANGE UP (ref 7–60)
MCHC RBC-ENTMCNC: 28.3 PG — SIGNIFICANT CHANGE UP (ref 27–34)
MCHC RBC-ENTMCNC: 32.5 G/DL — SIGNIFICANT CHANGE UP (ref 32–36)
MCV RBC AUTO: 87 FL — SIGNIFICANT CHANGE UP (ref 80–100)
NITRITE UR-MCNC: NEGATIVE — SIGNIFICANT CHANGE UP
PH UR: 7.5 — SIGNIFICANT CHANGE UP (ref 5–8)
PLATELET # BLD AUTO: 165 K/UL — SIGNIFICANT CHANGE UP (ref 150–400)
POTASSIUM SERPL-MCNC: 4.6 MMOL/L — SIGNIFICANT CHANGE UP (ref 3.5–5.3)
POTASSIUM SERPL-SCNC: 4.6 MMOL/L — SIGNIFICANT CHANGE UP (ref 3.5–5.3)
PROT SERPL-MCNC: 8.3 G/DL — SIGNIFICANT CHANGE UP (ref 6–8.3)
PROT UR-MCNC: NEGATIVE MG/DL — SIGNIFICANT CHANGE UP
RBC # BLD: 4.63 M/UL — SIGNIFICANT CHANGE UP (ref 3.8–5.2)
RBC # FLD: 13.7 % — SIGNIFICANT CHANGE UP (ref 10.3–16.9)
SODIUM SERPL-SCNC: 141 MMOL/L — SIGNIFICANT CHANGE UP (ref 135–145)
SP GR SPEC: 1.01 — SIGNIFICANT CHANGE UP (ref 1–1.03)
UROBILINOGEN FLD QL: 0.2 E.U./DL — SIGNIFICANT CHANGE UP
WBC # BLD: 4.9 K/UL — SIGNIFICANT CHANGE UP (ref 3.8–10.5)
WBC # FLD AUTO: 4.9 K/UL — SIGNIFICANT CHANGE UP (ref 3.8–10.5)

## 2017-07-09 PROCEDURE — 81001 URINALYSIS AUTO W/SCOPE: CPT

## 2017-07-09 PROCEDURE — 99284 EMERGENCY DEPT VISIT MOD MDM: CPT | Mod: 25

## 2017-07-09 PROCEDURE — 96375 TX/PRO/DX INJ NEW DRUG ADDON: CPT | Mod: XU

## 2017-07-09 PROCEDURE — 83690 ASSAY OF LIPASE: CPT

## 2017-07-09 PROCEDURE — 85027 COMPLETE CBC AUTOMATED: CPT

## 2017-07-09 PROCEDURE — 74177 CT ABD & PELVIS W/CONTRAST: CPT | Mod: 26

## 2017-07-09 PROCEDURE — 80053 COMPREHEN METABOLIC PANEL: CPT

## 2017-07-09 PROCEDURE — 84702 CHORIONIC GONADOTROPIN TEST: CPT

## 2017-07-09 PROCEDURE — 74177 CT ABD & PELVIS W/CONTRAST: CPT

## 2017-07-09 PROCEDURE — 96374 THER/PROPH/DIAG INJ IV PUSH: CPT | Mod: XU

## 2017-07-09 PROCEDURE — 99285 EMERGENCY DEPT VISIT HI MDM: CPT

## 2017-07-09 RX ORDER — SODIUM CHLORIDE 9 MG/ML
1000 INJECTION INTRAMUSCULAR; INTRAVENOUS; SUBCUTANEOUS ONCE
Qty: 0 | Refills: 0 | Status: COMPLETED | OUTPATIENT
Start: 2017-07-09 | End: 2017-07-09

## 2017-07-09 RX ORDER — ONDANSETRON 8 MG/1
4 TABLET, FILM COATED ORAL ONCE
Qty: 0 | Refills: 0 | Status: COMPLETED | OUTPATIENT
Start: 2017-07-09 | End: 2017-07-09

## 2017-07-09 RX ORDER — FAMOTIDINE 10 MG/ML
20 INJECTION INTRAVENOUS ONCE
Qty: 0 | Refills: 0 | Status: COMPLETED | OUTPATIENT
Start: 2017-07-09 | End: 2017-07-09

## 2017-07-09 RX ORDER — IOHEXOL 300 MG/ML
50 INJECTION, SOLUTION INTRAVENOUS ONCE
Qty: 0 | Refills: 0 | Status: COMPLETED | OUTPATIENT
Start: 2017-07-09 | End: 2017-07-09

## 2017-07-09 RX ADMIN — SODIUM CHLORIDE 1000 MILLILITER(S): 9 INJECTION INTRAMUSCULAR; INTRAVENOUS; SUBCUTANEOUS at 20:23

## 2017-07-09 RX ADMIN — ONDANSETRON 4 MILLIGRAM(S): 8 TABLET, FILM COATED ORAL at 20:21

## 2017-07-09 RX ADMIN — IOHEXOL 50 MILLILITER(S): 300 INJECTION, SOLUTION INTRAVENOUS at 20:48

## 2017-07-09 RX ADMIN — FAMOTIDINE 20 MILLIGRAM(S): 10 INJECTION INTRAVENOUS at 20:23

## 2017-07-09 NOTE — ED PROVIDER NOTE - NS ED ROS FT
CONSTITUTIONAL:  Feeling hot.  NEURO: No headache, no dizziness, no syncope; No weakness/tingling/numbness  EYES: No visual changes  ENT: No rhinorrhea or sore throat  PULM: No cough or dyspnea  CV: No chest pain or palpitations  GI: No diarrhea.  : No dysuria, hematuria, frequency; no VB or discharge.  MSK: No neck pain or back pain, no joint pain  SKIN: no rash

## 2017-07-09 NOTE — ED PROVIDER NOTE - PHYSICAL EXAMINATION
CONSTITUTIONAL: WD,WN. NAD.    SKIN: Normal color and turgor. No rash.    EYES: Conjunctiva clear. EOMI. PERRL.    ENT: Airway patent, tracheostomy tube in place.  Normal voice.  OP clear. Nasal mucosa clear, no rhinorrhea.   RESPIRATORY:  Breathing non-labored. No retractions, accessory muscle use.  Lungs CTA bilat.  CARDIOVASCULAR:  RRR, S1S2. No M/R/G.      GI:  Abdomen soft, obese.  Moderately tender across upper abdomen/epigastric.    MSK: No joint swelling.  No neck or back tenderness. Painless FROM.  No extremity edema or tenderness.    NEURO: Alert and oriented; GOULD with normal strength.  Normal speech and gait.

## 2017-07-09 NOTE — ED ADULT TRIAGE NOTE - CHIEF COMPLAINT QUOTE
abdominal pain and 1 episode of vomiting since this morning. Denies any blood in vomit. Patient reports "I have milk in my right breast and I think I might be pregnant." Reports she has IUD.

## 2017-07-09 NOTE — ED ADULT NURSE REASSESSMENT NOTE - NS ED NURSE REASSESS COMMENT FT1
Received call from lab Type and Screen hemolyzed.  LAMONT Espino made aware and clarified no need to redraw labs at this time for type and screen, NOT needed.

## 2017-07-09 NOTE — ED PROVIDER NOTE - OBJECTIVE STATEMENT
PMHX: central apnea, DVT, CHF/HOCM, paroxysmal AF, HTN, HLD, UGIB, cholecystectomy, s/p trach-PEG during admission to [facility] for MSSA pneumonia - comes to ED from SNF c/o diffuse abdominal pain all day today.  Feels like cramps.  Vomited food contents once, NBNB.  Feeling "hot" today.    Pain is moderate in severity.  She says she may be pregnant (sexually active, monogamous in SNF, no condom use), because she did not have any spotting in past 3 months (has IUD), and today she expressed milk from right breast.   She also says her IUD string came out while urinating today.

## 2017-07-09 NOTE — ED ADULT NURSE NOTE - OBJECTIVE STATEMENT
Patient c/o severe abdominal pain with nausea and vomitting episodes, denies diarrhea, constipation, dysuria.  Patient also states IUD may have been dislodged.

## 2017-07-09 NOTE — ED PROVIDER NOTE - MEDICAL DECISION MAKING DETAILS
Multiple comorbidities, having abdominal pain and feeling warm all day.  Broad differential diagnosis including SBO, colitis/diverticulitis though less likely in absence of diarrhea, PID, among others.  Appears comfortable, HD stable.  Urine preg test negative.  Will check labs and CT scan. Multiple comorbidities, having abdominal pain and feeling warm all day.  Broad differential diagnosis including SBO, colitis/diverticulitis though less likely in absence of diarrhea, among others.  Appears comfortable, HD stable.  Urine preg test negative.  Will check labs and CT scan.

## 2017-07-09 NOTE — ED ADULT NURSE REASSESSMENT NOTE - NS ED NURSE REASSESS COMMENT FT1
Patient a/ox 3, c/o abdominal pain 10/10, cramping, constant with nausea and vomitting prior to arrival to ED, no diarrhea, dysuira.  Tracheostomy in place, intact, no discharge, no SOB or difficulty speaking.  Vital signs stable.  Left hand PIV #20 in place, all labs sent, no complications.  Able to ambulate w/ steady gait prn to bathroom.  Upreg neg.  NSS 1 l bolus completed, tolerated well.  NPO observed.  Oral contrast ongoing, tolerating well.  CT scan pending. Stable and comfortable.

## 2017-07-09 NOTE — ED PROVIDER NOTE - ATTENDING CONTRIBUTION TO CARE
31F with hx of hospitalization for hypercapnic resp failure require trach placement, thought ot be due to pna, hx of HOCM, hx of diaphagmatic issues due to central apnea 31F with hx of hospitalization for hypercapnic resp failure require trach placement, thought ot be due to pna, hx of HOCM, hx of diaphagmatic issues due to central apnea, will be receiving diaphragmatic pacemaker placement, hx of afib. HX of ugib, no blood/black stools, no hematemesis. Pt thinks she is lactating, thinks she may be pregnant, IUD in place. upreg neg. Plan for CT scan to evaluate abdominal pain, hx of cholecystectomy in 2008.

## 2017-07-10 VITALS
RESPIRATION RATE: 18 BRPM | SYSTOLIC BLOOD PRESSURE: 151 MMHG | HEART RATE: 94 BPM | DIASTOLIC BLOOD PRESSURE: 104 MMHG | OXYGEN SATURATION: 98 % | TEMPERATURE: 99 F

## 2017-08-11 ENCOUNTER — EMERGENCY (EMERGENCY)
Facility: HOSPITAL | Age: 32
LOS: 1 days | Discharge: HOME | End: 2017-08-11
Attending: EMERGENCY MEDICINE | Admitting: EMERGENCY MEDICINE
Payer: MEDICAID

## 2017-08-11 VITALS
OXYGEN SATURATION: 98 % | TEMPERATURE: 98 F | SYSTOLIC BLOOD PRESSURE: 149 MMHG | RESPIRATION RATE: 18 BRPM | HEART RATE: 102 BPM | DIASTOLIC BLOOD PRESSURE: 99 MMHG

## 2017-08-11 VITALS
OXYGEN SATURATION: 96 % | RESPIRATION RATE: 18 BRPM | DIASTOLIC BLOOD PRESSURE: 92 MMHG | HEART RATE: 112 BPM | SYSTOLIC BLOOD PRESSURE: 146 MMHG | TEMPERATURE: 99 F

## 2017-08-11 DIAGNOSIS — Z79.899 OTHER LONG TERM (CURRENT) DRUG THERAPY: ICD-10-CM

## 2017-08-11 DIAGNOSIS — E78.5 HYPERLIPIDEMIA, UNSPECIFIED: ICD-10-CM

## 2017-08-11 DIAGNOSIS — Z91.013 ALLERGY TO SEAFOOD: ICD-10-CM

## 2017-08-11 DIAGNOSIS — I10 ESSENTIAL (PRIMARY) HYPERTENSION: ICD-10-CM

## 2017-08-11 DIAGNOSIS — J95.09 OTHER TRACHEOSTOMY COMPLICATION: ICD-10-CM

## 2017-08-11 DIAGNOSIS — Q24.9 CONGENITAL MALFORMATION OF HEART, UNSPECIFIED: Chronic | ICD-10-CM

## 2017-08-11 PROCEDURE — 71010: CPT | Mod: 26

## 2017-08-11 PROCEDURE — 71045 X-RAY EXAM CHEST 1 VIEW: CPT

## 2017-08-11 PROCEDURE — 99284 EMERGENCY DEPT VISIT MOD MDM: CPT | Mod: 25

## 2017-08-11 PROCEDURE — 99284 EMERGENCY DEPT VISIT MOD MDM: CPT

## 2017-08-11 PROCEDURE — 96374 THER/PROPH/DIAG INJ IV PUSH: CPT

## 2017-08-11 RX ORDER — SODIUM CHLORIDE 9 MG/ML
1000 INJECTION INTRAMUSCULAR; INTRAVENOUS; SUBCUTANEOUS ONCE
Qty: 0 | Refills: 0 | Status: COMPLETED | OUTPATIENT
Start: 2017-08-11 | End: 2017-08-11

## 2017-08-11 RX ORDER — FENTANYL CITRATE 50 UG/ML
50 INJECTION INTRAVENOUS ONCE
Qty: 0 | Refills: 0 | Status: DISCONTINUED | OUTPATIENT
Start: 2017-08-11 | End: 2017-08-11

## 2017-08-11 RX ADMIN — SODIUM CHLORIDE 2000 MILLILITER(S): 9 INJECTION INTRAMUSCULAR; INTRAVENOUS; SUBCUTANEOUS at 18:53

## 2017-08-11 RX ADMIN — FENTANYL CITRATE 50 MICROGRAM(S): 50 INJECTION INTRAVENOUS at 18:49

## 2017-08-11 RX ADMIN — FENTANYL CITRATE 50 MICROGRAM(S): 50 INJECTION INTRAVENOUS at 20:07

## 2017-08-11 NOTE — ED PROVIDER NOTE - MEDICAL DECISION MAKING DETAILS
Trach complication, discussed with Dr. Fishman and ENT regarding. pt states normally tachycardic slightly at basline, no sob, cp, fever- do not suspect PE/infection at this time. Trach complication of being slightly dislodged, discussed with Dr. Fishman and ENT regarding pt and trach changed from size 8 to 6. pt states normally tachycardic slightly at baseline, no sob, cp, fever- do not suspect PE/infection at this time. Discussed trach care with pt and ENT involved in discussion. Discussed strict return precautions, and need for follow up.  Pt expressed understanding and agrees with plan.

## 2017-08-11 NOTE — ED ADULT NURSE REASSESSMENT NOTE - NS ED NURSE REASSESS COMMENT FT1
Patient a/ox 3, s/p Trach replacemelnt by ENT , Shiley 6.0 cuffed, Fentanyl 50mcg adminsitered for pain and procedure VORB Dr. Levin.  Patient sitting up in bed, no SOB or distress, NSS 1 L bolus ongoing, vital signs stable.  Discharge to home pending; as per ENT extra Shiley 6.0 cuffed given to patient for home.

## 2017-08-11 NOTE — ED ADULT TRIAGE NOTE - OTHER COMPLAINTS
as per father, "they were changing something in her tracheostomy tube on Wednesday and since then she's having pain and swelling to the area and she can't talk since last night." Respirations equal and unlabored. as per father, "they were changing something in her tracheostomy tube on Wednesday and since then she's having pain and swelling to the area and she can't talk since last night." Respirations equal and unlabored. As per father, "the trachea is dislodged."

## 2017-08-11 NOTE — ED ADULT NURSE REASSESSMENT NOTE - NS ED NURSE REASSESS COMMENT FT1
Patient a/ox 3, states has 6/10 pain on trach insertion site, no SOB, no coughing noted, no discharge noted around trach site, bilateral lungs CTA.  Vital signs stable.  Sitting up in bed, repositions self for comfort.  ENT consult pending;  patient made aware.

## 2017-08-11 NOTE — ED ADULT NURSE NOTE - OBJECTIVE STATEMENT
Patient c/o 6/10 pain on Trach site, states was dislodged and not positioned properly by previous facility. No SOB or coughing, Trach noted to be in place, no discharge noted from site, no coughing.

## 2017-08-11 NOTE — CONSULT NOTE ADULT - SUBJECTIVE AND OBJECTIVE BOX
HPI: 33 y/o F poor historian, PMHx HTN, HLD, paroxysmal afib (reports during pregnancy), congenital cardiac malformation s/p repair, HOCM, MICHAEL, DVT in 2008, bipolar d/o. Patient was admitted to MICU for hypercapnic respiratory failure 2/2 MSSA PNA and was emergently intubated. Patient, during stay, was showing signs of central apnea and becoming apneic very quickly on CPAP trial therefore she required a Trach/PEG. Her father had central apnea syndrome and required a pacemaker in his diaphragm. Pt has been at a nursing home with an 8-0 shiley cuffed trach. Attempted trach change to 6-0 cuffed trach at the outside facility 2 days ago, however, the patient did not tolerate the trach change and the trach was partially sticking out--not flush against the skin. No desats, not difficulty breathing. Came in today to the ER for further evaluation and possible trach change. ENT consulted for further management. Pt tolerates PMV and uses finger occlusion for voicing.     Allergies    No Known Drug Allergies  Seafood (Rash)    Intolerances        PAST MEDICAL & SURGICAL HISTORY:  Trachea displaced  DVT (deep venous thrombosis)  GIB (gastrointestinal bleeding)  Afib  HTN (hypertension)  Chronic systolic congestive heart failure  HLD (hyperlipidemia)  Myocardial infarction  Congenital heart disease      FAMILY HISTORY:   father had central apnea syndrome and required a pacemaker in his diaphragm.       REVIEW OF SYSTEMS    General:	    HEENT:	trach placed in feb, currently 8-0 cuffed shiley  Respiratory: breathing comfortably on RA    MEDICATIONS:  Antiinfectives:       Hematologic/Anticoagulation:      Pain medications/Neuro:      IV fluids:      Endocrine Medications:       All other standing medications:       All other PRN medications:      Vital Signs Last 24 Hrs  T(C): 37.6 (11 Aug 2017 17:24), Max: 37.6 (11 Aug 2017 17:24)  T(F): 99.7 (11 Aug 2017 17:24), Max: 99.7 (11 Aug 2017 17:24)  HR: 100 (11 Aug 2017 17:24) (100 - 112)  BP: 143/97 (11 Aug 2017 17:24) (143/97 - 146/92)  BP(mean): --  RR: 18 (11 Aug 2017 17:24) (18 - 18)  SpO2: 96% (11 Aug 2017 17:24) (96% - 96%)    LABS:  CBC-          Coagulation Studies-    Endocrine Panel-        PHYSICAL EXAM:    Constitutional: obese,   Neck:  Trachea midline.  Thyroid, parotid and submandibular glands normal.  Lymph:  No cervical adenopathy.  Neuro/Psych:  answering questions appropriately, anxious   Pulm:  8-0 cuffed shiley in place, sitting partially out put back in, cuff down; No dyspnea, non-labored breathing  Tracheoscopy: clear to elysia, trach in middle of the trachea      Assessment/Plan:  32y Female with multiple comorbidities here for trach change and downsize to 6-0 cuffed shiley. Discussed with Dr. Fishman the plan to replace shiley 8-0 cuffed with 6-0 cuffed.  -replace inner cannula daily or more frequently prn  -suction prn  -            [facility] at [phone] with any questions/concerns. HPI: 33 y/o F poor historian, PMHx HTN, HLD, paroxysmal afib (reports during pregnancy), congenital cardiac malformation s/p repair, HOCM, MICHAEL, DVT in 2008, bipolar d/o. Patient was admitted to MICU for hypercapnic respiratory failure 2/2 MSSA PNA and was emergently intubated. Patient, during stay, was showing signs of central apnea and becoming apneic very quickly on CPAP trial therefore she required a Trach/PEG. Her father had central apnea syndrome and required a pacemaker in his diaphragm. Pt has been at a nursing home with an 8-0 shiley cuffed trach. Attempted trach change to 6-0 cuffed trach at the outside facility 2 days ago, however, the patient did not tolerate the trach change and the trach was partially sticking out--not flush against the skin. No desats, not difficulty breathing. Came in today to the ER for further evaluation and possible trach change. ENT consulted for further management. Pt tolerates PMV and uses finger occlusion for voicing. Initially refused trach changed, but then agreed.     Allergies    No Known Drug Allergies  Seafood (Rash)    Intolerances        PAST MEDICAL & SURGICAL HISTORY:  Trachea displaced  DVT (deep venous thrombosis)  GIB (gastrointestinal bleeding)  Afib  HTN (hypertension)  Chronic systolic congestive heart failure  HLD (hyperlipidemia)  Myocardial infarction  Congenital heart disease      FAMILY HISTORY:   father had central apnea syndrome and required a pacemaker in his diaphragm.       REVIEW OF SYSTEMS    General:	    HEENT:	trach placed in feb, currently 8-0 cuffed shiley  Respiratory: breathing comfortably on RA    MEDICATIONS:  Antiinfectives:       Hematologic/Anticoagulation:      Pain medications/Neuro:      IV fluids:      Endocrine Medications:       All other standing medications:       All other PRN medications:      Vital Signs Last 24 Hrs  T(C): 37.6 (11 Aug 2017 17:24), Max: 37.6 (11 Aug 2017 17:24)  T(F): 99.7 (11 Aug 2017 17:24), Max: 99.7 (11 Aug 2017 17:24)  HR: 100 (11 Aug 2017 17:24) (100 - 112)  BP: 143/97 (11 Aug 2017 17:24) (143/97 - 146/92)  BP(mean): --  RR: 18 (11 Aug 2017 17:24) (18 - 18)  SpO2: 96% (11 Aug 2017 17:24) (96% - 96%)    LABS:  CBC-          Coagulation Studies-    Endocrine Panel-        PHYSICAL EXAM:    Constitutional: obese,   Neck:  Trachea midline.  Thyroid, parotid and submandibular glands normal.  Lymph:  No cervical adenopathy.  Neuro/Psych:  answering questions appropriately, anxious   Pulm:  8-0 cuffed shiley in place, sitting partially out put back in, cuff down; No dyspnea, non-labored breathing  Tracheoscopy: clear to elysia, trach in middle of the trachea    Procedure: Patient was given fentanyl supervised by the ER attending. 8-0 cuffed trach was removed. Minimal granulation tissue at the stoma site. minimal ooze from the removal of the trach. A 6-0 cuffed shiley was then placed uneventfully and confirmed with tracheoscopy.     Assessment/Plan:  32y Female with multiple comorbidities here for trach change and downsize to 6-0 cuffed shiley. Discussed with Dr. Fishman the plan to replace shiley 8-0 cuffed with 6-0 cuffed, which was done uneventfully and confirmed in place.   -replace inner cannula daily or more frequently prn  -suction prn  -routine trach care  -please send patient out with a spare 6-0 shiley cuffed  -f/u with Dr. Fishman as planned    [facility] with any questions or concerns             [facility] at [phone] with any questions/concerns.

## 2017-08-11 NOTE — ED ADULT NURSE NOTE - CHPI ED SYMPTOMS NEG
no dizziness/no weakness/no chills/no decreased eating/drinking/no numbness/no nausea/no tingling/no vomiting

## 2017-08-11 NOTE — ED ADULT NURSE NOTE - PMH
Afib    Chronic systolic congestive heart failure    DVT (deep venous thrombosis)    GIB (gastrointestinal bleeding)    HLD (hyperlipidemia)    HTN (hypertension)    Myocardial infarction    Trachea displaced

## 2017-08-11 NOTE — ED PROVIDER NOTE - PHYSICAL EXAMINATION
CONSTITUTIONAL: Well appearing, well nourished, awake, alert and in no apparent distress. obese  HEENT: Head is atraumatic. Eyes clear bilaterally, normal EOMI. Airway patent.  CARDIAC: Normal rate, regular rhythm.  Heart sounds S1, S2.   RESPIRATORY: Breath sounds clear and equal bilaterally.  GASTROINTESTINAL: Abdomen soft, non-tender, no guarding.  MUSCULOSKELETAL: Spine appears normal, range of motion is not limited, no muscle or joint tenderness.   NEUROLOGICAL: Alert and oriented, no focal deficits, no motor or sensory deficits.  SKIN: Skin normal color for race, warm, dry and intact. No evidence of rash.  PSYCHIATRIC: Alert and oriented to person, place, time/situation. normal mood and affect. no apparent risk to self or others. CONSTITUTIONAL: Well appearing, well nourished, awake, alert and in no apparent distress. obese  HEENT: Head is atraumatic. Eyes clear bilaterally, normal EOMI. Airway patent. 8-0 trach in place slighltly protruding, no active bleeding  CARDIAC: Normal rate, regular rhythm.  Heart sounds S1, S2.   RESPIRATORY: Breath sounds clear and equal bilaterally.  GASTROINTESTINAL: Abdomen soft, non-tender, no guarding.  MUSCULOSKELETAL: Spine appears normal, range of motion is not limited, no muscle or joint tenderness.   NEUROLOGICAL: Alert and oriented, no focal deficits, no motor or sensory deficits.  SKIN: Skin normal color for race, warm, dry and intact. No evidence of rash.  PSYCHIATRIC: Alert and oriented to person, place, time/situation. normal mood and affect. no apparent risk to self or others. CONSTITUTIONAL: Well appearing, well nourished, awake, alert and in no apparent distress. obese  HEENT: Head is atraumatic. Eyes clear bilaterally, normal EOMI. Airway patent. 8-0 trach in place slightly protruding, no active bleeding/swelling.  CARDIAC: Normal rate, regular rhythm.  Heart sounds S1, S2.   RESPIRATORY: Breath sounds clear and equal bilaterally. no tachypnea  GASTROINTESTINAL: Abdomen soft, non-tender, no guarding.  MUSCULOSKELETAL: Spine appears normal, range of motion is not limited, no muscle or joint tenderness.   NEUROLOGICAL: Alert and oriented, no focal deficits, no motor or sensory deficits.  SKIN: Skin normal color for race, warm, dry and intact. No evidence of rash.  PSYCHIATRIC: Alert and oriented to person, place, time/situation. normal mood and affect. no apparent risk to self or others.

## 2017-08-11 NOTE — ED ADULT NURSE NOTE - OTHER COMPLAINTS
as per father, "they were changing something in her tracheostomy tube on Wednesday and since then she's having pain and swelling to the area and she can't talk since last night." Respirations equal and unlabored. As per father, "the trachea is dislodged."

## 2017-08-11 NOTE — ED PROVIDER NOTE - OBJECTIVE STATEMENT
33 yo hx of per records HTN, HLD, paroxysmal afib, congenital cardiac malformation s/p repair, HOCM, MICHAEL, DVT in 2008 (s/p Coumadin last dose 2009), h/o UGIB s/p endoscopy, recent 2 day admission (Jan 2017) to [facility] for SOB/CP/n/v r/o ACS, ECHO w complaints of trachea being dislodged. had 2 days of swelling 33 yo hx of per records HTN, HLD, paroxysmal afib, congenital cardiac malformation s/p repair, HOCM, MICHAEL, DVT in 2008 (s/p Coumadin last dose 2009), h/o UGIB s/p endoscopy, recent 2 day admission (Jan 2017) to [facility] for SOB/CP/n/v r/o ACS, ECHO w complaints of trachea being dislodged. had 2 days of swelling and mild bleeding after attempted change 2 days ago at [facility]sh. no sob, cp, fever, abd pain, n/v.

## 2017-09-07 NOTE — ED ADULT NURSE NOTE - NS ED NOTE  TALK SOMEONE YN
"Chief Complaint   Patient presents with     Gastric Problem     Panel Management     prevnar, flu, honoring choices, cmp, eye exam, shanell       Initial /80 (BP Location: Right arm, Patient Position: Sitting, Cuff Size: Adult Regular)  Pulse 69  Temp 97.6  F (36.4  C)  Ht 5' 1.9\" (1.572 m)  Wt 184 lb (83.5 kg)  SpO2 95%  BMI 33.76 kg/m2 Estimated body mass index is 33.76 kg/(m^2) as calculated from the following:    Height as of this encounter: 5' 1.9\" (1.572 m).    Weight as of this encounter: 184 lb (83.5 kg).  Medication Reconciliation: complete   Neeru Robbins MA  September 7, 2017      " no

## 2017-11-11 ENCOUNTER — EMERGENCY (EMERGENCY)
Facility: HOSPITAL | Age: 32
LOS: 1 days | Discharge: HOME | End: 2017-11-11
Attending: EMERGENCY MEDICINE | Admitting: EMERGENCY MEDICINE
Payer: MEDICAID

## 2017-11-11 VITALS
RESPIRATION RATE: 18 BRPM | OXYGEN SATURATION: 100 % | HEART RATE: 94 BPM | DIASTOLIC BLOOD PRESSURE: 108 MMHG | TEMPERATURE: 99 F | SYSTOLIC BLOOD PRESSURE: 144 MMHG

## 2017-11-11 DIAGNOSIS — R07.89 OTHER CHEST PAIN: ICD-10-CM

## 2017-11-11 DIAGNOSIS — R10.13 EPIGASTRIC PAIN: ICD-10-CM

## 2017-11-11 DIAGNOSIS — I10 ESSENTIAL (PRIMARY) HYPERTENSION: ICD-10-CM

## 2017-11-11 DIAGNOSIS — E78.5 HYPERLIPIDEMIA, UNSPECIFIED: ICD-10-CM

## 2017-11-11 DIAGNOSIS — Z91.013 ALLERGY TO SEAFOOD: ICD-10-CM

## 2017-11-11 DIAGNOSIS — Q24.9 CONGENITAL MALFORMATION OF HEART, UNSPECIFIED: Chronic | ICD-10-CM

## 2017-11-11 DIAGNOSIS — Z79.899 OTHER LONG TERM (CURRENT) DRUG THERAPY: ICD-10-CM

## 2017-11-11 DIAGNOSIS — R05 COUGH: ICD-10-CM

## 2017-11-11 LAB
ALBUMIN SERPL ELPH-MCNC: 3.5 G/DL — SIGNIFICANT CHANGE UP (ref 3.3–5)
ALP SERPL-CCNC: 66 U/L — SIGNIFICANT CHANGE UP (ref 40–120)
ALT FLD-CCNC: 10 U/L — SIGNIFICANT CHANGE UP (ref 10–45)
ANION GAP SERPL CALC-SCNC: 10 MMOL/L — SIGNIFICANT CHANGE UP (ref 5–17)
APPEARANCE UR: CLEAR — SIGNIFICANT CHANGE UP
APTT BLD: 27.3 SEC — LOW (ref 27.5–37.4)
AST SERPL-CCNC: 14 U/L — SIGNIFICANT CHANGE UP (ref 10–40)
BASOPHILS NFR BLD AUTO: 0.4 % — SIGNIFICANT CHANGE UP (ref 0–2)
BILIRUB SERPL-MCNC: 0.3 MG/DL — SIGNIFICANT CHANGE UP (ref 0.2–1.2)
BILIRUB UR-MCNC: NEGATIVE — SIGNIFICANT CHANGE UP
BUN SERPL-MCNC: 11 MG/DL — SIGNIFICANT CHANGE UP (ref 7–23)
CALCIUM SERPL-MCNC: 9.3 MG/DL — SIGNIFICANT CHANGE UP (ref 8.4–10.5)
CHLORIDE SERPL-SCNC: 94 MMOL/L — LOW (ref 96–108)
CK MB CFR SERPL CALC: <1 NG/ML — SIGNIFICANT CHANGE UP (ref 0–6.7)
CK SERPL-CCNC: 37 U/L — SIGNIFICANT CHANGE UP (ref 25–170)
CO2 SERPL-SCNC: 32 MMOL/L — HIGH (ref 22–31)
COLOR SPEC: YELLOW — SIGNIFICANT CHANGE UP
CREAT SERPL-MCNC: 0.76 MG/DL — SIGNIFICANT CHANGE UP (ref 0.5–1.3)
DIFF PNL FLD: NEGATIVE — SIGNIFICANT CHANGE UP
EOSINOPHIL NFR BLD AUTO: 2.1 % — SIGNIFICANT CHANGE UP (ref 0–6)
GLUCOSE SERPL-MCNC: 131 MG/DL — HIGH (ref 70–99)
GLUCOSE UR QL: NEGATIVE — SIGNIFICANT CHANGE UP
HCT VFR BLD CALC: 42.6 % — SIGNIFICANT CHANGE UP (ref 34.5–45)
HGB BLD-MCNC: 13 G/DL — SIGNIFICANT CHANGE UP (ref 11.5–15.5)
INR BLD: 1.1 — SIGNIFICANT CHANGE UP (ref 0.88–1.16)
KETONES UR-MCNC: NEGATIVE — SIGNIFICANT CHANGE UP
LEUKOCYTE ESTERASE UR-ACNC: NEGATIVE — SIGNIFICANT CHANGE UP
LYMPHOCYTES # BLD AUTO: 16 % — SIGNIFICANT CHANGE UP (ref 13–44)
MCHC RBC-ENTMCNC: 28 PG — SIGNIFICANT CHANGE UP (ref 27–34)
MCHC RBC-ENTMCNC: 30.5 G/DL — LOW (ref 32–36)
MCV RBC AUTO: 91.6 FL — SIGNIFICANT CHANGE UP (ref 80–100)
MONOCYTES NFR BLD AUTO: 7.4 % — SIGNIFICANT CHANGE UP (ref 2–14)
NEUTROPHILS NFR BLD AUTO: 74.1 % — SIGNIFICANT CHANGE UP (ref 43–77)
NITRITE UR-MCNC: NEGATIVE — SIGNIFICANT CHANGE UP
PH UR: 8.5 — HIGH (ref 5–8)
PLATELET # BLD AUTO: 216 K/UL — SIGNIFICANT CHANGE UP (ref 150–400)
POTASSIUM SERPL-MCNC: 4.4 MMOL/L — SIGNIFICANT CHANGE UP (ref 3.5–5.3)
POTASSIUM SERPL-SCNC: 4.4 MMOL/L — SIGNIFICANT CHANGE UP (ref 3.5–5.3)
PROT SERPL-MCNC: 7.6 G/DL — SIGNIFICANT CHANGE UP (ref 6–8.3)
PROT UR-MCNC: NEGATIVE MG/DL — SIGNIFICANT CHANGE UP
PROTHROM AB SERPL-ACNC: 12.2 SEC — SIGNIFICANT CHANGE UP (ref 9.8–12.7)
RBC # BLD: 4.65 M/UL — SIGNIFICANT CHANGE UP (ref 3.8–5.2)
RBC # FLD: 17.2 % — HIGH (ref 10.3–16.9)
SODIUM SERPL-SCNC: 136 MMOL/L — SIGNIFICANT CHANGE UP (ref 135–145)
SP GR SPEC: 1.01 — SIGNIFICANT CHANGE UP (ref 1–1.03)
TROPONIN T SERPL-MCNC: <0.01 NG/ML — SIGNIFICANT CHANGE UP (ref 0–0.01)
UROBILINOGEN FLD QL: 0.2 E.U./DL — SIGNIFICANT CHANGE UP
WBC # BLD: 6.8 K/UL — SIGNIFICANT CHANGE UP (ref 3.8–10.5)
WBC # FLD AUTO: 6.8 K/UL — SIGNIFICANT CHANGE UP (ref 3.8–10.5)

## 2017-11-11 PROCEDURE — 85730 THROMBOPLASTIN TIME PARTIAL: CPT

## 2017-11-11 PROCEDURE — 85610 PROTHROMBIN TIME: CPT

## 2017-11-11 PROCEDURE — 71275 CT ANGIOGRAPHY CHEST: CPT | Mod: 26

## 2017-11-11 PROCEDURE — 81003 URINALYSIS AUTO W/O SCOPE: CPT

## 2017-11-11 PROCEDURE — 71045 X-RAY EXAM CHEST 1 VIEW: CPT

## 2017-11-11 PROCEDURE — 84484 ASSAY OF TROPONIN QUANT: CPT

## 2017-11-11 PROCEDURE — 71275 CT ANGIOGRAPHY CHEST: CPT

## 2017-11-11 PROCEDURE — 80053 COMPREHEN METABOLIC PANEL: CPT

## 2017-11-11 PROCEDURE — 93005 ELECTROCARDIOGRAM TRACING: CPT

## 2017-11-11 PROCEDURE — 99284 EMERGENCY DEPT VISIT MOD MDM: CPT | Mod: 25

## 2017-11-11 PROCEDURE — 82550 ASSAY OF CK (CPK): CPT

## 2017-11-11 PROCEDURE — 71010: CPT | Mod: 26

## 2017-11-11 PROCEDURE — 85025 COMPLETE CBC W/AUTO DIFF WBC: CPT

## 2017-11-11 PROCEDURE — 82553 CREATINE MB FRACTION: CPT

## 2017-11-11 PROCEDURE — 93010 ELECTROCARDIOGRAM REPORT: CPT

## 2017-11-11 PROCEDURE — 99285 EMERGENCY DEPT VISIT HI MDM: CPT | Mod: 25

## 2017-11-11 PROCEDURE — 96374 THER/PROPH/DIAG INJ IV PUSH: CPT | Mod: XU

## 2017-11-11 RX ORDER — LIDOCAINE 4 G/100G
10 CREAM TOPICAL ONCE
Qty: 0 | Refills: 0 | Status: COMPLETED | OUTPATIENT
Start: 2017-11-11 | End: 2017-11-11

## 2017-11-11 RX ORDER — FAMOTIDINE 10 MG/ML
20 INJECTION INTRAVENOUS ONCE
Qty: 0 | Refills: 0 | Status: COMPLETED | OUTPATIENT
Start: 2017-11-11 | End: 2017-11-11

## 2017-11-11 RX ADMIN — FAMOTIDINE 20 MILLIGRAM(S): 10 INJECTION INTRAVENOUS at 21:26

## 2017-11-11 RX ADMIN — Medication 30 MILLILITER(S): at 21:26

## 2017-11-11 RX ADMIN — LIDOCAINE 10 MILLILITER(S): 4 CREAM TOPICAL at 21:25

## 2017-11-11 NOTE — ED ADULT NURSE NOTE - PMH
Afib    Chronic systolic congestive heart failure    DVT (deep venous thrombosis)    GIB (gastrointestinal bleeding)    HLD (hyperlipidemia)    HTN (hypertension)    Malignant neoplasm    Myocardial infarction    Trachea displaced

## 2017-11-11 NOTE — ED ADULT NURSE NOTE - OBJECTIVE STATEMENT
pt came in with chief complaint of chest pain started this afternoon. pt came from [facility] via EMS.

## 2017-11-11 NOTE — ED PROVIDER NOTE - OBJECTIVE STATEMENT
trach, afib, htn, chf, mi, here with burning chest pain since this morning.  Middle of chest, non radiating.  No n/v/d, fever/chills, sob.  Mild cough.  Notes pain worse with deep breath.  Better when she is attached to vent.  History of gerd and takes zantac but feels different.

## 2017-11-11 NOTE — ED PROVIDER NOTE - MEDICAL DECISION MAKING DETAILS
burning chest pain substernal most likely gerd/dyspepsia.  cardiac enzymes and labs unremarkable.  cta of chest done to r/o pe and neg.  no leukocytosis or fever to suggest infectious cause.   given gi cocktail with resolution of symptoms.  rec daily antacid, diet adjustment, elevation of bed after eating

## 2017-11-12 VITALS
TEMPERATURE: 98 F | DIASTOLIC BLOOD PRESSURE: 98 MMHG | HEART RATE: 84 BPM | SYSTOLIC BLOOD PRESSURE: 140 MMHG | OXYGEN SATURATION: 100 % | RESPIRATION RATE: 18 BRPM

## 2017-12-22 ENCOUNTER — APPOINTMENT (OUTPATIENT)
Dept: PULMONOLOGY | Facility: CLINIC | Age: 32
End: 2017-12-22
Payer: MEDICAID

## 2017-12-22 PROCEDURE — 99203 OFFICE O/P NEW LOW 30 MIN: CPT

## 2019-01-05 ENCOUNTER — EMERGENCY (EMERGENCY)
Facility: HOSPITAL | Age: 34
LOS: 1 days | Discharge: HOME | End: 2019-01-05
Attending: EMERGENCY MEDICINE | Admitting: EMERGENCY MEDICINE
Payer: SELF-PAY

## 2019-01-05 VITALS
WEIGHT: 210.1 LBS | SYSTOLIC BLOOD PRESSURE: 143 MMHG | HEART RATE: 88 BPM | OXYGEN SATURATION: 95 % | DIASTOLIC BLOOD PRESSURE: 102 MMHG | RESPIRATION RATE: 23 BRPM | HEIGHT: 69 IN | TEMPERATURE: 98 F

## 2019-01-05 VITALS
TEMPERATURE: 98 F | SYSTOLIC BLOOD PRESSURE: 132 MMHG | HEART RATE: 95 BPM | RESPIRATION RATE: 18 BRPM | OXYGEN SATURATION: 99 % | DIASTOLIC BLOOD PRESSURE: 98 MMHG

## 2019-01-05 DIAGNOSIS — Q24.9 CONGENITAL MALFORMATION OF HEART, UNSPECIFIED: Chronic | ICD-10-CM

## 2019-01-05 PROCEDURE — 99282 EMERGENCY DEPT VISIT SF MDM: CPT

## 2019-01-05 NOTE — ED PROVIDER NOTE - MEDICAL DECISION MAKING DETAILS
px requesting removal of trach- counseled re ENT eval first- now they don't want to wait- no si/sx of infection

## 2019-01-05 NOTE — ED ADULT NURSE NOTE - OBJECTIVE STATEMENT
34 y/o female c/o bleeding within trach x3 days. AOx3, patient had trach placed 2 years ago by Dr. Fishman, patient cannot recall the reason for placement. States her doctor in [place] says she does not need the trach but will not take it out. Comes to ED without appointment requesting Dr. Fishman for trach removal. Per patient, bleeding is intermittent that worsens with coughing. Trach is size 6 and filled with green mucous, no cuff on arrival. Uses ventilator nightly. Denies SoB, CP, fevers, chills, N/V.

## 2019-01-05 NOTE — ED PROVIDER NOTE - ENMT, MLM
Airway patent, Nasal mucosa clear. trach in place- no bleeding + slightly foul odor  thick secretions in tube

## 2019-01-05 NOTE — ED ADULT NURSE NOTE - CHPI ED NUR SYMPTOMS NEG
no nausea/no numbness/no syncope/no weakness/no bleeding gums/no chills/no fever/no loss of consciousness/no vomiting

## 2019-01-05 NOTE — ED ADULT NURSE REASSESSMENT NOTE - NS ED NURSE REASSESS COMMENT FT1
Patient resting in stretcher, AOx3. States she does not want to wait for ENT and wants to be discharged. Denies SoB, dyspnea, CP, N/V. MD made aware, risks and benefits explained, awaiting discharge dispo.

## 2019-01-05 NOTE — CONSULT NOTE ADULT - SUBJECTIVE AND OBJECTIVE BOX
ENT consulted and attempted to see patient in ED, however patient discharged prior to ENT evaluation per patient request.   Per chart review, patient is a  31 y/o F poor historian, PMHx HTN, HLD, paroxysmal afib (reports during pregnancy), congenital cardiac malformation s/p repair, HOCM, MICHAEL, DVT in 2008, bipolar d/o. Patient was admitted to MICU for hypercapnic respiratory failure 2/2 MSSA PNA and was emergently intubated in february 2017. Patient, during stay, was showing signs of central apnea and becoming apneic very quickly on CPAP trial therefore she required a Trach/PEG. Percutaneous trach placed 2/10/17 by general surgery at [facility]. Her father had central apnea syndrome and required a pacemaker in his diaphragm. Patient previously seen in ED Aug 2017 after attempted trach change at nursing home from 8 to 6.0 cuffed shiley was unsuccessful. ENT changed trach to 6.0 cuffed shiley (cuff down) at that time and patietn instructed to follow-up with Dr. Fishman (internal medicine). Patient now presented to ED for bleeding from for intermittent bleeding from trach for 2 days and foul smell from trach for weeks. states trach has never been changed however was changed in ED by ENT Aug 2017 as noted above. Would like trach removed however has not had good follow up as outpatient. Reports that she still uses a "ventilator" at night but doesn't know why. Per ED attending, patient has 6.0 LPC shiley in place and she reported that someone cut the balloon. No bleeding noted at his evaluation. Patient left ED prior to ENT evaluation. Please page if patient returns to ED.

## 2019-01-06 PROBLEM — C80.1 MALIGNANT (PRIMARY) NEOPLASM, UNSPECIFIED: Chronic | Status: ACTIVE | Noted: 2017-11-11

## 2019-01-06 PROBLEM — J39.8 OTHER SPECIFIED DISEASES OF UPPER RESPIRATORY TRACT: Chronic | Status: ACTIVE | Noted: 2017-08-11

## 2019-01-09 DIAGNOSIS — J95.09 OTHER TRACHEOSTOMY COMPLICATION: ICD-10-CM

## 2019-01-09 DIAGNOSIS — Z79.899 OTHER LONG TERM (CURRENT) DRUG THERAPY: ICD-10-CM

## 2019-01-09 DIAGNOSIS — Z91.013 ALLERGY TO SEAFOOD: ICD-10-CM

## 2019-01-14 ENCOUNTER — APPOINTMENT (OUTPATIENT)
Dept: PULMONOLOGY | Facility: CLINIC | Age: 34
End: 2019-01-14
Payer: SELF-PAY

## 2019-01-14 VITALS
BODY MASS INDEX: 38.19 KG/M2 | TEMPERATURE: 98.9 F | SYSTOLIC BLOOD PRESSURE: 140 MMHG | HEIGHT: 68 IN | WEIGHT: 252 LBS | DIASTOLIC BLOOD PRESSURE: 100 MMHG | OXYGEN SATURATION: 93 % | HEART RATE: 99 BPM

## 2019-01-14 DIAGNOSIS — Z87.898 PERSONAL HISTORY OF OTHER SPECIFIED CONDITIONS: ICD-10-CM

## 2019-01-14 DIAGNOSIS — Z86.59 PERSONAL HISTORY OF OTHER MENTAL AND BEHAVIORAL DISORDERS: ICD-10-CM

## 2019-01-14 DIAGNOSIS — Z86.79 PERSONAL HISTORY OF OTHER DISEASES OF THE CIRCULATORY SYSTEM: ICD-10-CM

## 2019-01-14 PROCEDURE — 99214 OFFICE O/P EST MOD 30 MIN: CPT

## 2019-01-14 NOTE — HISTORY OF PRESENT ILLNESS
[Difficulty Breathing During Exertion] : denies dyspnea on exertion [Feelings Of Weakness On Exertion] : denies exercise intolerance [Cough] : denies coughing [Wheezing] : denies wheezing [Regional Soft Tissue Swelling Both Lower Extremities] : denies lower extremity edema [Chest Pain Or Discomfort] : denies chest pain [Fever] : denies fever [FreeTextEntry1] : This is a 34 y/o woman with an extensive past medical history including HCM, DVT, UGIB, and MICHAEL. The patient has MICHAEL, OHA, and in the setting of PNA was intubated 4 years ago and had a complicated hospital course and ended up with a tracheostomy. The patient was discharged to a rehab with a tracheostomy and after discharge she left to [place] and was lost to follow up. The patient presents to the clinic today to re-establish care and notes that for the past several years she has had difficulty getting care for her tracheostomy and has found no one willing to help her decannulate it. The patient is able to talk without having to occlude the trach with her finger. It is a size 6 trach.

## 2019-01-14 NOTE — ASSESSMENT
[FreeTextEntry1] : This is a 32 y/o woman with a tracheostomy that was placed 4 years ago in the setting of acute hypercapnic respiratory failure (related to her hx of OHS) as well as in the setting of pneumonia. The patient is tolerating the tracheostomy well but complaining of the smell emanating from it. \par \par 1. Tracheostomy.\par 2. OHS\par \par Plan:\par -The patient is long overdue for decannulation however this should be done in a controlled setting. The patient will be admitted to the hospital and using the decannulation protocol will attempt to decannulate under close observation. \par -The patient will likely need a bronchoscopic evaluation during this process as well to examine the airway and look for granulation tissue that could potentially cause obstruction and to make sure the trachea is in good repair before decannulation.

## 2019-01-14 NOTE — PHYSICAL EXAM
[General Appearance - Well Developed] : well developed [Normal Appearance] : normal appearance [Well Groomed] : well groomed [General Appearance - Well Nourished] : well nourished [No Deformities] : no deformities [General Appearance - In No Acute Distress] : no acute distress [Normal Conjunctiva] : the conjunctiva exhibited no abnormalities [Eyelids - No Xanthelasma] : the eyelids demonstrated no xanthelasmas [Normal Oropharynx] : normal oropharynx [IV] : IV [Neck Cervical Mass (___cm)] : no neck mass was observed [Jugular Venous Distention Increased] : there was no jugular-venous distention [Heart Rate And Rhythm] : heart rate and rhythm were normal [Heart Sounds] : normal S1 and S2 [Murmurs] : no murmurs present [Respiration, Rhythm And Depth] : normal respiratory rhythm and effort [Exaggerated Use Of Accessory Muscles For Inspiration] : no accessory muscle use [Auscultation Breath Sounds / Voice Sounds] : lungs were clear to auscultation bilaterally [Abdomen Soft] : soft [Abdomen Tenderness] : non-tender [Abdomen Mass (___ Cm)] : no abdominal mass palpated [Abnormal Walk] : normal gait [Gait - Sufficient For Exercise Testing] : the gait was sufficient for exercise testing [Nail Clubbing] : no clubbing of the fingernails [Cyanosis, Localized] : no localized cyanosis [Petechial Hemorrhages (___cm)] : no petechial hemorrhages [Skin Color & Pigmentation] : normal skin color and pigmentation [] : no rash [No Venous Stasis] : no venous stasis [Skin Lesions] : no skin lesions [No Skin Ulcers] : no skin ulcer [No Xanthoma] : no  xanthoma was observed [Deep Tendon Reflexes (DTR)] : deep tendon reflexes were 2+ and symmetric [Sensation] : the sensory exam was normal to light touch and pinprick [No Focal Deficits] : no focal deficits [Oriented To Time, Place, And Person] : oriented to person, place, and time [Impaired Insight] : insight and judgment were intact [Affect] : the affect was normal [FreeTextEntry1] : Large neck diameter

## 2019-01-15 ENCOUNTER — INPATIENT (INPATIENT)
Facility: HOSPITAL | Age: 34
LOS: 9 days | Discharge: HOME | DRG: 205 | End: 2019-01-25
Payer: MEDICAID

## 2019-01-15 VITALS
WEIGHT: 253.09 LBS | SYSTOLIC BLOOD PRESSURE: 155 MMHG | OXYGEN SATURATION: 100 % | RESPIRATION RATE: 18 BRPM | HEART RATE: 80 BPM | DIASTOLIC BLOOD PRESSURE: 107 MMHG | TEMPERATURE: 98 F

## 2019-01-15 DIAGNOSIS — Q24.9 CONGENITAL MALFORMATION OF HEART, UNSPECIFIED: Chronic | ICD-10-CM
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
## 2019-01-15 DIAGNOSIS — Z29.9 ENCOUNTER FOR PROPHYLACTIC MEASURES, UNSPECIFIED: ICD-10-CM

## 2019-01-15 DIAGNOSIS — I48.91 UNSPECIFIED ATRIAL FIBRILLATION: ICD-10-CM

## 2019-01-15 DIAGNOSIS — Z93.0 TRACHEOSTOMY STATUS: ICD-10-CM

## 2019-01-15 DIAGNOSIS — R63.8 OTHER SYMPTOMS AND SIGNS CONCERNING FOOD AND FLUID INTAKE: ICD-10-CM

## 2019-01-15 LAB
ALBUMIN SERPL ELPH-MCNC: 3.7 G/DL — SIGNIFICANT CHANGE UP (ref 3.3–5)
ALP SERPL-CCNC: 56 U/L — SIGNIFICANT CHANGE UP (ref 40–120)
ALT FLD-CCNC: 14 U/L — SIGNIFICANT CHANGE UP (ref 10–45)
ANION GAP SERPL CALC-SCNC: 9 MMOL/L — SIGNIFICANT CHANGE UP (ref 5–17)
APPEARANCE UR: ABNORMAL
AST SERPL-CCNC: 18 U/L — SIGNIFICANT CHANGE UP (ref 10–40)
BACTERIA # UR AUTO: PRESENT /HPF
BASE EXCESS BLDA CALC-SCNC: 7.4 MMOL/L — HIGH (ref -2–3)
BASE EXCESS BLDA CALC-SCNC: 8.1 MMOL/L — HIGH (ref -2–3)
BASOPHILS NFR BLD AUTO: 0.2 % — SIGNIFICANT CHANGE UP (ref 0–2)
BILIRUB SERPL-MCNC: 0.3 MG/DL — SIGNIFICANT CHANGE UP (ref 0.2–1.2)
BILIRUB UR-MCNC: NEGATIVE — SIGNIFICANT CHANGE UP
BUN SERPL-MCNC: 12 MG/DL — SIGNIFICANT CHANGE UP (ref 7–23)
CALCIUM SERPL-MCNC: 9.1 MG/DL — SIGNIFICANT CHANGE UP (ref 8.4–10.5)
CHLORIDE SERPL-SCNC: 98 MMOL/L — SIGNIFICANT CHANGE UP (ref 96–108)
CO2 SERPL-SCNC: 29 MMOL/L — SIGNIFICANT CHANGE UP (ref 22–31)
COLOR SPEC: YELLOW — SIGNIFICANT CHANGE UP
COMMENT - URINE: SIGNIFICANT CHANGE UP
CREAT SERPL-MCNC: 0.81 MG/DL — SIGNIFICANT CHANGE UP (ref 0.5–1.3)
DIFF PNL FLD: NEGATIVE — SIGNIFICANT CHANGE UP
EOSINOPHIL NFR BLD AUTO: 1.7 % — SIGNIFICANT CHANGE UP (ref 0–6)
EPI CELLS # UR: ABNORMAL /HPF (ref 0–5)
GLUCOSE SERPL-MCNC: 89 MG/DL — SIGNIFICANT CHANGE UP (ref 70–99)
GLUCOSE UR QL: NEGATIVE — SIGNIFICANT CHANGE UP
HCO3 BLDA-SCNC: 35 MMOL/L — HIGH (ref 21–28)
HCO3 BLDA-SCNC: 36 MMOL/L — HIGH (ref 21–28)
HCT VFR BLD CALC: 38.7 % — SIGNIFICANT CHANGE UP (ref 34.5–45)
HGB BLD-MCNC: 12.3 G/DL — SIGNIFICANT CHANGE UP (ref 11.5–15.5)
KETONES UR-MCNC: NEGATIVE — SIGNIFICANT CHANGE UP
LEUKOCYTE ESTERASE UR-ACNC: NEGATIVE — SIGNIFICANT CHANGE UP
LYMPHOCYTES # BLD AUTO: 21.9 % — SIGNIFICANT CHANGE UP (ref 13–44)
MCHC RBC-ENTMCNC: 27.5 PG — SIGNIFICANT CHANGE UP (ref 27–34)
MCHC RBC-ENTMCNC: 31.8 G/DL — LOW (ref 32–36)
MCV RBC AUTO: 86.6 FL — SIGNIFICANT CHANGE UP (ref 80–100)
MONOCYTES NFR BLD AUTO: 6 % — SIGNIFICANT CHANGE UP (ref 2–14)
NEUTROPHILS NFR BLD AUTO: 70.2 % — SIGNIFICANT CHANGE UP (ref 43–77)
NITRITE UR-MCNC: NEGATIVE — SIGNIFICANT CHANGE UP
PCO2 BLDA: 64 MMHG — CRITICAL HIGH (ref 32–45)
PCO2 BLDA: 67 MMHG — CRITICAL HIGH (ref 32–45)
PH BLDA: 7.35 — SIGNIFICANT CHANGE UP (ref 7.35–7.45)
PH BLDA: 7.36 — SIGNIFICANT CHANGE UP (ref 7.35–7.45)
PH UR: 6 — SIGNIFICANT CHANGE UP (ref 5–8)
PLATELET # BLD AUTO: 141 K/UL — LOW (ref 150–400)
PO2 BLDA: 46 MMHG — CRITICAL LOW (ref 83–108)
PO2 BLDA: 61 MMHG — LOW (ref 83–108)
POTASSIUM SERPL-MCNC: 4.2 MMOL/L — SIGNIFICANT CHANGE UP (ref 3.5–5.3)
POTASSIUM SERPL-SCNC: 4.2 MMOL/L — SIGNIFICANT CHANGE UP (ref 3.5–5.3)
PROT SERPL-MCNC: 8 G/DL — SIGNIFICANT CHANGE UP (ref 6–8.3)
PROT UR-MCNC: ABNORMAL MG/DL
RBC # BLD: 4.47 M/UL — SIGNIFICANT CHANGE UP (ref 3.8–5.2)
RBC # FLD: 15.4 % — SIGNIFICANT CHANGE UP (ref 10.3–16.9)
RBC CASTS # UR COMP ASSIST: < 5 /HPF — SIGNIFICANT CHANGE UP
SAO2 % BLDA: 76 % — LOW (ref 95–100)
SAO2 % BLDA: 88 % — LOW (ref 95–100)
SODIUM SERPL-SCNC: 136 MMOL/L — SIGNIFICANT CHANGE UP (ref 135–145)
SP GR SPEC: 1.02 — SIGNIFICANT CHANGE UP (ref 1–1.03)
UROBILINOGEN FLD QL: 0.2 E.U./DL — SIGNIFICANT CHANGE UP
WBC # BLD: 4.2 K/UL — SIGNIFICANT CHANGE UP (ref 3.8–10.5)
WBC # FLD AUTO: 4.2 K/UL — SIGNIFICANT CHANGE UP (ref 3.8–10.5)
WBC UR QL: < 5 /HPF — SIGNIFICANT CHANGE UP

## 2019-01-15 PROCEDURE — 71046 X-RAY EXAM CHEST 2 VIEWS: CPT | Mod: 26

## 2019-01-15 PROCEDURE — 99285 EMERGENCY DEPT VISIT HI MDM: CPT | Mod: 25

## 2019-01-15 PROCEDURE — 93010 ELECTROCARDIOGRAM REPORT: CPT

## 2019-01-15 PROCEDURE — 99223 1ST HOSP IP/OBS HIGH 75: CPT | Mod: GC

## 2019-01-15 RX ORDER — VENLAFAXINE HCL 75 MG
1 CAPSULE, EXT RELEASE 24 HR ORAL
Qty: 0 | Refills: 0 | COMMUNITY

## 2019-01-15 RX ORDER — RANITIDINE HYDROCHLORIDE 150 MG/1
0 TABLET, FILM COATED ORAL
Qty: 0 | Refills: 0 | COMMUNITY

## 2019-01-15 NOTE — CONSULT NOTE ADULT - SUBJECTIVE AND OBJECTIVE BOX
[facility] SERVICE CONSULTATION NOTE    CC: decannulation    HPI: 33F poor historian with documented PMHx of     ROS:  Otherwise negative, except as specified in HPI.    PMH:    PSH:    FH:    SH:    ALLERGIES:    MEDICATIONS:    VITAL SIGNS:  ICU Vital Signs Last 24 Hrs  T(C): 37 (15 Delgado 2019 14:47), Max: 37 (15 Delgado 2019 14:47)  T(F): 98.6 (15 Delgado 2019 14:47), Max: 98.6 (15 Delgado 2019 14:47)  HR: 78 (15 Delgado 2019 14:47) (78 - 80)  BP: 165/108 (15 Delgado 2019 14:47) (155/107 - 165/108)  BP(mean): --  ABP: --  ABP(mean): --  RR: 20 (15 [facility] 2019 14:47) (18 - 20)  SpO2: 97% (15 Delgado 2019 14:47) (97% - 100%)    CAPILLARY BLOOD GLUCOSE          PHYSICAL EXAM:  Constitutional: resting comfortably in bed, NAD  HEENT: NC/AT; PERRL, anicteric sclera; no oropharyngeal erythema or exudates; MMM  Neck: supple, no appreciable JVD  Respiratory: CTA B/L, no W/R/R; respirations appear non-labored, conversive in full sentences  Cardiovascular: +S1/S2, RRR  Gastrointestinal: abdomen soft, NT/ND  Extremities: WWP; no clubbing, cyanosis or edema  Vascular: 2+ radial, femoral, and DP/PT pulses B/L  Dermatologic: skin normal color and turgor; no visible rashes  Neurological:     LABS:                        12.3   4.2   )-----------( 141      ( 15 Delgado 2019 14:38 )             38.7                   Urinalysis Basic - ( 15 Delgado 2019 14:38 )    Color: Yellow / Appearance: Cloudy / S.025 / pH: x  Gluc: x / Ketone: NEGATIVE  / Bili: Negative / Urobili: 0.2 E.U./dL   Blood: x / Protein: Trace mg/dL / Nitrite: NEGATIVE   Leuk Esterase: NEGATIVE / RBC: < 5 /HPF / WBC < 5 /HPF   Sq Epi: x / Non Sq Epi: Many /HPF / Bacteria: Present /HPF          EKG: Reviewed.    RADIOLOGY & ADDITIONAL TESTS: Reviewed. [facility] SERVICE CONSULTATION NOTE    CC: decannulation    HPI: 33F poor historian with documented PMHx of HTN, HLD, pAfib, congenital cardiac malformation s/p repair, DVT in  (s/p Coumadin last dose ), h/o UGIB s/p endoscopy, HCM (last TTE in 2017 with reported severe asymmetric septal ventricular hypertrophy without obstructive outflow tract), prolonged hospitalization in 2017 due to hypercapnic respiratory failure 2/2 MSSA pneumonia c/b difficulty with extubation due to failed CPAP trials believed to be due to central hypoventilation syndrome (given family hx) for which patient had tracheostomy and PEG placement followed by discharge to rehab facility and lost to follow up, who now presents for decannulation. Patient reports that in the last few years, she's required several admissions to OSH in [place] (where she predominantly resides) for tracheostomy care. Patient reports occasional bleeding from trach site as well as foul smelling discharge, though at present she denies either of these complaints. Denies any recent pneumonia treatment, fevers/chills. Reports still using a ventilator on occasion while sleeping for unclear reasons. Regarding possible central sleep apnea, patient reports no outside follow up since [facility] admission in . Upon chart review, patient was instructed to follow up with father's physician for evaluation for possible diaphragmatic pacemaker placement at [facility]. Unclear why patient has not followed up. She reports that she will occasionally be tired and fall asleep during the day, however it is not as severe as it was in the past prior to be intubated/trached. Patient otherwise denies any follow up with PMD, cardiology or psychiatry regarding other medical problems; unable to give reasoning for that.     ROS:  Otherwise negative, except as specified in HPI.    PMHx: as above    PSHx: denies     FHx: father with central hypoventilation syndrome s/p diaphragmatic pacemaker     SHx: denies tobacco, alcohol and illicit drug use    ALLERGIES: seafood, otherwise NKDA     HOME MEDICATIONS: none     VITAL SIGNS:  T(C): 37 (15 Delgado 2019 14:47), Max: 37 (15 Delgado 2019 14:47)  T(F): 98.6 (15 Delgado 2019 14:47), Max: 98.6 (15 Delgado 2019 14:47)  HR: 78 (15 Delgado 2019 14:47) (78 - 80)  BP: 165/108 (15 Delgado 2019 14:47) (155/107 - 165/108)  RR: 20 (15 Delgado 2019 14:47) (18 - 20)  SpO2: 97% (15 Delgado 2019 14:47) (97% - 100%)    PHYSICAL EXAM:  Constitutional: obese female, sitting up comfortably in bed, NAD  HEENT: NCAT, PERRL, anicteric sclera, MMM, no oropharyngeal erythema or exudates, poor dentition  Neck: supple, no appreciable JVD. Trach site is nonerythematous, nontender, yellow sputum in cannula, foul smelling, no bleeding noted  Respiratory: CTA B/L, no W/R/R; respirations appear non-labored, conversive in full sentences  Cardiovascular: +S1/S2, RRR, no murmur  Gastrointestinal: obese, soft, NT/ND  Extremities: WWP; no clubbing, cyanosis or edema  Vascular: 2+ radial, femoral, and DP/PT pulses B/L  Dermatologic: skin normal color and turgor; no visible rashes  Neurological: AAOx3, CNII-XII grossly intact, moves all extremities     LABS:                        12.3   4.2   )-----------( 141      ( 15 Delgado 2019 14:38 )             38.7     01-15    136  |  98  |  12  ----------------------------<  89  4.2   |  29  |  0.81    Ca    9.1      15 Delgado 2019 14:38    TPro  8.0  /  Alb  3.7  /  TBili  0.3  /  DBili  x   /  AST  18  /  ALT  14  /  AlkPhos  56  01-15    Urinalysis Basic - ( 15 Delgado 2019 14:38 )    Color: Yellow / Appearance: Cloudy / S.025 / pH: x  Gluc: x / Ketone: NEGATIVE  / Bili: Negative / Urobili: 0.2 E.U./dL   Blood: x / Protein: Trace mg/dL / Nitrite: NEGATIVE   Leuk Esterase: NEGATIVE / RBC: < 5 /HPF / WBC < 5 /HPF   Sq Epi: x / Non Sq Epi: Many /HPF / Bacteria: Present /HPF    RADIOLOGY & ADDITIONAL TESTS: Reviewed. [facility] SERVICE CONSULTATION NOTE    CC: decannulation    HPI: 33F poor historian with documented PMHx of HTN, HLD, pAfib, congenital cardiac malformation s/p repair, DVT in  (s/p Coumadin last dose ), h/o UGIB s/p endoscopy, HCM (last TTE in 2017 with reported severe asymmetric septal ventricular hypertrophy without obstructive outflow tract), prolonged hospitalization in 2017 due to hypercapnic respiratory failure 2/2 MSSA pneumonia c/b difficulty with extubation due to failed CPAP trials believed to be due to central hypoventilation syndrome (given family hx) for which patient had tracheostomy and PEG placement followed by discharge to rehab facility and lost to follow up, who now presents for decannulation. Patient reports that in the last few years, she's required several admissions to OSH in [place] (where she predominantly resides) for tracheostomy care however was unable to be decannulated as physicians in [place] recommended she return to [facility] where it was placed for removal. Patient reports occasional bleeding from trach site as well as foul smelling discharge, though at present she denies either of these complaints. Denies any recent pneumonia treatment, fevers/chills. Reports still using a ventilator on occasion while sleeping for unclear reasons. Regarding possible central sleep apnea, patient reports no outside follow up since [facility] admission in . Upon chart review, patient was instructed to follow up with father's physician for evaluation for possible diaphragmatic pacemaker placement at [facility]. Unclear why patient has not followed up. She reports that she will occasionally be tired and fall asleep during the day, however it is not as severe as it was in the past prior to be intubated/trached. Patient otherwise denies any follow up with PMD, cardiology or psychiatry regarding other medical problems; unable to give reasoning for that.   Patient presented to Dr. Fishman's office yesterday with her father for follow up and re-establishment of care, would like to be decannulated. Sent in to ER by Dr. Fishman for procedure. In the ED, vital signs stable. ICU involved as patient will require telemetry monitoring.     ROS:  Otherwise negative, except as specified in HPI.    PMHx: as above    PSHx: denies     FHx: father with central hypoventilation syndrome s/p diaphragmatic pacemaker     SHx: denies tobacco, alcohol and illicit drug use    ALLERGIES: seafood, otherwise NKDA     HOME MEDICATIONS: none     VITAL SIGNS:  T(C): 37 (15 Delgado 2019 14:47), Max: 37 (15 Delgado 2019 14:47)  T(F): 98.6 (15 Delgado 2019 14:47), Max: 98.6 (15 Delgado 2019 14:47)  HR: 78 (15 Delgado 2019 14:47) (78 - 80)  BP: 165/108 (15 Delgado 2019 14:47) (155/107 - 165/108)  RR: 20 (15 Delgado 2019 14:47) (18 - 20)  SpO2: 97% (15 Delgado 2019 14:47) (97% - 100%)    PHYSICAL EXAM:  Constitutional: obese female, sitting up comfortably in bed, NAD  HEENT: NCAT, PERRL, anicteric sclera, MMM, no oropharyngeal erythema or exudates, poor dentition  Neck: supple, no appreciable JVD. Trach site is nonerythematous, nontender, yellow sputum in cannula, foul smelling, no bleeding noted  Respiratory: CTA B/L, no W/R/R; respirations appear non-labored, conversive in full sentences  Cardiovascular: +S1/S2, RRR, no murmur  Gastrointestinal: obese, soft, NT/ND  Extremities: WWP; no clubbing, cyanosis or edema  Vascular: 2+ radial, femoral, and DP/PT pulses B/L  Dermatologic: skin normal color and turgor; no visible rashes  Neurological: AAOx3, CNII-XII grossly intact, moves all extremities     LABS:                        12.3   4.2   )-----------( 141      ( 15 Delgado 2019 14:38 )             38.7     01-15    136  |  98  |  12  ----------------------------<  89  4.2   |  29  |  0.81    Ca    9.1      15 Delgado 2019 14:38    TPro  8.0  /  Alb  3.7  /  TBili  0.3  /  DBili  x   /  AST  18  /  ALT  14  /  AlkPhos  56  01-15    Urinalysis Basic - ( 15 Delgado 2019 14:38 )    Color: Yellow / Appearance: Cloudy / S.025 / pH: x  Gluc: x / Ketone: NEGATIVE  / Bili: Negative / Urobili: 0.2 E.U./dL   Blood: x / Protein: Trace mg/dL / Nitrite: NEGATIVE   Leuk Esterase: NEGATIVE / RBC: < 5 /HPF / WBC < 5 /HPF   Sq Epi: x / Non Sq Epi: Many /HPF / Bacteria: Present /HPF    RADIOLOGY & ADDITIONAL TESTS: Reviewed.

## 2019-01-15 NOTE — ED PROVIDER NOTE - OBJECTIVE STATEMENT
32yo female with pmhx of HCM, DVT on asa, UGIB, MICHAEL presents for trach decannulation. Pt was intubated 4 years ago and had trach placed in the setting of acute hypercapnic respiratory failure. She was discharged to a rehab and lost to follow up. She was seen yesterday in Pulmonology clinic with Dr. Fishman to re-establish care. Her only complaint is smell emanating from trach. Currently has a size 6. Was instructed to present to ED today for admission to hospital for decannulation under close observation. She denies fever, chills, chest pain, shortness of breath, cough, abd pain, N/V/D, leg swelling.

## 2019-01-15 NOTE — ED ADULT TRIAGE NOTE - CHIEF COMPLAINT QUOTE
referred here to see Dr. Fishman to have old trach removed - MD here to call Kye and send patient to [facility] ; client has no symptoms

## 2019-01-15 NOTE — ED PROVIDER NOTE - MEDICAL DECISION MAKING DETAILS
ED course unremarkable - afebrile and hemodynamically stable. Pt has no acute complaints, instructed to present to ED today for admission for trach decannulation. Contacted Dr. Fishman who requests admission labs and CXR which are pending. She is to be admitted to [facility] on Dr. Fishman's service.

## 2019-01-15 NOTE — H&P ADULT - HISTORY OF PRESENT ILLNESS
Ms Pardo is a 33F poor historian with documented PMHx of HTN, HLD, pAfib, congenital cardiac malformation s/p repair, DVT in 2008 (s/p Coumadin last dose 2009), h/o UGIB s/p endoscopy, HCM (last TTE in 2/2017 with reported severe asymmetric septal ventricular hypertrophy without obstructive outflow tract), prolonged hospitalization in 1/2017 due to hypercapnic respiratory failure 2/2 MSSA pneumonia c/b difficulty with extubation due to failed CPAP trials believed to be due to central hypoventilation syndrome (given family hx) for which patient had tracheostomy and PEG placement followed by discharge to rehab facility and lost to follow up. She now presents for decannulation. Patient reports that in the last few years, she's required several admissions to OSH in [place] (where she predominantly resides) for tracheostomy care however was unable to be decannulated as physicians in [place] recommended she return to [facility] where it was placed for removal. Patient reports occasional bleeding from trach site as well as foul smelling discharge, though at present she denies either of these complaints. Denies any recent pneumonia treatment, fevers or chills. Reports still using a ventilator on occasion while sleeping for unclear reasons. Regarding possible central sleep apnea, patient reports no outside follow up since [facility] admission in 2017. Upon chart review, patient was instructed to follow up with father's physician for evaluation for possible diaphragmatic pacemaker placement at [facility]. Unclear why patient has not followed up. She reports that she will occasionally be tired and fall asleep during the day, however it is not as severe as it was in the past prior to be intubated/trached. Patient otherwise denies any follow up with PMD, cardiology or psychiatry regarding other medical problems; unable to give reasoning for that.   Patient presented to Dr. Fishman's office yesterday with her father for follow up and re-establishment of care, would like to be decannulated. Sent in to ER by Dr. Fishman for procedure. In the ED, vital signs stable. Pt admitted to [facility] as she will require telemetry monitoring.     ROS positive for occasional cough productive of yellow phlegm. Denies HA, weakness, n/v, SOB, CP, Abdominal pain, B/B symptoms.

## 2019-01-15 NOTE — H&P ADULT - NSHPLABSRESULTS_GEN_ALL_CORE
LABS:                         12.3   4.2   )-----------( 141      ( 15 Delgado 2019 14:38 )             38.7     01-15    136  |  98  |  12  ----------------------------<  89  4.2   |  29  |  0.81    Ca    9.1      15 Delgado 2019 14:38    TPro  8.0  /  Alb  3.7  /  TBili  0.3  /  DBili  x   /  AST  18  /  ALT  14  /  AlkPhos  56  01-15      Urinalysis Basic - ( 15 Delgado 2019 14:38 )    Color: Yellow / Appearance: Cloudy / S.025 / pH: x  Gluc: x / Ketone: NEGATIVE  / Bili: Negative / Urobili: 0.2 E.U./dL   Blood: x / Protein: Trace mg/dL / Nitrite: NEGATIVE   Leuk Esterase: NEGATIVE / RBC: < 5 /HPF / WBC < 5 /HPF   Sq Epi: x / Non Sq Epi: Many /HPF / Bacteria: Present /HPF    RADIOLOGY, EKG & ADDITIONAL TESTS: Reviewed.   < from: Xray Chest 2 Views PA/Lat (01.15.19 @ 13:32) >    Impression:    Hypoinflation. Tracheostomy tube. No lung infiltrate or pleural effusion.   No pneumothorax. Degenerative changes thoracic spine present.

## 2019-01-15 NOTE — ED PROVIDER NOTE - NS ED ROS FT
Constitutional: No fever. No chills.  Eyes: No redness. No discharge. No vision change.   ENT: No sore throat. No ear pain. +smell emanating from trach.  Cardiovascular: No chest pain. No leg swelling.  Respiratory: No cough. No shortness of breath.  GI: No abdominal pain. No vomiting. No diarrhea.   MSK: No joint pain. No back pain.   Skin: No rash. No abrasions.   Neuro: No numbness. No weakness.   Psych: No known mental health issues.

## 2019-01-15 NOTE — H&P ADULT - PROBLEM SELECTOR PLAN 1
prolonged hospitalization in 1/2017 due to hypercapnic respiratory failure 2/2 MSSA pneumonia c/b difficulty with extubation due to failed CPAP trials believed to be due to central hypoventilation syndrome (given family hx) for which patient had tracheostomy placed. Pt now wished to be decanulated.  - genetic testing showing pt is heterozygous for gene consistent with congenital central hypoventilation  - ABG with pH pCO2 pO2 and HCO3  - cap trach overnight and see how pt tolerates  - repeat ABG in the AM to compare  - AM CXR

## 2019-01-15 NOTE — ED ADULT NURSE NOTE - NSIMPLEMENTINTERV_GEN_ALL_ED
Implemented All Universal Safety Interventions:  [place] to call system. Call bell, personal items and telephone within reach. Instruct patient to call for assistance. Room bathroom lighting operational. Non-slip footwear when patient is off stretcher. Physically safe environment: no spills, clutter or unnecessary equipment. Stretcher in lowest position, wheels locked, appropriate side rails in place. repeat/scheduled

## 2019-01-15 NOTE — H&P ADULT - NSHPPHYSICALEXAM_GEN_ALL_CORE
VITAL SIGNS:  T(C): 37.3 (01-15-19 @ 18:01), Max: 37.3 (01-15-19 @ 18:01)  T(F): 99.2 (01-15-19 @ 18:01), Max: 99.2 (01-15-19 @ 18:01)  HR: 96 (01-15-19 @ 18:01) (78 - 96)  BP: 155/96 (01-15-19 @ 17:33) (148/94 - 165/108)  BP(mean): 116 (01-15-19 @ 17:33) (116 - 116)  RR: 18 (01-15-19 @ 17:33) (18 - 20)  SpO2: 97% (01-15-19 @ 18:01) (90% - 100%)  Wt(kg): --    PHYSICAL EXAM:  Constitutional: Obese, WDWN resting comfortably in bed; NAD  Head: NC/AT  Eyes: PERRL, EOMI, anicteric sclera  ENT: no nasal discharge; uvula midline, no oropharyngeal erythema or exudates; MMM  Neck: supple; no JVD or thyromegaly  Respiratory: Diminished breath sounds B/L; no W/R/R, no retractions, trach collar in place is malodorous, pt speaks in full sentences with normal tone of voice with trach capped and uncapped  Cardiac: +S1/S2; RRR; no M/R/G  Gastrointestinal: abdomen soft, NT/ND; no rebound or guarding; +BSx4  Back: spine midline, no bony tenderness or step-offs; no CVAT B/L  Extremities: WWP, no clubbing or cyanosis; no peripheral edema  Musculoskeletal: NROM x4; no joint swelling, tenderness or erythema  Vascular: 2+ radial, femoral, DP/PT pulses B/L  Dermatologic: skin warm, dry and intact; no rashes, wounds, or scars  Neurologic: AAOx3; CNII-XII grossly intact; no focal deficits  Psychiatric: affect and characteristics of appearance, verbalizations, behaviors are appropriate

## 2019-01-15 NOTE — ED PROVIDER NOTE - PHYSICAL EXAMINATION
VITAL SIGNS: I have reviewed nursing notes and confirm.  CONSTITUTIONAL: Well-developed; well-nourished; in no acute distress.   SKIN:  warm and dry, no acute rash.   HEAD:  normocephalic, atraumatic.  EYES: PERRL, EOM intact; conjunctiva and sclera clear.  ENT: No nasal discharge; airway clear. Trach in place, no surrounding erythema or edema.   NECK: Supple; non tender.  CARD: S1, S2 normal; no murmurs, gallops, or rubs. Regular rate and rhythm.   RESP:  Clear to auscultation b/l, no wheezes, rales or rhonchi.  ABD: Normal bowel sounds; soft; non-distended; non-tender; no guarding/ rebound.  EXT: Normal ROM. No clubbing, cyanosis or edema. 2+ pulses to b/l ue/le.  NEURO: Alert, oriented, grossly unremarkable  PSYCH: Cooperative, mood and affect appropriate.

## 2019-01-15 NOTE — H&P ADULT - PROBLEM SELECTOR PLAN 2
Pt has reported hx of HOCM. TTE in 2/2017 with reported severe asymmetric septal ventricular hypertrophy without obstructive outflow tract.  - repeat ECHO

## 2019-01-16 ENCOUNTER — TRANSCRIPTION ENCOUNTER (OUTPATIENT)
Age: 34
End: 2019-01-16

## 2019-01-16 DIAGNOSIS — R06.89 OTHER ABNORMALITIES OF BREATHING: ICD-10-CM

## 2019-01-16 LAB
ANION GAP SERPL CALC-SCNC: 6 MMOL/L — SIGNIFICANT CHANGE UP (ref 5–17)
BASE EXCESS BLDA CALC-SCNC: 6.2 MMOL/L — HIGH (ref -2–3)
BASE EXCESS BLDA CALC-SCNC: 7.7 MMOL/L — HIGH (ref -2–3)
BUN SERPL-MCNC: 14 MG/DL — SIGNIFICANT CHANGE UP (ref 7–23)
CALCIUM SERPL-MCNC: 9.3 MG/DL — SIGNIFICANT CHANGE UP (ref 8.4–10.5)
CHLORIDE SERPL-SCNC: 97 MMOL/L — SIGNIFICANT CHANGE UP (ref 96–108)
CHOLEST SERPL-MCNC: 140 MG/DL — SIGNIFICANT CHANGE UP (ref 10–199)
CO2 SERPL-SCNC: 34 MMOL/L — HIGH (ref 22–31)
CREAT SERPL-MCNC: 0.95 MG/DL — SIGNIFICANT CHANGE UP (ref 0.5–1.3)
GAS PNL BLDA: SIGNIFICANT CHANGE UP
GLUCOSE SERPL-MCNC: 110 MG/DL — HIGH (ref 70–99)
HBA1C BLD-MCNC: 5.1 % — SIGNIFICANT CHANGE UP (ref 4–5.6)
HCO3 BLDA-SCNC: 35 MMOL/L — HIGH (ref 21–28)
HCO3 BLDA-SCNC: 37 MMOL/L — HIGH (ref 21–28)
HCT VFR BLD CALC: 41.1 % — SIGNIFICANT CHANGE UP (ref 34.5–45)
HDLC SERPL-MCNC: 71 MG/DL — SIGNIFICANT CHANGE UP
HGB BLD-MCNC: 12.7 G/DL — SIGNIFICANT CHANGE UP (ref 11.5–15.5)
LIPID PNL WITH DIRECT LDL SERPL: 60 MG/DL — SIGNIFICANT CHANGE UP
MAGNESIUM SERPL-MCNC: 1.8 MG/DL — SIGNIFICANT CHANGE UP (ref 1.6–2.6)
MCHC RBC-ENTMCNC: 27.5 PG — SIGNIFICANT CHANGE UP (ref 27–34)
MCHC RBC-ENTMCNC: 30.9 G/DL — LOW (ref 32–36)
MCV RBC AUTO: 89 FL — SIGNIFICANT CHANGE UP (ref 80–100)
PCO2 BLDA: 75 MMHG — CRITICAL HIGH (ref 32–45)
PCO2 BLDA: 76 MMHG — CRITICAL HIGH (ref 32–45)
PH BLDA: 7.28 — LOW (ref 7.35–7.45)
PH BLDA: 7.31 — LOW (ref 7.35–7.45)
PLATELET # BLD AUTO: 161 K/UL — SIGNIFICANT CHANGE UP (ref 150–400)
PO2 BLDA: 127 MMHG — HIGH (ref 83–108)
PO2 BLDA: 87 MMHG — SIGNIFICANT CHANGE UP (ref 83–108)
POTASSIUM SERPL-MCNC: 4.3 MMOL/L — SIGNIFICANT CHANGE UP (ref 3.5–5.3)
POTASSIUM SERPL-SCNC: 4.3 MMOL/L — SIGNIFICANT CHANGE UP (ref 3.5–5.3)
RBC # BLD: 4.62 M/UL — SIGNIFICANT CHANGE UP (ref 3.8–5.2)
RBC # FLD: 15.3 % — SIGNIFICANT CHANGE UP (ref 10.3–16.9)
SAO2 % BLDA: 96 % — SIGNIFICANT CHANGE UP (ref 95–100)
SAO2 % BLDA: 98 % — SIGNIFICANT CHANGE UP (ref 95–100)
SODIUM SERPL-SCNC: 137 MMOL/L — SIGNIFICANT CHANGE UP (ref 135–145)
TOTAL CHOLESTEROL/HDL RATIO MEASUREMENT: 2 RATIO — LOW (ref 3.3–7.1)
TRIGL SERPL-MCNC: 45 MG/DL — SIGNIFICANT CHANGE UP (ref 10–149)
TSH SERPL-MCNC: 1.17 UIU/ML — SIGNIFICANT CHANGE UP (ref 0.35–4.94)
WBC # BLD: 4 K/UL — SIGNIFICANT CHANGE UP (ref 3.8–10.5)
WBC # FLD AUTO: 4 K/UL — SIGNIFICANT CHANGE UP (ref 3.8–10.5)

## 2019-01-16 PROCEDURE — 71045 X-RAY EXAM CHEST 1 VIEW: CPT | Mod: 26

## 2019-01-16 PROCEDURE — 93306 TTE W/DOPPLER COMPLETE: CPT | Mod: 26

## 2019-01-16 PROCEDURE — 99233 SBSQ HOSP IP/OBS HIGH 50: CPT | Mod: GC

## 2019-01-16 RX ORDER — LIDOCAINE HCL 20 MG/ML
4 VIAL (ML) INJECTION ONCE
Qty: 0 | Refills: 0 | Status: COMPLETED | OUTPATIENT
Start: 2019-01-16 | End: 2019-01-16

## 2019-01-16 RX ORDER — MAGNESIUM SULFATE 500 MG/ML
1 VIAL (ML) INJECTION ONCE
Qty: 0 | Refills: 0 | Status: COMPLETED | OUTPATIENT
Start: 2019-01-16 | End: 2019-01-16

## 2019-01-16 RX ADMIN — Medication 4 MILLILITER(S): at 10:39

## 2019-01-16 RX ADMIN — Medication 100 GRAM(S): at 08:08

## 2019-01-16 NOTE — PROGRESS NOTE ADULT - PROBLEM SELECTOR PLAN 3
Pt has reported hx of Afib. Denies use of any medications at home.  - continue to monitor on tele Pt has reported hx of HOCM. TTE in 2/2017 with reported severe asymmetric septal ventricular hypertrophy without obstructive outflow tract.  - repeat ECHO not showing signs of HOCM

## 2019-01-16 NOTE — DISCHARGE NOTE ADULT - PATIENT PORTAL LINK FT
You can access the [facility] Patient Portal, offered by [facility], by registering with the following website: [email or web address]

## 2019-01-16 NOTE — DISCHARGE NOTE ADULT - PLAN OF CARE
Follow up with your pulmonologist You came to the hospital to have your tracheostomy assessed to see if it could be removed. You had a trial overnight with a cap over your tracheostomy to see if you could tolerate breathing on your own without you oxygen levels becoming too low. We were concerned because of your family history of congenital central hypoventilation syndrome and that you carry a gene for this condition. Overnight your oxygen levels did become low and you required extra oxygen to bring your oxygen levels up. This made us concerned that you likely have hypoventilation syndrome. You will need to have a sleep study to further evaluate your condition. You had a bronchoscopy done in the hospital to evaluate your tracheostomy as well. Please follow up with Dr. Fishman in his office to further work up and evaluate your condition. You came to the hospital to have your tracheostomy assessed to see if it could be removed. We were concerned because of your family history of congenital central hypoventilation syndrome and that you carry a gene for this condition. You had multiple trials on and off of a ventilator while you were sleeping to monitor your oxygen levels. Many times you required extra oxygen and to be put on the hospital ventilator because your oxygen levels came low. You were tried on your home ventilator overnight with extra oxygen and you did well. You also had your tracheostomy changed by ENT while you were in the hospital. You will be discharged home to use your ventilator with extra oxygen. Please follow up with Dr. Fishman in his office to further management of your condition.

## 2019-01-16 NOTE — BRIEF OPERATIVE NOTE - PROCEDURE
<<-----Click on this checkbox to enter Procedure Flexible bronchoscopy  01/16/2019    Active  TAMMIE

## 2019-01-16 NOTE — DISCHARGE NOTE ADULT - CARE PROVIDER_API CALL
Rissa Fishman), Critical Care Medicine; Pulmonary Disease  [address] [place] [address]  Phone: [phone]  Fax: [phone]

## 2019-01-16 NOTE — PROGRESS NOTE ADULT - PROBLEM SELECTOR PLAN 2
Pt has reported hx of HOCM. TTE in 2/2017 with reported severe asymmetric septal ventricular hypertrophy without obstructive outflow tract.  - repeat ECHO Prolonged hospitalization in 1/2017 due to hypercapnic respiratory failure 2/2 MSSA pneumonia c/b difficulty with extubation due to failed CPAP trials believed to be due to central hypoventilation syndrome (given family hx) for which patient had tracheostomy placed. Pt now wished to be decanulated.  - genetic testing showing pt is heterozygous for gene consistent with congenital central hypoventilation  - ABG with pH pCO2 pO2 and HCO3  - cap trach overnight and see how pt tolerates  - repeat ABG in the AM to compare  - AM CXR

## 2019-01-16 NOTE — PROGRESS NOTE ADULT - SUBJECTIVE AND OBJECTIVE BOX
INCOMPLETE NOTE    OVERNIGHT EVENTS:    SUBJECTIVE / INTERVAL HPI: Patient seen and examined at bedside.     VITAL SIGNS:  Vital Signs Last 24 Hrs  T(C): 36.3 (2019 06:36), Max: 37.3 ([facility] 2019 18:01)  T(F): 97.4 (2019 06:36), Max: 99.2 (15 [facility] 2019 18:01)  HR: 92 (2019 05:07) (78 - 98)  BP: 146/83 (2019 05:07) (127/70 - 165/108)  BP(mean): 109 (2019 05:07) (93 - 118)  RR: 20 (2019 05:07) (18 - 24)  SpO2: 92% (2019 05:07) (90% - 100%)        PHYSICAL EXAM:  General: NAD  HEENT: NC/AT, anicteric sclera, MMM  Neck: supple  Cardiovascular: +S1/S2, RRR  Respiratory: CTA B/L, no W/R/R  Gastrointestinal: soft, NT/ND, +BSx4  Extremities: WWP, no edema, clubbing or cyanosis  Vascular: 2+ radial, DP/PT pulses B/L  Neurological: AAOx3, no focal deficits    MEDICATIONS  (STANDING):    MEDICATIONS  (PRN):      Allergies    No Known Drug Allergies  Seafood (Rash)    Intolerances        LABS:                        12.7   4.0   )-----------( 161      ( 2019 06:16 )             41.1     01-15    136  |  98  |  12  ----------------------------<  89  4.2   |  29  |  0.81    Ca    9.1      15 [facility] 2019 14:38    TPro  8.0  /  Alb  3.7  /  TBili  0.3  /  DBili  x   /  AST  18  /  ALT  14  /  AlkPhos  56  01-15      Urinalysis Basic - ( 15 [facility] 2019 14:38 )    Color: Yellow / Appearance: Cloudy / S.025 / pH: x  Gluc: x / Ketone: NEGATIVE  / Bili: Negative / Urobili: 0.2 E.U./dL   Blood: x / Protein: Trace mg/dL / Nitrite: NEGATIVE   Leuk Esterase: NEGATIVE / RBC: < 5 /HPF / WBC < 5 /HPF   Sq Epi: x / Non Sq Epi: Many /HPF / Bacteria: Present /HPF      CAPILLARY BLOOD GLUCOSE              RADIOLOGY & ADDITIONAL TESTS: Reviewed. OVERNIGHT EVENTS: Patient desaturated to 69% on 5L NC while asleep. Her trach was uncapped with consistent desaturation in 60s so she was started on trach collar at 40% FIO2 with resolution to SpO2 to 92%.    SUBJECTIVE / INTERVAL HPI: Patient seen and examined at bedside. Patient reports that she is feeling well. Denies SOB or cough. Denies any other new symptoms.    VITAL SIGNS:  Vital Signs Last 24 Hrs  T(C): 36.3 (2019 06:36), Max: 37.3 (15 [facility] 2019 18:01)  T(F): 97.4 (2019 06:36), Max: 99.2 (15 [facility] 2019 18:01)  HR: 92 (2019 05:07) (78 - 98)  BP: 146/83 (2019 05:07) (127/70 - 165/108)  BP(mean): 109 (2019 05:07) (93 - 118)  RR: 20 (2019 05:07) (18 - 24)  SpO2: 92% (2019 05:07) (90% - 100%)    PHYSICAL EXAM:  Constitutional: Obese, WDWN resting comfortably in bed; NAD  	Head: NC/AT  	Eyes: PERRL, EOMI, anicteric sclera  	ENT: no nasal discharge; uvula midline, no oropharyngeal erythema or exudates; MMM  	Neck: supple; no JVD or thyromegaly  	Respiratory: Diminished breath sounds B/L; no W/R/R, no retractions, trach collar in place is malodorous, pt speaks in full sentences with normal tone of voice with trach capped and uncapped  	Cardiac: +S1/S2; RRR; no M/R/G  	Gastrointestinal: abdomen soft, NT/ND; no rebound or guarding; +BSx4  	Extremities: WWP, no clubbing or cyanosis; no peripheral edema  	Musculoskeletal: NROM x4; no joint swelling, tenderness or erythema  	Vascular: 2+ radial, femoral, DP/PT pulses B/L  	Dermatologic: skin warm, dry and intact; no rashes, wounds, or scars  	Neurologic: AAOx3; CNII-XII grossly intact; no focal deficits    Meds: none    Allergies  No Known Drug Allergies  Seafood (Rash)    LABS:                        12.7   4.0   )-----------( 161      ( 2019 06:16 )             41.1     01-15    136  |  98  |  12  ----------------------------<  89  4.2   |  29  |  0.81    Ca    9.1      15 Delgado 2019 14:38    TPro  8.0  /  Alb  3.7  /  TBili  0.3  /  DBili  x   /  AST  18  /  ALT  14  /  AlkPhos  56  01-15      Urinalysis Basic - ( 15 [facility] 2019 14:38 )    Color: Yellow / Appearance: Cloudy / S.025 / pH: x  Gluc: x / Ketone: NEGATIVE  / Bili: Negative / Urobili: 0.2 E.U./dL   Blood: x / Protein: Trace mg/dL / Nitrite: NEGATIVE   Leuk Esterase: NEGATIVE / RBC: < 5 /HPF / WBC < 5 /HPF   Sq Epi: x / Non Sq Epi: Many /HPF / Bacteria: Present /HPF    RADIOLOGY & ADDITIONAL TESTS: Reviewed.  < from: TTE Echo w/Cont Complete (19 @ 10:17) >  The left atrial size is normal. The left atrial volume index is 23.2 cc/m2   (normal <34cc/m2).  There is mild concentric left ventricular   hypertrophy.The   left ventricular wall motion is normal.The left ventricular ejection   fraction   is 70%.The mitral inflow pattern is consistent with impaired left   ventricular   relaxation with mildly elevated left atrial pressure (8-14mmHg).  Right   atrial   size is normal.The right ventricle is normal in size and function.The   pulmonary artery systolic pressure is estimated to be at least 56 mmHg.   This   study shows evidence of pulmonary hypertension.  The IVC is dilated   (>2.1 cm)   with an abnormal inspiratory collapse (<50%) consistent with elevated   right   atrial pressure.  No evidence for any hemodynamically significant   valvular   disease.No aortic root dilatation.There is no pericardial effusion.Left   pleural effusion noted.    < end of copied text >

## 2019-01-16 NOTE — BRIEF OPERATIVE NOTE - OPERATION/FINDINGS
Informed consent was obtained from the patient. A time out was performed by myself. The Olympus BF-P190 bronchoscope was introduced into the patients tracheostomy. There was noted a crescent shaped piece of dried and hardened mucus. This was gently suctioned and it came easily off the tracheal wall and the patient was able to cough it up with the help of the suction from the scope. The trachea was then examined and there were areas noted of small granulation tissue that were not impinging on the airway or causing any obstruction, the trachea was patent in and overall in good repair. There were some areas of slight erythema near to where the granulation tissue however this was minor. There was white mucus noted in the RLL, however this was noted from above and the segmental airways were not examined in detail. The trach was manipulated over the bronchoscope to see if it would be easily removed however the patient experienced pain and the trach was not easily moveable. The trach was left in place, the bronchoscope was withdrawn and the procedure was concluded.

## 2019-01-16 NOTE — DISCHARGE NOTE ADULT - HOSPITAL COURSE
Ms Pardo is a 33F poor historian with documented PMHx of HTN, HLD, pAfib, congenital cardiac malformation s/p repair, DVT in 2008 (s/p Coumadin last dose 2009), h/o UGIB s/p endoscopy, questionable HOCM (last TTE in 2/2017 with reported severe asymmetric septal ventricular hypertrophy without obstructive outflow tract), prolonged hospitalization in 1/2017 for hypercapnic respiratory failure 2/2 MSSA pneumonia c/b difficulty with extubation d/t failed CPAP trials believed to be d/t central hypoventilation syndrome (given family hx) for which patient had tracheostomy and PEG placement followed by discharge to rehab facility and lost to follow up. She presented for evaluation for decannulation and admitted to [facility] for telemetry monitoring. Pt's ABG on arrival was supportive of congenital hypoventilation syndrome. 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 Pt's trach was capped overnight and while she was sleeping she desatted to the 60s. She was decapped and put on trach collar with improvement in O2 saturation. ABG in the morning showed worse repository acidosis, further supporting a diagnosis of hypoventilation syndrome. Pt ha d a bronchoscopy that showed XXXX. ECHO did not confirm a diagnosis of HOCM. Pt was tried on AVAPS with her trach capped. Ms Pardo is a 33F with PMH of hypercapnic respiratory failure 2/2 MSSA PNA c/b difficult extubation for which patient had trach + PEG placed, congenital hypoventilation syndrome (heterozygous for PHOX2B gene, newly diagnosed on this admission), congenital cardiac malformation s/p repair, TTE 2/2017 showing severe asymmetric septal hypertrophy, DVT in 2008 (s/p Coumadin), UGIB s/p endoscopy, pAfib, HTN, and HLD. She presented for decannulation and admitted to [facility] for telemetry monitoring. Pt's trach was capped overnight and while she was sleeping she desaturated to the 60s. She was decapped and put on trach collar with improvement in O2 saturation. O/N on 1/17 pt was tried on AVAPS with her trach capped but again desaturated to 60s and became difficult to arouse. ABG was consistent with hypoventillation. Pt was stepped up to MICU for worsening hypoxic hypercapnic respiratory failure and possible need for AC/VC support. In the MICU, patient was maintained on trach collar and PCO2 improved to the 70s. Patient accidentally tore the balloon off her trach collar and could not be vented o/n. ENT suctioned the trach and performed a tracheoscopy. They emergently replaced the trach to a a size 6 cuffed, fenestrated trach. Patient is was transferred back to [facility] where she required being put on vent o/n for desatting while sleeping. Desatted 91-93% while walking and desatted intermittently to 88% when talking and walking simultaneously. Patient was trialed on her home vent and desatted, requiring switch to [facility] vent. Patient has oxygen decompressor at home. Will see if respiratory will be able to connect oxygen to her home ventilator and see how O2 sats are overnight. Pt was also noted to have 1.5-2sec pauses on tele while asleep. Ms Pardo is a 33F with PMH of hypercapnic respiratory failure 2/2 MSSA PNA c/b difficult extubation for which patient had trach + PEG placed, congenital hypoventilation syndrome (heterozygous for PHOX2B gene, newly diagnosed on this admission), congenital cardiac malformation s/p repair, TTE 2/2017 showing severe asymmetric septal hypertrophy, DVT in 2008 (s/p Coumadin), UGIB s/p endoscopy, pAfib, HTN, and HLD. She presented for decannulation and admitted to [facility] for telemetry monitoring. Pt's trach was capped overnight and while she was sleeping she desaturated to the 60s. She was decapped and put on trach collar with improvement in O2 saturation. O/N on 1/17 pt was tried on AVAPS with her trach capped but again desaturated to 60s and became difficult to arouse. ABG was consistent with hypoventillation. Pt was stepped up to MICU for worsening hypoxic hypercapnic respiratory failure and possible need for AC/VC support. In the MICU, patient was maintained on trach collar and PCO2 improved to the 70s. Patient accidentally tore the balloon off her trach collar and could not be vented o/n. ENT suctioned the trach and performed a tracheoscopy. They emergently replaced the trach to a a size 6 cuffed, fenestrated trach. Patient is was transferred back to [facility] where she required being put on vent o/n for desatting while sleeping. Desatted 91-93% while walking and desatted intermittently to 88% when talking and walking simultaneously. Patient was trialed on her home vent and desatted, requiring switch to [facility] vent. Patient has oxygen decompressor at home. Home vent company came to repair circuit. Pt now on home vent overnight and trach collar during the day with good oxygen saturation; hemodynamically stable. Discussed with Dr. Fishman and agreement for discharge home with f/u in his office as an OP.

## 2019-01-16 NOTE — PROGRESS NOTE ADULT - PROBLEM SELECTOR PLAN 1
prolonged hospitalization in 1/2017 due to hypercapnic respiratory failure 2/2 MSSA pneumonia c/b difficulty with extubation due to failed CPAP trials believed to be due to central hypoventilation syndrome (given family hx) for which patient had tracheostomy placed. Pt now wished to be decanulated.  - genetic testing showing pt is heterozygous for gene consistent with congenital central hypoventilation  - ABG with pH pCO2 pO2 and HCO3  - cap trach overnight and see how pt tolerates  - repeat ABG in the AM to compare  - AM CXR Pt has hx of prolonged hospitalization in 1/2017 for hypercapnic respiratory failure 2/2 MSSA pneumonia c/b difficulty with extubation from failed CPAP trials believed to be d/t central hypoventilation syndrome (given family hx) for which patient had tracheostomy placed.  - genetic testing showed pt heterozygous for PHOX2B gene which is associated with central hypoventilation syndrome  - ABG on room air supportive of hypoventaltion (pH 7.35, pCO2 67, pO2 61) with worsening acidosis while sleeping (next day AM ABG pH 7.31, pCO2 75, pO2 87)  - likely MICHAEL vc OHS, will continue to work up  - trial of pt on AVAPS with trach capped, obtain ABG after a few hours to see how pt tolerates

## 2019-01-16 NOTE — DISCHARGE NOTE ADULT - MEDICATION SUMMARY - MEDICATIONS TO STOP TAKING
I will STOP taking the medications listed below when I get home from the hospital:    heparin  -- 7500 unit(s) subcutaneous every 8 hours    QUEtiapine 50 mg oral tablet  -- 1 tab(s) by mouth 2 times a day    chlorhexidine 0.12% mucous membrane liquid  -- 15 milliliter(s) mucous membrane 2 times a day    clonazePAM 1 mg oral tablet  -- 1 tab(s) by mouth every 12 hours    polyethylene glycol 3350 oral powder for reconstitution  -- 17 gram(s) by mouth once a day    raNITIdine 150 mg oral capsule  --  by mouth    Effexor XR 75 mg oral capsule, extended release  -- 1 cap(s) by mouth once a day

## 2019-01-16 NOTE — PROGRESS NOTE ADULT - PROBLEM SELECTOR PLAN 4
F: none  E: replete  N: DASH/ TLC diet Pt has reported hx of Afib. Denies use of any medications at home.  - continue to monitor on tele

## 2019-01-16 NOTE — DISCHARGE NOTE ADULT - CONDITIONS AT DISCHARGE
Patient seen and examined with house-staff during bedside rounds.  Resident note read, including vitals, physical findings, laboratory data, and radiological reports.   Revisions included below.  Direct personal management at bed side and extensive interpretation of the data.  Plan was outlined and discussed in details with the housestaff.  Decision making of high complexity  Action taken for acute disease activity to reflect the level of care provided:  - medication reconciliation  - review laboratory data  Patient tolerated her on ventilator with oxygen and did not desaturate. Patient was provided with supplies. Patient is to be discharged today. Patient will be followed in the office in 2 to weeks. The respiratory therapist from the provider will be seeing the patient at home today

## 2019-01-16 NOTE — DISCHARGE NOTE ADULT - CARE PLAN
Principal Discharge DX:	Hypoventilation syndrome  Goal:	Follow up with your pulmonologist Principal Discharge DX:	Hypoventilation syndrome  Goal:	Follow up with your pulmonologist  Assessment and plan of treatment:	You came to the hospital to have your tracheostomy assessed to see if it could be removed. You had a trial overnight with a cap over your tracheostomy to see if you could tolerate breathing on your own without you oxygen levels becoming too low. We were concerned because of your family history of congenital central hypoventilation syndrome and that you carry a gene for this condition. Overnight your oxygen levels did become low and you required extra oxygen to bring your oxygen levels up. This made us concerned that you likely have hypoventilation syndrome. You will need to have a sleep study to further evaluate your condition. You had a bronchoscopy done in the hospital to evaluate your tracheostomy as well. Please follow up with Dr. Fishman in his office to further work up and evaluate your condition. Principal Discharge DX:	Hypoventilation syndrome  Goal:	Follow up with your pulmonologist  Assessment and plan of treatment:	You came to the hospital to have your tracheostomy assessed to see if it could be removed. We were concerned because of your family history of congenital central hypoventilation syndrome and that you carry a gene for this condition. You had multiple trials on and off of a ventilator while you were sleeping to monitor your oxygen levels. Many times you required extra oxygen and to be put on the hospital ventilator because your oxygen levels came low. You were tried on your home ventilator overnight with extra oxygen and you did well. You also had your tracheostomy changed by ENT while you were in the hospital. You will be discharged home to use your ventilator with extra oxygen. Please follow up with Dr. Fishman in his office to further management of your condition.

## 2019-01-16 NOTE — PROGRESS NOTE ADULT - ASSESSMENT
Ms Pardo is a 33F poor historian with documented PMHx of HTN, HLD, pAfib, congenital cardiac malformation s/p repair, DVT in 2008 (s/p Coumadin last dose 2009), h/o UGIB s/p endoscopy, HOCM (last TTE in 2/2017 with reported severe asymmetric septal ventricular hypertrophy without obstructive outflow tract), prolonged hospitalization in 1/2017 due to hypercapnic respiratory failure 2/2 MSSA pneumonia c/b difficulty with extubation due to failed CPAP trials believed to be due to central hypoventilation syndrome (given family hx) for which patient had tracheostomy and PEG placement followed by discharge to rehab facility and lost to follow up. She now presents for decannulation.

## 2019-01-17 DIAGNOSIS — J96.21 ACUTE AND CHRONIC RESPIRATORY FAILURE WITH HYPOXIA: ICD-10-CM

## 2019-01-17 DIAGNOSIS — E66.2 MORBID (SEVERE) OBESITY WITH ALVEOLAR HYPOVENTILATION: ICD-10-CM

## 2019-01-17 DIAGNOSIS — J96.22 ACUTE AND CHRONIC RESPIRATORY FAILURE WITH HYPERCAPNIA: ICD-10-CM

## 2019-01-17 DIAGNOSIS — Z91.89 OTHER SPECIFIED PERSONAL RISK FACTORS, NOT ELSEWHERE CLASSIFIED: ICD-10-CM

## 2019-01-17 DIAGNOSIS — G47.31 PRIMARY CENTRAL SLEEP APNEA: ICD-10-CM

## 2019-01-17 LAB
ALBUMIN SERPL ELPH-MCNC: 3.8 G/DL — SIGNIFICANT CHANGE UP (ref 3.3–5)
ALP SERPL-CCNC: 60 U/L — SIGNIFICANT CHANGE UP (ref 40–120)
ALT FLD-CCNC: 13 U/L — SIGNIFICANT CHANGE UP (ref 10–45)
ANION GAP SERPL CALC-SCNC: 10 MMOL/L — SIGNIFICANT CHANGE UP (ref 5–17)
AST SERPL-CCNC: 15 U/L — SIGNIFICANT CHANGE UP (ref 10–40)
BASE EXCESS BLDA CALC-SCNC: 8.2 MMOL/L — HIGH (ref -2–3)
BILIRUB SERPL-MCNC: 0.2 MG/DL — SIGNIFICANT CHANGE UP (ref 0.2–1.2)
BUN SERPL-MCNC: 18 MG/DL — SIGNIFICANT CHANGE UP (ref 7–23)
CALCIUM SERPL-MCNC: 9.1 MG/DL — SIGNIFICANT CHANGE UP (ref 8.4–10.5)
CHLORIDE SERPL-SCNC: 98 MMOL/L — SIGNIFICANT CHANGE UP (ref 96–108)
CO2 SERPL-SCNC: 33 MMOL/L — HIGH (ref 22–31)
CREAT SERPL-MCNC: 0.87 MG/DL — SIGNIFICANT CHANGE UP (ref 0.5–1.3)
GAS PNL BLDA: SIGNIFICANT CHANGE UP
GLUCOSE BLDC GLUCOMTR-MCNC: 118 MG/DL — HIGH (ref 70–99)
GLUCOSE SERPL-MCNC: 118 MG/DL — HIGH (ref 70–99)
HCO3 BLDA-SCNC: 34 MMOL/L — HIGH (ref 21–28)
HCT VFR BLD CALC: 41.1 % — SIGNIFICANT CHANGE UP (ref 34.5–45)
HGB BLD-MCNC: 12.6 G/DL — SIGNIFICANT CHANGE UP (ref 11.5–15.5)
MAGNESIUM SERPL-MCNC: 1.9 MG/DL — SIGNIFICANT CHANGE UP (ref 1.6–2.6)
MCHC RBC-ENTMCNC: 27.5 PG — SIGNIFICANT CHANGE UP (ref 27–34)
MCHC RBC-ENTMCNC: 30.7 G/DL — LOW (ref 32–36)
MCV RBC AUTO: 89.5 FL — SIGNIFICANT CHANGE UP (ref 80–100)
PCO2 BLDA: 50 MMHG — HIGH (ref 32–45)
PH BLDA: 7.45 — SIGNIFICANT CHANGE UP (ref 7.35–7.45)
PHOSPHATE SERPL-MCNC: 5.1 MG/DL — HIGH (ref 2.5–4.5)
PLATELET # BLD AUTO: 176 K/UL — SIGNIFICANT CHANGE UP (ref 150–400)
PO2 BLDA: 53 MMHG — CRITICAL LOW (ref 83–108)
POTASSIUM SERPL-MCNC: 4.6 MMOL/L — SIGNIFICANT CHANGE UP (ref 3.5–5.3)
POTASSIUM SERPL-SCNC: 4.6 MMOL/L — SIGNIFICANT CHANGE UP (ref 3.5–5.3)
PROT SERPL-MCNC: 7.9 G/DL — SIGNIFICANT CHANGE UP (ref 6–8.3)
RBC # BLD: 4.59 M/UL — SIGNIFICANT CHANGE UP (ref 3.8–5.2)
RBC # FLD: 15.3 % — SIGNIFICANT CHANGE UP (ref 10.3–16.9)
SAO2 % BLDA: 89 % — LOW (ref 95–100)
SODIUM SERPL-SCNC: 141 MMOL/L — SIGNIFICANT CHANGE UP (ref 135–145)
WBC # BLD: 5 K/UL — SIGNIFICANT CHANGE UP (ref 3.8–10.5)
WBC # FLD AUTO: 5 K/UL — SIGNIFICANT CHANGE UP (ref 3.8–10.5)

## 2019-01-17 PROCEDURE — 99233 SBSQ HOSP IP/OBS HIGH 50: CPT | Mod: GC

## 2019-01-17 PROCEDURE — 71045 X-RAY EXAM CHEST 1 VIEW: CPT | Mod: 26

## 2019-01-17 PROCEDURE — 99232 SBSQ HOSP IP/OBS MODERATE 35: CPT

## 2019-01-17 RX ORDER — CHLORHEXIDINE GLUCONATE 213 G/1000ML
1 SOLUTION TOPICAL DAILY
Qty: 0 | Refills: 0 | Status: DISCONTINUED | OUTPATIENT
Start: 2019-01-17 | End: 2019-01-18

## 2019-01-17 RX ORDER — MAGNESIUM SULFATE 500 MG/ML
2 VIAL (ML) INJECTION ONCE
Qty: 0 | Refills: 0 | Status: COMPLETED | OUTPATIENT
Start: 2019-01-17 | End: 2019-01-17

## 2019-01-17 RX ORDER — MIDAZOLAM HYDROCHLORIDE 1 MG/ML
2 INJECTION, SOLUTION INTRAMUSCULAR; INTRAVENOUS ONCE
Qty: 0 | Refills: 0 | Status: DISCONTINUED | OUTPATIENT
Start: 2019-01-17 | End: 2019-01-17

## 2019-01-17 RX ORDER — MORPHINE SULFATE 50 MG/1
2 CAPSULE, EXTENDED RELEASE ORAL ONCE
Qty: 0 | Refills: 0 | Status: DISCONTINUED | OUTPATIENT
Start: 2019-01-17 | End: 2019-01-17

## 2019-01-17 RX ORDER — HYDROMORPHONE HYDROCHLORIDE 2 MG/ML
0.2 INJECTION INTRAMUSCULAR; INTRAVENOUS; SUBCUTANEOUS ONCE
Qty: 0 | Refills: 0 | Status: DISCONTINUED | OUTPATIENT
Start: 2019-01-17 | End: 2019-01-18

## 2019-01-17 RX ORDER — HEPARIN SODIUM 5000 [USP'U]/ML
7500 INJECTION INTRAVENOUS; SUBCUTANEOUS EVERY 8 HOURS
Qty: 0 | Refills: 0 | Status: DISCONTINUED | OUTPATIENT
Start: 2019-01-17 | End: 2019-01-25

## 2019-01-17 RX ADMIN — HEPARIN SODIUM 7500 UNIT(S): 5000 INJECTION INTRAVENOUS; SUBCUTANEOUS at 13:34

## 2019-01-17 RX ADMIN — Medication 50 GRAM(S): at 11:07

## 2019-01-17 RX ADMIN — MIDAZOLAM HYDROCHLORIDE 2 MILLIGRAM(S): 1 INJECTION, SOLUTION INTRAMUSCULAR; INTRAVENOUS at 01:50

## 2019-01-17 RX ADMIN — HEPARIN SODIUM 7500 UNIT(S): 5000 INJECTION INTRAVENOUS; SUBCUTANEOUS at 22:06

## 2019-01-17 RX ADMIN — CHLORHEXIDINE GLUCONATE 1 APPLICATION(S): 213 SOLUTION TOPICAL at 06:36

## 2019-01-17 NOTE — PROGRESS NOTE ADULT - SUBJECTIVE AND OBJECTIVE BOX
INTERVAL HPI/OVERNIGHT EVENTS:  O/n: Pt transferred to MICU, lethargic and wearing BIPAP mask, not following commands and resisting   Interval: Pt seen on AM rounds, somnolent. ENT emergently consulted for cuff exchange with fenestrated cuff.   VITAL SIGNS:  T(F): 97.4 (19 @ 14:11)  HR: 76 (19 @ 13:00)  BP: 147/91 (19 @ 13:00)  RR: 12 (19 @ 13:00)  SpO2: 99% (19 @ 13:00)  Wt(kg): --    PHYSICAL EXAM:    Constitutional: WDWN, NAD  HEENT: PERRL, EOMI, sclera non-icteric, neck supple, trachea midline, at time of exam now sitting with deflatted, uncapped trach collar, no JVD, MMM, missing teeth  Respiratory: CTA b/l, good air entry b/l, no wheezing, no rhonchi, no rales, without accessory muscle use and no intercostal retractions  Cardiovascular: RRR, normal S1S2, no M/R/G  Gastrointestinal: soft, NTND, no masses palpable, BS normal  Extremities: Warm, well perfused, pulses equal bilateral upper and lower extremities, no edema, no clubbing  Neurological: somnolent but arousable, not following directions   Skin: Normal temperature, warm, dry    MEDICATIONS  (STANDING):  chlorhexidine 2% Cloths 1 Application(s) Topical daily  heparin  Injectable 7500 Unit(s) SubCutaneous every 8 hours  HYDROmorphone  Injectable 0.2 milliGRAM(s) IV Push once  lidocaine 1%/EPINEPHrine 1:100,000 Inj 10 milliLiter(s) Local Injection once    MEDICATIONS  (PRN):      Allergies    No Known Drug Allergies  Seafood (Rash)    Intolerances        LABS:                        12.6   5.0   )-----------( 176      ( 2019 06:23 )             41.1         141  |  98  |  18  ----------------------------<  118<H>  4.6   |  33<H>  |  0.87    Ca    9.1      2019 06:23  Phos  5.1       Mg     1.9         TPro  7.9  /  Alb  3.8  /  TBili  0.2  /  DBili  x   /  AST  15  /  ALT  13  /  AlkPhos  60        Urinalysis Basic - ( 15 [facility] 2019 14:38 )    Color: Yellow / Appearance: Cloudy / S.025 / pH: x  Gluc: x / Ketone: NEGATIVE  / Bili: Negative / Urobili: 0.2 E.U./dL   Blood: x / Protein: Trace mg/dL / Nitrite: NEGATIVE   Leuk Esterase: NEGATIVE / RBC: < 5 /HPF / WBC < 5 /HPF   Sq Epi: x / Non Sq Epi: Many /HPF / Bacteria: Present /HPF        RADIOLOGY & ADDITIONAL TESTS:  Reviewed

## 2019-01-17 NOTE — CONSULT NOTE ADULT - PROBLEM SELECTOR RECOMMENDATION 9
-The patient has baseline hypercapnia. This was noted on her ABG done on admission, this was done on room air and showed respiratory acidosis (PCO2 67) with a metabolic alkalosis and a relatively normal pH (7.35). The patient developed acute on chronic hypercapnic respiratory failure after having her tracheostomy capped and being placed on AVAPS overnight.   -No orthodeoxia noted, thus the mechanism of hypoxia is less likely hepatopulmonary shunt in the setting of pulmonary hypertension (PASP on echo 56), and more likely related to a decreased partial pressure of oxygen in the setting of hypercapnia.       Recommend:  -Ideally the patient should be maintained on a ventilator at night which will help treat her OHS, and would also help treat underlying congenital hypoventilation syndrome, particularly if it manifests more so at night.   -Another alternative would be to try if CPAP/AVAPS overnight would help (although it will likely help to a lesser degree).   -While in ICU would recommend an AVAPS Trial where the patient is placed on AVAPS mode (trach can be left uncapped and cuff down since patient desats briskly when the trach is capped). An ABG should be drawn after 1-2 hours. If patient tolerates this well and pH is relatively normal then the plan will be to do an AVAPS trial overnight. If any issues occur overnight would recommend the patient be placed back on the ventilator.   -Would recommend the patient be evaluated for a home ventilator, if this will be covered for the patient it would be the ideal scenario for her to be on the vent at night.

## 2019-01-17 NOTE — PROGRESS NOTE ADULT - PROBLEM SELECTOR PLAN 3
-Heterozygous of PHOX2B gene, unclear to what degree there is penetrance of this gene and to what degree it is affecting the patients current clinical scenario.   -The will likely need further workup, Pt may be referred to Dr. Monte at [place] who has evaluated her father who was diagnosed with congenital central hypoventilation syndrome and has a diaphragmatic pacer.   -Will F/U with Sleep medicine.

## 2019-01-17 NOTE — PROGRESS NOTE ADULT - ASSESSMENT
Sleep apnea on father's side. Trach/PEG for MSSA PNA. Presented wanting to get trach out bc it was foul smelling. Hypoxia to low 60s overnight. Serial ABGs showed progressive increase in pCO2. AVAPs    PULM  #Hypoxic Hypercapneic Respiratory Failure  - likely 2/2 MICHAEL; found to be lethargic on tele unit a pCO2 in the 100s. repeat ABG w pCO2 in the 70s and pH WNL  - c/w BIPAP ON    CV  - 34 yo F w HTN, HLD, pAfib w hx of DVT (2008), congenital cardiac malformation s/p repair, h/o UGIB s/p endoscopy, hx of HOCM (on TTE 2/2017 but not noted on inpt echo), hypercapnic respiratory failure 2/2 MSSA PNA c/b trach/PEG (unable to extubate, failed CPAP trials), likely hx of central hypoventilation syndrome (family hx; w positive gene) who presented to [facility] for decannulation now in hypercapnic respiratory failure.      PULM  #Hypoxic Hypercapneic Respiratory Failure  - likely 2/2 MICHAEL; found to be lethargic on tele unit a pCO2 in the 100s. repeat ABG w pCO2 in the 70s and pH WNL  - c/w BIPAP ON    CV  - 36 yo F w HTN, HLD, pAfib w hx of DVT (2008), congenital cardiac malformation s/p repair, h/o UGIB s/p endoscopy, hx of HOCM (on TTE 2/2017 but not noted on inpt echo), hypercapnic respiratory failure 2/2 MSSA PNA c/b trach/PEG (unable to extubate, failed CPAP trials), likely hx of central hypoventilation syndrome (family hx; w positive gene) who presented to [facility] for decannulation now in hypercapnic respiratory failure.      PULM  #Hypoxic Hypercapneic Respiratory Failure  - likely 2/2 MICHAEL; found to be lethargic on tele unit a pCO2 105 --> 70 --> 87; of note, pt did fully wake up when she needed to urinate. stated he needed to pee and was moving all 4 extremities impatiently waiting for bedpan  - serial ABGs if pt continues to be lethargic; monitor for pCO2 trend.  - consider BIPAP if respiratory status worsens  - consider replacement of trach as pt cannot currently be put on vent (per nurses, trach no longer has balloon - pt popped it)    #Central Hypoventilation Syndrome  - pt w family hx of central hypoventilation; heterozygous for gene consistent w congenital central hypoventilation  - as above    CV  #pAfib  - pt w reported hx of afib. not on any rate control or a/c. not in afib currently  - continue to monitor    #HOCM  - pt w reported hx of HOCM documented on 2017 echo; TTE on this admission negative for HOCM    F: none at this time  E: replete PRN  N: NPO     DVT ppx: HSQ & SCDs    FULL CODE  Dispo: [facility] 36 yo F w HTN, HLD, pAfib w hx of DVT (2008), congenital cardiac malformation s/p repair, h/o UGIB s/p endoscopy, hx of HOCM (on TTE 2/2017 but not noted on inpt echo), hypercapnic respiratory failure 2/2 MSSA PNA c/b trach/PEG (unable to extubate, failed CPAP trials), likely hx of central hypoventilation syndrome (family hx; w positive gene) who presented to [facility] for decannulation now in hypercapnic respiratory failure.      PULM  #Hypoxic Hypercapneic Respiratory Failure  - likely 2/2 MICHAEL; found to be lethargic on tele unit a pCO2 105 --> 70 --> 87; of note, pt did fully wake up when she needed to urinate. stated he needed to pee and was moving all 4 extremities impatiently waiting for bedpan  - serial ABGs if pt continues to be lethargic; monitor for pCO2 trend.  - consider BIPAP if respiratory status worsens  - consider replacement of trach as pt cannot currently be put on vent (per nurses, trach no longer has balloon - pt popped it)    #Central Hypoventilation Syndrome  - pt w family hx of central hypoventilation; heterozygous for gene consistent w congenital central hypoventilation  - as above    CV  #pAfib  - pt w reported hx of afib. not on any rate control or a/c. not in afib currently  - continue to monitor    #HOCM  - pt w reported hx of HOCM documented on 2017 echo; TTE on this admission negative for HOCM    #HLD  - pt w reported hx of HLD; however, on no medication per [facility] records  - obtain collateral in AM    F: none at this time  E: replete PRN  N: NPO     DVT ppx: HSQ & SCDs    FULL CODE  Dispo: [facility] 34 yo F w HTN, HLD, pAfib w hx of DVT (2008), congenital cardiac malformation s/p repair, h/o UGIB s/p endoscopy, hx of HOCM (on TTE 2/2017 but not noted on inpt echo), hypercapnic respiratory failure 2/2 MSSA PNA c/b trach/PEG (unable to extubate, failed CPAP trials), likely hx of central hypoventilation syndrome (family hx; w positive gene) who presented to [facility] for decannulation now in hypercapnic respiratory failure.      PULM  #Hypoxic Hypercapneic Respiratory Failure  - likely 2/2 MICHAEL and ?central hypoventilation; found to be lethargic on tele unit a pCO2 105 --> 70 --> 87; of note, pt did fully wake up when she needed to urinate. stated he needed to pee and was moving all 4 extremities impatiently waiting for bedpan  - serial ABGs if pt continues to be lethargic; monitor for pCO2 trend.  - consider BIPAP if respiratory status worsens  - consider replacement of trach as pt cannot currently be put on vent (per nurses, trach no longer has balloon - pt popped it)    #Central Hypoventilation Syndrome  - pt w family hx of central hypoventilation; heterozygous for gene consistent w congenital central hypoventilation  - as above    CV  #pAfib  - pt w reported hx of afib. not on any rate control or a/c. not in afib currently  - continue to monitor    #HOCM  - pt w reported hx of HOCM documented on 2017 echo; TTE on this admission negative for HOCM    #HLD  - pt w reported hx of HLD; however, on no medication per [facility] records  - obtain collateral in AM    F: none at this time  E: replete PRN  N: NPO     DVT ppx: HSQ & SCDs    FULL CODE  Dispo: [facility]

## 2019-01-17 NOTE — CONSULT NOTE ADULT - PROBLEM SELECTOR RECOMMENDATION 2
-The patient has a known hx of this and it was demonstrated again with daytime excessive hypercapnia.   -The best treatment for this is for her to be well ventilated overnight to allow her a buffer during the day in terms of hypercapnia.   -Plan as noted above.

## 2019-01-17 NOTE — PROGRESS NOTE ADULT - PROBLEM SELECTOR PLAN 1
Pt has a history of hypercapnic respiratory failure 2/2 MSSA pneumonia c/b difficulty with extubation from failed CPAP trials believed to be d/t central hypoventilation syndrome (given family hx) for which patient had tracheostomy placed. ABG on room air supportive of hypoventaltion (pH 7.35, pCO2 67, pO2 61) with worsening acidosis while sleeping (1/16 AM ABG pH 7.31, pCO2 75, pO2 87). Patient was placed on AVAPS with her trach capped on 1/16.  -Patient again desaturated to 60s o/n in the early AM of 1/17 and at that time was somnolent, difficult to arouse-  repeat ABG demonstrated worsening respiratory acidosis with pH 7.17, pCO2 105, [id number], bicarb 35.  -stepped up to MICU for concern of need for intubation  -continue to monitor ABG   -consider elective AC/VC Pt has a history of hypercapnic respiratory failure 2/2 MSSA pneumonia c/b difficulty with extubation from failed CPAP trials believed to be d/t central hypoventilation syndrome (given family hx) for which patient had tracheostomy placed. ABG on room air supportive of hypoventaltion (pH 7.35, pCO2 67, pO2 61) with worsening acidosis while sleeping (1/16 AM ABG pH 7.31, pCO2 75, pO2 87). Patient was placed on AVAPS with her trach capped on 1/16.  -Patient again desaturated to 60s o/n in the early AM of 1/17 and at that time was somnolent, difficult to arouse-  repeat ABG demonstrated worsening respiratory acidosis with pH 7.17, pCO2 105, [id number], bicarb 35.  -stepped up to MICU for concern for need of AC/VC support  -continue to monitor ABG   -consider elective AC/VC

## 2019-01-17 NOTE — DIETITIAN INITIAL EVALUATION ADULT. - ENERGY NEEDS
Height 69"; #; #; 175%IBW  BMI 37.4  Ideal body weight used for calculations as pt >120% of IBW. Needs estimated for maintenance in adults

## 2019-01-17 NOTE — PROGRESS NOTE ADULT - SUBJECTIVE AND OBJECTIVE BOX
[facility] Consult Note    CC: trach change    HPI (chart review as pt poor historian): 33F PMHx of HTN, HLD, pAfib, congenital cardiac malformation s/p repair, DVT, UGIB s/p endoscopy, ?HOCM.    She has a Hx of prolonged hospitalization in 1/2017 for hypercapnic respiratory failure 2/2 MSSA pneumonia c/b difficulty with extubation d/t failed CPAP trials believed to be d/t central hypoventilation syndrome (given family hx) for which patient had a percutaneous trach placed 2/10/17 by general surgery at [facility] followed by discharge to rehab facility and lost to follow up.     This admission she presented for evaluation for decannulation. She has had episodes of desaturations during attempted capping trials and was stepped up to MICU for worsening hypoxic hypercapnic respiratory failure and possible need for AC/VC support.      ENT consulted for trach change as initial attempts by ICU team meeting resistance. Trach last changed at unknown time. Pt initially refusing trach change however amenable after discussion.    PE  Awake, alert, responsive  Suction passed through inner cannula of current 6 Shiley trach  Tracheoscopy with trach in good position, copious clear secretions  6-0 cuffed trach removed, tract edematous but well-healed without bleeding, tracheotomy site visualized with use of hemostat  6-0 cuffed fenestrated Shiley placed with good respirations and easy passage of suction afterward    A/P  33F s/p perc trach in 2/2017 at OSH now with possible ventilator needs requiring trach change. Cuff on previous trach likely the source of resistance, as someone had cut the balloon previously. 6-0 cuffed fenestrated Shiley placed with assistance of hemostat.  -	Routine trach care  -	Suction with red rubber catheters only PRN  -	Keep surrounding skin try and protected  -	Page ENT if questions or concerns    Discussed with Dr. Osborn

## 2019-01-17 NOTE — PROGRESS NOTE ADULT - PROBLEM SELECTOR PLAN 2
As above, has history of trach from acute hypercapnic respiratory failure after MSSA pneumonia that was complicated by failed CPAP trials. Hypoventilation syndrome that to be from a central hypoventilation syndrome vs. MICHAEL  vs. 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  -f/u ABGs

## 2019-01-17 NOTE — PROGRESS NOTE ADULT - ASSESSMENT
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 She is s/p tracheostomy change and currently has a size 6 fenestrated cuffed trach. The patient was upgraded to the ICU for hypercapnic respiratory failure.

## 2019-01-17 NOTE — PROGRESS NOTE ADULT - PROBLEM SELECTOR PLAN 2
-As noted by her hypercapnia on admission which was done during the day, while the patient was awake. The patient has a known history of this. What is unclear is to what degree her congenital central hypoventilation is playing a role as she heterozygous for the gene and thus the penetrance of the gene is not predictable and the way to discern if patient is having central sleep apnea with polysomnography.  -For treatment of OHS patient will need to have some form of ventilation at night whether it be in the form of an actual ventilator or other mode of positive pressure.

## 2019-01-17 NOTE — PROGRESS NOTE ADULT - ASSESSMENT
34 yo F w HTN, HLD, pAfib w hx of DVT (2008), congenital cardiac malformation s/p repair, h/o UGIB s/p endoscopy, hx of HOCM (on TTE 2/2017 but not noted on inpt echo), hypercapnic respiratory failure 2/2 MSSA PNA c/b trach/PEG (unable to extubate, failed CPAP trials), likely hx of central hypoventilation syndrome (family hx; w positive gene) who presented to [facility] for decannulation now in hypercapnic respiratory failure.      PULM  #Hypoxic Hypercapneic Respiratory Failure  - likely 2/2 MICHAEL and ?central hypoventilation; found to be lethargic on tele unit a pCO2 105 --> 70 --> 87; of note, pt did fully wake up when she needed to urinate. stated he needed to pee and was moving all 4 extremities impatiently waiting for bedpan  - serial ABGs if pt continues to be lethargic; monitor for pCO2 trend.  - consider BIPAP if respiratory status worsens  - consider replacement of trach as pt cannot currently be put on vent (per nurses, trach no longer has balloon - pt popped it)    #Central Hypoventilation Syndrome  - pt w family hx of central hypoventilation; heterozygous for gene consistent w congenital central hypoventilation  - as above    CV  #pAfib  - pt w reported hx of afib. not on any rate control or a/c. not in afib currently  - continue to monitor    #HOCM  - pt w reported hx of HOCM documented on 2017 echo; TTE on this admission negative for HOCM    #HLD  - pt w reported hx of HLD; however, on no medication per [facility] records  - obtain collateral in AM    F: none at this time  E: replete PRN  N: NPO     DVT ppx: HSQ & SCDs    FULL CODE  Dispo: [facility] 34 yo F w HTN, HLD, pAfib w hx of DVT (2008), congenital cardiac malformation s/p repair, h/o UGIB s/p endoscopy, hx of HOCM (on TTE 2/2017 but not noted on inpt echo), hypercapnic respiratory failure 2/2 MSSA PNA c/b trach/PEG (unable to extubate, failed CPAP trials), likely hx of central hypoventilation syndrome (family hx; w positive gene) who presented to [facility] for decannulation. Stepped up to MICU for CO2 to    PULM  #Hypoxic Hypercapneic Respiratory Failure  - likely 2/2 MICHAEL and ?central hypoventilation; found to be lethargic on tele unit a pCO2 105 --> 70 --> 87; of note, pt did fully wake up when she needed to urinate. stated he needed to pee and was moving all 4 extremities impatiently waiting for bedpan  - serial ABGs if pt continues to be lethargic; monitor for pCO2 trend.  - consider BIPAP if respiratory status worsens  - consider replacement of trach as pt cannot currently be put on vent (per nurses, trach no longer has balloon - pt popped it)    #Central Hypoventilation Syndrome  - pt w family hx of central hypoventilation; heterozygous for gene consistent w congenital central hypoventilation  - as above    CV  #pAfib  - pt w reported hx of afib. not on any rate control or a/c. not in afib currently  - continue to monitor    #HOCM  - pt w reported hx of HOCM documented on 2017 echo; TTE on this admission negative for HOCM    #HLD  - pt w reported hx of HLD; however, on no medication per [facility] records  - obtain collateral in AM    F: none at this time  E: replete PRN  N: NPO     DVT ppx: HSQ & SCDs    FULL CODE  Dispo: [facility] 36 yo F w HTN, HLD, pAfib w hx of DVT (2008), congenital cardiac malformation s/p repair, h/o UGIB s/p endoscopy, hx of HOCM (on TTE 2/2017 but not noted on inpt echo), hypercapnic respiratory failure 2/2 MSSA PNA c/b trach/PEG (unable to extubate, failed CPAP trials), likely hx of central hypoventilation syndrome (family hx; w positive gene) who presented to [facility] for decannulation. Stepped up to MICU for CO2 toxicosis. Trach was emergently exchanged by ENT and Pt received AC with improvement in symptoms. Now for trial of AVAPS    PULM  #Hypoxic Hypercapneic Respiratory Failure  - likely 2/2 MICHAEL and ?central hypoventilation; found to be lethargic on tele unit a pCO2 105 --> 70 --> 87 with mental status improving after AC ventilation.   -Plan for AVAPs trial during day     #Trach Collar Exchange  -1/17: trach was emergently changed by ENT service. Found that cuff was partially inflated with balloon cut (Pt's father later states she had done that herself so that she can talk). Per ENT: tract edematous but well healed. 6-0 cuffed fenestrated Shiley placed with good respirations and easy passage of suction afterward  -C/w routine trach care   -Red runner catheter suction PRN     #Central Hypoventilation Syndrome  - pt w family hx of central hypoventilation; heterozygous for gene consistent w congenital central hypoventilation  - Planning for referral to [place] after d/c for diaphragmatic pacemaker insertion (father has undergone this therapy) but AVAPS trial to bridge her until this intervention     CV  #pAfib  - pt w reported hx of afib. not on any rate control or a/c. not in afib currently  - continue to monitor    #HOCM  - pt w reported hx of HOCM documented on 2017 echo; TTE on this admission negative for HOCM    #HLD  - pt w reported hx of HLD; however, on no medication per [facility] records  - obtain collateral in AM    F: none at this time  E: replete PRN  N: NPO     DVT ppx: HSQ & SCDs    FULL CODE  Dispo: [facility]

## 2019-01-17 NOTE — CONSULT NOTE ADULT - ATTENDING COMMENTS
Agree with above. Most likely has delayed onset central hypoventilation in the setting of PHOXB2 mutation. Has home ventilator which is not functioning. Will need to have this fixed prior to discharge. Will need outpatient follow up with sleep studies.

## 2019-01-17 NOTE — CONSULT NOTE ADULT - PROBLEM SELECTOR RECOMMENDATION 3
-Heterozygosity for PHOX2B mutation.   -Unclear to what degree the gene has penetrance.   -Ultimately the patient will need a polysomnography to further elucidate whether or not she is have obstructive or central apneas or both. This will need to be carefully coordinated as an outpatient.   -If central sleep apnea is causing a component of her symptoms, then she will need to be reassessed as to whether she tolerates night time ventilator or if she needs an intervention such as diaphragmatic pacing.

## 2019-01-17 NOTE — DIETITIAN INITIAL EVALUATION ADULT. - OTHER INFO
34 yo/female with extensive PMHx including HTN, HLD, Afib, congential cardiac malformation s/p repair, DVT, UGIB, prolonged hospital stay in 2018 2/2 hypercapnic respiratory failure eventually requiring trach/PEG, admitted for decannulation and found to have congenital hypoventilation syndrome. Overnight she was tried on AVAP w/trach capped and she became lethargic and desatted. Transferred to MICU for possible intubation. Pt seen in room and discussed during rounds. Trach changed by ENT this AM, placed on VC/AC support for a few hours. Pt lethargic and not answering questions at this time, therefore unable to obtain nutrition history. Per MD, pt does not use PEG and was eating while on 7 [place] yesterday. Allergy to seafood noted. Skin intact pressure-wise. Recommend dysphagia screen prior to re-starting diet. Unable to assess for pain at this time. No edu appropriate currently. Will continue to follow per RD protocol.

## 2019-01-17 NOTE — PROGRESS NOTE ADULT - SUBJECTIVE AND OBJECTIVE BOX
TRANSFER ACCEPTANCE NOTE 7[facility] to 7EAST:  Hospital Course:    SUBJECTIVE / INTERVAL HPI: Patient seen and examined at bedside.     VITAL SIGNS:  Vital Signs Last 24 Hrs  T(C): 36.6 (2019 22:29), Max: 36.8 (2019 13:36)  T(F): 97.9 (2019 22:29), Max: 98.2 (2019 13:36)  HR: 120 (2019 00:29) (80 - 120)  BP: 158/91 (2019 00:29) (131/84 - 159/92)  BP(mean): 118 (2019 00:29) (100 - 119)  RR: 19 (2019 00:29) (14 - 20)  SpO2: 77% (2019 00:29) (77% - 100%)    PHYSICAL EXAM:    General: WDWN  HEENT: NC/AT; PERRL, anicteric sclera; MMM  Neck: supple  Cardiovascular: +S1/S2, RRR  Respiratory: CTA B/L; no W/R/R  Gastrointestinal: soft, NT/ND; +BSx4  Extremities: WWP; no edema, clubbing or cyanosis  Vascular: 2+ radial, DP/PT pulses B/L  Neurological: AAOx3; no focal deficits    MEDICATIONS:  MEDICATIONS  (STANDING):    MEDICATIONS  (PRN):      ALLERGIES:  Allergies    No Known Drug Allergies  Seafood (Rash)    Intolerances        LABS:                        12.7   4.0   )-----------( 161      ( 2019 06:16 )             41.1     01-16    137  |  97  |  14  ----------------------------<  110<H>  4.3   |  34<H>  |  0.95    Ca    9.3      2019 06:16  Mg     1.8     01-16    TPro  8.0  /  Alb  3.7  /  TBili  0.3  /  DBili  x   /  AST  18  /  ALT  14  /  AlkPhos  56  01-15      Urinalysis Basic - ( 15 Delgado 2019 14:38 )    Color: Yellow / Appearance: Cloudy / S.025 / pH: x  Gluc: x / Ketone: NEGATIVE  / Bili: Negative / Urobili: 0.2 E.U./dL   Blood: x / Protein: Trace mg/dL / Nitrite: NEGATIVE   Leuk Esterase: NEGATIVE / RBC: < 5 /HPF / WBC < 5 /HPF   Sq Epi: x / Non Sq Epi: Many /HPF / Bacteria: Present /HPF      CAPILLARY BLOOD GLUCOSE          RADIOLOGY & ADDITIONAL TESTS: Reviewed. TRANSFER ACCEPTANCE NOTE 7[facility] to 7EAST:  Hospital Course: 34 yo F w HTN, HLD, pAfib w hx of DVT (2008), congenital cardiac malformation s/p repair, h/o UGIB s/p endoscopy, hx of HOCM (on TTE 2017 but not noted on inpt echo), hypercapnic respiratory failure 2/2 MSSA PNA c/b trach/PEG (unable to extubate, failed CPAP trials), likely hx of central hypoventilation syndrome (family hx; w positive gene) who presented to [facility] for decannulation because she felt her trach site was malodorous. Pt did not tolerate trach capping and was placed on trach collar. Overnight, became progressively lethargic and desaturated while sleeping. ABG 7.17/105/130/38 and pt was transferred to MICU for potential intubation.    SUBJECTIVE / INTERVAL HPI: Patient seen and examined at bedside. Once transferred to MICU, pt's repeat gas showed decreased pCO2 (70) so held off on intubation. continued to be lethargic. responsive to sternal rub but would only intermittently follow commands. did not speak or answer questions. as per [facility] team, previously AAOx3 and walked to the bathroom earlier in the day.    VITAL SIGNS:  Vital Signs Last 24 Hrs  T(C): 36.6 (2019 22:29), Max: 36.8 (2019 13:36)  T(F): 97.9 (2019 22:29), Max: 98.2 (2019 13:36)  HR: 120 (2019 00:29) (80 - 120)  BP: 158/91 (2019 00:29) (131/84 - 159/92)  BP(mean): 118 (2019 00:29) (100 - 119)  RR: 19 (2019 00:29) (14 - 20)  SpO2: 77% (2019 00:29) (77% - 100%)    PHYSICAL EXAM:    General: obese female laying in bed NAD  HEENT: NC/AT; PERRL, anicteric sclera; MMM;   Neck: trach in place - malodorous but no discharge noted  Cardiovascular: +S1/S2, RRR  Respiratory: CTA B/L; no W/R/R  Gastrointestinal: soft, NT/ND; +BSx4  Extremities: WWP; no edema, clubbing or cyanosis  Vascular: 2+ radial, DP/PT pulses B/L  Neurological: AAOx3; no focal deficits    MEDICATIONS:  MEDICATIONS  (STANDING):    MEDICATIONS  (PRN):      ALLERGIES:  Allergies    No Known Drug Allergies  Seafood (Rash)    Intolerances        LABS:                        12.7   4.0   )-----------( 161      ( 2019 06:16 )             41.1     -    137  |  97  |  14  ----------------------------<  110<H>  4.3   |  34<H>  |  0.95    Ca    9.3      2019 06:16  Mg     1.8         TPro  8.0  /  Alb  3.7  /  TBili  0.3  /  DBili  x   /  AST  18  /  ALT  14  /  AlkPhos  56  01-15      Urinalysis Basic - ( 15 Delgado 2019 14:38 )    Color: Yellow / Appearance: Cloudy / S.025 / pH: x  Gluc: x / Ketone: NEGATIVE  / Bili: Negative / Urobili: 0.2 E.U./dL   Blood: x / Protein: Trace mg/dL / Nitrite: NEGATIVE   Leuk Esterase: NEGATIVE / RBC: < 5 /HPF / WBC < 5 /HPF   Sq Epi: x / Non Sq Epi: Many /HPF / Bacteria: Present /HPF      CAPILLARY BLOOD GLUCOSE          RADIOLOGY & ADDITIONAL TESTS: Reviewed. TRANSFER ACCEPTANCE NOTE 7[facility] to 7EAST:  Hospital Course: 34 yo F w HTN, HLD, pAfib w hx of DVT (2008), congenital cardiac malformation s/p repair, h/o UGIB s/p endoscopy, hx of HOCM (on TTE 2017 but not noted on inpt echo), hypercapnic respiratory failure 2/2 MSSA PNA c/b trach/PEG (unable to extubate, failed CPAP trials), likely hx of central hypoventilation syndrome (family hx; w positive gene) who presented to [facility] for decannulation because she felt her trach site was malodorous. Pt did not tolerate trach capping and was placed on trach collar. Overnight, became progressively lethargic and desaturated while sleeping. ABG 7.17/105/130/38 and pt was transferred to MICU for potential intubation.    SUBJECTIVE / INTERVAL HPI: Patient seen and examined at bedside. Once transferred to MICU, pt's repeat gas showed decreased pCO2 (70) so held off on intubation. continued to be lethargic. responsive to sternal rub but would only intermittently follow commands. did not speak or answer questions. as per [facility] team, previously AAOx3 and walked to the bathroom earlier in the day.    VITAL SIGNS:  Vital Signs Last 24 Hrs  T(C): 36.6 (2019 22:29), Max: 36.8 (2019 13:36)  T(F): 97.9 (2019 22:29), Max: 98.2 (2019 13:36)  HR: 120 (2019 00:29) (80 - 120)  BP: 158/91 (2019 00:29) (131/84 - 159/92)  BP(mean): 118 (2019 00:29) (100 - 119)  RR: 19 (2019 00:29) (14 - 20)  SpO2: 77% (2019 00:29) (77% - 100%)    PHYSICAL EXAM:    General: obese female laying in bed NAD  HEENT: NC/AT; anicteric sclera; MMM;   Neck: trach in place - malodorous but no discharge noted  Cardiovascular: +S1/S2, RRR  Respiratory: CTA B/L; no W/R/R  Gastrointestinal: soft, NT/ND; +BSx4 - NO PEG in place; healed scar noted  Extremities: WWP; no edema, clubbing or cyanosis  Vascular: 2+ radial, DP/PT pulses B/L  Neurological: AAOx0; wouldn't answer questions. opens eyes to sternal rub. does not respond to verbal stimuli. barely responds to painful stimuli. PERRL.    MEDICATIONS:  MEDICATIONS  (STANDING):    MEDICATIONS  (PRN):      ALLERGIES:  Allergies    No Known Drug Allergies  Seafood (Rash)    Intolerances        LABS:                        12.7   4.0   )-----------( 161      ( 2019 06:16 )             41.1     -    137  |  97  |  14  ----------------------------<  110<H>  4.3   |  34<H>  |  0.95    Ca    9.3      2019 06:16  Mg     1.8         TPro  8.0  /  Alb  3.7  /  TBili  0.3  /  DBili  x   /  AST  18  /  ALT  14  /  AlkPhos  56  01-15      Urinalysis Basic - ( 15 Delgado 2019 14:38 )    Color: Yellow / Appearance: Cloudy / S.025 / pH: x  Gluc: x / Ketone: NEGATIVE  / Bili: Negative / Urobili: 0.2 E.U./dL   Blood: x / Protein: Trace mg/dL / Nitrite: NEGATIVE   Leuk Esterase: NEGATIVE / RBC: < 5 /HPF / WBC < 5 /HPF   Sq Epi: x / Non Sq Epi: Many /HPF / Bacteria: Present /HPF TRANSFER ACCEPTANCE NOTE 7[facility] to 7EAST:  Hospital Course: 34 yo F w HTN, HLD, pAfib w hx of DVT (), congenital cardiac malformation s/p repair, h/o UGIB s/p endoscopy, hx of HOCM (on TTE 2017 but not noted on inpt echo), hypercapnic respiratory failure 2/2 MSSA PNA c/b trach/PEG (unable to extubate, failed CPAP trials), likely hx of central hypoventilation syndrome (family hx; w positive gene) who presented to [facility] for decannulation because she felt her trach site was malodorous. Pt did not tolerate trach capping and was placed on trach collar . Overnight (), pt tried on AVAPs w trach capped. Became progressively lethargic and desaturated to 60s while sleeping. ABG 7.17/105/130/38 and pt was transferred to MICU for potential intubation.    SUBJECTIVE / INTERVAL HPI: Patient seen and examined at bedside. Once transferred to MICU, pt's repeat gas showed decreased pCO2 (70) so held off on intubation. continued to be lethargic. responsive to sternal rub but would only intermittently follow commands. did not speak or answer questions. as per [facility] team, previously AAOx3 and walked to the bathroom earlier in the day.    VITAL SIGNS:  Vital Signs Last 24 Hrs  T(C): 36.6 (2019 22:29), Max: 36.8 (2019 13:36)  T(F): 97.9 (2019 22:29), Max: 98.2 (2019 13:36)  HR: 120 (2019 00:29) (80 - 120)  BP: 158/91 (2019 00:29) (131/84 - 159/92)  BP(mean): 118 (2019 00:29) (100 - 119)  RR: 19 (2019 00:29) (14 - 20)  SpO2: 77% (2019 00:29) (77% - 100%)    PHYSICAL EXAM:    General: obese female laying in bed NAD  HEENT: NC/AT; anicteric sclera; MMM;   Neck: trach in place - malodorous but no discharge noted  Cardiovascular: +S1/S2, RRR  Respiratory: CTA B/L; no W/R/R  Gastrointestinal: soft, NT/ND; +BSx4 - NO PEG in place; healed scar noted  Extremities: WWP; no edema, clubbing or cyanosis  Vascular: 2+ radial, DP/PT pulses B/L  Neurological: AAOx0; wouldn't answer questions. opens eyes to sternal rub. does not respond to verbal stimuli. barely responds to painful stimuli. PERRL.    MEDICATIONS:  MEDICATIONS  (STANDING):    MEDICATIONS  (PRN):      ALLERGIES:  Allergies    No Known Drug Allergies  Seafood (Rash)    Intolerances        LABS:                        12.7   4.0   )-----------( 161      ( 2019 06:16 )             41.1     01-16    137  |  97  |  14  ----------------------------<  110<H>  4.3   |  34<H>  |  0.95    Ca    9.3      2019 06:16  Mg     1.8     -    TPro  8.0  /  Alb  3.7  /  TBili  0.3  /  DBili  x   /  AST  18  /  ALT  14  /  AlkPhos  56  01-15      Urinalysis Basic - ( 15 Delgado 2019 14:38 )    Color: Yellow / Appearance: Cloudy / S.025 / pH: x  Gluc: x / Ketone: NEGATIVE  / Bili: Negative / Urobili: 0.2 E.U./dL   Blood: x / Protein: Trace mg/dL / Nitrite: NEGATIVE   Leuk Esterase: NEGATIVE / RBC: < 5 /HPF / WBC < 5 /HPF   Sq Epi: x / Non Sq Epi: Many /HPF / Bacteria: Present /HPF

## 2019-01-17 NOTE — CONSULT NOTE ADULT - SUBJECTIVE AND OBJECTIVE BOX
SLEEP MEDICINE CONSULT NOTE:    Patient is a 33y old  Female who presents with a chief complaint of Decannulation of Tracheostomy.      HPI:    This is a 32 y/o woman with a hx of hypercapnic respiratory failure in 2017, with an associated MSSA pneumonia for which she had a tracheostomy placed. The patient also has a hx of obesity hypoventilation syndrome. The patient was noted during that hospitalization to have difficulty with extubation related to apneic events on the ventilator and CO2 rentention. The patients father had been diagnosed with congenital central hypoventilation syndrome for which he has a diaphragmatic pacer, and thus it was that perhaps this patient has the same condition. The patient had genetic testing done as an inpatient and while the results were pending she was sent to an LTAC. After leaving the LTAC the patient was essentially lost to follow up and moved to [place]. For the past two years the patient has been living with a tracheostomy that she keeps uncapped, the  balloon she states was cut during a hospitalization in [place]. The patient presented to [facility] for decannulation of her tracheostomy. 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 The patient was admitted to [facility], underwent a bedside bronchoscopy to evaluated for evidence of granulation tissue that would make removal of the tracheostomy more difficult (no significant obstructive granulation tissue found). The patient had her trach capped and was noted to desat to the 70's, this was though to be due to CO2 retention and a reduction in the partial pressure of oxygen. The patient was thus trailed on AVAPS mode on NIPPV, with an increased FIO2 to evaluate if this method would help with ventilation and thereby help maintain a normal SpO2. The hope was if the patient tolerated this well it may help treat her OHS, and she could be evaluated more thoroughly with a sleep study to assess for central apneas before she is decannulated. The patient however became hypercapnic and acidotic and was upgraded to the ICU and had her tracheostomy changed by ENT to a size 6 cuffed, fenestrated trach, and was placed on the ventilator overnight. Sleep medicine was consulted to help further evaluated this patients hypercapnic respiratory failure. The patient denies any SOB, cough, wheeze, chest pain, nausea, vomiting, diarrhea.     ROS:  A 14 point ROS was reviewed with the patient and was negative except for what is mentioned above.     PAST MEDICAL & SURGICAL HISTORY:  Trachea displaced  DVT (deep venous thrombosis)  GIB (gastrointestinal bleeding)  Afib  HTN (hypertension)  HLD (hyperlipidemia)  Congenital heart disease      FAMILY HISTORY:  Family history of sleep apnea (Father): Central sleep apnea      SOCIAL HISTORY:  Smoking Status: Never smoker   Pack Years:    MEDICATIONS:  Pulmonary:    Antimicrobials:    Anticoagulants:  heparin  Injectable 7500 Unit(s) SubCutaneous every 8 hours    Onc:    GI/:    Endocrine:    Cardiac:    Other Medications:  chlorhexidine 2% Cloths 1 Application(s) Topical daily  HYDROmorphone  Injectable 0.2 milliGRAM(s) IV Push once  lidocaine 1%/EPINEPHrine 1:100,000 Inj 10 milliLiter(s) Local Injection once      Allergies    No Known Drug Allergies  Seafood (Rash)    Intolerances        Vital Signs Last 24 Hrs  T(C): 36.3 (17 Jan 2019 14:11), Max: 37 (17 Jan 2019 05:30)  T(F): 97.4 (17 Jan 2019 14:11), Max: 98.6 (17 Jan 2019 05:30)  HR: 84 (17 Jan 2019 15:00) (72 - 120)  BP: 166/91 (17 Jan 2019 15:00) (131/84 - 185/115)  BP(mean): 119 (17 Jan 2019 15:00) (99 - 145)  RR: 18 (17 Jan 2019 15:00) (8 - 28)  SpO2: 92% (17 Jan 2019 15:00) (77% - 100%)    General: NAD, AAO x3  HEENT: No icterus,. Moist mucous membranes, size 6 fenestrated cuffed trach present.   Neck: No JVD noted. Supple, no meningismus, large neck diameter.   Cardio: S1, S2 noted, RRR. No murmurs, rubs or gallops  Resp: Diminished bases bilaterally, no wheezes, rales, or rhonchi.  Abdo: Soft, NT, bowel sounds present. No organomegaly  Extremities: No edema noted. Pulses present b/l  Neuro: AAO x3, grossly normal motor strength.  Lymphnodes: no lymphadenopathy identified.  Skin: Dry, no rashes      01-16 @ 07:01  -  01-17 @ 07:00  --------------------------------------------------------  IN: 0 mL / OUT: 1225 mL / NET: -1225 mL    01-17 @ 07:01  -  01-17 @ 16:22  --------------------------------------------------------  IN: 50 mL / OUT: 550 mL / NET: -500 mL      Mode: AC/ CMV (Assist Control/ Continuous Mandatory Ventilation)  RR (machine): 12  TV (machine): 500  FiO2: 50  PEEP: 5  ITime: 1  MAP: 8  PIP: 21      LABS:  ABG - ( 17 Jan 2019 04:10 )  pH, Arterial: 7.24  pH, Blood: x     /  pCO2: 87    /  pO2: 85    / HCO3: 36    / Base Excess: 6.4   /  SaO2: 96                  CBC Full  -  ( 17 Jan 2019 06:23 )  WBC Count : 5.0 K/uL  Hemoglobin : 12.6 g/dL  Hematocrit : 41.1 %  Platelet Count - Automated : 176 K/uL  Mean Cell Volume : 89.5 fL  Mean Cell Hemoglobin : 27.5 pg  Mean Cell Hemoglobin Concentration : 30.7 g/dL  Auto Neutrophil # : x  Auto Lymphocyte # : x  Auto Monocyte # : x  Auto Eosinophil # : x  Auto Basophil # : x  Auto Neutrophil % : x  Auto Lymphocyte % : x  Auto Monocyte % : x  Auto Eosinophil % : x  Auto Basophil % : x    01-17    141  |  98  |  18  ----------------------------<  118<H>  4.6   |  33<H>  |  0.87    Ca    9.1      17 Jan 2019 06:23  Phos  5.1     01-17  Mg     1.9     01-17    TPro  7.9  /  Alb  3.8  /  TBili  0.2  /  DBili  x   /  AST  15  /  ALT  13  /  AlkPhos  60  01-17                      RADIOLOGY & ADDITIONAL STUDIES (The following images were personally reviewed):  Chest Xray reviewed.

## 2019-01-17 NOTE — PROGRESS NOTE ADULT - PROBLEM SELECTOR PLAN 1
-The patient has baseline hypercapnia as noted on her ABG from admission which showed a PCO2 of 67 with a well compensated PH. This is due to her hx of obesity hypoventilation syndrome. The patient went into worsening respiratory failure during an AVAPs trial overnight and thus required a tracheostomy change (prior trach has  balloon cut and thus was not vent compatible) and was placed on the ventilator.   -Patient doing much better today after having been on the vent overnight.     Recommend:  -would place the patient on CPAP trail and if she does well would place her back on room  air.   -Will have Dr. Kimberley Patel from sleep medicine evaluate the patient.   -Patient will likely need either a ventilator to go on at night versus NIPPV in the form of AVAPS/CPAP.

## 2019-01-17 NOTE — PROGRESS NOTE ADULT - SUBJECTIVE AND OBJECTIVE BOX
HPI:   This is a 32 y/o woman with a hx of OHS, and has a heterozygous mutation of congenital central hypoventilation syndrome (PHOX2B gene). The patient follows with Dr. Fishman. She had a tracheostomy placed in 2017 in the setting of hypercapnic respiratory    Interval Events:  Patient seen and examined at bedside. The patient     MEDICATIONS:  Pulmonary:    Antimicrobials:    Anticoagulants:  heparin  Injectable 7500 Unit(s) SubCutaneous every 8 hours    Cardiac:    Endocrine:    Allergies    No Known Drug Allergies  Seafood (Rash)    Intolerances        Vital Signs Last 24 Hrs  T(C): 36.3 (17 Jan 2019 14:11), Max: 37 (17 Jan 2019 05:30)  T(F): 97.4 (17 Jan 2019 14:11), Max: 98.6 (17 Jan 2019 05:30)  HR: 84 (17 Jan 2019 15:00) (72 - 120)  BP: 166/91 (17 Jan 2019 15:00) (131/84 - 185/115)  BP(mean): 119 (17 Jan 2019 15:00) (99 - 145)  RR: 18 (17 Jan 2019 15:00) (8 - 28)  SpO2: 92% (17 Jan 2019 15:00) (77% - 100%)    01-16 @ 07:01 - 01-17 @ 07:00  --------------------------------------------------------  IN: 0 mL / OUT: 1225 mL / NET: -1225 mL    01-17 @ 07:01 - 01-17 @ 15:35  --------------------------------------------------------  IN: 50 mL / OUT: 550 mL / NET: -500 mL      Mode: AC/ CMV (Assist Control/ Continuous Mandatory Ventilation)  RR (machine): 12  TV (machine): 500  FiO2: 50  PEEP: 5  ITime: 1  MAP: 8  PIP: 21      LABS:  ABG - ( 17 Jan 2019 04:10 )  pH, Arterial: 7.24  pH, Blood: x     /  pCO2: 87    /  pO2: 85    / HCO3: 36    / Base Excess: 6.4   /  SaO2: 96                  CBC Full  -  ( 17 Jan 2019 06:23 )  WBC Count : 5.0 K/uL  Hemoglobin : 12.6 g/dL  Hematocrit : 41.1 %  Platelet Count - Automated : 176 K/uL  Mean Cell Volume : 89.5 fL  Mean Cell Hemoglobin : 27.5 pg  Mean Cell Hemoglobin Concentration : 30.7 g/dL  Auto Neutrophil # : x  Auto Lymphocyte # : x  Auto Monocyte # : x  Auto Eosinophil # : x  Auto Basophil # : x  Auto Neutrophil % : x  Auto Lymphocyte % : x  Auto Monocyte % : x  Auto Eosinophil % : x  Auto Basophil % : x    01-17    141  |  98  |  18  ----------------------------<  118<H>  4.6   |  33<H>  |  0.87    Ca    9.1      17 Jan 2019 06:23  Phos  5.1     01-17  Mg     1.9     01-17    TPro  7.9  /  Alb  3.8  /  TBili  0.2  /  DBili  x   /  AST  15  /  ALT  13  /  AlkPhos  60  01-17                      RADIOLOGY & ADDITIONAL STUDIES (The following images were personally reviewed): HPI:   This is a 34 y/o woman with a hx of OHS, and has a heterozygous mutation of congenital central hypoventilation syndrome (PHOX2B gene). The patient follows with Dr. Fishman. She had a tracheostomy placed in 2017 in the setting of hypercapnic respiratory failure. She presented to hospital to have her chronic tracheostomy replaced and to be evaluated for her OHS/central sleep apnea. The patient was admitted to the ICU overnight for hypercapnic respiratory failure.     Interval Events:  Patient seen and examined at bedside. The patient was awake alert and cheerful. The patient only noted some pain at her IV site.     MEDICATIONS:  Pulmonary:    Antimicrobials:    Anticoagulants:  heparin  Injectable 7500 Unit(s) SubCutaneous every 8 hours    Cardiac:    Endocrine:    Allergies    No Known Drug Allergies  Seafood (Rash)    Intolerances        Vital Signs Last 24 Hrs  T(C): 36.3 (17 Jan 2019 14:11), Max: 37 (17 Jan 2019 05:30)  T(F): 97.4 (17 Jan 2019 14:11), Max: 98.6 (17 Jan 2019 05:30)  HR: 84 (17 Jan 2019 15:00) (72 - 120)  BP: 166/91 (17 Jan 2019 15:00) (131/84 - 185/115)  BP(mean): 119 (17 Jan 2019 15:00) (99 - 145)  RR: 18 (17 Jan 2019 15:00) (8 - 28)  SpO2: 92% (17 Jan 2019 15:00) (77% - 100%)    General: NAD, AAO x3  HEENT: No icterus,. Moist mucous membranes, size 6 fenestrated cuffed trach present.   Neck: No JVD noted. Supple, no meningismus, large neck diameter.   Cardio: S1, S2 noted, RRR. No murmurs, rubs or gallops  Resp: Diminished bases bilaterally, no wheezes, rales, or rhonchi.  Abdo: Soft, NT, bowel sounds present. No organomegaly  Extremities: No edema noted. Pulses present b/l  Neuro: AAO x3, grossly normal motor strength.  Lymphnodes: no lymphadenopathy identified.  Skin: Dry, no rashes    01-16 @ 07:01  -  01-17 @ 07:00  --------------------------------------------------------  IN: 0 mL / OUT: 1225 mL / NET: -1225 mL    01-17 @ 07:01  -  01-17 @ 15:35  --------------------------------------------------------  IN: 50 mL / OUT: 550 mL / NET: -500 mL      Mode: AC/ CMV (Assist Control/ Continuous Mandatory Ventilation)  RR (machine): 12  TV (machine): 500  FiO2: 50  PEEP: 5  ITime: 1  MAP: 8  PIP: 21      LABS:  ABG - ( 17 Jan 2019 04:10 )  pH, Arterial: 7.24  pH, Blood: x     /  pCO2: 87    /  pO2: 85    / HCO3: 36    / Base Excess: 6.4   /  SaO2: 96                  CBC Full  -  ( 17 Jan 2019 06:23 )  WBC Count : 5.0 K/uL  Hemoglobin : 12.6 g/dL  Hematocrit : 41.1 %  Platelet Count - Automated : 176 K/uL  Mean Cell Volume : 89.5 fL  Mean Cell Hemoglobin : 27.5 pg  Mean Cell Hemoglobin Concentration : 30.7 g/dL  Auto Neutrophil # : x  Auto Lymphocyte # : x  Auto Monocyte # : x  Auto Eosinophil # : x  Auto Basophil # : x  Auto Neutrophil % : x  Auto Lymphocyte % : x  Auto Monocyte % : x  Auto Eosinophil % : x  Auto Basophil % : x    01-17    141  |  98  |  18  ----------------------------<  118<H>  4.6   |  33<H>  |  0.87    Ca    9.1      17 Jan 2019 06:23  Phos  5.1     01-17  Mg     1.9     01-17    TPro  7.9  /  Alb  3.8  /  TBili  0.2  /  DBili  x   /  AST  15  /  ALT  13  /  AlkPhos  60  01-17                      RADIOLOGY & ADDITIONAL STUDIES (The following images were personally reviewed):  Chest Xray reviewed.

## 2019-01-17 NOTE — PROGRESS NOTE ADULT - PROBLEM SELECTOR PLAN 3
Prolonged hospitalization in 1/2017 due to hypercapnic respiratory failure 2/2 MSSA pneumonia c/b difficulty with extubation due to failed CPAP trials believed to be due to central hypoventilation syndrome (given family hx) for which patient had tracheostomy placed. Pt now wished to be decanulated.  - genetic testing showing pt is heterozygous for gene consistent with congenital central hypoventilation  - trend ABGs as above   - capping trach causes worsening hypoxic, hypercapnic respiratory when sleeping  - AM CXR

## 2019-01-17 NOTE — CONSULT NOTE ADULT - ASSESSMENT
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 The patient presented to decannulation of her tracheostomy, had worsening hypercapnic respiratory failure and was upgraded to the ICU. She is now s/p tracheostomy replacement.
33F poor historian with documented PMHx of HTN, HLD, pAfib, congenital cardiac malformation s/p repair, DVT in 2008 (s/p Coumadin last dose 2009), h/o UGIB s/p endoscopy, HCM (last TTE in 2/2017 with reported severe asymmetric septal ventricular hypertrophy without obstructive outflow tract), prolonged hospitalization in 1/2017 due to hypercapnic respiratory failure 2/2 MSSA pneumonia c/b difficulty with extubation due to failed CPAP trials believed to be due to central hypoventilation syndrome (given family hx) for which patient had tracheostomy and PEG placement followed by discharge to rehab facility and lost to follow up, who now presents for decannulation.    #Hypercapnic respiratory failure requiring tracheostomy: now presents for decannulation. No s/s of infective process. Unclear whether patient has central sleep apnea as she is poor historian with no neurological follow up since admission in 2017 at [facility].   - per Dr. Fishman, admit to [facility] for monitoring during decannulation   - would recommend outpatient follow up with neurology for further evaluation for central sleep apnea     Discussed above with Dr. Fishman and primary team.

## 2019-01-17 NOTE — PROGRESS NOTE ADULT - SUBJECTIVE AND OBJECTIVE BOX
OVERNIGHT EVENTS:    SUBJECTIVE / INTERVAL HPI: Patient seen and examined at bedside.     VITAL SIGNS:  Vital Signs Last 24 Hrs  T(C): 36.6 (2019 22:29), Max: 36.8 (2019 13:36)  T(F): 97.9 (2019 22:29), Max: 98.2 (2019 13:36)  HR: 110 (2019 03:00) (80 - 120)  BP: 158/92 (2019 03:00) (131/84 - 183/124)  BP(mean): 117 (2019 03:00) (100 - 145)  RR: 16 (2019 03:00) (8 - 28)  SpO2: 92% (2019 03:00) (77% - 100%)    PHYSICAL EXAM:    General: WDWN  HEENT: NC/AT; PERRL, anicteric sclera; MMM  Neck: supple  Cardiovascular: +S1/S2, RRR  Respiratory: CTA B/L; no W/R/R  Gastrointestinal: soft, NT/ND; +BSx4  Extremities: WWP; no edema, clubbing or cyanosis  Vascular: 2+ radial, DP/PT pulses B/L  Neurological: AAOx3; no focal deficits    MEDICATIONS:  MEDICATIONS  (STANDING):  midazolam Injectable 2 milliGRAM(s) IV Push once    MEDICATIONS  (PRN):      ALLERGIES:  Allergies    No Known Drug Allergies  Seafood (Rash)    Intolerances        LABS:                        12.7   4.0   )-----------( 161      ( 2019 06:16 )             41.1     01-16    137  |  97  |  14  ----------------------------<  110<H>  4.3   |  34<H>  |  0.95    Ca    9.3      2019 06:16  Mg     1.8     01-16    TPro  8.0  /  Alb  3.7  /  TBili  0.3  /  DBili  x   /  AST  18  /  ALT  14  /  AlkPhos  56  01-15      Urinalysis Basic - ( [facility] 2019 14:38 )    Color: Yellow / Appearance: Cloudy / S.025 / pH: x  Gluc: x / Ketone: NEGATIVE  / Bili: Negative / Urobili: 0.2 E.U./dL   Blood: x / Protein: Trace mg/dL / Nitrite: NEGATIVE   Leuk Esterase: NEGATIVE / RBC: < 5 /HPF / WBC < 5 /HPF   Sq Epi: x / Non Sq Epi: Many /HPF / Bacteria: Present /HPF      CAPILLARY BLOOD GLUCOSE      POCT Blood Glucose.: 118 mg/dL (2019 03:50)      RADIOLOGY & ADDITIONAL TESTS: Reviewed. [facility]    Hospital Course  Ms Pardo is a 33F poor historian with documented PMHx of HTN, HLD, pAfib, congenital cardiac malformation s/p repair, DVT in  (s/p Coumadin last dose ), h/o UGIB s/p endoscopy, questionable HOCM (last TTE in 2017 with reported severe asymmetric septal ventricular hypertrophy without obstructive outflow tract), prolonged hospitalization in 2017 for hypercapnic respiratory failure 2/2 MSSA pneumonia c/b difficulty with extubation d/t failed CPAP trials believed to be d/t central hypoventilation syndrome (given family hx) for which patient had tracheostomy and PEG placement followed by discharge to rehab facility and lost to follow up. She presented for evaluation for decannulation and admitted to [facility] for telemetry monitoring. Pt's ABG on arrival was supportive of congenital hypoventilation syndrome. 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 Pt's trach was capped overnight and while she was sleeping she desatted to the 60s. She was decapped and put on trach collar with improvement in O2 saturation. ABG in the morning  showed worse repository acidosis, further supporting a diagnosis of hypoventilation syndrome. s/p bronchoscopy on  which showed dried and hardened mucus that was suctioned, small amounts of non-obstructive granulation tissue associated with minor erythema around the area of granulation tissue, and white mucus in the RLL. ECHO did not confirm a diagnosis of HOCM. Pt was tried on AVAPS with her trach capped. Patient again desaturated to 60s o/n in the early AM of  and at that times was somnolent, difficult to arouse-  repeat ABG demonstrated worsening respiratory acidosis with pH 7.17, pCO2 105, [id number], bicarb 35. Placed on BiPAP. Patient was stepped up to MICU due to concern for need of intubation from worsening hypoxic hypercapnic respiratory failure.      SUBJECTIVE / INTERVAL HPI: Patient seen and examined at bedside. Patient was lethargic, difficult to arouse.     VITAL SIGNS:  Vital Signs Last 24 Hrs  T(C): 36.6 (2019 22:29), Max: 36.8 (2019 13:36)  T(F): 97.9 (2019 22:29), Max: 98.2 (2019 13:36)  HR: 110 (2019 03:00) (80 - 120)  BP: 158/92 (2019 03:00) (131/84 - 183/124)  BP(mean): 117 (2019 03:00) (100 - 145)  RR: 16 (2019 03:00) (8 - 28)  SpO2: 92% (2019 03:00) (77% - 100%)    PHYSICAL EXAM:    General: Obese, lethargic but awakens to pain.   HEENT: NC/AT; PERRL, anicteric sclera; MMM  Neck: supple  Cardiovascular: +S1/S2, RRR  Respiratory: Decreased breath sounds at bases. No wheezes, no rales.   Gastrointestinal: soft, NT/ND; +BSx4  Extremities: WWP; no edema, clubbing or cyanosis  Vascular: 2+ radial, DP/PT pulses B/L  Neurological: Not cooperating with exam given altered mental status     MEDICATIONS:  MEDICATIONS  (STANDING):  midazolam Injectable 2 milliGRAM(s) IV Push once    MEDICATIONS  (PRN):      ALLERGIES:  Allergies    No Known Drug Allergies  Seafood (Rash)    Intolerances        LABS:                        12.7   4.0   )-----------( 161      ( 2019 06:16 )             41.1     -16    137  |  97  |  14  ----------------------------<  110<H>  4.3   |  34<H>  |  0.95    Ca    9.3      2019 06:16  Mg     1.8     -    TPro  8.0  /  Alb  3.7  /  TBili  0.3  /  DBili  x   /  AST  18  /  ALT  14  /  AlkPhos  56  -15      Urinalysis Basic - ( 15 [facility] 2019 14:38 )    Color: Yellow / Appearance: Cloudy / S.025 / pH: x  Gluc: x / Ketone: NEGATIVE  / Bili: Negative / Urobili: 0.2 E.U./dL   Blood: x / Protein: Trace mg/dL / Nitrite: NEGATIVE   Leuk Esterase: NEGATIVE / RBC: < 5 /HPF / WBC < 5 /HPF   Sq Epi: x / Non Sq Epi: Many /HPF / Bacteria: Present /HPF      CAPILLARY BLOOD GLUCOSE      POCT Blood Glucose.: 118 mg/dL (2019 03:50)      RADIOLOGY & ADDITIONAL TESTS: Reviewed. [facility]    [facility]ospital Course  Ms Pardo is a 33F poor historian with documented PMHx of HTN, HLD, pAfib, congenital cardiac malformation s/p repair, DVT in  (s/p Coumadin last dose ), h/o UGIB s/p endoscopy, questionable HOCM (last TTE in 2017 with reported severe asymmetric septal ventricular hypertrophy without obstructive outflow tract), prolonged hospitalization in 2017 for hypercapnic respiratory failure 2/2 MSSA pneumonia c/b difficulty with extubation d/t failed CPAP trials believed to be d/t central hypoventilation syndrome (given family hx) for which patient had tracheostomy and PEG placement followed by discharge to rehab facility and lost to follow up. She presented for evaluation for decannulation and admitted to [facility] for telemetry monitoring. Pt's ABG on arrival was supportive of congenital hypoventilation syndrome. 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 Pt's trach was capped overnight and while she was sleeping she desatted to the 60s. She was decapped and put on trach collar with improvement in O2 saturation. ABG in the morning  showed worse repository acidosis, further supporting a diagnosis of hypoventilation syndrome. s/p bronchoscopy on  which showed dried and hardened mucus that was suctioned, small amounts of non-obstructive granulation tissue associated with minor erythema around the area of granulation tissue, and white mucus in the RLL. ECHO did not confirm a diagnosis of HOCM. Pt was tried on AVAPS with her trach capped. Patient again desaturated to 60s o/n in the early AM of  and at that times was somnolent, difficult to arouse-  repeat ABG demonstrated worsening respiratory acidosis with pH 7.17, pCO2 105, [id number], bicarb 35. Patient was stepped up to MICU for worsening hypoxic hypercapnic respiratory failure and possible need for AC/VC support.      SUBJECTIVE / INTERVAL HPI: Patient seen and examined at bedside. Patient was lethargic, difficult to arouse.     VITAL SIGNS:  Vital Signs Last 24 Hrs  T(C): 36.6 (2019 22:29), Max: 36.8 (2019 13:36)  T(F): 97.9 (2019 22:29), Max: 98.2 (2019 13:36)  HR: 110 (2019 03:00) (80 - 120)  BP: 158/92 (2019 03:00) (131/84 - 183/124)  BP(mean): 117 (2019 03:00) (100 - 145)  RR: 16 (2019 03:00) (8 - 28)  SpO2: 92% (2019 03:00) (77% - 100%)    PHYSICAL EXAM:    General: Obese, lethargic but awakens to pain.   HEENT: NC/AT; PERRL, anicteric sclera; MMM  Neck: supple  Cardiovascular: +S1/S2, RRR  Respiratory: Decreased breath sounds at bases. No wheezes, no rales.   Gastrointestinal: soft, NT/ND; +BSx4  Extremities: WWP; no edema, clubbing or cyanosis  Vascular: 2+ radial, DP/PT pulses B/L  Neurological: Not cooperating with exam given altered mental status     MEDICATIONS:  MEDICATIONS  (STANDING):  midazolam Injectable 2 milliGRAM(s) IV Push once    MEDICATIONS  (PRN):      ALLERGIES:  Allergies    No Known Drug Allergies  Seafood (Rash)    Intolerances        LABS:                        12.7   4.0   )-----------( 161      ( 2019 06:16 )             41.1     01-16    137  |  97  |  14  ----------------------------<  110<H>  4.3   |  34<H>  |  0.95    Ca    9.3      2019 06:16  Mg     1.8     -    TPro  8.0  /  Alb  3.7  /  TBili  0.3  /  DBili  x   /  AST  18  /  ALT  14  /  AlkPhos  56  -15      Urinalysis Basic - ( 15 [facility] 2019 14:38 )    Color: Yellow / Appearance: Cloudy / S.025 / pH: x  Gluc: x / Ketone: NEGATIVE  / Bili: Negative / Urobili: 0.2 E.U./dL   Blood: x / Protein: Trace mg/dL / Nitrite: NEGATIVE   Leuk Esterase: NEGATIVE / RBC: < 5 /HPF / WBC < 5 /HPF   Sq Epi: x / Non Sq Epi: Many /HPF / Bacteria: Present /HPF      CAPILLARY BLOOD GLUCOSE      POCT Blood Glucose.: 118 mg/dL (2019 03:50)      RADIOLOGY & ADDITIONAL TESTS: Reviewed.

## 2019-01-17 NOTE — PROGRESS NOTE ADULT - PROBLEM SELECTOR PLAN 4
Pt has reported hx of HOCM. TTE in 2/2017 with reported severe asymmetric septal ventricular hypertrophy without obstructive outflow tract.  - repeat ECHO not showing signs of HOCM

## 2019-01-18 PROCEDURE — 99233 SBSQ HOSP IP/OBS HIGH 50: CPT | Mod: GC

## 2019-01-18 RX ADMIN — HEPARIN SODIUM 7500 UNIT(S): 5000 INJECTION INTRAVENOUS; SUBCUTANEOUS at 14:38

## 2019-01-18 RX ADMIN — HEPARIN SODIUM 7500 UNIT(S): 5000 INJECTION INTRAVENOUS; SUBCUTANEOUS at 05:57

## 2019-01-18 RX ADMIN — HEPARIN SODIUM 7500 UNIT(S): 5000 INJECTION INTRAVENOUS; SUBCUTANEOUS at 22:01

## 2019-01-18 NOTE — PROGRESS NOTE ADULT - PROBLEM SELECTOR PLAN 1
Pt has a history of hypercapnic respiratory failure 2/2 MSSA pneumonia c/b difficulty with extubation from failed CPAP trials (believed to be d/t central hypoventilation syndrome), for which patient had tracheostomy placed. Presenting for decannulation but had episodes of desaturation to 60s after placed on AVAPs o/n with trach capped. In the MICU pCO2 improved from 100s --> 70s with trach collar uncapped. Patient could not be vented as she had previously torn off the balloon of her trach collar. ENT emergently saw patient and placed new fenestrated, cuffed trach.   -Mechanical ventillation o/n per sleep medicine and pulm recs   -per sleep medicine recs, patient be evaluated for a home ventilator  -polysomnography as an outpatient to determine if she has obstructive or central apneas or combination of both Pt has a history of hypercapnic respiratory failure 2/2 MSSA pneumonia c/b difficulty with extubation from failed CPAP trials (believed to be d/t central hypoventilation syndrome), for which patient had tracheostomy placed. Presenting for decannulation but had episodes of desaturation to 60s after placed on AVAPs o/n with trach capped. In the MICU pCO2 improved from 100s --> 70s with trach collar uncapped. Patient could not be vented as she had previously torn off the balloon of her trach collar. ENT emergently saw patient and placed new fenestrated, cuffed trach.   -Pt used mechanical ventilation o/n per sleep medicine and pulm recs  -per sleep medicine recs, patient be evaluated for a home ventilator  -polysomnography as an outpatient to determine if she has obstructive or central apneas or combination of both  - pt will be tried with deflated cuff, uncapped and off of vent overnight to see how she tolerates w/o supplemental O2 when she sleeps

## 2019-01-18 NOTE — PROGRESS NOTE ADULT - PROBLEM SELECTOR PLAN 1
Pt has a history of hypercapnic respiratory failure 2/2 MSSA pneumonia c/b difficulty with extubation from failed CPAP trials (believed to be d/t central hypoventilation syndrome), for which patient had tracheostomy placed. Presenting for decannulation but had episodes of desaturation to 60s after placed on AVAPs o/n with trach capped. In the MICU pCO2 improved from 100s --> 70s with trach collar uncapped. Patient could not be vented as she had previously torn off the balloon of her trach collar. ENT emergently saw patient and placed new fenestrated, cuffed trach.   -Mechanical ventillation o/n per sleep medicine and pulm recs   -per sleep medicine recs, patient be evaluated for a home ventilator  -polysomnography as an outpatient to determine if she has obstructive or central apneas or combination of both

## 2019-01-18 NOTE — PROGRESS NOTE ADULT - PROBLEM SELECTOR PLAN 2
-- Plan as above  -- F/U further sleep medicine and pulmonology recs - Plan as above  - F/U further sleep medicine and pulmonology recs

## 2019-01-18 NOTE — PROGRESS NOTE ADULT - SUBJECTIVE AND OBJECTIVE BOX
INCOMPLETE NOTE    OVERNIGHT EVENTS:    SUBJECTIVE / INTERVAL HPI: Patient seen and examined at bedside.     VITAL SIGNS:  Vital Signs Last 24 Hrs  T(C): 36.9 (18 Jan 2019 06:00), Max: 37.2 (17 Jan 2019 23:28)  T(F): 98.5 (18 Jan 2019 06:00), Max: 98.9 (17 Jan 2019 23:28)  HR: 81 (18 Jan 2019 06:15) (70 - 104)  BP: 134/82 (18 Jan 2019 04:14) (133/82 - 185/115)  BP(mean): 102 (18 Jan 2019 04:14) (99 - 149)  RR: 12 (18 Jan 2019 06:15) (10 - 25)  SpO2: 92% (18 Jan 2019 06:15) (88% - 100%)      01-17-19 @ 07:01  -  01-18-19 @ 07:00  --------------------------------------------------------  IN: 50 mL / OUT: 1250 mL / NET: -1200 mL        PHYSICAL EXAM:  General: NAD  HEENT: NC/AT, anicteric sclera, MMM  Neck: supple  Cardiovascular: +S1/S2, RRR  Respiratory: CTA B/L, no W/R/R  Gastrointestinal: soft, NT/ND, +BSx4  Extremities: WWP, no edema, clubbing or cyanosis  Vascular: 2+ radial, DP/PT pulses B/L  Neurological: AAOx3, no focal deficits    MEDICATIONS  (STANDING):  chlorhexidine 2% Cloths 1 Application(s) Topical daily  heparin  Injectable 7500 Unit(s) SubCutaneous every 8 hours  HYDROmorphone  Injectable 0.2 milliGRAM(s) IV Push once  lidocaine 1%/EPINEPHrine 1:100,000 Inj 10 milliLiter(s) Local Injection once    MEDICATIONS  (PRN):      Allergies    No Known Drug Allergies  Seafood (Rash)    Intolerances        LABS:                        12.6   5.0   )-----------( 176      ( 17 Jan 2019 06:23 )             41.1     01-17    141  |  98  |  18  ----------------------------<  118<H>  4.6   |  33<H>  |  0.87    Ca    9.1      17 Jan 2019 06:23  Phos  5.1     01-17  Mg     1.9     01-17    TPro  7.9  /  Alb  3.8  /  TBili  0.2  /  DBili  x   /  AST  15  /  ALT  13  /  AlkPhos  60  01-17        CAPILLARY BLOOD GLUCOSE      POCT Blood Glucose.: 118 mg/dL (17 Jan 2019 03:50)          RADIOLOGY & ADDITIONAL TESTS: Reviewed. OVERNIGHT EVENTS: Pt [facility].    SUBJECTIVE / INTERVAL HPI: Patient seen and examined at bedside. Denies any new complaints. Reports that her breathing feels good and she is not SOB. She denies cough.    VITAL SIGNS:  Vital Signs Last 24 Hrs  T(C): 36.9 (18 Jan 2019 06:00), Max: 37.2 (17 Jan 2019 23:28)  T(F): 98.5 (18 Jan 2019 06:00), Max: 98.9 (17 Jan 2019 23:28)  HR: 81 (18 Jan 2019 06:15) (70 - 104)  BP: 134/82 (18 Jan 2019 04:14) (133/82 - 185/115)  BP(mean): 102 (18 Jan 2019 04:14) (99 - 149)  RR: 12 (18 Jan 2019 06:15) (10 - 25)  SpO2: 92% (18 Jan 2019 06:15) (88% - 100%)      01-17-19 @ 07:01  -  01-18-19 @ 07:00  --------------------------------------------------------  IN: 50 mL / OUT: 1250 mL / NET: -1200 mL    PHYSICAL EXAM:  General: NAD  HEENT: NC/AT, anicteric sclera, MMM  Neck: supple  Cardiovascular: +S1/S2, RRR  Respiratory: CTA B/L, no W/R/R  Gastrointestinal: soft, NT/ND, +BSx4  Extremities: WWP, no edema, clubbing or cyanosis  Vascular: 2+ radial, DP/PT pulses B/L  Neurological: AAOx3, no focal deficits    MEDICATIONS  (STANDING):  chlorhexidine 2% Cloths 1 Application(s) Topical daily  heparin  Injectable 7500 Unit(s) SubCutaneous every 8 hours  HYDROmorphone  Injectable 0.2 milliGRAM(s) IV Push once  lidocaine 1%/EPINEPHrine 1:100,000 Inj 10 milliLiter(s) Local Injection once    Allergies  No Known Drug Allergies  Seafood (Rash)    LABS:                        12.6   5.0   )-----------( 176      ( 17 Jan 2019 06:23 )             41.1     01-17    141  |  98  |  18  ----------------------------<  118<H>  4.6   |  33<H>  |  0.87  Ca    9.1      17 Jan 2019 06:23  Phos  5.1     01-17  Mg     1.9     01-17  TPro  7.9  /  Alb  3.8  /  TBili  0.2  /  DBili  x   /  AST  15  /  ALT  13  /  AlkPhos  60  01-17  POCT Blood Glucose.: 118 mg/dL (17 Jan 2019 03:50)    RADIOLOGY & ADDITIONAL TESTS: Reviewed.

## 2019-01-18 NOTE — PROGRESS NOTE ADULT - PROBLEM SELECTOR PLAN 5
Pt has reported hx of Afib. Denies use of any medications at home.  - continue to monitor on tele F: none  E: replete  N: Regular diet

## 2019-01-18 NOTE — PROGRESS NOTE ADULT - PROBLEM SELECTOR PLAN 7
DVTppx: none    Dispo: MICU  FULL CODE 1) PCP Contacted on Admission: (Y/N) --> Name & Phone #:  2) Date of Contact with PCP:  3) PCP Contacted at Discharge: (Y/N, N/A)  4) Summary of Handoff Given to PCP:   5) Post-Discharge Appointment Date and Location:

## 2019-01-18 NOTE — PROGRESS NOTE ADULT - ASSESSMENT
33F with PMH of hypercapnic respiratory failure 2/2 MSSA PNA c/b difficult extubation for which patient had trach + PEG placed, congenital hypoventilation syndrome (heterozygous for PHOX2B gene), congenital cardiac malformation s/p repair, TTE 2/2017 showing severe asymmetric septal hypertrophy, DVT in 2008 (s/p Coumadin), UGIB s/p endoscopy, pAfib, HTN, HLD, who presented to [facility] for decannulation. Stepped up to MICU for CO2 toxicosis. Trach was emergently exchanged by ENT and Pt received AC with improvement in symptoms. Being stepped back down to [facility].

## 2019-01-18 NOTE — PROGRESS NOTE ADULT - ASSESSMENT
33F with PMH of hypercapnic respiratory failure 2/2 MSSA PNA c/b difficult extubation for which patient had trach + PEG placed, congenital hypoventilation syndrome (heterozygous for PHOX2B gene), congenital cardiac malformation s/p repair, TTE 2/2017 showing severe asymmetric septal hypertrophy, DVT in 2008 (s/p Coumadin), UGIB s/p endoscopy, pAfib, HTN, HLD, who presented to [facility] for decannulation. Stepped up to MICU for CO2 toxicosis. Trach was emergently exchanged by ENT and Pt received AC with improvement in symptoms. Being stepped back down to [facility]. 33F with PMH of hypercapnic respiratory failure 2/2 MSSA PNA c/b difficult extubation for which patient had trach + PEG placed, congenital hypoventilation syndrome (heterozygous for PHOX2B gene), congenital cardiac malformation s/p repair, TTE 2/2017 showing severe asymmetric septal hypertrophy, DVT in 2008 (s/p Coumadin), UGIB s/p endoscopy, pAfib, HTN, HLD, who presented to [facility] for decannulation. Stepped up to MICU for CO2 toxicosis. Trach was emergently exchanged by ENT and Pt received AC with improvement in symptoms. Stepped back down to [facility].

## 2019-01-18 NOTE — PROGRESS NOTE ADULT - PROBLEM SELECTOR PLAN 8
1) PCP Contacted on Admission: (Y/N) --> Name & Phone #:  2) Date of Contact with PCP:  3) PCP Contacted at Discharge: (Y/N, N/A)  4) Summary of Handoff Given to PCP:   5) Post-Discharge Appointment Date and Location:

## 2019-01-18 NOTE — PROGRESS NOTE ADULT - SUBJECTIVE AND OBJECTIVE BOX
PGY1 Transfer Acceptance Note: MICU --> [address] [place] [address] COURSE:     Ms Pardo is a 33F with PMH of hypercapnic respiratory failure 2/2 MSSA PNA c/b difficult extubation for which patient had trach + PEG placed, congenital hypoventilation syndrome (heterozygous for PHOX2B gene), congenital cardiac malformation s/p repair, TTE 2/2017 showing severe asymmetric septal hypertrophy, DVT in 2008 (s/p Coumadin), UGIB s/p endoscopy, pAfib, HTN, and HLD. She presented for decannulation and admitted to [facility] for telemetry monitoring. Pt's trach was capped overnight and while she was sleeping she desaturated to the 60s. She was decapped and put on trach collar with improvement in O2 saturation. O/N on 1/17 Pt was tried on AVAPS with her trach capped but again desaturated to 60s and became difficult to arouse. ABG was consistent with hypoventillation pH 7.17, pCO2 105, [id number], bicarb 35. Patient was stepped up to MICU for worsening hypoxic hypercapnic respiratory failure and possible need for AC/VC support. In the MICU, patient was maintained on trach collar and PCO2 improved to the 70s. Patient accidentally tore the balloon of her trach collar and could not be vented o/n. Patient was seen by ENT in the AM, who suctioned the trach and performed a tracheoscopy. They emergently replaced the trach to a a size 6 cuffed, fenestrated trach. Patient is now being transferred back to [facility].     INTERVAL HPI/OVERNIGHT EVENTS:  Patient was seen and examined at bedside. As per nurse and patient, no o/n events, patient resting comfortably. No complaints at this time. Patient denies: fever, chills, dizziness, weakness, HA, Changes in vision, CP, palpitations, SOB, cough, N/V/D/C, dysuria, changes in bowel movements, LE edema. ROS otherwise negative.    ICU Vital Signs Last 24 Hrs  T(C): 37.2 (17 Jan 2019 23:28), Max: 37.2 (17 Jan 2019 23:28)  T(F): 98.9 (17 Jan 2019 23:28), Max: 98.9 (17 Jan 2019 23:28)  HR: 82 (18 Jan 2019 00:00) (72 - 116)  BP: 170/97 (18 Jan 2019 00:00) (133/82 - 185/115)  BP(mean): 119 (18 Jan 2019 00:00) (99 - 149)  RR: 25 (18 Jan 2019 00:00) (8 - 28)  SpO2: 93% (18 Jan 2019 00:00) (88% - 100%)      PHYSICAL EXAM:    Constitutional: WDWN, NAD  HEENT: PERRL, EOMI, sclera non-icteric, neck supple, trachea midline, no masses, no JVD, MMM, good dentition  Respiratory: CTA b/l, good air entry b/l, no wheezing, no rhonchi, no rales, without accessory muscle use and no intercostal retractions  Cardiovascular: RRR, normal S1S2, no M/R/G  Gastrointestinal: soft, NTND, no masses palpable, BS normal  Extremities: Warm, well perfused, pulses equal bilateral upper and lower extremities, no edema, no clubbing  Neurological: AAOx3, CN Grossly intact  Skin: Normal temperature, warm, dry    MEDICATIONS  (STANDING):  chlorhexidine 2% Cloths 1 Application(s) Topical daily  heparin  Injectable 7500 Unit(s) SubCutaneous every 8 hours  HYDROmorphone  Injectable 0.2 milliGRAM(s) IV Push once  lidocaine 1%/EPINEPHrine 1:100,000 Inj 10 milliLiter(s) Local Injection once    MEDICATIONS  (PRN):      Allergies    No Known Drug Allergies  Seafood (Rash)    Intolerances        LABS:                        12.6   5.0   )-----------( 176      ( 17 Jan 2019 06:23 )             41.1     01-17    141  |  98  |  18  ----------------------------<  118<H>  4.6   |  33<H>  |  0.87    Ca    9.1      17 Jan 2019 06:23  Phos  5.1     01-17  Mg     1.9     01-17    TPro  7.9  /  Alb  3.8  /  TBili  0.2  /  DBili  x   /  AST  15  /  ALT  13  /  AlkPhos  60  01-17          RADIOLOGY & ADDITIONAL TESTS:  Reviewed PGY1 Transfer Acceptance Note: MICU --> [address] [place] [address] COURSE:     Ms Pardo is a 33F with PMH of hypercapnic respiratory failure 2/2 MSSA PNA c/b difficult extubation for which patient had trach + PEG placed, congenital hypoventilation syndrome (heterozygous for PHOX2B gene), congenital cardiac malformation s/p repair, TTE 2/2017 showing severe asymmetric septal hypertrophy, DVT in 2008 (s/p Coumadin), UGIB s/p endoscopy, pAfib, HTN, and HLD. She presented for decannulation and admitted to [facility] for telemetry monitoring. Pt's trach was capped overnight and while she was sleeping she desaturated to the 60s. She was decapped and put on trach collar with improvement in O2 saturation. O/N on 1/17 Pt was tried on AVAPS with her trach capped but again desaturated to 60s and became difficult to arouse. ABG was consistent with hypoventillation pH 7.17, pCO2 105, bS1263, bicarb 35. Patient was stepped up to MICU for worsening hypoxic hypercapnic respiratory failure and possible need for AC/VC support. In the MICU, patient was maintained on trach collar and PCO2 improved to the 70s. Patient accidentally tore the balloon of her trach collar and could not be vented o/n. Patient was seen by ENT in the AM, who suctioned the trach and performed a tracheoscopy. They emergently replaced the trach to a a size 6 cuffed, fenestrated trach. Patient is now being transferred back to [facility].     INTERVAL HPI/OVERNIGHT EVENTS:  Patient was seen and examined at bedside. At this time denies dyspnea, chest pain, palpitations, or other symptoms. ROS is otherwise negative.     ICU Vital Signs Last 24 Hrs  T(C): 37.2 (17 Jan 2019 23:28), Max: 37.2 (17 Jan 2019 23:28)  T(F): 98.9 (17 Jan 2019 23:28), Max: 98.9 (17 Jan 2019 23:28)  HR: 82 (18 Jan 2019 00:00) (72 - 116)  BP: 170/97 (18 Jan 2019 00:00) (133/82 - 185/115)  BP(mean): 119 (18 Jan 2019 00:00) (99 - 149)  RR: 25 (18 Jan 2019 00:00) (8 - 28)  SpO2: 93% (18 Jan 2019 00:00) (88% - 100%)      PHYSICAL EXAM:    Constitutional: Young female, in NAD, lying in bed   HEENT: Trach in place; sclera non-icteric; no JVD, MMM, missing teeth  Respiratory: CTA b/l  Cardiovascular: RRR, normal S1S2, no M/R/G  Gastrointestinal: soft, NTND, no masses palpable, BS normal  Extremities: no edema  Neurological: Alert and awake; no gross deficits     MEDICATIONS  (STANDING):  chlorhexidine 2% Cloths 1 Application(s) Topical daily  heparin  Injectable 7500 Unit(s) SubCutaneous every 8 hours  HYDROmorphone  Injectable 0.2 milliGRAM(s) IV Push once  lidocaine 1%/EPINEPHrine 1:100,000 Inj 10 milliLiter(s) Local Injection once    MEDICATIONS  (PRN):      Allergies    No Known Drug Allergies  Seafood (Rash)    Intolerances        LABS:                        12.6   5.0   )-----------( 176      ( 17 Jan 2019 06:23 )             41.1     01-17    141  |  98  |  18  ----------------------------<  118<H>  4.6   |  33<H>  |  0.87    Ca    9.1      17 Jan 2019 06:23  Phos  5.1     01-17  Mg     1.9     01-17    TPro  7.9  /  Alb  3.8  /  TBili  0.2  /  DBili  x   /  AST  15  /  ALT  13  /  AlkPhos  60  01-17          RADIOLOGY & ADDITIONAL TESTS:  Reviewed

## 2019-01-18 NOTE — PROGRESS NOTE ADULT - PROBLEM SELECTOR PLAN 4
Pt has reported hx of HOCM. TTE in 2/2017 with reported severe asymmetric septal ventricular hypertrophy without obstructive outflow tract.  - repeat ECHO not showing signs of HOCM Pt has reported hx of Afib. Denies use of any medications at home.  - continue to monitor on tele

## 2019-01-19 PROCEDURE — 99233 SBSQ HOSP IP/OBS HIGH 50: CPT | Mod: GC

## 2019-01-19 RX ADMIN — HEPARIN SODIUM 7500 UNIT(S): 5000 INJECTION INTRAVENOUS; SUBCUTANEOUS at 22:56

## 2019-01-19 RX ADMIN — HEPARIN SODIUM 7500 UNIT(S): 5000 INJECTION INTRAVENOUS; SUBCUTANEOUS at 05:57

## 2019-01-19 RX ADMIN — HEPARIN SODIUM 7500 UNIT(S): 5000 INJECTION INTRAVENOUS; SUBCUTANEOUS at 14:20

## 2019-01-19 NOTE — PROGRESS NOTE ADULT - SUBJECTIVE AND OBJECTIVE BOX
INCOMPLETE NOTE    OVERNIGHT EVENTS:    SUBJECTIVE / INTERVAL HPI: Patient seen and examined at bedside.     VITAL SIGNS:  Vital Signs Last 24 Hrs  T(C): 36.8 (19 Jan 2019 06:22), Max: 37.7 (18 Jan 2019 13:47)  T(F): 98.2 (19 Jan 2019 06:22), Max: 99.9 (18 Jan 2019 13:47)  HR: 66 (19 Jan 2019 07:03) (66 - 95)  BP: 116/78 (19 Jan 2019 07:03) (116/78 - 148/97)  BP(mean): 92 (19 Jan 2019 07:03) (92 - 116)  RR: 14 (19 Jan 2019 07:03) (12 - 24)  SpO2: 98% (19 Jan 2019 07:03) (91% - 100%)        PHYSICAL EXAM:  General: NAD  HEENT: NC/AT, anicteric sclera, MMM  Neck: supple  Cardiovascular: +S1/S2, RRR  Respiratory: CTA B/L, no W/R/R  Gastrointestinal: soft, NT/ND, +BSx4  Extremities: WWP, no edema, clubbing or cyanosis  Vascular: 2+ radial, DP/PT pulses B/L  Neurological: AAOx3, no focal deficits    MEDICATIONS  (STANDING):  heparin  Injectable 7500 Unit(s) SubCutaneous every 8 hours    MEDICATIONS  (PRN):      Allergies    No Known Drug Allergies  Seafood (Rash)    RADIOLOGY & ADDITIONAL TESTS: Reviewed. OVERNIGHT EVENTS: Uncuffed and off of supplemental pt desaturated to 80s then was placed on vent with improvement    SUBJECTIVE / INTERVAL HPI: Patient seen and examined at bedside. denies SOB and cough. Denies any other new complaints.    VITAL SIGNS:  Vital Signs Last 24 Hrs  T(C): 36.8 (19 Jan 2019 06:22), Max: 37.7 (18 Jan 2019 13:47)  T(F): 98.2 (19 Jan 2019 06:22), Max: 99.9 (18 Jan 2019 13:47)  HR: 66 (19 Jan 2019 07:03) (66 - 95)  BP: 116/78 (19 Jan 2019 07:03) (116/78 - 148/97)  BP(mean): 92 (19 Jan 2019 07:03) (92 - 116)  RR: 14 (19 Jan 2019 07:03) (12 - 24)  SpO2: 98% (19 Jan 2019 07:03) (91% - 100%)        PHYSICAL EXAM:  General: NAD  HEENT: NC/AT, anicteric sclera, MMM  Neck: supple  Cardiovascular: +S1/S2, RRR  Respiratory: CTA B/L, no W/R/R  Gastrointestinal: soft, NT/ND, +BSx4  Extremities: WWP, no edema, clubbing or cyanosis  Vascular: 2+ radial, DP/PT pulses B/L  Neurological: AAOx3, no focal deficits    MEDICATIONS  (STANDING):  heparin  Injectable 7500 Unit(s) SubCutaneous every 8 hours    MEDICATIONS  (PRN):      Allergies    No Known Drug Allergies  Seafood (Rash)    RADIOLOGY & ADDITIONAL TESTS: Reviewed. OVERNIGHT EVENTS: Uncuffed and off of supplemental pt desaturated to 80s then was placed on vent with improvement    SUBJECTIVE / INTERVAL HPI: Patient seen and examined at bedside. Reports feeling comfortable on trach to vent currently. Denies SOB and cough. Denies any other new complaints.    VITAL SIGNS:  Vital Signs Last 24 Hrs  T(C): 36.8 (19 Jan 2019 06:22), Max: 37.7 (18 Jan 2019 13:47)  T(F): 98.2 (19 Jan 2019 06:22), Max: 99.9 (18 Jan 2019 13:47)  HR: 66 (19 Jan 2019 07:03) (66 - 95)  BP: 116/78 (19 Jan 2019 07:03) (116/78 - 148/97)  BP(mean): 92 (19 Jan 2019 07:03) (92 - 116)  RR: 14 (19 Jan 2019 07:03) (12 - 24)  SpO2: 98% (19 Jan 2019 07:03) (91% - 100%)    PHYSICAL EXAM:  Constitutional: Young female, in NAD, lying in bed   HEENT: Trach in place; sclera non-icteric; no JVD, MMM, missing teeth  Respiratory: CTA b/l, on trach to vent, no respiratory distress  Cardiovascular: RRR, normal S1S2, no M/R/G  Gastrointestinal: soft, NTND, no masses palpable, BS normal  Extremities: no edema  Neurological: Alert and awake; no gross deficits     MEDICATIONS  (STANDING):  heparin  Injectable 7500 Unit(s) SubCutaneous every 8 hours    MEDICATIONS  (PRN):      Allergies    No Known Drug Allergies  Seafood (Rash)    RADIOLOGY & ADDITIONAL TESTS: Reviewed.

## 2019-01-19 NOTE — PROGRESS NOTE ADULT - PROBLEM SELECTOR PLAN 1
Pt has a history of hypercapnic respiratory failure 2/2 MSSA pneumonia c/b difficulty with extubation from failed CPAP trials (believed to be d/t central hypoventilation syndrome), for which patient had tracheostomy placed. Presenting for decannulation but had episodes of desaturation to 60s after placed on AVAPs o/n with trach capped. In the MICU pCO2 improved from 100s --> 70s with trach collar uncapped. Patient could not be vented as she had previously torn off the balloon of her trach collar. ENT emergently saw patient and placed new fenestrated, cuffed trach.   -Pt used mechanical ventilation o/n per sleep medicine and pulm recs  -per sleep medicine recs, patient be evaluated for a home ventilator  -polysomnography as an outpatient to determine if she has obstructive or central apneas or combination of both  - pt will be tried with deflated cuff, uncapped and off of vent overnight to see how she tolerates w/o supplemental O2 when she sleeps Pt has a history of hypercapnic respiratory failure 2/2 MSSA pneumonia c/b difficulty with extubation from failed CPAP trials (believed to be d/t central hypoventilation syndrome), for which patient had tracheostomy placed. Presenting for decannulation but had episodes of desaturation to 60s after placed on AVAPs o/n with trach capped. In the MICU pCO2 improved from 100s --> 70s with trach collar uncapped. Patient could not be vented as she had previously torn off the balloon of her trach collar. ENT emergently saw patient and placed new fenestrated, cuffed trach.   -Pt required mechanical ventilation o/n while cuff deflated and pt was on RA for oxygen desaturation to 80s  -per sleep medicine recs, patient be evaluated for a home ventilator  -polysomnography as an outpatient to determine if she has obstructive or central apneas or combination of both

## 2019-01-19 NOTE — PROGRESS NOTE ADULT - ASSESSMENT
33F with PMH of hypercapnic respiratory failure 2/2 MSSA PNA c/b difficult extubation for which patient had trach + PEG placed, congenital hypoventilation syndrome (heterozygous for PHOX2B gene), congenital cardiac malformation s/p repair, TTE 2/2017 showing severe asymmetric septal hypertrophy, DVT in 2008 (s/p Coumadin), UGIB s/p endoscopy, pAfib, HTN, HLD, who presented to [facility] for decannulation. Stepped up to MICU for CO2 toxicosis. Trach was emergently exchanged by ENT and Pt received AC with improvement in symptoms. Stepped back down to [facility]

## 2019-01-20 PROCEDURE — 99233 SBSQ HOSP IP/OBS HIGH 50: CPT | Mod: GC

## 2019-01-20 RX ADMIN — HEPARIN SODIUM 7500 UNIT(S): 5000 INJECTION INTRAVENOUS; SUBCUTANEOUS at 05:12

## 2019-01-20 RX ADMIN — HEPARIN SODIUM 7500 UNIT(S): 5000 INJECTION INTRAVENOUS; SUBCUTANEOUS at 22:15

## 2019-01-20 RX ADMIN — HEPARIN SODIUM 7500 UNIT(S): 5000 INJECTION INTRAVENOUS; SUBCUTANEOUS at 14:45

## 2019-01-20 NOTE — PROGRESS NOTE ADULT - ASSESSMENT
33F with PMH of hypercapnic respiratory failure 2/2 MSSA PNA c/b difficult extubation for which patient had trach + PEG placed, congenital hypoventilation syndrome (heterozygous for PHOX2B gene), congenital cardiac malformation s/p repair, TTE 2/2017 showing severe asymmetric septal hypertrophy, DVT in 2008 (s/p Coumadin), UGIB s/p endoscopy, pAfib, HTN, HLD, who presented to [facility] for decannulation. Stepped up to MICU for CO2 toxicosis. Trach was emergently exchanged by ENT and Pt received AC with improvement in symptoms. Stepped back down to [facility]

## 2019-01-20 NOTE — PROGRESS NOTE ADULT - PROBLEM SELECTOR PLAN 1
Pt has a history of hypercapnic respiratory failure 2/2 MSSA pneumonia c/b difficulty with extubation from failed CPAP trials (believed to be d/t central hypoventilation syndrome), for which patient had tracheostomy placed. Presenting for decannulation but had episodes of desaturation to 60s after placed on AVAPs o/n with trach capped. In the MICU pCO2 improved from 100s --> 70s with trach collar uncapped. Patient could not be vented as she had previously torn off the balloon of her trach collar. ENT emergently saw patient and placed new fenestrated, cuffed trach.   -Pt required mechanical ventilation o/n while cuff deflated and pt was on RA for oxygen desaturation to 80s  -per sleep medicine recs, patient be evaluated for a home ventilator  -polysomnography as an outpatient to determine if she has obstructive or central apneas or combination of both

## 2019-01-20 NOTE — PROGRESS NOTE ADULT - SUBJECTIVE AND OBJECTIVE BOX
INTERVAL HPI/OVERNIGHT EVENTS:    SUBJECTIVE: Patient seen and examined at bedside.    OBJECTIVE:    VITAL SIGNS:  ICU Vital Signs Last 24 Hrs  T(C): 36.9 (20 Jan 2019 06:00), Max: 37.4 (19 Jan 2019 09:35)  T(F): 98.4 (20 Jan 2019 06:00), Max: 99.3 (19 Jan 2019 09:35)  HR: 93 (20 Jan 2019 08:04) (80 - 94)  BP: 131/91 (20 Jan 2019 04:41) (98/52 - 136/92)  BP(mean): 104 (20 Jan 2019 04:41) (70 - 107)  ABP: --  ABP(mean): --  RR: 19 (20 Jan 2019 08:04) (14 - 19)  SpO2: 100% (20 Jan 2019 08:04) (90% - 100%)    Mode: standby    01-19 @ 07:01  -  01-20 @ 07:00  --------------------------------------------------------  IN: 0 mL / OUT: 750 mL / NET: -750 mL      CAPILLARY BLOOD GLUCOSE          PHYSICAL EXAM:  Constitutional: Young female, in NAD, lying in bed   HEENT: Trach in place; sclera non-icteric; no JVD, MMM, missing teeth  Respiratory: CTA b/l, on trach to vent, no respiratory distress  Cardiovascular: RRR, normal S1S2, no M/R/G  Gastrointestinal: soft, NTND, no masses palpable, BS normal  Extremities: no edema  Neurological: Alert and awake; no gross deficits       MEDICATIONS:  MEDICATIONS  (STANDING):  heparin  Injectable 7500 Unit(s) SubCutaneous every 8 hours    MEDICATIONS  (PRN):      ALLERGIES:  Allergies    No Known Drug Allergies  Seafood (Rash)    Intolerances        LABS:                RADIOLOGY & ADDITIONAL TESTS: Reviewed. INTERVAL HPI/OVERNIGHT EVENTS: NAEO.    SUBJECTIVE: Patient seen and examined at bedside. No active complaints. Denies CP, SOB, nausea, vomiting, CP.     OBJECTIVE:    VITAL SIGNS:  ICU Vital Signs Last 24 Hrs  T(C): 36.9 (20 Jan 2019 06:00), Max: 37.4 (19 Jan 2019 09:35)  T(F): 98.4 (20 Jan 2019 06:00), Max: 99.3 (19 Jan 2019 09:35)  HR: 93 (20 Jan 2019 08:04) (80 - 94)  BP: 131/91 (20 Jan 2019 04:41) (98/52 - 136/92)  BP(mean): 104 (20 Jan 2019 04:41) (70 - 107)  ABP: --  ABP(mean): --  RR: 19 (20 Jan 2019 08:04) (14 - 19)  SpO2: 100% (20 Jan 2019 08:04) (90% - 100%)    Mode: standby    01-19 @ 07:01  -  01-20 @ 07:00  --------------------------------------------------------  IN: 0 mL / OUT: 750 mL / NET: -750 mL      CAPILLARY BLOOD GLUCOSE          PHYSICAL EXAM:  Constitutional: Young female, in NAD, lying in bed   HEENT: Trach in place; sclera non-icteric; no JVD, MMM, missing teeth  Respiratory: CTA b/l, on trach to vent, no respiratory distress  Cardiovascular: RRR, normal S1S2, no M/R/G  Gastrointestinal: soft, NTND, no masses palpable, BS normal  Extremities: no edema  Neurological: Alert and awake; no gross deficits       MEDICATIONS:  MEDICATIONS  (STANDING):  heparin  Injectable 7500 Unit(s) SubCutaneous every 8 hours    MEDICATIONS  (PRN):      ALLERGIES:  Allergies    No Known Drug Allergies  Seafood (Rash)    Intolerances        LABS:                RADIOLOGY & ADDITIONAL TESTS: Reviewed.

## 2019-01-21 LAB
ANION GAP SERPL CALC-SCNC: 10 MMOL/L — SIGNIFICANT CHANGE UP (ref 5–17)
BUN SERPL-MCNC: 20 MG/DL — SIGNIFICANT CHANGE UP (ref 7–23)
CALCIUM SERPL-MCNC: 9.8 MG/DL — SIGNIFICANT CHANGE UP (ref 8.4–10.5)
CHLORIDE SERPL-SCNC: 96 MMOL/L — SIGNIFICANT CHANGE UP (ref 96–108)
CO2 SERPL-SCNC: 31 MMOL/L — SIGNIFICANT CHANGE UP (ref 22–31)
CREAT SERPL-MCNC: 0.96 MG/DL — SIGNIFICANT CHANGE UP (ref 0.5–1.3)
GLUCOSE SERPL-MCNC: 108 MG/DL — HIGH (ref 70–99)
HCT VFR BLD CALC: 46.4 % — HIGH (ref 34.5–45)
HGB BLD-MCNC: 14.2 G/DL — SIGNIFICANT CHANGE UP (ref 11.5–15.5)
MAGNESIUM SERPL-MCNC: 2 MG/DL — SIGNIFICANT CHANGE UP (ref 1.6–2.6)
MCHC RBC-ENTMCNC: 27.1 PG — SIGNIFICANT CHANGE UP (ref 27–34)
MCHC RBC-ENTMCNC: 30.6 G/DL — LOW (ref 32–36)
MCV RBC AUTO: 88.5 FL — SIGNIFICANT CHANGE UP (ref 80–100)
PLATELET # BLD AUTO: 185 K/UL — SIGNIFICANT CHANGE UP (ref 150–400)
POTASSIUM SERPL-MCNC: 4.9 MMOL/L — SIGNIFICANT CHANGE UP (ref 3.5–5.3)
POTASSIUM SERPL-SCNC: 4.9 MMOL/L — SIGNIFICANT CHANGE UP (ref 3.5–5.3)
RBC # BLD: 5.24 M/UL — HIGH (ref 3.8–5.2)
RBC # FLD: 15.3 % — SIGNIFICANT CHANGE UP (ref 10.3–16.9)
SODIUM SERPL-SCNC: 137 MMOL/L — SIGNIFICANT CHANGE UP (ref 135–145)
WBC # BLD: 6.9 K/UL — SIGNIFICANT CHANGE UP (ref 3.8–10.5)
WBC # FLD AUTO: 6.9 K/UL — SIGNIFICANT CHANGE UP (ref 3.8–10.5)

## 2019-01-21 PROCEDURE — 99233 SBSQ HOSP IP/OBS HIGH 50: CPT | Mod: GC

## 2019-01-21 RX ORDER — ACETAMINOPHEN 500 MG
650 TABLET ORAL ONCE
Qty: 0 | Refills: 0 | Status: COMPLETED | OUTPATIENT
Start: 2019-01-21 | End: 2019-01-21

## 2019-01-21 RX ADMIN — HEPARIN SODIUM 7500 UNIT(S): 5000 INJECTION INTRAVENOUS; SUBCUTANEOUS at 06:11

## 2019-01-21 RX ADMIN — HEPARIN SODIUM 7500 UNIT(S): 5000 INJECTION INTRAVENOUS; SUBCUTANEOUS at 14:58

## 2019-01-21 RX ADMIN — HEPARIN SODIUM 7500 UNIT(S): 5000 INJECTION INTRAVENOUS; SUBCUTANEOUS at 21:39

## 2019-01-21 NOTE — PROGRESS NOTE ADULT - ASSESSMENT
33F with PMH of hypercapnic respiratory failure 2/2 MSSA PNA c/b difficult extubation for which patient had trach + PEG placed, congenital hypoventilation syndrome (heterozygous for PHOX2B gene), congenital cardiac malformation s/p repair, TTE 2/2017 showing severe asymmetric septal hypertrophy, DVT in 2008 (s/p Coumadin), UGIB s/p endoscopy, pAfib, HTN, HLD, who presented to [facility] for decannulation. Stepped up to MICU for CO2 toxicosis. Trach was emergently exchanged by ENT and Pt received AC with improvement in symptoms. Stepped back down to [facility]

## 2019-01-21 NOTE — PROGRESS NOTE ADULT - PROBLEM SELECTOR PLAN 1
Pt has a history of hypercapnic respiratory failure 2/2 MSSA pneumonia c/b difficulty with extubation from failed CPAP trials (believed to be d/t central hypoventilation syndrome), for which patient had tracheostomy placed. Presenting for decannulation but had episodes of desaturation to 60s after placed on AVAPs o/n with trach capped. In the MICU pCO2 improved from 100s --> 70s with trach collar uncapped. Patient could not be vented as she had previously torn off the balloon of her trach collar. ENT emergently saw patient and placed new fenestrated, cuffed trach.   -Pt required mechanical ventilation o/n while cuff deflated and pt was on RA for oxygen desaturation to 80s  -per sleep medicine recs, patient be evaluated for a home ventilator  -polysomnography as an outpatient to determine if she has obstructive or central apneas or combination of both Pt has a history of hypercapnic respiratory failure 2/2 MSSA pneumonia c/b difficulty with extubation from failed CPAP trials (believed to be d/t central hypoventilation syndrome), for which patient had tracheostomy placed. Presenting for decannulation but had episodes of desaturation to 60s after placed on AVAPs o/n with trach capped. In the MICU pCO2 improved from 100s --> 70s with trach collar uncapped. Patient could not be vented as she had previously torn off the balloon of her trach collar. ENT emergently saw patient and placed new fenestrated, cuffed trach.   -Pt required mechanical ventilation o/n  -per sleep medicine recs, patient be evaluated for a home ventilator  -polysomnography as an outpatient to determine if she has obstructive or central apneas or combination of both

## 2019-01-21 NOTE — PROGRESS NOTE ADULT - SUBJECTIVE AND OBJECTIVE BOX
INCOMPLETE NOTE    OVERNIGHT EVENTS:    SUBJECTIVE / INTERVAL HPI: Patient seen and examined at bedside.     VITAL SIGNS:  Vital Signs Last 24 Hrs  T(C): 36.8 (21 Jan 2019 06:00), Max: 37.4 (20 Jan 2019 21:36)  T(F): 98.2 (21 Jan 2019 06:00), Max: 99.3 (20 Jan 2019 21:36)  HR: 100 (21 Jan 2019 06:39) (90 - 106)  BP: 118/58 (21 Jan 2019 04:52) (113/62 - 137/61)  BP(mean): 84 (21 Jan 2019 04:52) (80 - 89)  RR: 12 (21 Jan 2019 06:39) (12 - 19)  SpO2: 98% (21 Jan 2019 06:39) (77% - 100%)      01-20-19 @ 07:01  -  01-21-19 @ 07:00  --------------------------------------------------------  IN: 0 mL / OUT: 700 mL / NET: -700 mL        PHYSICAL EXAM:  General: NAD  HEENT: NC/AT, anicteric sclera, MMM  Neck: supple  Cardiovascular: +S1/S2, RRR  Respiratory: CTA B/L, no W/R/R  Gastrointestinal: soft, NT/ND, +BSx4  Extremities: WWP, no edema, clubbing or cyanosis  Vascular: 2+ radial, DP/PT pulses B/L  Neurological: AAOx3, no focal deficits    MEDICATIONS  (STANDING):  heparin  Injectable 7500 Unit(s) SubCutaneous every 8 hours    MEDICATIONS  (PRN):      Allergies    No Known Drug Allergies  Seafood (Rash)    Intolerances        LABS:              CAPILLARY BLOOD GLUCOSE              RADIOLOGY & ADDITIONAL TESTS: Reviewed. OVERNIGHT EVENTS: MARY. Pt was on trach to vent O/N.    SUBJECTIVE / INTERVAL HPI: Patient seen and examined at bedside. Patient denying SOB, cough, discomfort Patient denies any new complaints.    VITAL SIGNS:  Vital Signs Last 24 Hrs  T(C): 36.8 (21 Jan 2019 06:00), Max: 37.4 (20 Jan 2019 21:36)  T(F): 98.2 (21 Jan 2019 06:00), Max: 99.3 (20 Jan 2019 21:36)  HR: 100 (21 Jan 2019 06:39) (90 - 106)  BP: 118/58 (21 Jan 2019 04:52) (113/62 - 137/61)  BP(mean): 84 (21 Jan 2019 04:52) (80 - 89)  RR: 12 (21 Jan 2019 06:39) (12 - 19)  SpO2: 98% (21 Jan 2019 06:39) (77% - 100%)      01-20-19 @ 07:01  -  01-21-19 @ 07:00  --------------------------------------------------------  IN: 0 mL / OUT: 700 mL / NET: -700 mL    PHYSICAL EXAM:  Constitutional: Young female, in NAD, lying in bed   HEENT: Trach in place; sclera non-icteric; no JVD, MMM, missing teeth  Respiratory: CTA b/l, on trach to vent, no respiratory distress  Cardiovascular: RRR, normal S1S2, no M/R/G  Gastrointestinal: soft, NTND, no masses palpable, BS normal  Extremities: no edema  Neurological: Alert and awake; no gross deficits     MEDICATIONS  (STANDING):  heparin  Injectable 7500 Unit(s) SubCutaneous every 8 hours  Allergies  No Known Drug Allergies  Seafood (Rash)    LABS:                         14.2   6.9   )-----------( 185      ( 21 Jan 2019 06:51 )             46.4     01-21    137  |  96  |  20  ----------------------------<  108<H>  4.9   |  31  |  0.96    Ca    9.8      21 Jan 2019 06:53  Mg     2.0     01-21    RADIOLOGY, EKG & ADDITIONAL TESTS: Reviewed.

## 2019-01-22 LAB
BASE EXCESS BLDA CALC-SCNC: 7.9 MMOL/L — HIGH (ref -2–3)
HCO3 BLDA-SCNC: 30 MMOL/L — HIGH (ref 21–28)
PCO2 BLDA: 35 MMHG — SIGNIFICANT CHANGE UP (ref 32–45)
PH BLDA: 7.55 — HIGH (ref 7.35–7.45)
PO2 BLDA: 70 MMHG — LOW (ref 83–108)
SAO2 % BLDA: 96 % — SIGNIFICANT CHANGE UP (ref 95–100)

## 2019-01-22 PROCEDURE — 99233 SBSQ HOSP IP/OBS HIGH 50: CPT | Mod: GC

## 2019-01-22 RX ADMIN — HEPARIN SODIUM 7500 UNIT(S): 5000 INJECTION INTRAVENOUS; SUBCUTANEOUS at 14:31

## 2019-01-22 RX ADMIN — HEPARIN SODIUM 7500 UNIT(S): 5000 INJECTION INTRAVENOUS; SUBCUTANEOUS at 22:56

## 2019-01-22 RX ADMIN — HEPARIN SODIUM 7500 UNIT(S): 5000 INJECTION INTRAVENOUS; SUBCUTANEOUS at 06:02

## 2019-01-22 NOTE — PROGRESS NOTE ADULT - SUBJECTIVE AND OBJECTIVE BOX
OVERNIGHT EVENTS: Desaturated to low to mid 80s when placed on home vent. Replaced patient on [facility] vent. ABG drawn consistent with metabolic alkalosis pH 7.55, HCO3 30.    SUBJECTIVE / INTERVAL HPI: Patient seen and examined at bedside. Patient denies SOB or cough. She states that she is concerned that her home vent isn't working because she used to use it all the time and she is concerned that she will have to stay in the hospital longer.    VITAL SIGNS:  Vital Signs Last 24 Hrs  T(C): 38.1 (22 Jan 2019 10:11), Max: 38.1 (22 Jan 2019 10:11)  T(F): 100.5 (22 Jan 2019 10:11), Max: 100.5 (22 Jan 2019 10:11)  HR: 99 (22 Jan 2019 09:27) (80 - 100)  BP: 100/58 (22 Jan 2019 09:27) (100/58 - 116/81)  BP(mean): 76 (22 Jan 2019 09:27) (76 - 91)  RR: 19 (22 Jan 2019 09:27) (12 - 20)  SpO2: 97% (22 Jan 2019 09:27) (91% - 100%)    PHYSICAL EXAM:  Constitutional: Obese, in NAD, lying in bed   HEENT: Trach in place, sclera non-icteric; no JVD, MMM, missing teeth  Respiratory: CTA b/l, on trach to vent, no respiratory distress  Cardiovascular: RRR, normal S1S2, no M/R/G  Gastrointestinal: soft, NTND, no masses palpable, BS normal  Extremities: no edema  Neurological: Alert and awake; no gross deficits     MEDICATIONS  (STANDING):  heparin  Injectable 7500 Unit(s) SubCutaneous every 8 hours    Allergies  No Known Drug Allergies  Seafood (Rash)    LABS:                     14.2   6.9   )-----------( 185      ( 21 Jan 2019 06:51 )             46.4     01-21  137  |  96  |  20  ----------------------------<  108<H>  4.9   |  31  |  0.96    Ca    9.8      21 Jan 2019 06:53  Mg     2.0     01-21    RADIOLOGY & ADDITIONAL TESTS: Reviewed. HOSPITAL COURSE:  Ms Pardo is a 33F with PMH of hypercapnic respiratory failure 2/2 MSSA PNA c/b difficult extubation for which patient had trach + PEG placed, congenital hypoventilation syndrome (heterozygous for PHOX2B gene, newly diagnosed on this admission), congenital cardiac malformation s/p repair, TTE 2/2017 showing severe asymmetric septal hypertrophy, DVT in 2008 (s/p Coumadin), UGIB s/p endoscopy, pAfib, HTN, and HLD. She presented for decannulation and admitted to [facility] for telemetry monitoring. Pt's trach was capped overnight and while she was sleeping she desaturated to the 60s. She was decapped and put on trach collar with improvement in O2 saturation. O/N on 1/17 pt was tried on AVAPS with her trach capped but again desaturated to 60s and became difficult to arouse. ABG was consistent with hypoventillation. Pt was stepped up to MICU for worsening hypoxic hypercapnic respiratory failure and possible need for AC/VC support. In the MICU, patient was maintained on trach collar and PCO2 improved to the 70s. Patient accidentally tore the balloon off her trach collar and could not be vented o/n. ENT suctioned the trach and performed a tracheoscopy. They emergently replaced the trach to a a size 6 cuffed, fenestrated trach. Patient is was transferred back to [facility] where she required being put on vent o/n for desatting while sleeping. Desatted 91-93% while walking and desatted intermittently to 88% when talking and walking simultaneously. Patient was trialed on her home vent and desatted, requiring switch to H vent. Patient has oxygen decompressor at home. Will see if respiratory will be able to connect oxygen to her home ventilator and see how O2 sats are overnight. Pt was also noted to have 1.5-2sec pauses on tele while asleep.    OVERNIGHT EVENTS: Desaturated to low to mid 80s when placed on home vent. Replaced patient on [facility] vent. ABG drawn consistent with metabolic alkalosis pH 7.55, HCO3 30.    SUBJECTIVE / INTERVAL HPI: Patient seen and examined at bedside. Patient denies SOB or cough. She states that she is concerned that her home vent isn't working because she used to use it all the time and she is concerned that she will have to stay in the hospital longer.    VITAL SIGNS:  Vital Signs Last 24 Hrs  T(C): 38.1 (22 Jan 2019 10:11), Max: 38.1 (22 Jan 2019 10:11)  T(F): 100.5 (22 Jan 2019 10:11), Max: 100.5 (22 Jan 2019 10:11)  HR: 99 (22 Jan 2019 09:27) (80 - 100)  BP: 100/58 (22 Jan 2019 09:27) (100/58 - 116/81)  BP(mean): 76 (22 Jan 2019 09:27) (76 - 91)  RR: 19 (22 Jan 2019 09:27) (12 - 20)  SpO2: 97% (22 Jan 2019 09:27) (91% - 100%)    PHYSICAL EXAM:  Constitutional: Obese, in NAD, lying in bed   HEENT: Trach in place, sclera non-icteric; no JVD, MMM, missing teeth  Respiratory: CTA b/l, on trach to vent, no respiratory distress  Cardiovascular: RRR, normal S1S2, no M/R/G  Gastrointestinal: soft, NTND, no masses palpable, BS normal  Extremities: no edema  Neurological: Alert and awake; no gross deficits     MEDICATIONS  (STANDING):  heparin  Injectable 7500 Unit(s) SubCutaneous every 8 hours    Allergies  No Known Drug Allergies  Seafood (Rash)    LABS:                     14.2   6.9   )-----------( 185      ( 21 Jan 2019 06:51 )             46.4     01-21  137  |  96  |  20  ----------------------------<  108<H>  4.9   |  31  |  0.96    Ca    9.8      21 Jan 2019 06:53  Mg     2.0     01-21    RADIOLOGY & ADDITIONAL TESTS: Reviewed.

## 2019-01-22 NOTE — CHART NOTE - NSCHARTNOTEFT_GEN_A_CORE
Admitting Diagnosis:   Patient is a 33y old  Female who presents with a chief complaint of Decannulation of Tracheostomy (22 Jan 2019 11:43)      PAST MEDICAL & SURGICAL HISTORY:  Malignant neoplasm  Trachea displaced  DVT (deep venous thrombosis)  GIB (gastrointestinal bleeding)  Afib  HTN (hypertension)  Chronic systolic congestive heart failure  HLD (hyperlipidemia)  Myocardial infarction  Congenital heart disease      Current Nutrition Order:  DASH/TLC    PO Intake: Good (%) [   ]  Fair (50-75%) [X   ] Poor (<25%) [   ]    GI Issues: No N/V/C/D reported at this time. +BM    Pain: No pain endorsed    Skin Integrity: Rob 21, intact pressure-wise; trach    Labs:   01-21    137  |  96  |  20  ----------------------------<  108<H>  4.9   |  31  |  0.96    Ca    9.8      21 Jan 2019 06:53  Mg     2.0     01-21      CAPILLARY BLOOD GLUCOSE          Medications:  MEDICATIONS  (STANDING):  heparin  Injectable 7500 Unit(s) SubCutaneous every 8 hours    MEDICATIONS  (PRN):      Weight: 114.8kg  Daily     Daily     Weight Change:  No new weights recorded since admit     Nutrition Focused Physical Exam: Completed [   ]  Not Pertinent [  X ]    Estimated energy needs: Ideal body weight (65.7kg) used for calculations as pt >120% of IBW. Needs estimated for maintenance in adults  Calories: 25-30 kcal/kg = 1642-1971kcal/day  Protein: 0.8-1.0 g/kg = 49-66g protein/day  Fluids: 25-30 mL/kg = 1642-1971mL/day    Subjective: 34 yo/female with extensive PMHx including HTN, HLD, Afib, congential cardiac malformation s/p repair, DVT, UGIB, prolonged hospital stay in 2018 2/2 hypercapnic respiratory failure eventually requiring trach/PEG, admitted for decannulation and found to have congenital hypoventilation syndrome. Transferred to MICU for possible intubation, desaturation, lethargy. Trach changed by ENT on 1/17. Pt stepped down to [facility]. Seen in room, awake, alert, currently breathing on trach collar. She endorses good PO intake; no difficulty chewing or swallowing reported. No complaints of N/V/C/D, normal BMs. No pain. Reviewed normal, nutrition edu w/patient.     Previous Nutrition Diagnosis:   Inadequate energy intake RT current NPO status AEB 0% of EER being met at this time    Active [   ]  Resolved [ X  ]    If resolved, new PES: Food and nutrition knowledge deficit RT no formal MNT education regarding general, healthy eating AEB undesirable food choices, questions about diet     Goal: Pt will be able to state 3+ healthy diet options for breakfast, lunch, and dinner at f/u visit     Recommendations:  1. Encourage PO intake  2. Reinforce diet education   3. Trend weights   4. Monitor lytes and replete prn.     Education: General, healthy nutrition handout provided; discussed protein sources    Risk Level: High [   ] Moderate [  X ] Low [   ]

## 2019-01-22 NOTE — PROGRESS NOTE ADULT - PROBLEM SELECTOR PLAN 2
- Plan as above  - F/U further sleep medicine and pulmonology recs - Plan as above  - F/U further sleep medicine and pulmonology recs  will need diaphragmatic pacing

## 2019-01-22 NOTE — PROGRESS NOTE ADULT - PROBLEM SELECTOR PLAN 1
Pt has a history of hypercapnic respiratory failure 2/2 MSSA pneumonia c/b difficulty with extubation from failed CPAP trials (believed to be d/t central hypoventilation syndrome), for which patient had tracheostomy placed. Presenting for decannulation but had episodes of desaturation to 60s after placed on AVAPs o/n with trach capped. In the MICU pCO2 improved from 100s --> 70s with trach collar uncapped. Patient could not be vented as she had previously torn off the balloon of her trach collar. ENT emergently saw patient and placed new fenestrated, cuffed trach.   -Pt destaurated on her home ventilator overnight and needed to be switched to hospital ventilator with added O2  -Pt currently on her home ventilator with O2 bleeding, goal spO2 >88%  -polysomnography as an outpatient to determine if she has obstructive or central apneas or combination of both Pt has a history of hypercapnic respiratory failure 2/2 MSSA pneumonia c/b difficulty with extubation from failed CPAP trials (believed to be d/t central hypoventilation syndrome), for which patient had tracheostomy placed. Presenting for decannulation but had episodes of desaturation to 60s after placed on AVAPs o/n with trach capped. In the MICU pCO2 improved from 100s --> 70s with trach collar uncapped. Patient could not be vented as she had previously torn off the balloon of her trach collar. ENT emergently saw patient and placed new fenestrated, cuffed trach.   -Pt destaurated on her home ventilator overnight and needed to be switched to hospital ventilator with added O2  -Pt currently on her home ventilator with O2 bleeding, goal spO2 >88%  -polysomnography as an outpatient to determine if she has obstructive or central apneas or combination of both  she desaturated on the ventilator last night and was asymptomatic .  I discussed with RT and will add oxygen to hr respirator and will follow

## 2019-01-23 LAB
ANION GAP SERPL CALC-SCNC: 11 MMOL/L — SIGNIFICANT CHANGE UP (ref 5–17)
BUN SERPL-MCNC: 17 MG/DL — SIGNIFICANT CHANGE UP (ref 7–23)
CALCIUM SERPL-MCNC: 9.4 MG/DL — SIGNIFICANT CHANGE UP (ref 8.4–10.5)
CHLORIDE SERPL-SCNC: 98 MMOL/L — SIGNIFICANT CHANGE UP (ref 96–108)
CO2 SERPL-SCNC: 28 MMOL/L — SIGNIFICANT CHANGE UP (ref 22–31)
CREAT SERPL-MCNC: 0.91 MG/DL — SIGNIFICANT CHANGE UP (ref 0.5–1.3)
GLUCOSE SERPL-MCNC: 94 MG/DL — SIGNIFICANT CHANGE UP (ref 70–99)
HCT VFR BLD CALC: 41.7 % — SIGNIFICANT CHANGE UP (ref 34.5–45)
HGB BLD-MCNC: 13 G/DL — SIGNIFICANT CHANGE UP (ref 11.5–15.5)
MAGNESIUM SERPL-MCNC: 2 MG/DL — SIGNIFICANT CHANGE UP (ref 1.6–2.6)
MCHC RBC-ENTMCNC: 27.3 PG — SIGNIFICANT CHANGE UP (ref 27–34)
MCHC RBC-ENTMCNC: 31.2 G/DL — LOW (ref 32–36)
MCV RBC AUTO: 87.4 FL — SIGNIFICANT CHANGE UP (ref 80–100)
PLATELET # BLD AUTO: 181 K/UL — SIGNIFICANT CHANGE UP (ref 150–400)
POTASSIUM SERPL-MCNC: 4.4 MMOL/L — SIGNIFICANT CHANGE UP (ref 3.5–5.3)
POTASSIUM SERPL-SCNC: 4.4 MMOL/L — SIGNIFICANT CHANGE UP (ref 3.5–5.3)
RBC # BLD: 4.77 M/UL — SIGNIFICANT CHANGE UP (ref 3.8–5.2)
RBC # FLD: 15 % — SIGNIFICANT CHANGE UP (ref 10.3–16.9)
SODIUM SERPL-SCNC: 137 MMOL/L — SIGNIFICANT CHANGE UP (ref 135–145)
WBC # BLD: 6.5 K/UL — SIGNIFICANT CHANGE UP (ref 3.8–10.5)
WBC # FLD AUTO: 6.5 K/UL — SIGNIFICANT CHANGE UP (ref 3.8–10.5)

## 2019-01-23 PROCEDURE — 99233 SBSQ HOSP IP/OBS HIGH 50: CPT | Mod: GC

## 2019-01-23 RX ADMIN — HEPARIN SODIUM 7500 UNIT(S): 5000 INJECTION INTRAVENOUS; SUBCUTANEOUS at 22:43

## 2019-01-23 RX ADMIN — HEPARIN SODIUM 7500 UNIT(S): 5000 INJECTION INTRAVENOUS; SUBCUTANEOUS at 14:03

## 2019-01-23 RX ADMIN — HEPARIN SODIUM 7500 UNIT(S): 5000 INJECTION INTRAVENOUS; SUBCUTANEOUS at 05:26

## 2019-01-23 NOTE — PROGRESS NOTE ADULT - PROBLEM SELECTOR PLAN 1
Pt has a history of hypercapnic respiratory failure 2/2 MSSA pneumonia c/b difficulty with extubation from failed CPAP trials (believed to be d/t central hypoventilation syndrome), for which patient had tracheostomy placed. Presenting for decannulation but had episodes of desaturation to 60s after placed on AVAPs o/n with trach capped. In the MICU pCO2 improved from 100s --> 70s with trach collar uncapped. Patient could not be vented as she had previously torn off the balloon of her trach collar. ENT emergently saw patient and placed new fenestrated, cuffed trach.   -Pt desaturated on her home ventilator overnight and needed to be switched to hospital ventilator with added O2  -on 1/22 overnight placed on home vent, but with low circuit leak, and placed back on hospital vent  -will contact home vent maker company to troubleshoot device  -polysomnography as an outpatient to determine if she has obstructive or central apneas or combination of both  -c/w trach collar during the day

## 2019-01-23 NOTE — PROGRESS NOTE ADULT - PROBLEM SELECTOR PLAN 2
- Plan as above  - F/U further sleep medicine and pulmonology recs  - will need diaphragmatic pacing

## 2019-01-23 NOTE — PROGRESS NOTE ADULT - ASSESSMENT
32 yo F with PMH of hypercapnic respiratory failure 2/2 MSSA PNA c/b difficult extubation for which patient had trach + PEG placed, congenital hypoventilation syndrome (heterozygous for PHOX2B gene), congenital cardiac malformation s/p repair, TTE 2/2017 showing severe asymmetric septal hypertrophy, DVT in 2008 (s/p Coumadin), UGIB s/p endoscopy, pAfib, HTN, HLD, who presented to [facility] for decannulation, stepped up to MICU for CO2 toxicosis. Trach was emergently exchanged by ENT and Pt received AC with improvement in symptoms, now stepped back down to [facility] for further management

## 2019-01-23 NOTE — PROGRESS NOTE ADULT - SUBJECTIVE AND OBJECTIVE BOX
VITAL SIGNS:  Vital Signs Last 24 Hrs  T(C): 37.3 (23 Jan 2019 06:27), Max: 38.1 (22 Jan 2019 10:11)  T(F): 99.1 (23 Jan 2019 06:27), Max: 100.5 (22 Jan 2019 10:11)  HR: 93 (23 Jan 2019 06:15) (86 - 99)  BP: 103/55 (23 Jan 2019 04:03) (100/58 - 143/69)  BP(mean): 73 (23 Jan 2019 04:03) (73 - 90)  RR: 16 (23 Jan 2019 06:15) (16 - 24)  SpO2: 98% (23 Jan 2019 06:15) (96% - 100%)    PHYSICAL EXAM:    General: in NAD, lying comfortably in bed  HEENT: normocephalic, atraumatic; PERRL, anicteric sclera; MMM  Neck: supple, no JVD, no thyromegaly, no lymphadenopathy  Cardiovascular: +S1/S2, RRR, no M/G/R  Respiratory: clear to auscultation B/L; no wheezing, no rales, no rhonchi  Gastrointestinal: soft, NT/ND; +BSx4, no organomegaly  Extremities: WWP; no edema, clubbing or cyanosis  Vascular: 2+ radial, DP/PT pulses B/L  Neurological: AAOx3; no focal deficits    MEDICATIONS:  MEDICATIONS  (STANDING):  heparin  Injectable 7500 Unit(s) SubCutaneous every 8 hours    MEDICATIONS  (PRN):      ALLERGIES:  Allergies    No Known Drug Allergies  Seafood (Rash)    Intolerances        LABS:              CAPILLARY BLOOD GLUCOSE          RADIOLOGY & ADDITIONAL TESTS: Reviewed. Interval HPI/overnight events: Patient was placed on home ventilator overnight, however there was low circuit leak and pt placed on hospital ventilator, saturating well. Patient seen and examined at bedside, wanting to get off vent, however no acute complaints. Denies fever, chills, nausea, chest pain, SOB, cough, abdominal pain.    VITAL SIGNS:  Vital Signs Last 24 Hrs  T(C): 37.3 (23 Jan 2019 06:27), Max: 38.1 (22 Jan 2019 10:11)  T(F): 99.1 (23 Jan 2019 06:27), Max: 100.5 (22 Jan 2019 10:11)  HR: 93 (23 Jan 2019 06:15) (86 - 99)  BP: 103/55 (23 Jan 2019 04:03) (100/58 - 143/69)  BP(mean): 73 (23 Jan 2019 04:03) (73 - 90)  RR: 16 (23 Jan 2019 06:15) (16 - 24)  SpO2: 98% (23 Jan 2019 06:15) (96% - 100%)    PHYSICAL EXAM:  General: obese female, in NAD, lying comfortably in bed, trach to vent in place  HEENT: normocephalic, atraumatic; PERRL, anicteric sclera; MMM  Neck: supple, no JVD, no thyromegaly, no lymphadenopathy  Cardiovascular: +S1/S2, RRR, no M/G/R  Respiratory: clear to auscultation B/L; +vent sounds, in NAD, not using accessory muscles  Gastrointestinal: soft, NT/ND; +BSx4, no organomegaly  Extremities: WWP; no edema, clubbing or cyanosis  Vascular: 2+ radial, DP/PT pulses B/L  Neurological: Awake, responding to questions, moving all extremities; no focal deficits    MEDICATIONS:  MEDICATIONS  (STANDING):  heparin  Injectable 7500 Unit(s) SubCutaneous every 8 hours    MEDICATIONS  (PRN):      ALLERGIES:  Allergies    No Known Drug Allergies  Seafood (Rash)    Intolerances        LABS:      CAPILLARY BLOOD GLUCOSE      RADIOLOGY & ADDITIONAL TESTS: Reviewed.

## 2019-01-23 NOTE — PROGRESS NOTE ADULT - PROBLEM SELECTOR PLAN 4
Pt has reported hx of Afib. Denies use of any medications at home  - so far no events reported on tele  - continue to monitor

## 2019-01-24 PROCEDURE — 99233 SBSQ HOSP IP/OBS HIGH 50: CPT | Mod: GC

## 2019-01-24 RX ADMIN — HEPARIN SODIUM 7500 UNIT(S): 5000 INJECTION INTRAVENOUS; SUBCUTANEOUS at 21:40

## 2019-01-24 RX ADMIN — HEPARIN SODIUM 7500 UNIT(S): 5000 INJECTION INTRAVENOUS; SUBCUTANEOUS at 05:46

## 2019-01-24 RX ADMIN — HEPARIN SODIUM 7500 UNIT(S): 5000 INJECTION INTRAVENOUS; SUBCUTANEOUS at 14:41

## 2019-01-24 NOTE — PROGRESS NOTE ADULT - ATTENDING COMMENTS
Patient seen and examined with house-staff during bedside rounds  Resident note read, including vitals, physical findings, laboratory data, and radiological reports.   Revisions included below.  Case discussed with House staff  Direct personal management at bedside  and extensive interpretation of data. Decision making of high complexity.
Patient seen and examined with house-staff during bedside rounds.  Resident note read, including vitals, physical findings, laboratory data, and radiological reports.   Revisions included below.  Direct personal management at bed side and extensive interpretation of the data.  Plan was outlined and discussed in details with the housestaff.  Decision making of high complexity  Action taken for acute disease activity to reflect the level of care provided:  - medication reconciliation  - review laboratory data    The patient was seen several times during the day. The patient decompensated overnight as she did not tolerate the noninvasive ventilation. Patient became lethargic due to acute on top of chronic respiratory acidosis. Patient was discontinued from noninvasive ventilation and was on room air and Her health status and has changed slightly improved.  The tracheostomy what change to fenestrated with the cough by ENT. Patient was put on the ventilator to stabilize her condition. Patient later went on CPAP mask with the cuff deflated. Gas exchange improved. Patient turned to have a ventilator on home
Patient seen and examined with house-staff during bedside rounds.  Resident note read, including vitals, physical findings, laboratory data, and radiological reports.   Revisions included below.  Direct personal management at bed side and extensive interpretation of the data.  Plan was outlined and discussed in details with the housestaff.  Decision making of high complexity  Action taken for acute disease activity to reflect the level of care provided:  - medication reconciliation  - review laboratory data  We contacted the provider for the ventilator. Respiratory therapist were unable to do connect oxygen into the ventilator. Patient cannot be discharge until her respiratory status is optimize and her respirator is connected to the oxygen and her saturation 88% and above while asleep.
Patient seen and examined with house-staff during bedside rounds.  Resident note read, including vitals, physical findings, laboratory data, and radiological reports.   Revisions included below.  Direct personal management at bed side and extensive interpretation of the data.  Plan was outlined and discussed in details with the housestaff.  Decision making of high complexity  Action taken for acute disease activity to reflect the level of care provided:  - medication reconciliation  - review laboratory data  she desaturated last night and needed to go back on vent.  Awaiting [facility] to authorize her vent for patient to go home.  Follow saturation on RA on exertion
Patient seen and examined with house-staff during bedside rounds.  Resident note read, including vitals, physical findings, laboratory data, and radiological reports.   Revisions included below.  Direct personal management at bed side and extensive interpretation of the data.  Plan was outlined and discussed in details with the housestaff.  Decision making of high complexity  Action taken for acute disease activity to reflect the level of care provided:  - medication reconciliation  - review laboratory data  Patient was not put on ventilator last night and she is lethargic this morning. Patient will put on the ventilator for an hour and she is improved. I discussed the case with the chief respiratory therapist. Await [facility] to authorize the use of her ventilator prior to discharge. I discussed the case extensively with the . We'll discuss with the father regarding appointing [place] Medicaid. The patient will be residing in [place] and then will transfer the patient to another facility in [place].
Patient seen and examined with house-staff during bedside rounds.  Resident note read, including vitals, physical findings, laboratory data, and radiological reports.   Revisions included below.  Direct personal management at bed side and extensive interpretation of the data.  Plan was outlined and discussed in details with the housestaff.  Decision making of high complexity  Action taken for acute disease activity to reflect the level of care provided:  - medication reconciliation  - review laboratory data
Patient seen and examined with house-staff during bedside rounds.  Resident note read, including vitals, physical findings, laboratory data, and radiological reports.   Revisions included below.  Direct personal management at bed side and extensive interpretation of the data.  Plan was outlined and discussed in details with the housestaff.  Decision making of high complexity  Action taken for acute disease activity to reflect the level of care provided:  - medication reconciliation  - review laboratory data  The patient desaturated overnight on tracheostomy. Neuro patient was put on the ventilator last night. I discussed the case in details with the resident and the patient. Patient is to be put on her on ventilator tonight. Will try to connect auctions her ventilator. We'll observe overnight. The patient will be considered to discharge if she tolerates her on ventilator with adequate saturation with or without oxygen. Ambulation this morning and fall on the oxygen saturation with exertion on room air
Patient seen and examined with house-staff during bedside rounds.  Resident note read, including vitals, physical findings, laboratory data, and radiological reports.   Revisions included below.  Direct personal management at bed side and extensive interpretation of the data.  Plan was outlined and discussed in details with the housestaff.  Decision making of high complexity  Action taken for acute disease activity to reflect the level of care provided:  - medication reconciliation  - review laboratory data  Patient seen several times and discussed with residents, fellows, respiratory therapist.  The home ventilator was connected to oxygen.  Trial on home ventilator at night with oxygen and if stable will discharge in am
Patient seen and examined with house-staff during bedside rounds.  Resident note read, including vitals, physical findings, laboratory data, and radiological reports.   Revisions included below.  Direct personal management at bed side and extensive interpretation of the data.  Plan was outlined and discussed in details with the housestaff.  Decision making of high complexity  Action taken for acute disease activity to reflect the level of care provided:  - medication reconciliation  - review laboratory data  The patient is positive for the PHOX team which consistent with congenital hypoventilation syndrome. She's also has obstructive sleep apnea. The blood gas revealed respiratory acidosis possible acute on top of chronic and she became hypoxic overnight with caping the tracheostomy which was uncaped. Patient will be tried on noninvasive ventilation overnight possible discharge tomorrow. I informed the patient that evaluating the trip five bronchoscopy and overnight not a candidate for decannulation and she will require evaluation for diaphragmatic pacing
Patient seen and examined with house-staff during bedside rounds.  Resident note read, including vitals, physical findings, laboratory data, and radiological reports.   Revisions included below.  Direct personal management at bed side and extensive interpretation of the data.  Plan was outlined and discussed in details with the housestaff.  Decision making of high complexity  Action taken for acute disease activity to reflect the level of care provided:  - medication reconciliation  - review laboratory data  The patient is clinically stable. I evaluated the home ventilator the Trilogy. The ascending were adjusted. The patient was provided with further conduction. I discussed the case in details with the . We'll observe the patient off the ventilator off oxygen with a cough deflated overnight. Patient has home oxygen but does not have a portable compressor. No evidence of obstruction. Increase activity. Patient will require evaluation for diaphragmatic pacing

## 2019-01-24 NOTE — PROGRESS NOTE ADULT - REASON FOR ADMISSION
Decannulation of Tracheostomy

## 2019-01-24 NOTE — PROGRESS NOTE ADULT - PROBLEM SELECTOR PLAN 4
Pt has reported hx of Afib. Denies use of any medications at home  - so far no events reported on tele  - continue to monitor Pt has reported hx of Afib. Denies use of any medications at home  - so far no events reported on tele  -tachy in setting of respiratory drive during sleep, as above f/u sleep study. Stable this morning.   - continue to monitor

## 2019-01-24 NOTE — PROGRESS NOTE ADULT - PROBLEM SELECTOR PLAN 1
Pt has a history of hypercapnic respiratory failure 2/2 MSSA pneumonia c/b difficulty with extubation from failed CPAP trials (believed to be d/t central hypoventilation syndrome), for which patient had tracheostomy placed. Presenting for decannulation but had episodes of desaturation to 60s after placed on AVAPs o/n with trach capped. In the MICU pCO2 improved from 100s --> 70s with trach collar uncapped. Patient could not be vented as she had previously torn off the balloon of her trach collar. ENT emergently saw patient and placed new fenestrated, cuffed trach.   -Pt desaturated on her home ventilator overnight and needed to be switched to hospital ventilator with added O2  -on 1/22 overnight placed on home vent, but with low circuit leak, and placed back on hospital vent  -will contact home vent maker company to troubleshoot device  -polysomnography as an outpatient to determine if she has obstructive or central apneas or combination of both  -c/w trach collar during the day Pt has a history of hypercapnic respiratory failure 2/2 MSSA pneumonia c/b difficulty with extubation from failed CPAP trials (believed to be d/t central hypoventilation syndrome), for which patient had tracheostomy placed. Presenting for decannulation but had episodes of desaturation to 60s after placed on AVAPs o/n with trach capped. In the MICU pCO2 improved from 100s --> 70s with trach collar uncapped. Patient could not be vented as she had previously torn off the balloon of her trach collar. ENT emergently saw patient and placed new fenestrated, cuffed trach.   -Pt desaturated on her home ventilator overnight and needed to be switched to hospital ventilator with added O2  -on 1/22 overnight placed on home vent, but with low circuit leak, and placed back on hospital vent  -need to contact home vent maker company to troubleshoot device  -polysomnography as an outpatient to determine if she has obstructive or central apneas or combination of both  -c/w trach collar during the day

## 2019-01-24 NOTE — PROGRESS NOTE ADULT - SUBJECTIVE AND OBJECTIVE BOX
OVERNIGHT EVENTS:    SUBJECTIVE:    Vital Signs Last 12 Hrs  T(F): 98.1 (01-24-19 @ 05:51), Max: 98.7 (01-23-19 @ 22:55)  HR: 78 (01-24-19 @ 07:06) (76 - 106)  BP: 105/65 (01-24-19 @ 07:06) (101/62 - 119/70)  BP(mean): 76 (01-24-19 @ 04:20) (76 - 82)  RR: 21 (01-24-19 @ 07:06) (16 - 22)  SpO2: 100% (01-24-19 @ 07:06) (97% - 100%)  I&O's Summary      PHYSICAL EXAM:  Constitutional: NAD, comfortable in bed.  HEENT: NC/AT, PERRLA, EOMI, no conjunctival pallor or scleral icterus, MMM  Neck: Supple, no JVD  Respiratory: Normal rate, rhythm, depth, effort. CTAB. No w/r/r.   Cardiovascular: RRR, normal S1 and S2, no m/r/g.   Gastrointestinal: +BS, soft NTND, no guarding or rebound tenderness, no palpable masses   Extremities: wwp; no cyanosis, clubbing or edema.   Vascular: Pulses equal and strong throughout.   Neurological: AAOx3, no CN deficits, strength and sensation intact throughout.   Skin: No gross skin abnormalities or rashes        LABS:                        13.0   6.5   )-----------( 181      ( 23 Jan 2019 06:51 )             41.7     01-23    137  |  98  |  17  ----------------------------<  94  4.4   |  28  |  0.91    Ca    9.4      23 Jan 2019 06:51  Mg     2.0     01-23            RADIOLOGY & ADDITIONAL TESTS:    MEDICATIONS  (STANDING):  heparin  Injectable 7500 Unit(s) SubCutaneous every 8 hours    MEDICATIONS  (PRN): OVERNIGHT EVENTS:  tachycardic to 106 on vent with [id number], and improved. able to wean back to Trach collar this a.m.  otherwise, MARY    SUBJECTIVE:  No new complaints. 12 ROS neg.     Vital Signs Last 12 Hrs  T(F): 98.1 (01-24-19 @ 05:51), Max: 98.7 (01-23-19 @ 22:55)  HR: 78 (01-24-19 @ 07:06) (76 - 106)  BP: 105/65 (01-24-19 @ 07:06) (101/62 - 119/70)  BP(mean): 76 (01-24-19 @ 04:20) (76 - 82)  RR: 21 (01-24-19 @ 07:06) (16 - 22)  SpO2: 100% (01-24-19 @ 07:06) (97% - 100%)  I&O's Summary      PHYSICAL EXAM:  Constitutional: young woman, NAD, comfortable in bed.  HEENT: NC/AT, PERRLA, EOMI, no conjunctival pallor or scleral icterus, lips dry but MMM  Neck: Supple,   Respiratory: trach in situ with trach collar, using finger to occlude with good voicing and loudness.   , productive cough through trach, lungs then CTAB. No w/r/r.   Cardiovascular: RRR, normal S1 and S2, no m/r/g.   Gastrointestinal: +BS, soft NTND, PEG in situ  Extremities: wwp; no cyanosis, clubbing or edema.   Vascular: Pulses equal and strong throughout.   Neurological: AAOx3, no CN deficits, functional strength and sensation intact throughout.           LABS:                        13.0   6.5   )-----------( 181      ( 23 Jan 2019 06:51 )             41.7     01-23    137  |  98  |  17  ----------------------------<  94  4.4   |  28  |  0.91    Ca    9.4      23 Jan 2019 06:51  Mg     2.0     01-23            RADIOLOGY & ADDITIONAL TESTS:    MEDICATIONS  (STANDING):  heparin  Injectable 7500 Unit(s) SubCutaneous every 8 hours    MEDICATIONS  (PRN):

## 2019-01-24 NOTE — PROGRESS NOTE ADULT - ASSESSMENT
INCOMPLETE  32 yo F with PMH of hypercapnic respiratory failure 2/2 MSSA PNA c/b difficult extubation for which patient had trach + PEG placed, congenital hypoventilation syndrome (heterozygous for PHOX2B gene), congenital cardiac malformation s/p repair, TTE 2/2017 showing severe asymmetric septal hypertrophy, DVT in 2008 (s/p Coumadin), UGIB s/p endoscopy, pAfib, HTN, HLD, who presented to [facility] for decannulation, stepped up to MICU for CO2 toxicosis. Trach was emergently exchanged by ENT and Pt received AC with improvement in symptoms, now stepped back down to [facility] for further management 32 yo F with PMH of hypercapnic respiratory failure 2/2 MSSA PNA c/b difficult extubation for which patient had trach + PEG placed, congenital hypoventilation syndrome (heterozygous for PHOX2B gene), congenital cardiac malformation s/p repair, TTE 2/2017 showing severe asymmetric septal hypertrophy, DVT in 2008 (s/p Coumadin), UGIB s/p endoscopy, pAfib, HTN, HLD, who presented to [facility] for decannulation, stepped up to MICU for CO2 toxicosis. Trach was emergently exchanged by ENT and Pt received AC with improvement in symptoms, now stepped back down to [facility] for further management

## 2019-01-25 VITALS — TEMPERATURE: 98 F

## 2019-01-25 LAB
ANION GAP SERPL CALC-SCNC: 10 MMOL/L — SIGNIFICANT CHANGE UP (ref 5–17)
BUN SERPL-MCNC: 16 MG/DL — SIGNIFICANT CHANGE UP (ref 7–23)
CALCIUM SERPL-MCNC: 8.9 MG/DL — SIGNIFICANT CHANGE UP (ref 8.4–10.5)
CHLORIDE SERPL-SCNC: 100 MMOL/L — SIGNIFICANT CHANGE UP (ref 96–108)
CO2 SERPL-SCNC: 29 MMOL/L — SIGNIFICANT CHANGE UP (ref 22–31)
CREAT SERPL-MCNC: 0.91 MG/DL — SIGNIFICANT CHANGE UP (ref 0.5–1.3)
EXTRA LAVENDER TOP TUBE: SIGNIFICANT CHANGE UP
GLUCOSE SERPL-MCNC: 101 MG/DL — HIGH (ref 70–99)
POTASSIUM SERPL-MCNC: 4.7 MMOL/L — SIGNIFICANT CHANGE UP (ref 3.5–5.3)
POTASSIUM SERPL-SCNC: 4.7 MMOL/L — SIGNIFICANT CHANGE UP (ref 3.5–5.3)
SODIUM SERPL-SCNC: 139 MMOL/L — SIGNIFICANT CHANGE UP (ref 135–145)

## 2019-01-25 PROCEDURE — 84443 ASSAY THYROID STIM HORMONE: CPT

## 2019-01-25 PROCEDURE — 71046 X-RAY EXAM CHEST 2 VIEWS: CPT

## 2019-01-25 PROCEDURE — 93005 ELECTROCARDIOGRAM TRACING: CPT

## 2019-01-25 PROCEDURE — 94660 CPAP INITIATION&MGMT: CPT

## 2019-01-25 PROCEDURE — 80053 COMPREHEN METABOLIC PANEL: CPT

## 2019-01-25 PROCEDURE — 94003 VENT MGMT INPAT SUBQ DAY: CPT

## 2019-01-25 PROCEDURE — 84295 ASSAY OF SERUM SODIUM: CPT

## 2019-01-25 PROCEDURE — 99285 EMERGENCY DEPT VISIT HI MDM: CPT | Mod: 25

## 2019-01-25 PROCEDURE — 80048 BASIC METABOLIC PNL TOTAL CA: CPT

## 2019-01-25 PROCEDURE — 84132 ASSAY OF SERUM POTASSIUM: CPT

## 2019-01-25 PROCEDURE — C8929: CPT

## 2019-01-25 PROCEDURE — 94002 VENT MGMT INPAT INIT DAY: CPT

## 2019-01-25 PROCEDURE — 82803 BLOOD GASES ANY COMBINATION: CPT

## 2019-01-25 PROCEDURE — 94640 AIRWAY INHALATION TREATMENT: CPT

## 2019-01-25 PROCEDURE — 36415 COLL VENOUS BLD VENIPUNCTURE: CPT

## 2019-01-25 PROCEDURE — 83735 ASSAY OF MAGNESIUM: CPT

## 2019-01-25 PROCEDURE — 84100 ASSAY OF PHOSPHORUS: CPT

## 2019-01-25 PROCEDURE — 81001 URINALYSIS AUTO W/SCOPE: CPT

## 2019-01-25 PROCEDURE — 85025 COMPLETE CBC W/AUTO DIFF WBC: CPT

## 2019-01-25 PROCEDURE — 71045 X-RAY EXAM CHEST 1 VIEW: CPT

## 2019-01-25 PROCEDURE — 90686 IIV4 VACC NO PRSV 0.5 ML IM: CPT

## 2019-01-25 PROCEDURE — 80061 LIPID PANEL: CPT

## 2019-01-25 PROCEDURE — 99239 HOSP IP/OBS DSCHRG MGMT >30: CPT

## 2019-01-25 PROCEDURE — 82330 ASSAY OF CALCIUM: CPT

## 2019-01-25 PROCEDURE — 85027 COMPLETE CBC AUTOMATED: CPT

## 2019-01-25 PROCEDURE — 83036 HEMOGLOBIN GLYCOSYLATED A1C: CPT

## 2019-01-25 PROCEDURE — 82962 GLUCOSE BLOOD TEST: CPT

## 2019-01-25 RX ORDER — INFLUENZA VIRUS VACCINE 15; 15; 15; 15 UG/.5ML; UG/.5ML; UG/.5ML; UG/.5ML
0.5 SUSPENSION INTRAMUSCULAR ONCE
Qty: 0 | Refills: 0 | Status: COMPLETED | OUTPATIENT
Start: 2019-01-25 | End: 2019-01-25

## 2019-01-25 RX ADMIN — HEPARIN SODIUM 7500 UNIT(S): 5000 INJECTION INTRAVENOUS; SUBCUTANEOUS at 06:42

## 2019-01-25 RX ADMIN — INFLUENZA VIRUS VACCINE 0.5 MILLILITER(S): 15; 15; 15; 15 SUSPENSION INTRAMUSCULAR at 09:05

## 2019-01-29 DIAGNOSIS — I48.0 PAROXYSMAL ATRIAL FIBRILLATION: ICD-10-CM

## 2019-01-29 DIAGNOSIS — I50.22 CHRONIC SYSTOLIC (CONGESTIVE) HEART FAILURE: ICD-10-CM

## 2019-01-29 DIAGNOSIS — I11.0 HYPERTENSIVE HEART DISEASE WITH HEART FAILURE: ICD-10-CM

## 2019-01-29 DIAGNOSIS — E87.2 ACIDOSIS: ICD-10-CM

## 2019-01-29 DIAGNOSIS — J96.92 RESPIRATORY FAILURE, UNSPECIFIED WITH HYPERCAPNIA: ICD-10-CM

## 2019-01-29 DIAGNOSIS — G47.33 OBSTRUCTIVE SLEEP APNEA (ADULT) (PEDIATRIC): ICD-10-CM

## 2019-01-29 DIAGNOSIS — Z43.0 ENCOUNTER FOR ATTENTION TO TRACHEOSTOMY: ICD-10-CM

## 2019-01-29 DIAGNOSIS — J96.91 RESPIRATORY FAILURE, UNSPECIFIED WITH HYPOXIA: ICD-10-CM

## 2019-01-29 DIAGNOSIS — G47.35 CONGENITAL CENTRAL ALVEOLAR HYPOVENTILATION SYNDROME: ICD-10-CM

## 2019-01-29 DIAGNOSIS — T85.618A BREAKDOWN (MECHANICAL) OF OTHER SPECIFIED INTERNAL PROSTHETIC DEVICES, IMPLANTS AND GRAFTS, INITIAL ENCOUNTER: ICD-10-CM

## 2019-03-01 ENCOUNTER — EMERGENCY (EMERGENCY)
Facility: HOSPITAL | Age: 34
LOS: 1 days | Discharge: HOME | End: 2019-03-01
Admitting: EMERGENCY MEDICINE
Payer: MEDICAID

## 2019-03-01 VITALS
OXYGEN SATURATION: 96 % | TEMPERATURE: 98 F | RESPIRATION RATE: 16 BRPM | DIASTOLIC BLOOD PRESSURE: 90 MMHG | SYSTOLIC BLOOD PRESSURE: 134 MMHG | HEART RATE: 98 BPM

## 2019-03-01 DIAGNOSIS — Q24.9 CONGENITAL MALFORMATION OF HEART, UNSPECIFIED: Chronic | ICD-10-CM

## 2019-03-01 LAB
HBV SURFACE AG SER-ACNC: SIGNIFICANT CHANGE UP
HCV AB S/CO SERPL IA: 0.07 S/CO — SIGNIFICANT CHANGE UP
HCV AB SERPL-IMP: SIGNIFICANT CHANGE UP
HIV 1+2 AB+HIV1 P24 AG SERPL QL IA: SIGNIFICANT CHANGE UP

## 2019-03-01 PROCEDURE — 96372 THER/PROPH/DIAG INJ SC/IM: CPT

## 2019-03-01 PROCEDURE — 87389 HIV-1 AG W/HIV-1&-2 AB AG IA: CPT

## 2019-03-01 PROCEDURE — 99284 EMERGENCY DEPT VISIT MOD MDM: CPT

## 2019-03-01 PROCEDURE — 86706 HEP B SURFACE ANTIBODY: CPT

## 2019-03-01 PROCEDURE — 87591 N.GONORRHOEAE DNA AMP PROB: CPT

## 2019-03-01 PROCEDURE — 87491 CHLMYD TRACH DNA AMP PROBE: CPT

## 2019-03-01 PROCEDURE — 99284 EMERGENCY DEPT VISIT MOD MDM: CPT | Mod: 25

## 2019-03-01 PROCEDURE — 80074 ACUTE HEPATITIS PANEL: CPT

## 2019-03-01 RX ORDER — AZITHROMYCIN 500 MG/1
1 TABLET, FILM COATED ORAL ONCE
Qty: 0 | Refills: 0 | Status: COMPLETED | OUTPATIENT
Start: 2019-03-01 | End: 2019-03-01

## 2019-03-01 RX ORDER — METRONIDAZOLE 7.5 MG/G
1 GEL VAGINAL
Qty: 1 | Refills: 0
Start: 2019-03-01 | End: 2019-03-01

## 2019-03-01 RX ORDER — CEFTRIAXONE 500 MG/1
250 INJECTION, POWDER, FOR SOLUTION INTRAMUSCULAR; INTRAVENOUS ONCE
Qty: 0 | Refills: 0 | Status: COMPLETED | OUTPATIENT
Start: 2019-03-01 | End: 2019-03-01

## 2019-03-01 RX ADMIN — AZITHROMYCIN 1 GRAM(S): 500 TABLET, FILM COATED ORAL at 14:45

## 2019-03-01 RX ADMIN — CEFTRIAXONE 250 MILLIGRAM(S): 500 INJECTION, POWDER, FOR SOLUTION INTRAMUSCULAR; INTRAVENOUS at 14:46

## 2019-03-01 NOTE — ED PROVIDER NOTE - CLINICAL SUMMARY MEDICAL DECISION MAKING FREE TEXT BOX
34 y/o female with vaginal dc. PE: no adnexal, CMT. Milky white malodorous dc. Concern for BV. Cervix nml - do not suspect trich. DC not characteristic of candidiasis. Will send for stds, pt opting for ppx treatment. Denies chance of pregnancy. Advised gyn follow-up. Given strict return precautions. No additional abnormal skin findings on exam

## 2019-03-01 NOTE — ED ADULT NURSE NOTE - PRO INTERPRETER NEED 2
49F with history of ETOH ESLD, with decompensations including ascites, HE, HRS, recently admitted to OSH with HE secondary to HRS, now presenting with abdominal distension. Last seen in clinic 1/2017 with Dr. Johnson with Na-MELD 16. Currently with elevated LFT with Tbili elevated to 11 from prior 1.4 (4/2018) with concern for PNA.    Recent labs:  4/16 Tb 1.4, AST 62, ALT 27, Crt 0.5  7/15 Tb 13.7, AST 32, ALT 20, 1.6  7/19 Tb 10.8, AST 46, ALT 25  7/23 Tb 11.0, AST 34, ALT 20, INR 1.4, 0.9    - EGD (6/2016): LA Grade B esophagitis, portal hypertensive gastropathy. No varices.  - RUQ (7/24) hepatic cirrhosis and portal hypertension patent vasculature.    LFTs remain elevated since admission. Infx workup thusfar concerning for PNA (bilateral subsegmental patchy opacification); negative diagnostic paracentesis. CT AP concerning for 4cm hepatic lesion ?HCC. MRI completed overnight for further characterization. AFP 3.5.    Plan  - discussed HCC finding with the patient.  - PETH pending  - addiction psych consulted: high risk for relapse given poor insight, poor health, poor coping skills, poor judgment  - TTE normal LVEF, dilated LA. Bubble study performed but with no intra-cardiac shunt but no documentation regarding ?pulmonary shunt. Will ask radiology to re-evaluate. Repeat ABG pending today.  - Ceftriaxone, Azithromycin for treatment of ?pneumonia. Infectious workup pending.  - continue diuretics: lasix 40mg TID, spironolactone 100mg TID  - mental status currently appropriate, continue lactulose 30mg TID  - Midodrine 15mg TID     English

## 2019-03-01 NOTE — ED PROVIDER NOTE - OBJECTIVE STATEMENT
32 y/o female present in the ED c/o vaginal discharge and irritation x1 day. Pt states she had unprotected sexual intercourse yesterday and since then noticed her symptoms. She reports the vaginal discharge is white in color that is malodorous. She denies the following: fever, chills, abdominal pain, pelvic pain, urinary symptoms. Pt states sexual partner is new with only 1 sexual partner at the current time. Denies hx of past sexual infections.

## 2019-03-01 NOTE — ED PROVIDER NOTE - NSFOLLOWUPINSTRUCTIONS_ED_ALL_ED_FT
Bacterial Vaginosis  Bacterial vaginosis is a vaginal infection that occurs when the normal balance of bacteria in the vagina is disrupted. It results from an overgrowth of certain bacteria. This is the most common vaginal infection among women ages 15–44.    ImageBecause bacterial vaginosis increases your risk for STIs (sexually transmitted infections), getting treated can help reduce your risk for chlamydia, gonorrhea, herpes, and HIV (human immunodeficiency virus). Treatment is also important for preventing complications in pregnant women, because this condition can cause an early (premature) delivery.    What are the causes?  This condition is caused by an increase in harmful bacteria that are normally present in small amounts in the vagina. However, the reason that the condition develops is not fully understood.    What increases the risk?  The following factors may make you more likely to develop this condition:    Having a new sexual partner or multiple sexual partners.  Having unprotected sex.  Douching.  Having an intrauterine device (IUD).  Smoking.  Drug and alcohol abuse.  Taking certain antibiotic medicines.  Being pregnant.    You cannot get bacterial vaginosis from toilet seats, bedding, swimming pools, or contact with objects around you.    What are the signs or symptoms?  Symptoms of this condition include:    Grey or white vaginal discharge. The discharge can also be watery or foamy.  A fish-like odor with discharge, especially after sexual intercourse or during menstruation.  Itching in and around the vagina.  Burning or pain with urination.    Some women with bacterial vaginosis have no signs or symptoms.    How is this diagnosed?  This condition is diagnosed based on:    Your medical history.  A physical exam of the vagina.  Testing a sample of vaginal fluid under a microscope to look for a large amount of bad bacteria or abnormal cells. Your health care provider may use a cotton swab or a small wooden spatula to collect the sample.    How is this treated?  This condition is treated with antibiotics. These may be given as a pill, a vaginal cream, or a medicine that is put into the vagina (suppository). If the condition comes back after treatment, a second round of antibiotics may be needed.    Follow these instructions at home:  Medicines     Take over-the-counter and prescription medicines only as told by your health care provider.  Take or use your antibiotic as told by your health care provider. Do not stop taking or using the antibiotic even if you start to feel better.  General instructions     If you have a female sexual partner, tell her that you have a vaginal infection. She should see her health care provider and be treated if she has symptoms. If you have a male sexual partner, he does not need treatment.  During treatment:    Avoid sexual activity until you finish treatment.  Do not douche.  Avoid alcohol as directed by your health care provider.  Avoid breastfeeding as directed by your health care provider.    Drink enough water and fluids to keep your urine clear or pale yellow.  Keep the area around your vagina and rectum clean.    Wash the area daily with warm water.  Wipe yourself from front to back after using the toilet.    Keep all follow-up visits as told by your health care provider. This is important.  How is this prevented?  Do not douche.  Wash the outside of your vagina with warm water only.  Use protection when having sex. This includes latex condoms and dental dams.  Limit how many sexual partners you have. To help prevent bacterial vaginosis, it is best to have sex with just one partner (monogamous).  Make sure you and your sexual partner are tested for STIs.  Wear cotton or cotton-lined underwear.  Avoid wearing tight pants and pantyhose, especially during summer.  Limit the amount of alcohol that you drink.  Do not use any products that contain nicotine or tobacco, such as cigarettes and e-cigarettes. If you need help quitting, ask your health care provider.  Do not use illegal drugs.  Where to find more information  Centers for Disease Control and Prevention: www.cdc.gov/std  American Sexual Health Association (FLIP): www.ashastd.org  U.S. Department of Health and Human Services, Office on Women's Health: www.womenshealth.gov/ or https://www.womenshealth.gov/a-z-topics/bacterial-vaginosis  Contact a health care provider if:  Your symptoms do not improve, even after treatment.  You have more discharge or pain when urinating.  You have a fever.  You have pain in your abdomen.  You have pain during sex.  You have vaginal bleeding between periods.  Summary  Bacterial vaginosis is a vaginal infection that occurs when the normal balance of bacteria in the vagina is disrupted.  Because bacterial vaginosis increases your risk for STIs (sexually transmitted infections), getting treated can help reduce your risk for chlamydia, gonorrhea, herpes, and HIV (human immunodeficiency virus). Treatment is also important for preventing complications in pregnant women, because the condition can cause an early (premature) delivery.  This condition is treated with antibiotic medicines. These may be given as a pill, a vaginal cream, or a medicine that is put into the vagina (suppository).  This information is not intended to replace advice given to you by your health care provider. Make sure you discuss any questions you have with your health care provider.

## 2019-03-01 NOTE — ED ADULT NURSE NOTE - CHPI ED NUR SYMPTOMS NEG
no abdominal pain/no back pain/no fever/no coffee grounds emesis/no pain/no vaginal discharge/no vomiting/no nausea

## 2019-03-02 LAB
C TRACH RRNA SPEC QL NAA+PROBE: SIGNIFICANT CHANGE UP
HAV IGM SER-ACNC: SIGNIFICANT CHANGE UP
HBV CORE IGM SER-ACNC: SIGNIFICANT CHANGE UP
HBV SURFACE AB SER-ACNC: SIGNIFICANT CHANGE UP
N GONORRHOEA RRNA SPEC QL NAA+PROBE: SIGNIFICANT CHANGE UP
SPECIMEN SOURCE: SIGNIFICANT CHANGE UP

## 2019-03-05 DIAGNOSIS — N76.0 ACUTE VAGINITIS: ICD-10-CM

## 2019-03-05 DIAGNOSIS — E78.5 HYPERLIPIDEMIA, UNSPECIFIED: ICD-10-CM

## 2019-03-05 DIAGNOSIS — N89.8 OTHER SPECIFIED NONINFLAMMATORY DISORDERS OF VAGINA: ICD-10-CM

## 2019-03-05 DIAGNOSIS — I10 ESSENTIAL (PRIMARY) HYPERTENSION: ICD-10-CM

## 2019-03-05 DIAGNOSIS — Z79.899 OTHER LONG TERM (CURRENT) DRUG THERAPY: ICD-10-CM

## 2019-03-05 DIAGNOSIS — Z91.013 ALLERGY TO SEAFOOD: ICD-10-CM

## 2019-04-16 ENCOUNTER — APPOINTMENT (OUTPATIENT)
Dept: PULMONOLOGY | Facility: CLINIC | Age: 34
End: 2019-04-16
Payer: MEDICAID

## 2019-04-16 VITALS
OXYGEN SATURATION: 94 % | HEART RATE: 98 BPM | TEMPERATURE: 98.7 F | HEIGHT: 68 IN | SYSTOLIC BLOOD PRESSURE: 130 MMHG | DIASTOLIC BLOOD PRESSURE: 85 MMHG | BODY MASS INDEX: 41.52 KG/M2 | WEIGHT: 274 LBS

## 2019-04-16 DIAGNOSIS — Z78.9 OTHER SPECIFIED HEALTH STATUS: ICD-10-CM

## 2019-04-16 PROCEDURE — 99214 OFFICE O/P EST MOD 30 MIN: CPT

## 2019-04-16 NOTE — PHYSICAL EXAM
[General Appearance - Well Developed] : well developed [Normal Appearance] : normal appearance [General Appearance - Well Nourished] : well nourished [Well Groomed] : well groomed [No Deformities] : no deformities [General Appearance - In No Acute Distress] : no acute distress [Eyelids - No Xanthelasma] : the eyelids demonstrated no xanthelasmas [Normal Conjunctiva] : the conjunctiva exhibited no abnormalities [IV] : IV [Normal Oropharynx] : normal oropharynx [Neck Cervical Mass (___cm)] : no neck mass was observed [Jugular Venous Distention Increased] : there was no jugular-venous distention [Heart Rate And Rhythm] : heart rate and rhythm were normal [Heart Sounds] : normal S1 and S2 [Murmurs] : no murmurs present [Auscultation Breath Sounds / Voice Sounds] : lungs were clear to auscultation bilaterally [Exaggerated Use Of Accessory Muscles For Inspiration] : no accessory muscle use [Respiration, Rhythm And Depth] : normal respiratory rhythm and effort [Abdomen Soft] : soft [Abdomen Tenderness] : non-tender [Abdomen Mass (___ Cm)] : no abdominal mass palpated [Gait - Sufficient For Exercise Testing] : the gait was sufficient for exercise testing [Abnormal Walk] : normal gait [Nail Clubbing] : no clubbing of the fingernails [Cyanosis, Localized] : no localized cyanosis [Petechial Hemorrhages (___cm)] : no petechial hemorrhages [Skin Color & Pigmentation] : normal skin color and pigmentation [] : no ischemic changes [No Venous Stasis] : no venous stasis [Skin Lesions] : no skin lesions [No Skin Ulcers] : no skin ulcer [No Xanthoma] : no  xanthoma was observed [Deep Tendon Reflexes (DTR)] : deep tendon reflexes were 2+ and symmetric [Sensation] : the sensory exam was normal to light touch and pinprick [No Focal Deficits] : no focal deficits [Impaired Insight] : insight and judgment were intact [Oriented To Time, Place, And Person] : oriented to person, place, and time [Affect] : the affect was normal [FreeTextEntry1] : Large neck diameter

## 2019-04-16 NOTE — HISTORY OF PRESENT ILLNESS
[Difficulty Breathing During Exertion] : denies dyspnea on exertion [Feelings Of Weakness On Exertion] : denies exercise intolerance [Cough] : denies coughing [Chest Pain Or Discomfort] : denies chest pain [Wheezing] : denies wheezing [Regional Soft Tissue Swelling Both Lower Extremities] : denies lower extremity edema [Wt Gain ___ Lbs] : recent [unfilled] ~Upound(s) weight gain [Fever] : denies fever [0  -  Nothing at all] : 0, nothing at all [Class II - Mild Symptoms and Slight Limitations] : II [Never] : was never a smoker [None] : The patient is not currently on any medications [Oxygen] : the patient uses no supplemental oxygen [Good Control] : peak flow has been poor [More Frequent Use Needed Recently] : Patient reports no recent increase in frequency of [Goals--Doing Well] : the patient is not doing well with ~his/her~ goals [FreeTextEntry1] : This is a 32 y/o woman with an extensive past medical history including HCM, DVT, UGIB, and MICHAEL. The patient has MICHAEL, OHA, and in the setting of PNA was intubated 4 years ago and had a complicated hospital course and ended up with a tracheostomy. The patient was discharged to a rehab with a tracheostomy and after discharge she left to [place] and was lost to follow up. The patient presents to the clinic today to re-establish care and notes that for the past several years she has had difficulty getting care for her tracheostomy and has found no one willing to help her decannulate it. The patient is able to talk without having to occlude the trach with her finger. It is a size 6 trach.

## 2019-04-16 NOTE — HISTORY OF PRESENT ILLNESS
[Difficulty Breathing During Exertion] : denies dyspnea on exertion [Feelings Of Weakness On Exertion] : denies exercise intolerance [Chest Pain Or Discomfort] : denies chest pain [Regional Soft Tissue Swelling Both Lower Extremities] : denies lower extremity edema [Cough] : denies coughing [Wheezing] : denies wheezing [Wt Gain ___ Lbs] : recent [unfilled] ~Upound(s) weight gain [Fever] : denies fever [Class II - Mild Symptoms and Slight Limitations] : II [0  -  Nothing at all] : 0, nothing at all [Never] : was never a smoker [None] : None [Oxygen] : the patient uses no supplemental oxygen [Good Control] : peak flow has been poor [More Frequent Use Needed Recently] : Patient reports no recent increase in frequency of [Goals--Doing Well] : the patient is not doing well with ~his/her~ goals [FreeTextEntry1] : This is a 34 y/o woman with an extensive past medical history including HCM, DVT, UGIB, and MICHAEL. The patient has MICHAEL, OHA, and in the setting of PNA was intubated 4 years ago and had a complicated hospital course and ended up with a tracheostomy. The patient was discharged to a rehab with a tracheostomy and after discharge she left to [place] and was lost to follow up. The patient presents to the clinic today to re-establish care and notes that for the past several years she has had difficulty getting care for her tracheostomy and has found no one willing to help her decannulate it. The patient is able to talk without having to occlude the trach with her finger. It is a size 6 trach.

## 2019-04-16 NOTE — ASSESSMENT
[FreeTextEntry1] : This is a 34 y/o woman with a tracheostomy that was placed 4 years ago in the setting of acute hypercapnic respiratory failure (related to her hx of OHS) as well as in the setting of pneumonia. The patient is tolerating the tracheostomy well but complaining of the smell emanating from it. \par \par 1. Tracheostomy.\par 2. OHS\par \par Plan:\par -The patient is long overdue for decannulation however this should be done in a controlled setting. The patient will be admitted to the hospital and using the decannulation protocol will attempt to decannulate under close observation. \par -The patient will likely need a bronchoscopic evaluation during this process as well to examine the airway and look for granulation tissue that could potentially cause obstruction and to make sure the trachea is in good repair before decannulation.

## 2019-04-16 NOTE — PHYSICAL EXAM
[Normal Appearance] : normal appearance [General Appearance - Well Developed] : well developed [General Appearance - Well Nourished] : well nourished [Well Groomed] : well groomed [No Deformities] : no deformities [Eyelids - No Xanthelasma] : the eyelids demonstrated no xanthelasmas [Normal Conjunctiva] : the conjunctiva exhibited no abnormalities [General Appearance - In No Acute Distress] : no acute distress [Normal Oropharynx] : normal oropharynx [IV] : IV [Neck Cervical Mass (___cm)] : no neck mass was observed [Jugular Venous Distention Increased] : there was no jugular-venous distention [Heart Sounds] : normal S1 and S2 [Heart Rate And Rhythm] : heart rate and rhythm were normal [Murmurs] : no murmurs present [Exaggerated Use Of Accessory Muscles For Inspiration] : no accessory muscle use [Auscultation Breath Sounds / Voice Sounds] : lungs were clear to auscultation bilaterally [Respiration, Rhythm And Depth] : normal respiratory rhythm and effort [Abdomen Tenderness] : non-tender [Abdomen Soft] : soft [Gait - Sufficient For Exercise Testing] : the gait was sufficient for exercise testing [Abnormal Walk] : normal gait [Abdomen Mass (___ Cm)] : no abdominal mass palpated [Cyanosis, Localized] : no localized cyanosis [Nail Clubbing] : no clubbing of the fingernails [Petechial Hemorrhages (___cm)] : no petechial hemorrhages [Skin Color & Pigmentation] : normal skin color and pigmentation [] : no ischemic changes [No Venous Stasis] : no venous stasis [Skin Lesions] : no skin lesions [No Skin Ulcers] : no skin ulcer [No Xanthoma] : no  xanthoma was observed [Deep Tendon Reflexes (DTR)] : deep tendon reflexes were 2+ and symmetric [Sensation] : the sensory exam was normal to light touch and pinprick [Oriented To Time, Place, And Person] : oriented to person, place, and time [No Focal Deficits] : no focal deficits [Impaired Insight] : insight and judgment were intact [Affect] : the affect was normal [FreeTextEntry1] : Large neck diameter

## 2019-04-16 NOTE — H&P ADULT. - PROBLEM SELECTOR PLAN 4
normal -Continue PPI and Zofran PRN nausea  -AST/ALT 52/36, lipase 59  -continue to trend, consider abd US

## 2019-06-11 NOTE — H&P ADULT. - PROBLEM SELECTOR PROBLEM 8
Chon is a PT calling on behalf of patient and requests a referral be placed for a driving assessment to the [facility].  Please follow up with patient when this referral has been placed.  Thank you.   
I thought we had indicated that we wanted this in the past. Please set up a referral to physical therapy on [facility] to assess driving  
Please advise.   
CVA (cerebral vascular accident)

## 2019-07-08 ENCOUNTER — EMERGENCY (EMERGENCY)
Facility: HOSPITAL | Age: 34
LOS: 1 days | Discharge: HOME | End: 2019-07-08
Attending: EMERGENCY MEDICINE
Payer: COMMERCIAL

## 2019-07-08 VITALS
TEMPERATURE: 99 F | WEIGHT: 293 LBS | HEIGHT: 69 IN | HEART RATE: 108 BPM | SYSTOLIC BLOOD PRESSURE: 145 MMHG | DIASTOLIC BLOOD PRESSURE: 98 MMHG | OXYGEN SATURATION: 90 % | RESPIRATION RATE: 20 BRPM

## 2019-07-08 VITALS
DIASTOLIC BLOOD PRESSURE: 84 MMHG | OXYGEN SATURATION: 95 % | SYSTOLIC BLOOD PRESSURE: 138 MMHG | HEART RATE: 85 BPM | RESPIRATION RATE: 19 BRPM

## 2019-07-08 DIAGNOSIS — Q24.9 CONGENITAL MALFORMATION OF HEART, UNSPECIFIED: Chronic | ICD-10-CM

## 2019-07-08 LAB
ALBUMIN SERPL ELPH-MCNC: 3.6 G/DL — SIGNIFICANT CHANGE UP (ref 3.3–5)
ALP SERPL-CCNC: SIGNIFICANT CHANGE UP U/L (ref 40–120)
ALT FLD-CCNC: SIGNIFICANT CHANGE UP U/L (ref 10–45)
ANION GAP SERPL CALC-SCNC: 6 MMOL/L — SIGNIFICANT CHANGE UP (ref 5–17)
APTT BLD: 28.2 SEC — SIGNIFICANT CHANGE UP (ref 27.5–36.3)
AST SERPL-CCNC: SIGNIFICANT CHANGE UP U/L (ref 10–40)
BASOPHILS # BLD AUTO: 0.03 K/UL — SIGNIFICANT CHANGE UP (ref 0–0.2)
BASOPHILS NFR BLD AUTO: 0.5 % — SIGNIFICANT CHANGE UP (ref 0–2)
BILIRUB SERPL-MCNC: 0.5 MG/DL — SIGNIFICANT CHANGE UP (ref 0.2–1.2)
BUN SERPL-MCNC: 11 MG/DL — SIGNIFICANT CHANGE UP (ref 7–23)
CALCIUM SERPL-MCNC: 8.8 MG/DL — SIGNIFICANT CHANGE UP (ref 8.4–10.5)
CHLORIDE SERPL-SCNC: 100 MMOL/L — SIGNIFICANT CHANGE UP (ref 96–108)
CO2 SERPL-SCNC: 30 MMOL/L — SIGNIFICANT CHANGE UP (ref 22–31)
CREAT SERPL-MCNC: 0.98 MG/DL — SIGNIFICANT CHANGE UP (ref 0.5–1.3)
EOSINOPHIL # BLD AUTO: 0.12 K/UL — SIGNIFICANT CHANGE UP (ref 0–0.5)
EOSINOPHIL NFR BLD AUTO: 2.1 % — SIGNIFICANT CHANGE UP (ref 0–6)
GAS PNL BLDV: SIGNIFICANT CHANGE UP
GLUCOSE SERPL-MCNC: 97 MG/DL — SIGNIFICANT CHANGE UP (ref 70–99)
HCT VFR BLD CALC: 48.3 % — HIGH (ref 34.5–45)
HGB BLD-MCNC: 14.9 G/DL — SIGNIFICANT CHANGE UP (ref 11.5–15.5)
IMM GRANULOCYTES NFR BLD AUTO: 0.4 % — SIGNIFICANT CHANGE UP (ref 0–1.5)
INR BLD: 1.04 — SIGNIFICANT CHANGE UP (ref 0.88–1.16)
LACTATE SERPL-SCNC: 2.9 MMOL/L — HIGH (ref 0.5–2)
LYMPHOCYTES # BLD AUTO: 1 K/UL — SIGNIFICANT CHANGE UP (ref 1–3.3)
LYMPHOCYTES # BLD AUTO: 17.8 % — SIGNIFICANT CHANGE UP (ref 13–44)
MCHC RBC-ENTMCNC: 27.9 PG — SIGNIFICANT CHANGE UP (ref 27–34)
MCHC RBC-ENTMCNC: 30.8 GM/DL — LOW (ref 32–36)
MCV RBC AUTO: 90.3 FL — SIGNIFICANT CHANGE UP (ref 80–100)
MONOCYTES # BLD AUTO: 0.41 K/UL — SIGNIFICANT CHANGE UP (ref 0–0.9)
MONOCYTES NFR BLD AUTO: 7.3 % — SIGNIFICANT CHANGE UP (ref 2–14)
NEUTROPHILS # BLD AUTO: 4.04 K/UL — SIGNIFICANT CHANGE UP (ref 1.8–7.4)
NEUTROPHILS NFR BLD AUTO: 71.9 % — SIGNIFICANT CHANGE UP (ref 43–77)
NRBC # BLD: 0 /100 WBCS — SIGNIFICANT CHANGE UP (ref 0–0)
PLATELET # BLD AUTO: 172 K/UL — SIGNIFICANT CHANGE UP (ref 150–400)
POTASSIUM SERPL-MCNC: SIGNIFICANT CHANGE UP MMOL/L (ref 3.5–5.3)
POTASSIUM SERPL-SCNC: SIGNIFICANT CHANGE UP MMOL/L (ref 3.5–5.3)
PROT SERPL-MCNC: 8.7 G/DL — HIGH (ref 6–8.3)
PROTHROM AB SERPL-ACNC: 11.8 SEC — SIGNIFICANT CHANGE UP (ref 10–12.9)
RBC # BLD: 5.35 M/UL — HIGH (ref 3.8–5.2)
RBC # FLD: 15.3 % — HIGH (ref 10.3–14.5)
SODIUM SERPL-SCNC: 136 MMOL/L — SIGNIFICANT CHANGE UP (ref 135–145)
WBC # BLD: 5.62 K/UL — SIGNIFICANT CHANGE UP (ref 3.8–10.5)
WBC # FLD AUTO: 5.62 K/UL — SIGNIFICANT CHANGE UP (ref 3.8–10.5)

## 2019-07-08 PROCEDURE — 85025 COMPLETE CBC W/AUTO DIFF WBC: CPT

## 2019-07-08 PROCEDURE — 93010 ELECTROCARDIOGRAM REPORT: CPT

## 2019-07-08 PROCEDURE — 83605 ASSAY OF LACTIC ACID: CPT

## 2019-07-08 PROCEDURE — 99284 EMERGENCY DEPT VISIT MOD MDM: CPT

## 2019-07-08 PROCEDURE — 87040 BLOOD CULTURE FOR BACTERIA: CPT

## 2019-07-08 PROCEDURE — 80053 COMPREHEN METABOLIC PANEL: CPT

## 2019-07-08 PROCEDURE — 84295 ASSAY OF SERUM SODIUM: CPT

## 2019-07-08 PROCEDURE — 82803 BLOOD GASES ANY COMBINATION: CPT

## 2019-07-08 PROCEDURE — 93005 ELECTROCARDIOGRAM TRACING: CPT

## 2019-07-08 PROCEDURE — 71045 X-RAY EXAM CHEST 1 VIEW: CPT | Mod: 26

## 2019-07-08 PROCEDURE — 71045 X-RAY EXAM CHEST 1 VIEW: CPT

## 2019-07-08 PROCEDURE — 84484 ASSAY OF TROPONIN QUANT: CPT

## 2019-07-08 PROCEDURE — 85730 THROMBOPLASTIN TIME PARTIAL: CPT

## 2019-07-08 PROCEDURE — 99285 EMERGENCY DEPT VISIT HI MDM: CPT | Mod: 25

## 2019-07-08 PROCEDURE — 84132 ASSAY OF SERUM POTASSIUM: CPT

## 2019-07-08 PROCEDURE — 85610 PROTHROMBIN TIME: CPT

## 2019-07-08 PROCEDURE — 96374 THER/PROPH/DIAG INJ IV PUSH: CPT

## 2019-07-08 PROCEDURE — 82330 ASSAY OF CALCIUM: CPT

## 2019-07-08 PROCEDURE — 96375 TX/PRO/DX INJ NEW DRUG ADDON: CPT

## 2019-07-08 RX ORDER — ACETAMINOPHEN 500 MG
650 TABLET ORAL ONCE
Refills: 0 | Status: DISCONTINUED | OUTPATIENT
Start: 2019-07-08 | End: 2019-07-08

## 2019-07-08 RX ORDER — ACETAMINOPHEN 500 MG
1000 TABLET ORAL ONCE
Refills: 0 | Status: COMPLETED | OUTPATIENT
Start: 2019-07-08 | End: 2019-07-08

## 2019-07-08 RX ORDER — METOCLOPRAMIDE HCL 10 MG
10 TABLET ORAL ONCE
Refills: 0 | Status: COMPLETED | OUTPATIENT
Start: 2019-07-08 | End: 2019-07-08

## 2019-07-08 RX ORDER — SODIUM CHLORIDE 9 MG/ML
500 INJECTION INTRAMUSCULAR; INTRAVENOUS; SUBCUTANEOUS ONCE
Refills: 0 | Status: DISCONTINUED | OUTPATIENT
Start: 2019-07-08 | End: 2019-07-12

## 2019-07-08 RX ORDER — AMOXICILLIN 250 MG/5ML
1 SUSPENSION, RECONSTITUTED, ORAL (ML) ORAL
Qty: 14 | Refills: 0
Start: 2019-07-08 | End: 2019-07-14

## 2019-07-08 RX ADMIN — Medication 10 MILLIGRAM(S): at 13:31

## 2019-07-08 RX ADMIN — Medication 1 TABLET(S): at 14:31

## 2019-07-08 RX ADMIN — Medication 400 MILLIGRAM(S): at 13:43

## 2019-07-08 NOTE — ED PROVIDER NOTE - NSFOLLOWUPINSTRUCTIONS_ED_ALL_ED_FT
Can take tylenol every 6hrs as needed for pain.  Take augmentin as prescribed.  Return for fevers, persistent vomit, uncontrolled pain, worsening breathing, worsening lightheaded or any other concerns!.  Follow up with primary doctor within 1-2 days.

## 2019-07-08 NOTE — CONSULT NOTE ADULT - SUBJECTIVE AND OBJECTIVE BOX
ENT, Head and Neck Surgery Consult Note    HPI  33F hx HTN, HLD, pAF not on AC, previously hospitalized for hypercapneic respiratory failure following PNA, with failure to extubate sp trach in 2017, recently admitted in January 2019 for decannulation with inability to decannulate ENT, Head and Neck Surgery Consult Note    HPI  33F hx HTN, HLD, pAF not on AC, previously hospitalized for hypercapneic respiratory failure following PNA, with failure to extubate sp trach in 2017, pw episode of "throat tightness" around the trach this morning. Patient was recently admitted in January 2019 for decannulation with a 6.0 cuffed trach that had the balloon cut off, and during admission had inability to decannulate due to possible hypoventilation system. Patient discharged after exchanged to 6.0 cuffed fenestrated trach. Since, patient remains with  in place during the day, CPAP overnight. Has not had trach change since January 2019. Of note when patient presented in January, she had malodorous secretions as she had not had a trach change in over a year because of lack of follow up.    This morning she had a 5 minute episode of "tightness" around the trach site, not provoked by any event, no trauma, no difficulty breathing or chest pain and no juan pain, and abated without intervention. Denies prior episode. At bedside, patient feels at baseline and wants to go home. With finger occlusion of trach reports voice at baseline no increased difficulty breathing. Patient is a poor historian and cannot reliable explain what she is concerned about and why she came even though the episode abated before she came to the ED.    PAST MEDICAL & SURGICAL HISTORY:  Malignant neoplasm  Trachea displaced  DVT (deep venous thrombosis)  GIB (gastrointestinal bleeding)  Afib  HTN (hypertension)  Chronic systolic congestive heart failure  HLD (hyperlipidemia)  Myocardial infarction  Congenital heart disease      ROS  Negative except as above      Physical Exam  A&Ox3, NAD  Tolerating secretions  Breathing comfortably with 6.0 fenestrated cuffed, cuff down, Shiley trach  Trach with soft collar  Suctioned thick mucoid secretions, malodorous, non purulent  Voice weak with finger occlusion, does not desaturate with finger occlusion  Stoma c/d/i  Tracheoscopy: erythema of the trachea, no e/o purulence, clear to elysia  FFL: patient refused      A/P  33F hx morbid obesity, central hypoventilation syndrome sp trach pw 5 minute episode of "throat tightness" of unknown etiology that resolved on its own. Patient refused FFL scope exam. Patient advised that she is likely developing a tracheitis, why may be due to colonized trach tube that has not been changed in 6 months. Discussed the r/b/a of trach change and patient refused. Discussed that she might be developing an infection. Patient acknowledged understanding and insisted she would like to go home. She signed out AMA.    D/w attending

## 2019-07-08 NOTE — ED ADULT NURSE NOTE - OBJECTIVE STATEMENT
Patient presents to the ED with cough, chills, throat pain and SOB.  Patient has chronic lung problems and uses a trach.  On arrival patient tachypneic, asking for her vent.  At this time breathing comfortably on vent.  non-diaphoretic, no pallor.  VSS.

## 2019-07-08 NOTE — ED PROVIDER NOTE - CLINICAL SUMMARY MEDICAL DECISION MAKING FREE TEXT BOX
33F PMH HTN, HLD, pafib, CHF, DVT, congenital hypoventilation syndrome, hx PNA / respiratory failure now w/ trach (to RA) p/w several complaints. Communicating by writing. 2-3d of sore throat, feels like area by trach is tight and has subsequent SOB. Intermittent HA, intermittent CP. HR 90s to low 100s, 99.1 oral temp, satting high 80s to low 90s, other vitals wnl. Exam as above.  ddx: Possible URI/PNA vs. tracheitis vs. less likely airway obstruction. Clinically benign HA.  CBC, cmp, trop, CXR, coags, blood cx. Symptom control, ENT consult. reassess.

## 2019-07-08 NOTE — ED PROVIDER NOTE - PROGRESS NOTE DETAILS
Klepfish: Pt requesting vent for comfot (clinically does not require it). started on cpap w/ pt feeling much better, lactate 2.9, other labs so far grossly wnl. Will reassess. Klepfish: Ot now on RA only >15min. Completely asymptomatic. Scoped by ENT - no airway concerns, but possible developing tracheitis. initial trop grossly wnl. Satting mid 90s.   The pt is clinically sober, A&Ox3, free from distracting injury.  Throughout our interactions in the Emergency Department today, the pt has demonstrated concrete thinking/reasoning, has maintained an orderly & reasonable conversation, appears to have intact insight/judgment/reason, a sound mind & therefore in my opinion has capacity now to make decisions.  Given the pt’s presentation, we explained, in laymen's terms, our concern for possible tracheitis, EKG changes (possible heart attack), her occasional low saturations, our concern for worsening breathing issues and possible permanent disability/death at any time. The pt verbalized an understanding of my worries. I've communicated to the pt that the ED evaluation is incomplete.  We have discussed the need for further ED/hospital w/u.  We have reviewed the range of possible dx, potential testing & tx options.  Our discussions included the potential outcomes of leaving AMA, including worsening of their condition, becoming permanently disabled/in pain/critically ill, & death.  Despite these efforts, we were unable to persuade the pt to stay.  The pt is refusing any further ED care & is leaving AMA. We have attempted to offer tx/rx/guidance for any dangerous conditions which are most likely and/or dangerous.  We have answered all questions & have implored the pt to return to the ED ASAP to complete the w/u.   I updated dr. raza.

## 2019-07-08 NOTE — ED PROVIDER NOTE - OBJECTIVE STATEMENT
33F PMH HTN, HLD, pafib, CHF, DVT, congenital hypoventilation syndrome, hx PNA / respiratory failure now w/ trach (to RA) p/w several complaints. Communicating by writing. 2-3d of sore throat, feels like area by trach is tight and has subsequent SOB. Intermittent HA, intermittent CP. Denies vision changes, focal weakness/numbness, increased secretions, cough, NVD, abd pain, urinary complaints, black/bloody stool.

## 2019-07-12 DIAGNOSIS — R06.02 SHORTNESS OF BREATH: ICD-10-CM

## 2019-07-12 DIAGNOSIS — R07.9 CHEST PAIN, UNSPECIFIED: ICD-10-CM

## 2019-07-12 DIAGNOSIS — R06.09 OTHER FORMS OF DYSPNEA: ICD-10-CM

## 2019-07-12 DIAGNOSIS — R51 HEADACHE: ICD-10-CM

## 2019-07-12 DIAGNOSIS — Z91.013 ALLERGY TO SEAFOOD: ICD-10-CM

## 2019-07-15 ENCOUNTER — APPOINTMENT (OUTPATIENT)
Dept: PULMONOLOGY | Facility: CLINIC | Age: 34
End: 2019-07-15
Payer: MEDICAID

## 2019-07-15 VITALS
BODY MASS INDEX: 44.41 KG/M2 | HEART RATE: 106 BPM | DIASTOLIC BLOOD PRESSURE: 110 MMHG | OXYGEN SATURATION: 85 % | HEIGHT: 68 IN | SYSTOLIC BLOOD PRESSURE: 140 MMHG | WEIGHT: 293 LBS | RESPIRATION RATE: 12 BRPM | TEMPERATURE: 99.4 F

## 2019-07-15 PROCEDURE — 99214 OFFICE O/P EST MOD 30 MIN: CPT

## 2019-07-15 NOTE — ASSESSMENT
[FreeTextEntry1] : This is a 34 y/o woman with a tracheostomy that was placed 4 years ago in the setting of acute hypercapnic respiratory failure (related to her hx of OHS) as well as in the setting of pneumonia. The patient is tolerating the tracheostomy well but complaining of the smell emanating from it. \par \par 1. Tracheostomy.\par 2. OHS\par 3. DVT\par 4. Acute on chronic hypoxic respiratory failure\par Plan:\par -I discussed her condition in details with the patient. Patient gained 50 pounds since January. Patient is complaining of early morning headache, dyspnea, and lack of energy. His condition is most likely to do weekly. Mild concern nocturnal hypoxemia. Patient is compliant with the noninvasive ventilation at home.\par \par Recommendation\par \par 1) overnight pulse oximetry\par #2 followup with echocardiogram to evaluate for pulmonary hypertension \par #3 portable oxygen 2 L per minute\par \par \par I discussed with the patient and need to control her calorie intake. I did review with the patient her diet. Patient has excessive carbohydrates and dullness in today during the day. I reviewed with the patient the need to cut down on her calories and carbohydrates intake.\par \par Baseline oxygen saturation at rest is 91% on room air. Patient desaturates on room air after walking 20 feet down to 85%. And she became dyspneic.  Oxygen saturation increased to 92% with 2 L per minute at rest and with exertion.

## 2019-07-15 NOTE — HISTORY OF PRESENT ILLNESS
[Difficulty Breathing During Exertion] : denies dyspnea on exertion [Feelings Of Weakness On Exertion] : denies exercise intolerance [Cough] : denies coughing [Wheezing] : denies wheezing [Regional Soft Tissue Swelling Both Lower Extremities] : denies lower extremity edema [Chest Pain Or Discomfort] : denies chest pain [Fever] : denies fever [Wt Gain ___ Lbs] : recent [unfilled] ~Upound(s) weight gain [0  -  Nothing at all] : 0, nothing at all [Class II - Mild Symptoms and Slight Limitations] : II [Never] : was never a smoker [None] : The patient is not currently on any medications [Oxygen] : the patient uses no supplemental oxygen [Good Control] : peak flow has been poor [More Frequent Use Needed Recently] : Patient reports no recent increase in frequency of [Goals--Doing Well] : the patient is not doing well with ~his/her~ goals [FreeTextEntry1] : Patient was few weeks is complaining of early morning headache. Patient sleeps with the ventilator at night. She goes out and comes back tired fatigued and goes for a nap on the ventilator. She is more short of breath with exertion and getting easily short of breath. No swelling of the leg. No fever. Secretions are minimal.

## 2019-08-01 ENCOUNTER — APPOINTMENT (OUTPATIENT)
Dept: PULMONOLOGY | Facility: CLINIC | Age: 34
End: 2019-08-01

## 2019-08-04 ENCOUNTER — EMERGENCY (EMERGENCY)
Facility: HOSPITAL | Age: 34
LOS: 1 days | Discharge: HOME | End: 2019-08-04
Admitting: EMERGENCY MEDICINE
Payer: MEDICAID

## 2019-08-04 VITALS
HEART RATE: 107 BPM | RESPIRATION RATE: 18 BRPM | SYSTOLIC BLOOD PRESSURE: 135 MMHG | WEIGHT: 293 LBS | DIASTOLIC BLOOD PRESSURE: 90 MMHG | TEMPERATURE: 98 F | OXYGEN SATURATION: 94 %

## 2019-08-04 DIAGNOSIS — Q24.9 CONGENITAL MALFORMATION OF HEART, UNSPECIFIED: Chronic | ICD-10-CM

## 2019-08-04 LAB
APPEARANCE UR: CLEAR — SIGNIFICANT CHANGE UP
BILIRUB UR-MCNC: NEGATIVE — SIGNIFICANT CHANGE UP
COLOR SPEC: YELLOW — SIGNIFICANT CHANGE UP
DIFF PNL FLD: ABNORMAL
GLUCOSE UR QL: NEGATIVE — SIGNIFICANT CHANGE UP
HIV 1+2 AB+HIV1 P24 AG SERPL QL IA: SIGNIFICANT CHANGE UP
KETONES UR-MCNC: NEGATIVE — SIGNIFICANT CHANGE UP
LEUKOCYTE ESTERASE UR-ACNC: NEGATIVE — SIGNIFICANT CHANGE UP
NITRITE UR-MCNC: NEGATIVE — SIGNIFICANT CHANGE UP
PH UR: 5.5 — SIGNIFICANT CHANGE UP (ref 5–8)
PROT UR-MCNC: 100 MG/DL
SP GR SPEC: 1.02 — SIGNIFICANT CHANGE UP (ref 1–1.03)
UROBILINOGEN FLD QL: 0.2 E.U./DL — SIGNIFICANT CHANGE UP

## 2019-08-04 PROCEDURE — 87086 URINE CULTURE/COLONY COUNT: CPT

## 2019-08-04 PROCEDURE — 36415 COLL VENOUS BLD VENIPUNCTURE: CPT

## 2019-08-04 PROCEDURE — 99284 EMERGENCY DEPT VISIT MOD MDM: CPT

## 2019-08-04 PROCEDURE — 81001 URINALYSIS AUTO W/SCOPE: CPT

## 2019-08-04 PROCEDURE — 99283 EMERGENCY DEPT VISIT LOW MDM: CPT

## 2019-08-04 PROCEDURE — 87389 HIV-1 AG W/HIV-1&-2 AB AG IA: CPT

## 2019-08-04 RX ORDER — FLUCONAZOLE 150 MG/1
150 TABLET ORAL ONCE
Refills: 0 | Status: COMPLETED | OUTPATIENT
Start: 2019-08-04 | End: 2019-08-04

## 2019-08-04 RX ADMIN — FLUCONAZOLE 150 MILLIGRAM(S): 150 TABLET ORAL at 11:41

## 2019-08-04 NOTE — ED PROVIDER NOTE - OBJECTIVE STATEMENT
The pt is a 34 y/o F, who presents to ED c/o vaginal "irritation" x 3 d. Pt states itching and discomfort. Also requesting new neck tie for her trach - has f/u this wk. Denies dysuria, urgency, frequency, flank pain, abd pain, fevers, chills, recent abx use, vag dc

## 2019-08-04 NOTE — ED ADULT NURSE NOTE - CHPI ED NUR SYMPTOMS NEG
no abdominal pain/no back pain/no coffee grounds emesis/no discharge/no fever/no vaginal discharge/no pain/no nausea/no vomiting

## 2019-08-04 NOTE — ED PROVIDER NOTE - CLINICAL SUMMARY MEDICAL DECISION MAKING FREE TEXT BOX
pt w/vag irritation x 3 d. no dysuria/urgency/frequency. exam consistent w/yeast inf, will tx w/diflucan, ua

## 2019-08-04 NOTE — ED ADULT NURSE NOTE - OBJECTIVE STATEMENT
pt states "I have been having itching for a couple of days and I am not sure if it is an infection but I have been using the 7 day cream with no improvement".

## 2019-08-04 NOTE — ED PROVIDER NOTE - NSFOLLOWUPINSTRUCTIONS_ED_ALL_ED_FT
Vaginal Yeast infection, Adult  Image   Vaginal yeast infection is a condition that causes soreness, swelling, and redness (inflammation) of the vagina. It also causes vaginal discharge. This is a common condition. Some women get this infection frequently.    What are the causes?  This condition is caused by a change in the normal balance of the yeast (candida) and bacteria that live in the vagina. This change causes an overgrowth of yeast, which causes the inflammation.    What increases the risk?  This condition is more likely to develop in:  Women who take antibiotic medicines.  Women who have diabetes.  Women who take birth control pills.  Women who are pregnant.  Women who douche often.  Women who have a weak defense (immune) system.  Women who have been taking steroid medicines for a long time.  Women who frequently wear tight clothing.  What are the signs or symptoms?  Symptoms of this condition include:  White, thick vaginal discharge.  Swelling, itching, redness, and irritation of the vagina. The lips of the vagina (vulva) may be affected as well.  Pain or a burning feeling while urinating.  Pain during sex.  How is this diagnosed?  This condition is diagnosed with a medical history and physical exam. This will include a pelvic exam. Your health care provider will examine a sample of your vaginal discharge under a microscope. Your health care provider may send this sample for testing to confirm the diagnosis.    How is this treated?  This condition is treated with medicine. Medicines may be over-the-counter or prescription. You may be told to use one or more of the following:  Medicine that is taken orally.  Medicine that is applied as a cream.  Medicine that is inserted directly into the vagina (suppository).  Follow these instructions at home:  Image   Take or apply over-the-counter and prescription medicines only as told by your health care provider.  Do not have sex until your health care provider has approved. Tell your sex partner that you have a yeast infection. That person should go to his or her health care provider if he or she develops symptoms.  Do not wear tight clothes, such as pantyhose or tight pants.  Avoid using tampons until your health care provider approves.  Eat more yogurt. This may help to keep your yeast infection from returning.  Try taking a sitz bath to help with discomfort. This is a warm water bath that is taken while you are sitting down. The water should only come up to your hips and should cover your buttocks. Do this 3–4 times per day or as told by your health care provider.  Do not douche.  Wear breathable, cotton underwear.  If you have diabetes, keep your blood sugar levels under control.  Contact a health care provider if:  You have a fever.  Your symptoms go away and then return.  Your symptoms do not get better with treatment.  Your symptoms get worse.  You have new symptoms.  You develop blisters in or around your vagina.  You have blood coming from your vagina and it is not your menstrual period.  You develop pain in your abdomen.

## 2019-08-06 LAB
CULTURE RESULTS: SIGNIFICANT CHANGE UP
SPECIMEN SOURCE: SIGNIFICANT CHANGE UP

## 2019-08-09 DIAGNOSIS — L29.2 PRURITUS VULVAE: ICD-10-CM

## 2019-08-09 DIAGNOSIS — B37.3 CANDIDIASIS OF VULVA AND VAGINA: ICD-10-CM

## 2019-08-15 NOTE — PROGRESS NOTE ADULT - PROBLEM SELECTOR PLAN 5
DVTppx: none    Dispo: [id number]  FULL CODE F: none  E: replete  N: DASH/ TLC diet No significant past surgical history

## 2019-08-25 ENCOUNTER — FORM ENCOUNTER (OUTPATIENT)
Age: 34
End: 2019-08-25

## 2019-08-26 ENCOUNTER — OUTPATIENT (OUTPATIENT)
Dept: OUTPATIENT SERVICES | Facility: HOSPITAL | Age: 34
LOS: 1 days | End: 2019-08-26
Payer: MEDICAID

## 2019-08-26 DIAGNOSIS — J96.21 ACUTE AND CHRONIC RESPIRATORY FAILURE WITH HYPOXIA: ICD-10-CM

## 2019-08-26 DIAGNOSIS — Q24.9 CONGENITAL MALFORMATION OF HEART, UNSPECIFIED: Chronic | ICD-10-CM

## 2019-08-26 PROCEDURE — 93306 TTE W/DOPPLER COMPLETE: CPT | Mod: 26

## 2019-08-26 PROCEDURE — 93306 TTE W/DOPPLER COMPLETE: CPT

## 2019-09-06 ENCOUNTER — APPOINTMENT (OUTPATIENT)
Dept: INTERNAL MEDICINE | Facility: CLINIC | Age: 34
End: 2019-09-06
Payer: COMMERCIAL

## 2019-09-06 VITALS
DIASTOLIC BLOOD PRESSURE: 89 MMHG | SYSTOLIC BLOOD PRESSURE: 128 MMHG | WEIGHT: 293 LBS | BODY MASS INDEX: 44.41 KG/M2 | OXYGEN SATURATION: 92 % | TEMPERATURE: 98.8 F | HEIGHT: 68 IN | HEART RATE: 97 BPM

## 2019-09-06 DIAGNOSIS — I10 ESSENTIAL (PRIMARY) HYPERTENSION: ICD-10-CM

## 2019-09-06 DIAGNOSIS — E66.9 OBESITY, UNSPECIFIED: ICD-10-CM

## 2019-09-06 DIAGNOSIS — Z00.00 ENCOUNTER FOR GENERAL ADULT MEDICAL EXAMINATION W/OUT ABNORMAL FINDINGS: ICD-10-CM

## 2019-09-06 DIAGNOSIS — E78.5 HYPERLIPIDEMIA, UNSPECIFIED: ICD-10-CM

## 2019-09-06 DIAGNOSIS — Z93.0 TRACHEOSTOMY STATUS: ICD-10-CM

## 2019-09-06 DIAGNOSIS — I42.2 OTHER HYPERTROPHIC CARDIOMYOPATHY: ICD-10-CM

## 2019-09-06 DIAGNOSIS — E66.2 MORBID (SEVERE) OBESITY WITH ALVEOLAR HYPOVENTILATION: ICD-10-CM

## 2019-09-06 PROCEDURE — 99385 PREV VISIT NEW AGE 18-39: CPT

## 2019-09-06 NOTE — HISTORY OF PRESENT ILLNESS
[FreeTextEntry1] : All notes reviewed; Tracheostomy in place for Hypercapnic respiratory failure. OHS [de-identified] : Patient with morbid obesity and wants to lose weight;  Weight today 320 \par Patient lives with father and does not work; Has three children;

## 2019-09-06 NOTE — HEALTH RISK ASSESSMENT
[No falls in past year] : Patient reported no falls in the past year [No] : No [0] : 2) Feeling down, depressed, or hopeless: Not at all (0) [Patient reported PAP Smear was normal] : Patient reported PAP Smear was normal [] : No [[id number]] : 0 [PapSmearDate] : 03/23/2019

## 2019-09-06 NOTE — PHYSICAL EXAM
[Normal Sclera/Conjunctiva] : normal sclera/conjunctiva [Regular Rhythm] : with a regular rhythm [Normal] : soft, non-tender, non-distended, no masses palpated, no HSM and normal bowel sounds [de-identified] : Morbid obesity [de-identified] : Trach in place

## 2019-09-06 NOTE — ASSESSMENT
[FreeTextEntry1] : Patient with morbid obesity; Discussed Gastric bypass surgery with patient ; She is interested; Will order blood work;  Otherwise respiratory status appears stable with the trach

## 2019-09-12 ENCOUNTER — APPOINTMENT (OUTPATIENT)
Dept: OBGYN | Facility: CLINIC | Age: 34
End: 2019-09-12
Payer: COMMERCIAL

## 2019-09-12 VITALS
SYSTOLIC BLOOD PRESSURE: 135 MMHG | HEIGHT: 68 IN | BODY MASS INDEX: 44.41 KG/M2 | WEIGHT: 293 LBS | DIASTOLIC BLOOD PRESSURE: 80 MMHG

## 2019-09-12 DIAGNOSIS — Z32.00 ENCOUNTER FOR PREGNANCY TEST, RESULT UNKNOWN: ICD-10-CM

## 2019-09-12 DIAGNOSIS — N89.8 OTHER SPECIFIED NONINFLAMMATORY DISORDERS OF VAGINA: ICD-10-CM

## 2019-09-12 DIAGNOSIS — Z12.4 ENCOUNTER FOR SCREENING FOR MALIGNANT NEOPLASM OF CERVIX: ICD-10-CM

## 2019-09-12 DIAGNOSIS — Z11.3 ENCOUNTER FOR SCREENING FOR INFECTIONS WITH A PREDOMINANTLY SEXUAL MODE OF TRANSMISSION: ICD-10-CM

## 2019-09-12 DIAGNOSIS — Z01.419 ENCOUNTER FOR GYNECOLOGICAL EXAMINATION (GENERAL) (ROUTINE) W/OUT ABNORMAL FINDINGS: ICD-10-CM

## 2019-09-12 PROCEDURE — 99385 PREV VISIT NEW AGE 18-39: CPT

## 2019-09-12 RX ORDER — METRONIDAZOLE 500 MG/1
500 TABLET ORAL
Qty: 14 | Refills: 0 | Status: ACTIVE | COMMUNITY
Start: 2019-09-12 | End: 1900-01-01

## 2019-09-12 RX ORDER — MEDROXYPROGESTERONE ACETATE 150 MG/ML
150 INJECTION, SUSPENSION INTRAMUSCULAR
Qty: 1 | Refills: 3 | Status: ACTIVE | COMMUNITY
Start: 2019-09-12 | End: 1900-01-01

## 2019-09-12 RX ORDER — HUMAN PAPILLOMAVIRUS 9-VALENT VACCINE, RECOMBINANT 30; 40; 60; 40; 20; 20; 20; 20; 20 UG/.5ML; UG/.5ML; UG/.5ML; UG/.5ML; UG/.5ML; UG/.5ML; UG/.5ML; UG/.5ML; UG/.5ML
INJECTION, SUSPENSION INTRAMUSCULAR
Qty: 1 | Refills: 2 | Status: ACTIVE | COMMUNITY
Start: 2019-09-12 | End: 1900-01-01

## 2019-09-12 NOTE — HISTORY OF PRESENT ILLNESS
[___ Year(s) Ago] : [unfilled] year(s) ago [Good] : being in good health [Weight Concerns] : weight concens [Last Pap ___] : Last cervical pap smear was [unfilled] [Reproductive Age] : is of reproductive age [Sexually Active] : is sexually active [Male ___] : [unfilled] male [No] : no [Regular Exercise] : not exercising regularly [Healthy Diet] : not having a healthy diet [Regular Cycle Intervals] : periods have been irregular [Monogamous] : is not monogamous [Contraception] : does not use contraception

## 2019-09-12 NOTE — PHYSICAL EXAM
[Awake] : awake [Alert] : alert [Soft] : soft [Oriented x3] : oriented to person, place, and time [Normal] : cervix [Discharge] : a  ~M vaginal discharge was present [Foul Smelling] : foul smelling [Moderate] : moderate [Jaz] : yellow [No Bleeding] : there was no active vaginal bleeding [Uterine Adnexae] : were not tender and not enlarged [Mass] : no breast mass [Acute Distress] : no acute distress [Nipple Discharge] : no nipple discharge [Axillary LAD] : no axillary lymphadenopathy [Tender] : non tender [FreeTextEntry7] : exam limited due to body habitus

## 2019-09-12 NOTE — PROCEDURE
[Liquid Base] : liquid base [Cervical Pap Smear] : cervical Pap smear [GC Chlamydia Culture] : GC Chlamydia Culture [Affirm (Triple Culture)] : Affirm (triple culture) [Tolerated Well] : the patient tolerated the procedure well [No Complications] : there were no complications

## 2019-09-13 LAB
C TRACH RRNA SPEC QL NAA+PROBE: NOT DETECTED
CANDIDA VAG CYTO: NOT DETECTED
G VAGINALIS+PREV SP MTYP VAG QL MICRO: DETECTED
HBV SURFACE AG SER QL: NONREACTIVE
HCG SERPL-MCNC: <1 MIU/ML
HCV AB SER QL: NONREACTIVE
HCV S/CO RATIO: 0.13 S/CO
HIV1+2 AB SPEC QL IA.RAPID: NONREACTIVE
N GONORRHOEA RRNA SPEC QL NAA+PROBE: NOT DETECTED
SOURCE AMPLIFICATION: NORMAL
T PALLIDUM AB SER QL IA: NEGATIVE
T VAGINALIS VAG QL WET PREP: NOT DETECTED

## 2019-09-13 RX ORDER — METRONIDAZOLE 7.5 MG/G
0.75 GEL VAGINAL
Qty: 1 | Refills: 0 | Status: ACTIVE | COMMUNITY
Start: 2019-09-13 | End: 1900-01-01

## 2019-09-16 LAB — HPV HIGH+LOW RISK DNA PNL CVX: NOT DETECTED

## 2019-09-18 LAB — CYTOLOGY CVX/VAG DOC THIN PREP: ABNORMAL

## 2019-09-23 LAB
25(OH)D3 SERPL-MCNC: 8 NG/ML
ALBUMIN SERPL ELPH-MCNC: 4.4 G/DL
ALP BLD-CCNC: 78 U/L
ALT SERPL-CCNC: 14 U/L
ANION GAP SERPL CALC-SCNC: 11 MMOL/L
APPEARANCE: ABNORMAL
AST SERPL-CCNC: 17 U/L
BACTERIA: NEGATIVE
BASOPHILS # BLD AUTO: 0.03 K/UL
BASOPHILS NFR BLD AUTO: 0.5 %
BILIRUB SERPL-MCNC: 0.4 MG/DL
BILIRUBIN URINE: NEGATIVE
BLOOD URINE: NEGATIVE
BUN SERPL-MCNC: 13 MG/DL
CALCIUM SERPL-MCNC: 9.4 MG/DL
CHLORIDE SERPL-SCNC: 99 MMOL/L
CHOLEST SERPL-MCNC: 154 MG/DL
CHOLEST/HDLC SERPL: 2.3 RATIO
CO2 SERPL-SCNC: 31 MMOL/L
COLOR: NORMAL
CREAT SERPL-MCNC: 1.23 MG/DL
EOSINOPHIL # BLD AUTO: 0.15 K/UL
EOSINOPHIL NFR BLD AUTO: 2.4 %
ESTIMATED AVERAGE GLUCOSE: 126 MG/DL
GLUCOSE QUALITATIVE U: NEGATIVE
GLUCOSE SERPL-MCNC: 91 MG/DL
HBA1C MFR BLD HPLC: 6 %
HCT VFR BLD CALC: 46.3 %
HDLC SERPL-MCNC: 66 MG/DL
HGB BLD-MCNC: 14.2 G/DL
HYALINE CASTS: 1 /LPF
IMM GRANULOCYTES NFR BLD AUTO: 0.3 %
KETONES URINE: NEGATIVE
LDLC SERPL CALC-MCNC: 76 MG/DL
LEUKOCYTE ESTERASE URINE: NEGATIVE
LYMPHOCYTES # BLD AUTO: 1.03 K/UL
LYMPHOCYTES NFR BLD AUTO: 16.7 %
MAN DIFF?: NORMAL
MCHC RBC-ENTMCNC: 27.9 PG
MCHC RBC-ENTMCNC: 30.7 GM/DL
MCV RBC AUTO: 91 FL
MICROSCOPIC-UA: NORMAL
MONOCYTES # BLD AUTO: 0.41 K/UL
MONOCYTES NFR BLD AUTO: 6.7 %
NEUTROPHILS # BLD AUTO: 4.52 K/UL
NEUTROPHILS NFR BLD AUTO: 73.4 %
NITRITE URINE: NEGATIVE
PH URINE: 8
PLATELET # BLD AUTO: 179 K/UL
POTASSIUM SERPL-SCNC: 4.6 MMOL/L
PROT SERPL-MCNC: 8.5 G/DL
PROTEIN URINE: NORMAL
RBC # BLD: 5.09 M/UL
RBC # FLD: 15.1 %
RED BLOOD CELLS URINE: 3 /HPF
SODIUM SERPL-SCNC: 141 MMOL/L
SPECIFIC GRAVITY URINE: 1.01
SQUAMOUS EPITHELIAL CELLS: 11 /HPF
T4 FREE SERPL-MCNC: 1.1 NG/DL
TRIGL SERPL-MCNC: 59 MG/DL
TSH SERPL-ACNC: 2.18 UIU/ML
UROBILINOGEN URINE: NORMAL
WBC # FLD AUTO: 6.16 K/UL
WHITE BLOOD CELLS URINE: 3 /HPF

## 2019-10-15 ENCOUNTER — EMERGENCY (EMERGENCY)
Facility: HOSPITAL | Age: 34
LOS: 1 days | Discharge: HOME | End: 2019-10-15
Attending: EMERGENCY MEDICINE | Admitting: EMERGENCY MEDICINE
Payer: COMMERCIAL

## 2019-10-15 VITALS
OXYGEN SATURATION: 85 % | HEART RATE: 94 BPM | RESPIRATION RATE: 16 BRPM | SYSTOLIC BLOOD PRESSURE: 144 MMHG | TEMPERATURE: 98 F | DIASTOLIC BLOOD PRESSURE: 95 MMHG

## 2019-10-15 VITALS
TEMPERATURE: 99 F | HEART RATE: 100 BPM | SYSTOLIC BLOOD PRESSURE: 139 MMHG | OXYGEN SATURATION: 88 % | DIASTOLIC BLOOD PRESSURE: 96 MMHG | RESPIRATION RATE: 18 BRPM

## 2019-10-15 DIAGNOSIS — J95.00 UNSPECIFIED TRACHEOSTOMY COMPLICATION: ICD-10-CM

## 2019-10-15 DIAGNOSIS — Q24.9 CONGENITAL MALFORMATION OF HEART, UNSPECIFIED: Chronic | ICD-10-CM

## 2019-10-15 PROCEDURE — 99283 EMERGENCY DEPT VISIT LOW MDM: CPT

## 2019-10-15 PROCEDURE — 71045 X-RAY EXAM CHEST 1 VIEW: CPT

## 2019-10-15 PROCEDURE — 99284 EMERGENCY DEPT VISIT MOD MDM: CPT

## 2019-10-15 PROCEDURE — 71045 X-RAY EXAM CHEST 1 VIEW: CPT | Mod: 26

## 2019-10-15 NOTE — ED ADULT NURSE NOTE - NSIMPLEMENTINTERV_GEN_ALL_ED
Implemented All Universal Safety Interventions:  Stopover to call system. Call bell, personal items and telephone within reach. Instruct patient to call for assistance. Room bathroom lighting operational. Non-slip footwear when patient is off stretcher. Physically safe environment: no spills, clutter or unnecessary equipment. Stretcher in lowest position, wheels locked, appropriate side rails in place.

## 2019-10-15 NOTE — ED PROVIDER NOTE - PATIENT PORTAL LINK FT
You can access the FollowMyHealth Patient Portal offered by [facility] by registering at the following website: [email or web address]. By joining [employer]’s FollowMyHealth portal, you will also be able to view your health information using other applications (apps) compatible with our system.

## 2019-10-15 NOTE — ED ADULT TRIAGE NOTE - CHIEF COMPLAINT QUOTE
pt states, "I need my trach changed, it falls out and smells and my oxygen keeps dropping." pt denies chest pain or fevers. pt also c/o SOB on exertion.

## 2019-10-15 NOTE — ED ADULT NURSE REASSESSMENT NOTE - NS ED NURSE REASSESS COMMENT FT1
Pt with difficult IV access, MD Rollins aware. Per MD Rollins to consult ENT, no labs necessary at this time. Will continue to monitor.

## 2019-10-15 NOTE — ED PROVIDER NOTE - OBJECTIVE STATEMENT
33 y/o F with PMHx HTN, HLD, pafib, CHF, DVT, congenital hypoventilation syndome, hx PNA/respiratory failure now w/ trach (to RA) presents to the ED with trach issues. Pt states the port to her trach came off 3 days ago and started smelling. Pt at baseline cough, no fever or chills. Pt is not on any medications now and feels okay. Pt's O2 Sat on room air is usually in the low 90s, however is 88% in the ED.

## 2019-10-15 NOTE — ED PROVIDER NOTE - NSFOLLOWUPINSTRUCTIONS_ED_ALL_ED_FT
Follow up with Dr. Fishman tomorrow.    Return to ED with any complications.    Tracheostomy, Care After  This sheet gives you information about how to care for yourself after your procedure. Your health care provider may also give you more specific instructions. If you have problems or questions, contact your health care provider.  What can I expect after the procedure?  After the procedure, it is common to have:  Crusting and slight bleeding around the tracheostomy opening.Inability to speak with your normal voice.Increased coughing with mucus.Limited sense of smell and taste.Follow these instructions at home:  Tracheostomy care     Change your bandage (dressing) as told by your health care provider. Wash your hands with soap and water before touching your dressing or any part of your tracheostomy.Make sure that you and your caregivers know how to:  Clean and replace your breathing tube (tracheostomy tube).Keep your tracheostomy opening free of mucus.Suction your windpipe (trachea).Care for the skin around your tracheostomy opening.Protect your trachea and lungs from dry air. To do this:  Cover the opening of your tracheostomy tube with moist gauze or a rubber collar (stoma bib or shower shield), as recommended by your health care provider.Use a humidifier in your home to add moisture to the air.When you change the tracheostomy hemphill around your neck, make sure that you can fit two fingers between the hemphill and your neck.Always have supplies available for tracheostomy care.Eating and drinking     Image   Follow instructions from your health care provider about any eating or drinking restrictions.Try adding spices and herbs to foods to improve flavor.Drink enough fluids to keep your urine pale yellow and to keep your mucus moist.Activity     Avoid activities that require a lot of energy (strenuous activities) for 2 weeks, or as long as told by your health care provider. Ask your health care provider what activities are safe for you and when you may return to your normal activities.Do not drive for 24 hours if you were given a medicine to help you relax (sedative) during your procedure.General instructions     Keep water out of your tracheostomy tube.  When you take a bath or shower, cover your tracheostomy tube with a shower shield, as told by your health care provider.Do not swim.Work with a speech therapist to find the best way for you to communicate. There are many options for communicating with a tracheostomy.Do not use any products that contain nicotine or tobacco, such as cigarettes and e-cigarettes. These can delay incision healing and make it harder to breathe. If you need help quitting, ask your health care provider.Take over-the-counter and prescription medicines only as told by your health care provider.Wear a medical alert bracelet or necklace that says that you have a tracheostomy.Keep all follow-up visits as told by your health care provider. This is important.Contact a health care provider if you have:  A fever or chills.A cough that does not go away and produces more mucus.Increased crusting, bleeding, or irritation around your tracheostomy opening.Mucus that smells bad or is discolored.Questions about how to care for your tracheostomy.Get help right away if:  You have trouble breathing.Your tracheal tube comes out and you cannot replace it.You have persistent bleeding or unusual drainage from your tracheostomy opening.You start to choke after eating.Summary  After a tracheostomy, it is common to have crusting and slight bleeding around the tracheostomy opening.Make sure that you and your caregivers know how to clean and replace your breathing tube (tracheostomy tube).Keep water out of your tracheostomy tube. When you take a bath or shower, cover your tracheostomy tube with a shower shield, as told by your health care provider.Get help right away if you have trouble breathing.This information is not intended to replace advice given to you by your health care provider. Make sure you discuss any questions you have with your health care provider.    Document Released: 07/15/2015 Document Revised: 11/15/2018 Document Reviewed: 11/15/2018  [employer] Interactive Patient Education © 2019 [employer]

## 2019-10-15 NOTE — ED PROVIDER NOTE - PMH
room air
Afib    Chronic systolic congestive heart failure    DVT (deep venous thrombosis)    GIB (gastrointestinal bleeding)    HLD (hyperlipidemia)    HTN (hypertension)    Malignant neoplasm    Myocardial infarction    Trachea displaced

## 2019-10-15 NOTE — ED PROVIDER NOTE - CLINICAL SUMMARY MEDICAL DECISION MAKING FREE TEXT BOX
35 y/o F with extensive PMHx (see HPI) presents with trach issues, including port to trach falling off and smelling. Plan for labs, CXR and consult pt's PCP Dr. Fishman. 35 y/o F with extensive PMHx (see HPI) presents with trach issues, including port to trach falling off and smelling. Plan for labs, CXR and consult pt's PCP Dr. Fishman.  As per Dr. Fishman - requests ENT.  Trach changed without complication.  To follow up with Dr. Fishman tomorrow.

## 2019-10-15 NOTE — ED ADULT NURSE NOTE - OBJECTIVE STATEMENT
Pt presents to ED c/o low O2 sat. Pt with Hx of trach 2/2 congenital hypoventilation syndrome per chart review presents today with trach problem. Pt states "the balloon came off" x3-4 days ago, since then pt reports desaturation with walking and increased O2 demand, does not usually use O2 at home. Pt endorses leakage around trach only with coughing, though pt denies new onset cough, f/c, pain at site. Pt with O2 sat 88% on room air in triage, pt upgraded to MD Rollins.

## 2019-10-16 ENCOUNTER — APPOINTMENT (OUTPATIENT)
Dept: PULMONOLOGY | Facility: CLINIC | Age: 34
End: 2019-10-16
Payer: MEDICAID

## 2019-10-16 VITALS
DIASTOLIC BLOOD PRESSURE: 84 MMHG | SYSTOLIC BLOOD PRESSURE: 126 MMHG | OXYGEN SATURATION: 85 % | HEART RATE: 106 BPM | WEIGHT: 293 LBS | HEIGHT: 68 IN | BODY MASS INDEX: 44.41 KG/M2 | TEMPERATURE: 98.8 F

## 2019-10-16 DIAGNOSIS — Z98.890 OTHER SPECIFIED POSTPROCEDURAL STATES: ICD-10-CM

## 2019-10-16 DIAGNOSIS — E66.2 MORBID (SEVERE) OBESITY WITH ALVEOLAR HYPOVENTILATION: ICD-10-CM

## 2019-10-16 DIAGNOSIS — I82.409 ACUTE EMBOLISM AND THROMBOSIS OF UNSPECIFIED DEEP VEINS OF UNSPECIFIED LOWER EXTREMITY: ICD-10-CM

## 2019-10-16 DIAGNOSIS — J96.21 ACUTE AND CHRONIC RESPIRATORY FAILURE WITH HYPOXIA: ICD-10-CM

## 2019-10-16 PROCEDURE — 99214 OFFICE O/P EST MOD 30 MIN: CPT

## 2019-10-17 NOTE — ASSESSMENT
[FreeTextEntry1] : 34 year old woman with a tracheostomy that was placed 4 years ago in the setting of acute hypercapnic respiratory failure (related to her hx of OHS and congenital hypoventilation) as well as in the setting of pneumonia.  \par \par 1. Tracheostomy.\par 2. OHS\par 3. DVT\par 4. Acute on chronic hypoxic respiratory failure\par \par Compliant with ventilator at home. Has pending appointment with Bariatric surgery. Wants to travel back to [place]. Noted to be hypoxic on room air with minimal exertion to as low as 83% \par \par Recommendation\par Will perscribe portable oxygen 2 L per minute\par Recommend follow up with [facility] once she moves to [place]. \par \par \par I discussed with the patient and need to control her calorie intake. I did review with the patient her diet. Patient has excessive carbohydrates and dullness in today during the day. I reviewed with the patient the need to cut down on her calories and carbohydrates intake.\par

## 2019-10-17 NOTE — HISTORY OF PRESENT ILLNESS
[FreeTextEntry1] : 34 year old female with pmh of HTN, HLD, ?A-fib, h/o DVT ( provoked during pregnancy ), Obesity c/w obesity hypoventilation, Congenital hypoventilation with ventilator dependence at night and trach dependence who is here for follow up. \par Patient was in [facility] ED yesterday complaining of foul smell from trach. She was seen by ENT in the ED and trach was changed with resolution of the smell. \par \par Patient noted to be hypoxic on ambulation during last office visit. noted to be hypoxic today as well. Denies SOB or cough. Has mild GAYTAN but able to walk up to 3 blocks and has no difficulty completing her ADLs. \par \par Wants to move back to [place].

## 2019-10-17 NOTE — PHYSICAL EXAM
[Normal Appearance] : normal appearance [Well Groomed] : well groomed [General Appearance - Well Nourished] : well nourished [No Deformities] : no deformities [General Appearance - In No Acute Distress] : no acute distress [Normal Conjunctiva] : the conjunctiva exhibited no abnormalities [Eyelids - No Xanthelasma] : the eyelids demonstrated no xanthelasmas [Neck Cervical Mass (___cm)] : no neck mass was observed [Jugular Venous Distention Increased] : there was no jugular-venous distention [Thyroid Diffuse Enlargement] : the thyroid was not enlarged [Thyroid Nodule] : there were no palpable thyroid nodules [Heart Rate And Rhythm] : heart rate and rhythm were normal [Heart Sounds] : normal S1 and S2 [Murmurs] : no murmurs present [Respiration, Rhythm And Depth] : normal respiratory rhythm and effort [Exaggerated Use Of Accessory Muscles For Inspiration] : no accessory muscle use [Abdomen Soft] : soft [Abdomen Tenderness] : non-tender [Abdomen Mass (___ Cm)] : no abdominal mass palpated [Nail Clubbing] : no clubbing of the fingernails [Cyanosis, Localized] : no localized cyanosis [Petechial Hemorrhages (___cm)] : no petechial hemorrhages [Skin Color & Pigmentation] : normal skin color and pigmentation [] : no rash [No Venous Stasis] : no venous stasis [Skin Lesions] : no skin lesions [No Skin Ulcers] : no skin ulcer [No Xanthoma] : no  xanthoma was observed [Deep Tendon Reflexes (DTR)] : deep tendon reflexes were 2+ and symmetric [Sensation] : the sensory exam was normal to light touch and pinprick [No Focal Deficits] : no focal deficits [Oriented To Time, Place, And Person] : oriented to person, place, and time [Impaired Insight] : insight and judgment were intact [Affect] : the affect was normal [FreeTextEntry1] : distant breath sounds due to body habitus.

## 2019-10-19 ENCOUNTER — CHART COPY (OUTPATIENT)
Age: 34
End: 2019-10-19

## 2019-10-22 ENCOUNTER — APPOINTMENT (OUTPATIENT)
Dept: OBGYN | Facility: CLINIC | Age: 34
End: 2019-10-22

## 2019-10-23 ENCOUNTER — APPOINTMENT (OUTPATIENT)
Dept: BARIATRICS | Facility: CLINIC | Age: 34
End: 2019-10-23

## 2019-11-01 ENCOUNTER — RX RENEWAL (OUTPATIENT)
Age: 34
End: 2019-11-01

## 2020-05-01 ENCOUNTER — OUTPATIENT (OUTPATIENT)
Dept: OUTPATIENT SERVICES | Facility: HOSPITAL | Age: 35
LOS: 1 days | End: 2020-05-01
Payer: MEDICAID

## 2020-05-01 DIAGNOSIS — Q24.9 CONGENITAL MALFORMATION OF HEART, UNSPECIFIED: Chronic | ICD-10-CM

## 2020-05-01 PROCEDURE — G9001: CPT

## 2020-05-18 DIAGNOSIS — Z71.89 OTHER SPECIFIED COUNSELING: ICD-10-CM

## 2020-12-30 NOTE — ED PROVIDER NOTE - CONSTITUTIONAL, MLM
Rajwinder Olsen  : 1955  [place]  Secondary: [address] at [facility]  [address] [facility], [address]  [phone]   [phone]      OUTPATIENT PHYSICAL THERAPY: Daily Treatment Note 2020    ICD-10: Treatment Diagnosis: M62.838 trapezius muscle spasm                 Treatment Diagnosis 2: M25.512 acute pain of left shoulder                 Treatment Diagnosis 3:R29.899 left leg weakness  Precautions: weal left quads -falls precautions-dizziness and headaches   Allergies: Rocephin [ceftriaxone], Chocolate flavor, and Codeine   TREATMENT PLAN:  Effective Dates: 10/28/2020 TO 2020 (60 days). Frequency/Duration: 2 times a week for 60 Day(s) MEDICAL/REFERRING DIAGNOSIS:  Other symptoms and signs involving the musculoskeletal system [R29.898]  Occipital neuralgia [M54.81]  Other muscle spasm [M62.838]  Pain in left shoulder [M25.512]   DATE OF ONSET: 6--stroke affecting left side   REFERRING PHYSICIAN: Timothy Wayne MD MD Orders: Evaluate and Treat   Return MD Appointment: unsure     Pre-treatment Symptoms/Complaints:  Patient reports no pain today     Pain: Initial: Pain Intensity 1: 0  Pain Location 1: Shoulder  Pain Orientation 1: Left  Post Session:  0/10    Medications Last Reviewed:  2020  Updated Objective Findings: ROM WFL'S all planes    TREATMENT:   THERAPEUTIC EXERCISE: 28 minutes):  Exercises per grid below to improve mobility, strength, balance and coordination. Required minimal verbal cues to promote proper body alignment, promote proper body posture and promote proper body mechanics. Progressed resistance, range and repetitions as indicated.      Date:  2020 Date:  20 Date:  2020   Activity/Exercise Parameters Parameters Parameters   Backward shoulder rolls &  shrugs 2 x 10 2x10 reps     Pulley in flexion/scaption  ---    Sit to stand  ---    Scapular retraction Green 2 x 10 Green 2x10 reps Blue 3 x 10   Low rows Green 3 x 10 Green, 2 x 10 Blue 3 x 10   Shoulder flexion Green 2 x 10 green, 2 x 10 Green 2 x 10   Shoulder ext Green 2 x 10 green, 2 x 10    Shoulder ext rotation Yellow 2 x 10 Green, 2 x 10 Green 3 x 10   Shoulder rows  Green 3 x 10 Green, 2 x 10  Blue 3 x 10   Horizontal abduction   Green 3 x 10 at bar and 2 x 10 with band   Bilateral ER   Green 3 x 10           Time spent with patient reviewing proper muscle recruitment and technique with exercises. MANUAL THERAPY: (15 minutes): Soft tissue mobilization was utilized and necessary because of the patient's restricted motion of soft tissue   Soft tissue mobilization to left cervicals ,rhomboids,lateral arm,anterior shoulder and upper trapezius emphasizing left  while seated to decreased tightness. MODALITIES: (10 minutes): *  Ultrasound was used today secondary to the patient having tightened structures limiting joint motion that require an increase in extensibility. Ultrasound was used today to reduce pain, reduce spasm, reduce joint stiffness and increase muscle flexibility. Ultrasound at 1.5 w/cm2 x 10 minutes at 1.5 cm2 to left cervical paraspinals and deltoid to decrease pain and tightness. HEP: As above; handouts given to patient for all exercises. Treatment/Session Summary:    · Response to Treatment:  No C/O'S with exercises  · Communication/Consultation: none today  · Equipment provided today:  None today  · Recommendations/Intent for next treatment session: Next visit will focus on left shoulder mobility and rehab  .     Total Treatment Billable Duration: 53 minutes      PT Patient Time In/Time Out  Time In: 0946  Time Out: [address] [address], [facility] normal... Well appearing, obese, awake, alert and in no apparent distress.

## 2021-03-11 PROBLEM — E66.2 OBESITY HYPOVENTILATION SYNDROME: Status: ACTIVE | Noted: 2019-01-14

## 2021-03-11 PROBLEM — E66.2 OBESITY HYPOVENTILATION SYNDROME: Status: RESOLVED | Noted: 2019-01-14 | Resolved: 2021-03-11

## 2021-07-13 NOTE — ED ADULT NURSE NOTE - NS PRO PASSIVE SMOKE EXP
Health Maintenance Due   Topic Date Due   • Shingles Vaccine (1 of 2) Never done       Patient is due for topics as listed above information given and patient will call insurance to verify coverage.   Unknown

## 2021-08-20 NOTE — ED ADULT NURSE NOTE - NEURO ASSESSMENT
Patient (s) 1 given copy of dc instructions and 0 paper script(s) and 1 electronic scripts. Patient (s)  verbalized understanding of instructions and script (s). Patient given a current medication reconciliation form and verbalized understanding of their medications. Patient (s) verbalized understanding of the importance of discussing medications with  his or her physician or clinic they will be following up with. Patient alert and oriented and in no acute distress. WDL

## 2022-03-09 NOTE — PROGRESS NOTE ADULT - EYES
Awake
EOMI; PERRL; no drainage or redness

## 2022-03-16 NOTE — PROGRESS NOTE ADULT - RS GEN PE MLT RESP DETAILS PC
MD at bedside  
rales
rhonchi
rhonchi
diffuse/rhonchi
diminished breath sounds, L/diminished breath sounds, R
rhonchi
rhonchi/diffuse
diminished breath sounds, L/diminished breath sounds, R
rhonchi

## 2022-03-24 NOTE — ED ADULT NURSE NOTE - CHPI ED SYMPTOMS NEG
After obtaining consent, and per orders of Dr. Courtney Clark, injection of Td given in Left deltoid by Zhane Montano. Patient instructed to remain in clinic for 20 minutes afterwards, and to report any adverse reaction to me immediately.
no back pain

## 2022-11-09 NOTE — H&P ADULT - ASSESSMENT
"S/w patient made aware that per Dr. Craft note,  \"All testing for hypercoagulable state was normal.  No evidence of underlying hypercoagulable condition.  Patient should complete 3 months of anticoagulation for her provoked upper extremity DVT.  She will finish this in early November.  No need to follow-up with hematology unless new concerns arise\"  Pt states she was nervous to stop the anticoag but understands that she no longer requires.  No further questions at this time  " Ms Pardo is a 33F poor historian with documented PMHx of HTN, HLD, pAfib, congenital cardiac malformation s/p repair, DVT in 2008 (s/p Coumadin last dose 2009), h/o UGIB s/p endoscopy, HOCM (last TTE in 2/2017 with reported severe asymmetric septal ventricular hypertrophy without obstructive outflow tract), prolonged hospitalization in 1/2017 due to hypercapnic respiratory failure 2/2 MSSA pneumonia c/b difficulty with extubation due to failed CPAP trials believed to be due to central hypoventilation syndrome (given family hx) for which patient had tracheostomy and PEG placement followed by discharge to rehab facility and lost to follow up. She now presents for decannulation.

## 2023-03-11 NOTE — ED ADULT NURSE NOTE - NSIMPLEMENTINTERV_GEN_ALL_ED
Implemented All Universal Safety Interventions:  [place] to call system. Call bell, personal items and telephone within reach. Instruct patient to call for assistance. Room bathroom lighting operational. Non-slip footwear when patient is off stretcher. Physically safe environment: no spills, clutter or unnecessary equipment. Stretcher in lowest position, wheels locked, appropriate side rails in place.
Accidental fall

## 2023-05-25 NOTE — ED ADULT NURSE NOTE - NSFALLRSKASSESSDT_ED_ALL_ED
[place] HOMELESS SHELTERS     Guest House  Contact: Shelter Manager  [phone] [address]  Houses up to 86 men for up to 30 days. Must be in by 6:00pm and out by 7:30am.    [facility]  [phone]  [address] [address]  Women with children for up to 30 days.  No single women.    [facility]  [phone]  [address] [address]  Families only for up to 30 days.  service. Call ahead.    [facility]  [phone]  [address] [address]  Emergency facility for men.  Transitional housing for single women and families: must have a referral from an emergency shelter, application process, and interview in order to be accepted.    [facility]  [phone]  [address]  24-hour respite  for up to 3 days. Ages  to 12 years old.    [facility]  [phone]  [address] [address]  Housing for up to 250 men.  Must be in by 6:30pm for prayer service.    [facility]  [phone]  [address] [address] (Entrance off [facility])  Housing for up to100 people. Associated with [facility]: houses women with children aged 10 years or under. No single women. Program based shelter. Call ahead.    [facility]  [phone]  [address] [address]  Telephone for information and requirements.  Assessment shelter and Emergency Shelter for single women, women with children, or men with children. No single men.    [facility]  [phone] [address]  Daily 6:00am-10:00pm. Must call before 3:00pm.  A special needs facility. Accepts elderly, physical or mental handicapped. Call ahead for details.    [facility]  [phone]  24 hr hotline.  Housing and services for battered women and children.    [facility]  [phone]  24 hr hotline  Housing and services for battered women and children.    [facility]  [phone]  [address]  Daytime only. Men and women.    [facility]  [phone]  Ages 11-17 years old.    [facility]  [phone]  Ages 11-17 years old.     [address] Call [phone] (from within the medical center) Or Call 211 (from outside the medical center)       Warming Shelters   Open when it's below certain temperatures:    [facility] ([address] - open during the day 7am-1pm M-Sat. If temperature is below 20 degrees, is open for overnight stay but patients have to be there by 7pm    [facility] ([address] - if temperature is below 32 degrees they can show up at any time but can only house 30 people, so encouraged to show up before 10pm    [facility] 1-636.948.4056. [employer] phones available. .     [facility]     24 hour Emergency Information  Mental Health Crisis Line: [phone]  [facility] Crisis Line: 631.872.3610  [facility] Psychiatric Crisis Services (PCS)/Admissions: [phone]  [facility]: [phone]  [facility] (Domestic Abuse Shelter): [phone]  [facility]: [phone]  Elder Care Emergency after hours: [phone]  National Human Trafficking Resource Center: 600.410.1114     Information and Referral  Community Advocates: [phone]  Community Information Line/IMPACT: 211 or 376-034-3514  Department on Agin175.528.3721  Hunger Task Force:  837.516.2239  Mental Health [facility]: [phone]  [facility] Health and Human Services Department: [phone]  National [place] on Mental Illness (ARABELLA): 125.720.4876  Parent Helpline: 636.156.4433  Social Security Administration: 519.912.2561      Alcohol +  Drug Abuse  Alcoholics Anonymous: 433.987.8847  Alcoholics Anonymous Family Groups: 599.135.1675  Cocaine Anonymous: 174.581.5837  First Step Detox (Uninsured [facility] Residents): [phone]  Addiction and Recovery Help line - Please call Praveen  Choice/[facility]act (Uninsured [facility] Residents - all levels of care): [phone] or call 211  Narcotics Anonymous:  [phone] [phone] (Luxem[place]ish)     Remote Recovery Resources  https://www.RailComm/meetings/agyqqe-sw-ifvoelhf  https://aachats.org/cv-htfkxrga-xlhsty  https://www.smartrecovery.org  https://Archivas-Muzeek.org  https://www.ca-online.org  https://www.[employer]  https://heroinanonymous.org     15-Delgado-2019 12:14

## 2024-01-01 NOTE — PROGRESS NOTE ADULT - ASSESSMENT
Infant with episodes of apnea/bradycardia following extubation, consistent with prematurity. Receiving caffeine since 6/19.    Last episodes:  Date/Time Apnea Count Apnea (secs) Bradycardia Rate Bradycardia (secs) Event SpO2 Color Change Intervention Activity Prior to Event Position Prior to Event Choking New Intervention   07/05/24 1156 -- -- 72 12 secs 72 Pink Self limiting;Tactile stimulation Sleeping Prone No None       Plan:  Continue caffeine 10mg/kg daily  Follow episode frequency  Must be episode free for 3-5 days to facilitate safe discharge   33F with PMH of hypercapnic respiratory failure 2/2 MSSA PNA c/b difficult extubation for which patient had trach + PEG placed, congenital hypoventilation syndrome (heterozygous for PHOX2B gene), congenital cardiac malformation s/p repair, TTE 2/2017 showing severe asymmetric septal hypertrophy, DVT in 2008 (s/p Coumadin), UGIB s/p endoscopy, pAfib, HTN, HLD, who presented to [facility] for decannulation. Stepped up to MICU for CO2 toxicosis. Trach was emergently exchanged by ENT and Pt received AC with improvement in symptoms. Stepped back down to 7Lach.

## 2024-01-04 NOTE — ED ADULT NURSE NOTE - NS ED NOTE  TALK SOMEONE YN
----- Message from Ildefonso Sung MD sent at 1/3/2024  5:08 PM CST -----  Result:  MRI lumbar shows previous L5-S1 postsurgical level with diffuse disc bulge having decreased disc extrusion left lateral recess with ongoing moderate-to-severe bilateral foraminal stenosis.  At L4-5 there is a diffuse disc bulge causing moderate bilateral foraminal stenosis.  At L3-4 there is a diffuse disc bulge with a new extruded and sequestered fragment on the right side measuring 1.2 cm causing significant impingement upon the right L4 nerve root.  There is also moderate to severe canal narrowing on the right.  At L2-3 there is a prominent right-sided disc bulge causing lateral recess stenosis impacting the L3 descending nerve root.    Plan:  Please schedule patient for a transforaminal epidural steroid injection right L4 in procedure room.  One week later please schedule patient for a transforaminal epidural steroid injection right L3 in procedure room.  Based upon overall diagnostic versus therapeutic relief we will consider referral to surgery for decompression if necessary.       No

## 2024-03-08 NOTE — ED ADULT NURSE NOTE - CAS TRG GEN SKIN COLOR
Quality 47: Advance Care Plan: Advance Care Planning discussed and documented in the medical record; patient did not wish or was not able to name a surrogate decision maker or provide an advance care plan. Detail Level: Detailed Quality 226: Preventive Care And Screening: Tobacco Use: Screening And Cessation Intervention: Patient screened for tobacco use and is an ex/non-smoker Quality 130: Documentation Of Current Medications In The Medical Record: Current Medications Documented Normal for race

## 2024-04-04 NOTE — ED PROVIDER NOTE - OBJECTIVE STATEMENT
Urine culture sent to confirm, to identify pathogen, and to clarify sensitivities.   Will augment treatment plan as necessary pending culture results.  Macrobid is prescribed for antibiotic treatment  Take the antibiotics until completed, do not stop unless otherwise directed by a healthcare provider.  Recommend daily yogurt or other probiotics while on antibiotics.  Increase water intake during treatment.  Can take ibuprofen (Motrin or Advil) or Acetaminophen (Tylenol) for pain symptoms.  If fevers, shivering, nausea, vomiting, shortness of breath, chest pain, if severe abdominal or back pain develops, or if symptoms continue to worsen despite treatment plan, follow up with the ER for further evaluation.  Follow up with your PCP within 5 days or, if unable, you can return to the clinic if symptoms persist beyond 5 days or if symptoms worsen.    New Prescriptions    NITROFURANTOIN, MACROCRYSTAL-MONOHYDRATE, (MACROBID) 100 MG CAPSULE    Take 1 capsule by mouth 2 times daily for 5 days      33 F co bleeding from trach- resp failure 2 years ago- has trach in place- co bleeding from trach for 2 days- foul smell from trach for weeks  states trach has never been changed  mild-mod amt of blood coming out the trach, cinthia with coughing  states she uses a 'ventilator' at night with the trach- but unsure the reason  mild-moderate severity 33 F co bleeding from trach- resp failure 2 years ago- has trach in place- co int bleeding from trach for 2 days- foul smell from trach for weeks  states trach has never been changed- requesting trach removal  mild-mod amt of blood coming out the trach, cinthia with coughing  states she uses a 'ventilator' at night with the trach- but unsure the reason  mild-moderate severity

## 2024-04-04 NOTE — DIETITIAN INITIAL EVALUATION ADULT. - PROBLEM/PLAN-1
DISPLAY PLAN FREE TEXT
Pt is scheduled for a cerebral angiogram and embolization with Dr. Roe on 4/11/24 at the interventional radiology suite  CBC, BMP, T/S, ARU and PRU ordered and obtained at [facility]  ABO on admit  Chlorhexidine soap and instructions reviewed and provided to pt with teach back understanding

## 2024-07-08 NOTE — ED ADULT TRIAGE NOTE - CADM TRG TX PRIOR TO ARRIVAL
Subjective     Patient ID: Madiha Liao is a 64 y.o. female.    Chief Complaint: Anxiety    HPI  Pt with Malignant brain tumor, Postablative hypothyroidism 2/2 Grave's disease, HLD, Anxiety, Insomnia is here for 6 month f/u. Her anxiety has been getting worse lately a/w anger issues. She would like a refill of her Xanax and also something for insomnia.   Review of Systems   Constitutional:  Negative for activity change, appetite change, chills, diaphoresis, fatigue, fever and unexpected weight change.   HENT:  Negative for postnasal drip, rhinorrhea, sinus pressure/congestion, sneezing, sore throat, trouble swallowing and voice change.    Respiratory:  Negative for cough, shortness of breath and wheezing.    Cardiovascular:  Negative for chest pain, palpitations and leg swelling.   Gastrointestinal:  Negative for abdominal pain, blood in stool, constipation, diarrhea, nausea and vomiting.   Genitourinary:  Negative for dysuria.   Musculoskeletal:  Negative for arthralgias and myalgias.   Integumentary:  Negative for rash and wound.   Allergic/Immunologic: Negative for environmental allergies and food allergies.   Hematological:  Negative for adenopathy. Does not bruise/bleed easily.   Psychiatric/Behavioral:  Positive for sleep disturbance. Negative for self-injury and suicidal ideas. The patient is nervous/anxious.           Objective     Physical Exam  Constitutional:       General: She is not in acute distress.     Appearance: Normal appearance. She is well-developed. She is not diaphoretic.   HENT:      Head: Normocephalic and atraumatic.      Right Ear: External ear normal.      Left Ear: External ear normal.      Nose: Nose normal.      Mouth/Throat:      Pharynx: No oropharyngeal exudate.   Eyes:      General: No scleral icterus.        Right eye: No discharge.         Left eye: No discharge.      Conjunctiva/sclera: Conjunctivae normal.      Pupils: Pupils are equal, round, and reactive to light.   Neck:       Vascular: No JVD.   Cardiovascular:      Rate and Rhythm: Normal rate and regular rhythm.      Pulses: Normal pulses.      Heart sounds: Normal heart sounds.   Pulmonary:      Effort: Pulmonary effort is normal. No respiratory distress.      Breath sounds: Normal breath sounds. No wheezing, rhonchi or rales.   Abdominal:      General: Bowel sounds are normal. There is no distension.      Palpations: Abdomen is soft.      Tenderness: There is no abdominal tenderness. There is no guarding or rebound.   Musculoskeletal:      Cervical back: Neck supple.      Right lower leg: No edema.      Left lower leg: No edema.   Lymphadenopathy:      Cervical: No cervical adenopathy.   Skin:     General: Skin is warm and dry.      Coloration: Skin is not pale.      Findings: No rash.   Neurological:      General: No focal deficit present.      Mental Status: She is alert and oriented to person, place, and time.      Gait: Gait normal.   Psychiatric:         Behavior: Behavior normal.         Thought Content: Thought content normal.            Assessment and Plan     1. Brain tumor  Overview:  underwent surgery,radiation followed by chemo.      2. Anxiety  -     ALPRAZolam (XANAX) 0.25 MG tablet; Take 1 tablet (0.25 mg total) by mouth nightly as needed for Insomnia or Anxiety.  Dispense: 30 tablet; Refill: 2    3. Insomnia, unspecified type  -     traZODone (DESYREL) 50 MG tablet; 1-2 tabs PO qHS PRN insomnia  Dispense: 60 tablet; Refill: 3    4. Postablative hypothyroidism    5. Hyperlipidemia, unspecified hyperlipidemia type    6. 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

## 2025-06-20 NOTE — ED ADULT NURSE NOTE - CHPI ED SYMPTOMS NEG
no fever/no syncope/no back pain/no diaphoresis/no dizziness/no chills/productive cough, HA, visual changes, numbness and tingling to extremities, CP, urinary symptoms. [Negative] : Heme/Lymph [As Noted in HPI] : as noted in HPI